# Patient Record
Sex: FEMALE | Race: WHITE | NOT HISPANIC OR LATINO | URBAN - METROPOLITAN AREA
[De-identification: names, ages, dates, MRNs, and addresses within clinical notes are randomized per-mention and may not be internally consistent; named-entity substitution may affect disease eponyms.]

---

## 2017-05-11 ENCOUNTER — APPOINTMENT (RX ONLY)
Dept: URBAN - METROPOLITAN AREA CLINIC 349 | Facility: CLINIC | Age: 72
Setting detail: DERMATOLOGY
End: 2017-05-11

## 2017-05-11 DIAGNOSIS — L81.4 OTHER MELANIN HYPERPIGMENTATION: ICD-10-CM | Status: IMPROVED

## 2017-05-11 DIAGNOSIS — D22 MELANOCYTIC NEVI: ICD-10-CM | Status: STABLE

## 2017-05-11 DIAGNOSIS — F42.4 EXCORIATION (SKIN-PICKING) DISORDER: ICD-10-CM

## 2017-05-11 DIAGNOSIS — L28.1 PRURIGO NODULARIS: ICD-10-CM

## 2017-05-11 DIAGNOSIS — D485 NEOPLASM OF UNCERTAIN BEHAVIOR OF SKIN: ICD-10-CM

## 2017-05-11 DIAGNOSIS — B07.8 OTHER VIRAL WARTS: ICD-10-CM

## 2017-05-11 DIAGNOSIS — L21.8 OTHER SEBORRHEIC DERMATITIS: ICD-10-CM | Status: STABLE

## 2017-05-11 PROBLEM — D22.5 MELANOCYTIC NEVI OF TRUNK: Status: ACTIVE | Noted: 2017-05-11

## 2017-05-11 PROBLEM — D22.62 MELANOCYTIC NEVI OF LEFT UPPER LIMB, INCLUDING SHOULDER: Status: ACTIVE | Noted: 2017-05-11

## 2017-05-11 PROBLEM — D22.61 MELANOCYTIC NEVI OF RIGHT UPPER LIMB, INCLUDING SHOULDER: Status: ACTIVE | Noted: 2017-05-11

## 2017-05-11 PROBLEM — E03.9 HYPOTHYROIDISM, UNSPECIFIED: Status: ACTIVE | Noted: 2017-05-11

## 2017-05-11 PROBLEM — D22.39 MELANOCYTIC NEVI OF OTHER PARTS OF FACE: Status: ACTIVE | Noted: 2017-05-11

## 2017-05-11 PROBLEM — L29.8 OTHER PRURITUS: Status: ACTIVE | Noted: 2017-05-11

## 2017-05-11 PROBLEM — J30.1 ALLERGIC RHINITIS DUE TO POLLEN: Status: ACTIVE | Noted: 2017-05-11

## 2017-05-11 PROBLEM — K21.9 GASTRO-ESOPHAGEAL REFLUX DISEASE WITHOUT ESOPHAGITIS: Status: ACTIVE | Noted: 2017-05-11

## 2017-05-11 PROBLEM — F32.9 MAJOR DEPRESSIVE DISORDER, SINGLE EPISODE, UNSPECIFIED: Status: ACTIVE | Noted: 2017-05-11

## 2017-05-11 PROBLEM — D22.72 MELANOCYTIC NEVI OF LEFT LOWER LIMB, INCLUDING HIP: Status: ACTIVE | Noted: 2017-05-11

## 2017-05-11 PROBLEM — L85.3 XEROSIS CUTIS: Status: ACTIVE | Noted: 2017-05-11

## 2017-05-11 PROBLEM — I10 ESSENTIAL (PRIMARY) HYPERTENSION: Status: ACTIVE | Noted: 2017-05-11

## 2017-05-11 PROBLEM — S70.922A UNSPECIFIED SUPERFICIAL INJURY OF LEFT THIGH, INITIAL ENCOUNTER: Status: ACTIVE | Noted: 2017-05-11

## 2017-05-11 PROBLEM — D48.5 NEOPLASM OF UNCERTAIN BEHAVIOR OF SKIN: Status: ACTIVE | Noted: 2017-05-11

## 2017-05-11 PROBLEM — S70.921A UNSPECIFIED SUPERFICIAL INJURY OF RIGHT THIGH, INITIAL ENCOUNTER: Status: ACTIVE | Noted: 2017-05-11

## 2017-05-11 PROBLEM — D22.71 MELANOCYTIC NEVI OF RIGHT LOWER LIMB, INCLUDING HIP: Status: ACTIVE | Noted: 2017-05-11

## 2017-05-11 PROCEDURE — ? COUNSELING

## 2017-05-11 PROCEDURE — ? OTHER

## 2017-05-11 PROCEDURE — ? LIQUID NITROGEN

## 2017-05-11 PROCEDURE — ? PRESCRIPTION

## 2017-05-11 PROCEDURE — 99214 OFFICE O/P EST MOD 30 MIN: CPT | Mod: 25

## 2017-05-11 PROCEDURE — 17110 DESTRUCTION B9 LES UP TO 14: CPT

## 2017-05-11 PROCEDURE — ? MEDICAL PHOTOGRAPHY REVIEW

## 2017-05-11 PROCEDURE — ? OBSERVATION

## 2017-05-11 RX ORDER — CLOBETASOL PROPIONATE 0.05 %
SHAMPOO TOPICAL
Qty: 1 | Refills: 2 | Status: ERX

## 2017-05-11 ASSESSMENT — LOCATION DETAILED DESCRIPTION DERM
LOCATION DETAILED: LEFT PROXIMAL POSTERIOR THIGH
LOCATION DETAILED: LEFT DISTAL POSTERIOR THIGH
LOCATION DETAILED: LEFT DISTAL DORSAL FOREARM
LOCATION DETAILED: NASAL SUPRATIP
LOCATION DETAILED: LEFT SUPERIOR PARIETAL SCALP
LOCATION DETAILED: RIGHT PROXIMAL POSTERIOR THIGH
LOCATION DETAILED: RIGHT DISTAL RADIAL DORSAL FOREARM
LOCATION DETAILED: RIGHT DISTAL PALMAR INDEX FINGER
LOCATION DETAILED: RIGHT PROXIMAL LATERAL POSTERIOR THIGH
LOCATION DETAILED: LEFT INFERIOR MEDIAL UPPER BACK
LOCATION DETAILED: NASAL TIP
LOCATION DETAILED: LEFT SUPERIOR MEDIAL FOREHEAD

## 2017-05-11 ASSESSMENT — LOCATION ZONE DERM
LOCATION ZONE: SCALP
LOCATION ZONE: FACE
LOCATION ZONE: LEG
LOCATION ZONE: FINGER
LOCATION ZONE: NOSE
LOCATION ZONE: ARM
LOCATION ZONE: TRUNK

## 2017-05-11 ASSESSMENT — LOCATION SIMPLE DESCRIPTION DERM
LOCATION SIMPLE: NOSE
LOCATION SIMPLE: LEFT FOREARM
LOCATION SIMPLE: RIGHT POSTERIOR THIGH
LOCATION SIMPLE: RIGHT FOREARM
LOCATION SIMPLE: LEFT UPPER BACK
LOCATION SIMPLE: SCALP
LOCATION SIMPLE: LEFT FOREHEAD
LOCATION SIMPLE: RIGHT INDEX FINGER
LOCATION SIMPLE: LEFT POSTERIOR THIGH

## 2017-05-11 NOTE — PROCEDURE: LIQUID NITROGEN
Consent: The patient's consent was obtained including but not limited to risks of crusting, scabbing, blistering, scarring, darker or lighter pigmentary change, recurrence, incomplete removal and infection.
Include Z78.9 (Other Specified Conditions Influencing Health Status) As An Associated Diagnosis?: Yes
Add 52 Modifier (Optional): no
Post-Care Instructions: I reviewed with the patient in detail post-care instructions. Patient is to wear sunprotection, and avoid picking at any of the treated lesions. Pt may apply Vaseline to crusted or scabbing areas.
Medical Necessity Information: It is in your best interest to select a reason for this procedure from the list below. All of these items fulfill various CMS LCD requirements except the new and changing color options.
Medical Necessity Clause: This procedure was medically necessary because the lesions that were treated were:
Detail Level: Detailed

## 2017-05-11 NOTE — PROCEDURE: OTHER
Other (Free Text): Patient was advised to treat this site with aldara due to bleeding, patient did not treat this lesion due to going out of town, after that lesion has remained stable, and has not bled. Advised patient if this happens again to begin aldara treatment
Note Text (......Xxx Chief Complaint.): This diagnosis correlates with the
Detail Level: Detailed

## 2017-08-13 ENCOUNTER — APPOINTMENT (OUTPATIENT)
Dept: GENERAL RADIOLOGY | Age: 72
End: 2017-08-13
Attending: EMERGENCY MEDICINE
Payer: MEDICARE

## 2017-08-13 ENCOUNTER — HOSPITAL ENCOUNTER (OUTPATIENT)
Age: 72
Setting detail: OBSERVATION
Discharge: HOME | End: 2017-08-14
Attending: EMERGENCY MEDICINE | Admitting: INTERNAL MEDICINE
Payer: MEDICARE

## 2017-08-13 DIAGNOSIS — I48.91 ATRIAL FIBRILLATION, UNSPECIFIED TYPE (HCC): Primary | ICD-10-CM

## 2017-08-13 PROBLEM — I48.0 PAROXYSMAL ATRIAL FIBRILLATION (HCC): Status: ACTIVE | Noted: 2017-08-13

## 2017-08-13 LAB
ALBUMIN SERPL BCP-MCNC: 3.4 G/DL (ref 3.2–4.6)
ALBUMIN/GLOB SERPL: 1 {RATIO} (ref 1.2–3.5)
ALP SERPL-CCNC: 96 U/L (ref 50–136)
ALT SERPL-CCNC: 15 U/L (ref 12–65)
ANION GAP BLD CALC-SCNC: 9 MMOL/L (ref 7–16)
AST SERPL W P-5'-P-CCNC: 16 U/L (ref 15–37)
BASOPHILS # BLD AUTO: 0 K/UL (ref 0–0.2)
BASOPHILS # BLD: 0 % (ref 0–2)
BILIRUB SERPL-MCNC: 0.7 MG/DL (ref 0.2–1.1)
BUN SERPL-MCNC: 13 MG/DL (ref 8–23)
CALCIUM SERPL-MCNC: 8.7 MG/DL (ref 8.3–10.4)
CHLORIDE SERPL-SCNC: 108 MMOL/L (ref 98–107)
CO2 SERPL-SCNC: 27 MMOL/L (ref 21–32)
CREAT SERPL-MCNC: 0.64 MG/DL (ref 0.6–1)
DIFFERENTIAL METHOD BLD: ABNORMAL
EOSINOPHIL # BLD: 0 K/UL (ref 0–0.8)
EOSINOPHIL NFR BLD: 0 % (ref 0.5–7.8)
ERYTHROCYTE [DISTWIDTH] IN BLOOD BY AUTOMATED COUNT: 13.3 % (ref 11.9–14.6)
GLOBULIN SER CALC-MCNC: 3.4 G/DL (ref 2.3–3.5)
GLUCOSE SERPL-MCNC: 106 MG/DL (ref 65–100)
HCT VFR BLD AUTO: 43.3 % (ref 35.8–46.3)
HGB BLD-MCNC: 15.2 G/DL (ref 11.7–15.4)
IMM GRANULOCYTES # BLD: 0 K/UL (ref 0–0.5)
IMM GRANULOCYTES NFR BLD AUTO: 0.1 % (ref 0–5)
LYMPHOCYTES # BLD AUTO: 20 % (ref 13–44)
LYMPHOCYTES # BLD: 1.5 K/UL (ref 0.5–4.6)
MAGNESIUM SERPL-MCNC: 2.2 MG/DL (ref 1.8–2.4)
MCH RBC QN AUTO: 30.5 PG (ref 26.1–32.9)
MCHC RBC AUTO-ENTMCNC: 35.1 G/DL (ref 31.4–35)
MCV RBC AUTO: 86.9 FL (ref 79.6–97.8)
MONOCYTES # BLD: 0.6 K/UL (ref 0.1–1.3)
MONOCYTES NFR BLD AUTO: 7 % (ref 4–12)
NEUTS SEG # BLD: 5.6 K/UL (ref 1.7–8.2)
NEUTS SEG NFR BLD AUTO: 73 % (ref 43–78)
PHOSPHATE SERPL-MCNC: 2.8 MG/DL (ref 2.3–3.7)
PLATELET # BLD AUTO: 324 K/UL (ref 150–450)
PMV BLD AUTO: 12.4 FL (ref 10.8–14.1)
POTASSIUM SERPL-SCNC: 3.8 MMOL/L (ref 3.5–5.1)
PROT SERPL-MCNC: 6.8 G/DL (ref 6.3–8.2)
RBC # BLD AUTO: 4.98 M/UL (ref 4.05–5.25)
SODIUM SERPL-SCNC: 144 MMOL/L (ref 136–145)
TROPONIN I BLD-MCNC: 0.02 NG/ML (ref 0–0.08)
TROPONIN I BLD-MCNC: 0.03 NG/ML (ref 0–0.08)
TROPONIN I BLD-MCNC: 0.05 NG/ML (ref 0–0.08)
WBC # BLD AUTO: 7.8 K/UL (ref 4.3–11.1)

## 2017-08-13 PROCEDURE — 84100 ASSAY OF PHOSPHORUS: CPT | Performed by: EMERGENCY MEDICINE

## 2017-08-13 PROCEDURE — 87641 MR-STAPH DNA AMP PROBE: CPT | Performed by: INTERNAL MEDICINE

## 2017-08-13 PROCEDURE — 83735 ASSAY OF MAGNESIUM: CPT | Performed by: EMERGENCY MEDICINE

## 2017-08-13 PROCEDURE — 85025 COMPLETE CBC W/AUTO DIFF WBC: CPT | Performed by: EMERGENCY MEDICINE

## 2017-08-13 PROCEDURE — 99285 EMERGENCY DEPT VISIT HI MDM: CPT | Performed by: EMERGENCY MEDICINE

## 2017-08-13 PROCEDURE — 74011250637 HC RX REV CODE- 250/637: Performed by: INTERNAL MEDICINE

## 2017-08-13 PROCEDURE — 80053 COMPREHEN METABOLIC PANEL: CPT | Performed by: EMERGENCY MEDICINE

## 2017-08-13 PROCEDURE — 99218 HC RM OBSERVATION: CPT

## 2017-08-13 PROCEDURE — 71010 XR CHEST PORT: CPT

## 2017-08-13 PROCEDURE — 84484 ASSAY OF TROPONIN QUANT: CPT

## 2017-08-13 PROCEDURE — 93005 ELECTROCARDIOGRAM TRACING: CPT | Performed by: EMERGENCY MEDICINE

## 2017-08-13 RX ORDER — SODIUM CHLORIDE 0.9 % (FLUSH) 0.9 %
5-10 SYRINGE (ML) INJECTION EVERY 8 HOURS
Status: DISCONTINUED | OUTPATIENT
Start: 2017-08-13 | End: 2017-08-14 | Stop reason: HOSPADM

## 2017-08-13 RX ORDER — ESTRADIOL 1 MG/1
1 TABLET ORAL DAILY
Status: DISCONTINUED | OUTPATIENT
Start: 2017-08-14 | End: 2017-08-14 | Stop reason: HOSPADM

## 2017-08-13 RX ORDER — SODIUM CHLORIDE 0.9 % (FLUSH) 0.9 %
5-10 SYRINGE (ML) INJECTION AS NEEDED
Status: DISCONTINUED | OUTPATIENT
Start: 2017-08-13 | End: 2017-08-13

## 2017-08-13 RX ORDER — SODIUM CHLORIDE 0.9 % (FLUSH) 0.9 %
5-10 SYRINGE (ML) INJECTION AS NEEDED
Status: DISCONTINUED | OUTPATIENT
Start: 2017-08-13 | End: 2017-08-14 | Stop reason: HOSPADM

## 2017-08-13 RX ORDER — ACETAMINOPHEN, DIPHENHYDRAMINE HCL, PHENYLEPHRINE HCL 325; 25; 5 MG/1; MG/1; MG/1
10 TABLET ORAL
Status: DISCONTINUED | OUTPATIENT
Start: 2017-08-13 | End: 2017-08-14 | Stop reason: HOSPADM

## 2017-08-13 RX ORDER — PANTOPRAZOLE SODIUM 40 MG/1
40 TABLET, DELAYED RELEASE ORAL
Status: DISCONTINUED | OUTPATIENT
Start: 2017-08-14 | End: 2017-08-14 | Stop reason: HOSPADM

## 2017-08-13 RX ORDER — LEVOTHYROXINE SODIUM 100 UG/1
100 TABLET ORAL
Status: DISCONTINUED | OUTPATIENT
Start: 2017-08-14 | End: 2017-08-14 | Stop reason: HOSPADM

## 2017-08-13 RX ORDER — SODIUM CHLORIDE 0.9 % (FLUSH) 0.9 %
5-10 SYRINGE (ML) INJECTION EVERY 8 HOURS
Status: DISCONTINUED | OUTPATIENT
Start: 2017-08-13 | End: 2017-08-13

## 2017-08-13 RX ORDER — METOPROLOL TARTRATE 25 MG/1
12.5 TABLET, FILM COATED ORAL EVERY 12 HOURS
Status: DISCONTINUED | OUTPATIENT
Start: 2017-08-13 | End: 2017-08-14 | Stop reason: HOSPADM

## 2017-08-13 RX ORDER — FLECAINIDE ACETATE 100 MG/1
50 TABLET ORAL EVERY 12 HOURS
Status: DISCONTINUED | OUTPATIENT
Start: 2017-08-13 | End: 2017-08-14 | Stop reason: HOSPADM

## 2017-08-13 RX ORDER — DULOXETIN HYDROCHLORIDE 30 MG/1
30 CAPSULE, DELAYED RELEASE ORAL DAILY
Status: DISCONTINUED | OUTPATIENT
Start: 2017-08-14 | End: 2017-08-14 | Stop reason: HOSPADM

## 2017-08-13 RX ADMIN — FLECAINIDE ACETATE 50 MG: 100 TABLET ORAL at 22:09

## 2017-08-13 RX ADMIN — RIVAROXABAN 20 MG: 20 TABLET, FILM COATED ORAL at 22:09

## 2017-08-13 RX ADMIN — Medication 10 ML: at 22:10

## 2017-08-13 RX ADMIN — METOPROLOL TARTRATE 12.5 MG: 25 TABLET ORAL at 22:10

## 2017-08-13 NOTE — ED PROVIDER NOTES
HPI Comments: Patient is a 72 yo female with lightheadedness and SOB. Patient states she has felt some intermittent dizziness and palpitations since early this morning. States she got up to go to the bathroom and passed out. When EMS arrived she was in afib with rate in the 130s however was given dilt 20 then 25mg and converted shortly after arriving back to sinus. She denies any chest pain,  Or SOB at this time, however states she felt extremely \"clammy\" . Denies any fevers or chills, no abdominal pain, no headache or neck pain, no further complaints. Patient is a 71 y.o. female presenting with rapid heart beat and dizziness. The history is provided by the patient. No  was used. Rapid Heart Rate   This is a new problem. Pertinent negatives include no chest pain, no abdominal pain, no headaches and no shortness of breath. Dizziness   Pertinent negatives include no shortness of breath, no chest pain, no vomiting, no headaches and no nausea.         Past Medical History:   Diagnosis Date    Anxiety     Arthritis     Bell's palsy     in Oct. 2009 with some vertigo at times still, no other after effects    Depression     GERD (gastroesophageal reflux disease)     reflux, takes nexium    Hypertension     on medication since 2006    Hypothyroidism     Obese     Psychiatric disorder     depression    S/P Left total knee replacement using cement 5/16/2011    Unspecified hypothyroidism 5/16/2011       Past Surgical History:   Procedure Laterality Date    HX APPENDECTOMY  1961    HX BREAST LUMPECTOMY  1998    benign    HX CATARACT REMOVAL Bilateral 2014    HX CHOLECYSTECTOMY  1976    HX GASTRECTOMY  9/21/15    sleeve    HX HYSTERECTOMY  1987    HX ORTHOPAEDIC Bilateral 1994    heel spurs removed    HX ORTHOPAEDIC  2005    lower back     HX TONSILLECTOMY  1966    HX UROLOGICAL  2008/2009    bladder tack X2    THYROIDECTOMY  1980    partial    TOTAL KNEE ARTHROPLASTY Right 2010    TOTAL KNEE ARTHROPLASTY Left 2011         Family History:   Problem Relation Age of Onset    Malignant Hyperthermia Neg Hx     Pseudocholinesterase Deficiency Neg Hx     Delayed Awakening Neg Hx     Post-op Nausea/Vomiting Neg Hx     Emergence Delirium Neg Hx     Post-op Cognitive Dysfunction Neg Hx     Cancer Mother      breast    Heart Attack Mother     Cancer Father      throat    Other Father      gastric ulcer    Kidney Disease Father        Social History     Social History    Marital status:      Spouse name: N/A    Number of children: N/A    Years of education: N/A     Occupational History    Not on file. Social History Main Topics    Smoking status: Never Smoker    Smokeless tobacco: Never Used    Alcohol use 1.0 oz/week     2 Glasses of wine per week    Drug use: No    Sexual activity: No     Other Topics Concern    Not on file     Social History Narrative         ALLERGIES: Dilaudid [hydromorphone (bulk)] and Sulfa (sulfonamide antibiotics)    Review of Systems   Constitutional: Negative for chills and fever. HENT: Negative for rhinorrhea and sore throat. Eyes: Negative for visual disturbance. Respiratory: Negative for cough and shortness of breath. Cardiovascular: Positive for palpitations. Negative for chest pain and leg swelling. Gastrointestinal: Negative for abdominal pain, diarrhea, nausea and vomiting. Genitourinary: Negative for dysuria. Musculoskeletal: Negative for back pain and neck pain. Skin: Negative for rash. Neurological: Positive for dizziness. Negative for weakness and headaches. Psychiatric/Behavioral: The patient is not nervous/anxious. There were no vitals filed for this visit. Physical Exam   Constitutional: She is oriented to person, place, and time. She appears well-developed and well-nourished. HENT:   Head: Normocephalic.    Right Ear: External ear normal.   Left Ear: External ear normal.   Eyes: Conjunctivae and EOM are normal. Pupils are equal, round, and reactive to light. Neck: Normal range of motion. Neck supple. No tracheal deviation present. Cardiovascular: Normal rate, regular rhythm, normal heart sounds and intact distal pulses. No murmur heard. Pulmonary/Chest: Effort normal and breath sounds normal. No respiratory distress. Abdominal: Soft. There is no tenderness. Musculoskeletal: Normal range of motion. Neurological: She is alert and oriented to person, place, and time. No cranial nerve deficit. Skin: No rash noted. Nursing note and vitals reviewed. MDM  Number of Diagnoses or Management Options     Amount and/or Complexity of Data Reviewed  Clinical lab tests: reviewed and ordered  Tests in the radiology section of CPT®: ordered and reviewed  Tests in the medicine section of CPT®: ordered and reviewed  Review and summarize past medical records: yes    Risk of Complications, Morbidity, and/or Mortality  Presenting problems: high  Diagnostic procedures: high  Management options: high    Patient Progress  Patient progress: stable    ED Course       Procedures    Recent Results (from the past 12 hour(s))   EKG, 12 LEAD, INITIAL    Collection Time: 08/13/17  3:03 PM   Result Value Ref Range    Ventricular Rate 85 BPM    Atrial Rate 85 BPM    P-R Interval 170 ms    QRS Duration 88 ms    Q-T Interval 390 ms    QTC Calculation (Bezet) 464 ms    Calculated P Axis 68 degrees    Calculated R Axis 42 degrees    Calculated T Axis 91 degrees    Diagnosis       !! AGE AND GENDER SPECIFIC ECG ANALYSIS !!   Normal sinus rhythm  Septal infarct , age undetermined  Abnormal ECG  When compared with ECG of 15-MAR-2010 13:30,  Septal infarct is now Present     CBC WITH AUTOMATED DIFF    Collection Time: 08/13/17  3:09 PM   Result Value Ref Range    WBC 7.8 4.3 - 11.1 K/uL    RBC 4.98 4.05 - 5.25 M/uL    HGB 15.2 11.7 - 15.4 g/dL    HCT 43.3 35.8 - 46.3 %    MCV 86.9 79.6 - 97.8 FL    MCH 30.5 26.1 - 32.9 PG    MCHC 35.1 (H) 31.4 - 35.0 g/dL    RDW 13.3 11.9 - 14.6 %    PLATELET 752 511 - 462 K/uL    MPV 12.4 10.8 - 14.1 FL    DF AUTOMATED      NEUTROPHILS 73 43 - 78 %    LYMPHOCYTES 20 13 - 44 %    MONOCYTES 7 4.0 - 12.0 %    EOSINOPHILS 0 (L) 0.5 - 7.8 %    BASOPHILS 0 0.0 - 2.0 %    IMMATURE GRANULOCYTES 0.1 0.0 - 5.0 %    ABS. NEUTROPHILS 5.6 1.7 - 8.2 K/UL    ABS. LYMPHOCYTES 1.5 0.5 - 4.6 K/UL    ABS. MONOCYTES 0.6 0.1 - 1.3 K/UL    ABS. EOSINOPHILS 0.0 0.0 - 0.8 K/UL    ABS. BASOPHILS 0.0 0.0 - 0.2 K/UL    ABS. IMM. GRANS. 0.0 0.0 - 0.5 K/UL   POC TROPONIN-I    Collection Time: 08/13/17  3:18 PM   Result Value Ref Range    Troponin-I (POC) 0.02 0.0 - 0.08 ng/ml   MAGNESIUM    Collection Time: 08/13/17  5:04 PM   Result Value Ref Range    Magnesium 2.2 1.8 - 2.4 mg/dL   METABOLIC PANEL, COMPREHENSIVE    Collection Time: 08/13/17  5:04 PM   Result Value Ref Range    Sodium 144 136 - 145 mmol/L    Potassium 3.8 3.5 - 5.1 mmol/L    Chloride 108 (H) 98 - 107 mmol/L    CO2 27 21 - 32 mmol/L    Anion gap 9 7 - 16 mmol/L    Glucose 106 (H) 65 - 100 mg/dL    BUN 13 8 - 23 MG/DL    Creatinine 0.64 0.6 - 1.0 MG/DL    GFR est AA >60 >60 ml/min/1.73m2    GFR est non-AA >60 >60 ml/min/1.73m2    Calcium 8.7 8.3 - 10.4 MG/DL    Bilirubin, total 0.7 0.2 - 1.1 MG/DL    ALT (SGPT) 15 12 - 65 U/L    AST (SGOT) 16 15 - 37 U/L    Alk.  phosphatase 96 50 - 136 U/L    Protein, total 6.8 6.3 - 8.2 g/dL    Albumin 3.4 3.2 - 4.6 g/dL    Globulin 3.4 2.3 - 3.5 g/dL    A-G Ratio 1.0 (L) 1.2 - 3.5     PHOSPHORUS    Collection Time: 08/13/17  5:04 PM   Result Value Ref Range    Phosphorus 2.8 2.3 - 3.7 MG/DL   POC TROPONIN-I    Collection Time: 08/13/17  5:55 PM   Result Value Ref Range    Troponin-I (POC) 0.03 0.0 - 0.08 ng/ml   POC TROPONIN-I    Collection Time: 08/13/17  6:35 PM   Result Value Ref Range    Troponin-I (POC) 0.05 0.0 - 0.08 ng/ml     Xr Chest Port    Result Date: 8/13/2017  HISTORY: Dizziness, new onset atrial fibrillation. EXAM: AP chest radiograph PRIOR STUDY: None FINDINGS: The cardiomediastinal silhouette is normal. The lungs are clear. There is no pleural effusion or pneumothorax. IMPRESSION: No acute cardiopulmonary process. 72 yo female with new onset Afib:     Troponin trending up although still within normal limits at this time. Discussed with cardiology for evaluation and possible Echo for further management.

## 2017-08-13 NOTE — IP AVS SNAPSHOT
[address] 
[phone] Patient: Yash Block MRN: [id number]:[id number] You are allergic to the following Allergen Reactions Dilaudid (Hydromorphone (Bulk)) Swelling Sulfa (Sulfonamide Antibiotics) Rash Recent Documentation Height Weight BMI OB Status Smoking Status 1.676 m 84.1 kg 29.92 kg/m2 Hysterectomy Never Smoker Unresulted Labs Order Current Status MRSA SCREEN - PCR (NASAL) In process Emergency Contacts Name Discharge Info Relation Home Work Mobile Neno Espitia  Spouse [3] [phone] About your hospitalization You were admitted on:  August 13, 2017 You last received care in the:  [facility] 3 TELEMETRY You were discharged on:  August 14, 2017 Unit phone number:  [phone] Why you were hospitalized Your primary diagnosis was:  Not on File Your diagnoses also included:  Paroxysmal Atrial Fibrillation (Hcc), Htn (Hypertension), A-Fib (Hcc) Providers Seen During Your Hospitalizations Provider Role Specialty Primary office phone David Charles MD Attending Provider Emergency Medicine [phone] Wayne Guajardo MD Attending Provider Emergency Medicine [phone] Jj Amado MD Attending Provider Cardiology [phone] Your Primary Care Physician (PCP) Primary Care Physician Office Phone Office Fax [address] [phone] Follow-up Information Follow up With Details Comments Contact Info Stephen Ruby MD On 8/28/2017 At 1:30pm [address] 
[phone] Jj Amado MD On 8/30/2017 at 9:30 AM [address] [address] 
[phone] Your Appointments Monday August 28, 2017  1:30 PM EDT Office Visit with Stephen Ruby MD  
 [address] [facility] ([facility] [address] [facility]) 101 [facility] ([facility]) [address]  
[phone] Wednesday August 30, 2017  9:30 AM EDT HOSPITAL FOLLOW-UP with Jaylen Latif MD  
[place] OFFICE ([facility]) [address]  
[phone] Thursday September 21, 2017  1:40 PM EDT Follow Up Bariatric with Vee Beck MD  
[facility] ([address] [facility]) [address] [place] [id number] [phone] Current Discharge Medication List  
  
START taking these medications Dose & Instructions Dispensing Information Comments Morning Noon Evening Bedtime  
 flecainide 50 mg tablet Commonly known as:  TAMBOCOR Your next dose is: Tonight before bedtime Dose:  50 mg Take 1 Tab by mouth every twelve (12) hours. Quantity:  60 Tab Refills:  3  
     
   
   
   
  
  
 metoprolol tartrate 25 mg tablet Commonly known as:  LOPRESSOR Your next dose is: Tonight before bedtime Dose:  12.5 mg Take 0.5 Tabs by mouth every twelve (12) hours. Quantity:  30 Tab Refills:  3  
     
   
   
   
  
  
 rivaroxaban 20 mg Tab tablet Commonly known as:  Derral Ness Your next dose is: Today at dinner time with food Dose:  20 mg Take 1 Tab by mouth daily (with dinner). Quantity:  30 Tab Refills:  3 CONTINUE these medications which have NOT CHANGED Dose & Instructions Dispensing Information Comments Morning Noon Evening Bedtime Biotin 2,500 mcg Cap Your next dose is:  Tuesday morning Take  by mouth. Refills:  0 DULoxetine 30 mg capsule Commonly known as:  CYMBALTA Your next dose is:  Tuesday morning Dose:  60 mg Take 60 mg by mouth daily. Refills:  0 estradiol 1 mg tablet Commonly known as:  ESTRACE Your next dose is:  Tuesday morning Dose:  1 mg Take 1 mg by mouth daily. Refills:  0  
     
   
   
   
  
 levothyroxine 100 mcg tablet Commonly known as:  SYNTHROID Your next dose is:  Tuesday morning Dose:  100 mcg Take 1 Tab by mouth daily. Quantity:  90 Tab Refills:  3  
     
   
   
   
  
 melatonin 10 mg Cap Your next dose is: At bedtime Take  by mouth. Refills:  0  
     
   
   
   
  
  
 multivitamin tablet Commonly known as:  ONE A DAY Your next dose is:  Tuesday morning Dose:  1 Tab Take 1 Tab by mouth daily. Refills:  0  
     
   
   
   
  
 omeprazole 40 mg capsule Commonly known as:  PriLOSEC Your next dose is:  Tuesday morning Dose:  40 mg Take 1 Cap by mouth daily. Indications: GASTROESOPHAGEAL REFLUX Quantity:  90 Cap Refills:  3 STOP taking these medications ALLER-NILSON PO Notes to Patient:  STOP TAKING THIS MEDICATION ! ! !  
   
  
  
  
Where to Get Your Medications Information on where to get these meds will be given to you by the nurse or doctor. ! Ask your nurse or doctor about these medications  
  flecainide 50 mg tablet  
 metoprolol tartrate 25 mg tablet  
 rivaroxaban 20 mg Tab tablet Discharge Instructions Atrial Fibrillation: Care Instructions Your Care Instructions Atrial fibrillation is an irregular and often fast heartbeat. Treating this condition is important for several reasons. It can cause blood clots, which can travel from your heart to your brain and cause a stroke. If you have a fast heartbeat, you may feel lightheaded, dizzy, and weak. An irregular heartbeat can also increase your risk for heart failure. Atrial fibrillation is often the result of another heart condition, such as high blood pressure or coronary artery disease.  Making changes to improve your heart condition will help you stay healthy and active. Follow-up care is a key part of your treatment and safety. Be sure to make and go to all appointments, and call your doctor if you are having problems. It's also a good idea to know your test results and keep a list of the medicines you take. How can you care for yourself at home? Medicines · Take your medicines exactly as prescribed. Call your doctor if you think you are having a problem with your medicine. You will get more details on the specific medicines your doctor prescribes. · If your doctor has given you a blood thinner to prevent a stroke, be sure you get instructions about how to take your medicine safely. Blood thinners can cause serious bleeding problems. · Do not take any vitamins, over-the-counter drugs, or herbal products without talking to your doctor first. 
Lifestyle changes · Do not smoke. Smoking can increase your chance of a stroke and heart attack. If you need help quitting, talk to your doctor about stop-smoking programs and medicines. These can increase your chances of quitting for good. · Eat a heart-healthy diet. · Stay at a healthy weight. Lose weight if you need to. · Limit alcohol to 2 drinks a day for men and 1 drink a day for women. Too much alcohol can cause health problems. · Avoid colds and flu. Get a pneumococcal vaccine shot. If you have had one before, ask your doctor whether you need another dose. Get a flu shot every year. If you must be around people with colds or flu, wash your hands often. Activity · If your doctor recommends it, get more exercise. Walking is a good choice. Bit by bit, increase the amount you walk every day. Try for at least 30 minutes on most days of the week. You also may want to swim, bike, or do other activities. Your doctor may suggest that you join a cardiac rehabilitation program so that you can have help increasing your physical activity safely. · Start light exercise if your doctor says it is okay. Even a small amount will help you get stronger, have more energy, and manage stress. Walking is an easy way to get exercise. Start out by walking a little more than you did in the hospital. Gradually increase the amount you walk. · When you exercise, watch for signs that your heart is working too hard. You are pushing too hard if you cannot talk while you are exercising. If you become short of breath or dizzy or have chest pain, sit down and rest immediately. · Check your pulse regularly. Place two fingers on the artery at the palm side of your wrist, in line with your thumb. If your heartbeat seems uneven or fast, talk to your doctor. When should you call for help? Call 911 anytime you think you may need emergency care. For example, call if: 
· You have symptoms of a heart attack. These may include: ¨ Chest pain or pressure, or a strange feeling in the chest. 
¨ Sweating. ¨ Shortness of breath. ¨ Nausea or vomiting. ¨ Pain, pressure, or a strange feeling in the back, neck, jaw, or upper belly or in one or both shoulders or arms. ¨ Lightheadedness or sudden weakness. ¨ A fast or irregular heartbeat. After you call 911, the  may tell you to chew 1 adult-strength or 2 to 4 low-dose aspirin. Wait for an ambulance. Do not try to drive yourself. · You have symptoms of a stroke. These may include: 
¨ Sudden numbness, tingling, weakness, or loss of movement in your face, arm, or leg, especially on only one side of your body. ¨ Sudden vision changes. ¨ Sudden trouble speaking. ¨ Sudden confusion or trouble understanding simple statements. ¨ Sudden problems with walking or balance. ¨ A sudden, severe headache that is different from past headaches. · You passed out (lost consciousness). Call your doctor now or seek immediate medical care if: 
· You have new or increased shortness of breath. · You feel dizzy or lightheaded, or you feel like you may faint. · Your heart rate becomes irregular. · You can feel your heart flutter in your chest or skip heartbeats. Tell your doctor if these symptoms are new or worse. Watch closely for changes in your health, and be sure to contact your doctor if you have any problems. Where can you learn more? Go to http://tamie-nolan.info/. Enter U020 in the search box to learn more about \"Atrial Fibrillation: Care Instructions. \" Current as of: September 21, 2016 Content Version: 11.3 © 7162-0107 [employer]. Care instructions adapted under license by [employer] (which disclaims liability or warranty for this information). If you have questions about a medical condition or this instruction, always ask your healthcare professional. [address] any warranty or liability for your use of this information. Discharge Orders None ACO Transitions of Care Introducing [facility] [address] Cecy Sloan offers a voluntary care coordination program to provide high quality service and care to [facility] fee-for-service beneficiaries. Becky Kaden was designed to help you enhance your health and well-being through the following services: ? Transitions of Care  support for individuals who are transitioning from one care setting to another (example: Hospital to home). ? Chronic and Complex Care Coordination  support for individuals and caregivers of those with serious or chronic illnesses or with more than one chronic (ongoing) condition and those who take a number of different medications. If you meet specific medical criteria, a [facility] [address] may call you directly to coordinate your care with your primary care physician and your other care providers.  
 
For questions about the [facility] programs, please, contact your physicians office. For general questions or additional information about Accountable Care Organizations: 
Please visit www.medicare.gov/acos. html or call 1-800-MEDICARE (5-241.577.5532) TTY users should call 7-320.951.7509. [email or web address] Announcement We are excited to announce that we are making your provider's discharge notes available to you in [email or web address]. You will see these notes when they are completed and signed by the physician that discharged you from your recent hospital stay. If you have any questions or concerns about any information you see in Vitals ([email or web address])t, please call the Health Information Department where you were seen or reach out to your Primary Care Provider for more information about your plan of care. Introducing [facility]! Dear Salvador Johnson: 
Thank you for requesting a [employer] account. Our records indicate that you have previously registered for a [email or web address] account but its currently inactive. Please call our [employer] support line at [phone]. Additional Information If you have questions, please visit the Frequently Asked Questions section of the [email or web address] website at [email or web address] Remember, [email or web address] is NOT to be used for urgent needs. For medical emergencies, dial 911. Now available from your iPhone and Android! General Information Please provide this summary of care documentation to your next provider. Patient Signature:  ____________________________________________________________ Date:  ____________________________________________________________  
  
[facility] Provider Signature:  ____________________________________________________________ Date:  ____________________________________________________________

## 2017-08-13 NOTE — ED TRIAGE NOTES
PAtient arrives via EMS with dizziness and fall this afternoon. EMS reports upon arrival on scene, patient pale and diaphoretic with afib rate of 160s. Given an initial bolus of 20 mg of cardizem IVP which did not improve the heart rate at all. Approximately 25 min after first dose, second dose of 25 mg IVP cardizem given. Rate slowed, but patient was still in afib. Upon arrival to ED, patient now in NSR.  with EMS. Denies chest pain and shortness of breath.

## 2017-08-13 NOTE — H&P
[facility] Cardiology/Electrophyiology Consult                Date of  Admission: 8/13/2017  2:55 PM     CC/Reason for consult: atrial fibrillation with RVR    Carlee Galloway is a 71 y.o. female admitted for There are no admission diagnoses documented for this encounter. .      4FG WF with a history of HTN (off meds since gastric sleeve), no prior CAD/CHF or arrhythmia history who presents today via EMS with afib with RVR. Pt reports she had the abrupt onset of feeling very dizzy and lightheaded starting at 0900. She continued to do things around the house but was walking and had to stop because of progressively worsening dizziness/presyncope and reports \" coming to\" on the ground. It is not clear whether or not she lost consciousness. Pt denies associated chest pain, shortness of breath, palpitations, feeling fast or irregular heart rates. No recent or remote exertional symptoms, no bleeding history. EMS arrived and HRs were in the 160s with afib with RVR, received diltiazem bolus x 2 and converted back to NSR. Pt afib is a new diagnosis, no aggravating or alleviating factors, with symptoms as noted above.      Cardiac PMH: (Old records have been reviewed and summarized below)  EMS strip afib with RVR    Patient Active Problem List   Diagnosis Code    Osteoarthritis of right knee M17.11    Status post total knee replacement Z96.659    Osteoarthritis of left knee M17.12    S/P Left total knee replacement using cement Z96.659    HTN (hypertension) I10    Unspecified hypothyroidism E03.9    Depression F32.9    Esophageal reflux K21.9    Morbid obesity with BMI of 45.0-49.9, adult ([facility]) E66.01, Z68.42    Paroxysmal atrial fibrillation ([facility] [address] I48.0       Past Medical History:   Diagnosis Date    Anxiety     Arthritis     Bell's palsy     in Oct. 2009 with some vertigo at times still, no other after effects    Depression     GERD (gastroesophageal reflux disease)     reflux, takes nexium    Hypertension on medication since 2006    Hypothyroidism     Obese     Paroxysmal atrial fibrillation ([facility] [address] 8/13/2017    Psychiatric disorder     depression    S/P Left total knee replacement using cement 5/16/2011    Unspecified hypothyroidism 5/16/2011      Past Surgical History:   Procedure Laterality Date    HX APPENDECTOMY  1961    HX BREAST LUMPECTOMY  1998    benign    HX CATARACT REMOVAL Bilateral 2014    HX CHOLECYSTECTOMY  1976    HX GASTRECTOMY  9/21/15    sleeve    HX HYSTERECTOMY  1987    HX ORTHOPAEDIC Bilateral 1994    heel spurs removed    HX ORTHOPAEDIC  2005    lower back     HX TONSILLECTOMY  1966    HX UROLOGICAL  2008/2009    bladder tack X2    THYROIDECTOMY  1980    partial    TOTAL KNEE ARTHROPLASTY Right 2010    TOTAL KNEE ARTHROPLASTY Left 2011     Allergies   Allergen Reactions    Dilaudid [Hydromorphone (Bulk)] Swelling    Sulfa (Sulfonamide Antibiotics) Rash      Family History   Problem Relation Age of Onset    Malignant Hyperthermia Neg Hx     Pseudocholinesterase Deficiency Neg Hx     Delayed Awakening Neg Hx     Post-op Nausea/Vomiting Neg Hx     Emergence Delirium Neg Hx     Post-op Cognitive Dysfunction Neg Hx     Cancer Mother      breast    Heart Attack Mother     Cancer Father      throat    Other Father      gastric ulcer    Kidney Disease Father         Current Facility-Administered Medications   Medication Dose Route Frequency    sodium chloride (NS) flush 5-10 mL  5-10 mL IntraVENous Q8H    sodium chloride (NS) flush 5-10 mL  5-10 mL IntraVENous PRN     Current Outpatient Prescriptions   Medication Sig    estradiol (ESTRACE) 1 mg tablet Take 1 mg by mouth daily.  omeprazole (PRILOSEC) 40 mg capsule Take 1 Cap by mouth daily. Indications: GASTROESOPHAGEAL REFLUX    levothyroxine (SYNTHROID) 100 mcg tablet Take 1 Tab by mouth daily.  melatonin 10 mg cap Take  by mouth.  Biotin 2,500 mcg cap Take  by mouth.     multivitamin (ONE A DAY) tablet Take 1 Tab by mouth daily.  DULoxetine (CYMBALTA) 30 mg capsule Take 30 mg by mouth daily.  CETIRIZINE HCL (ALLER-NILSON PO) Take 10 mg by mouth daily. Patient instructed to take morning of surgery per anesthesia guidelines       Review of Symptoms:  A comprehensive ROS was performed with the pertinent positives and negatives mentioned in the HPI, all other systems reviewed and are negative       Physical Exam  Vitals:    08/13/17 1655 08/13/17 1730   BP: 137/65 123/64   Pulse: 76 68   Resp: 15 16   SpO2: 100% 100%       Physical Exam:  General appearance - Alert, well appearing, and in no distress   Mental status - Affect appropriate to mood. Eyes - Sclerae anicteric,  ENMT - Hearing grossly normal bilaterally, Dental hygiene good. Neck - Carotids upstroke normal bilaterally, no bruits, no JVD. Resp - Clear to auscultation, no wheezes, rales or rhonchi, symmetric air entry. Heart - Normal rate, regular rhythm, distant S1, S2, no murmurs, rubs, clicks or gallops. GI - Soft, nontender, nondistended, no masses or organomegaly. Neurological - Grossly intact - normal speech, no focal findings  Musculoskeletal - No joint tenderness, deformity or swelling, no muscular tenderness noted. Extremities - Peripheral pulses normal, no pedal edema, no clubbing or cyanosis. Skin - Normal coloration and turgor. Psych -  oriented to person, place, and time. Cardiographics    Telemetry:   ECG (Indpendently visualized and interpreted): NSR, PRWP  Echocardiogram:     Labs:   Recent Labs      08/13/17   1704  08/13/17   1509   NA  144   --    K  3.8   --    MG  2.2   --    BUN  13   --    CREA  0.64   --    GLU  106*   --    WBC   --   7.8   HGB   --   15.2   HCT   --   43.3   PLT   --   324        Assessment:      Active Problems:    HTN (hypertension) (5/16/2011)      Paroxysmal atrial fibrillation (HCC) (8/13/2017)       Plan:   1.  Paroxysmal atrial fibrillation, new diagnosis: Pt presents with symptomatic afib with RVR, now converted to NSR and feels back to baseline. I had a discussion with the Pt today regarding rate and rhythm control strategies, rhythm control strategy treatment options including antiarrhythmic therapy. As patient was highly symptomatic with afib, will plan for a rhythm control strategy. Will initiate metoprolol 12.5mg Q12H and flecainide 50mg Q12H. Check ECG in the AM. TTE in the AM.    2. CVA protection: I had a discussion with the patient today regarding the increased risk of stroke in the setting of atrial fibrillation. We discussed the patient's individual risk factors for stroke (guided by the CHADSVasc risk score including CHF, HTN, age (>71 and >76), stroke and vascular disease (CAD, peripheral arterial disease)) and the risks, benefits, and alternatives to anticoagulation. We discussed the available therapies including warfarin/coumadin versus the novel oral anticoagulants (including eliquis, xarelto, and pradaxa). The main risk with all available therapies is the increased risk of bleeding. This risk is weighed against the increased risk for stroke, and the evidence these medications reduce the overall risk of stroke. After discussion, the decision was made to initiate anticoagulation to reduce the risks of cardioembolic events in the setting of atrial fibrillation. Will start xarelto 20mg with dinner. 3. Syncope: unclear whether patient lost consciousness, etiology likely secondary to afib with RVR, telemetry overnight. 4. HTN, controlled: blood pressures OK in ER, starting metoprolol as noted above    5. Morbid obesity: improved post gastric sleeve, consider IGNACIA evaluation as outpatient. 6. PPX: on xarelto    7. Full code    Abiola Kyle MD, MS  Cardiology/Electrophysiology

## 2017-08-13 NOTE — IP AVS SNAPSHOT
Armin Villarreal 
 
 
 [address] [facility] [address] [facility] 
[phone] Patient: Rere Nance MRN: [id number] Current Discharge Medication List  
  
START taking these medications Dose & Instructions Dispensing Information Comments Morning Noon Evening Bedtime  
 flecainide 50 mg tablet Commonly known as:  TAMBOCOR Your next dose is: Tonight before bedtime Dose:  50 mg Take 1 Tab by mouth every twelve (12) hours. Quantity:  60 Tab Refills:  3  
     
   
   
   
  
  
 metoprolol tartrate 25 mg tablet Commonly known as:  LOPRESSOR Your next dose is: Tonight before bedtime Dose:  12.5 mg Take 0.5 Tabs by mouth every twelve (12) hours. Quantity:  30 Tab Refills:  3  
     
   
   
   
  
  
 rivaroxaban 20 mg Tab tablet Commonly known as:  Felicie Galeas Your next dose is: Today at dinner time with food Dose:  20 mg Take 1 Tab by mouth daily (with dinner). Quantity:  30 Tab Refills:  3 CONTINUE these medications which have NOT CHANGED Dose & Instructions Dispensing Information Comments Morning Noon Evening Bedtime Biotin 2,500 mcg Cap Your next dose is:  Tuesday morning Take  by mouth. Refills:  0 DULoxetine 30 mg capsule Commonly known as:  CYMBALTA Your next dose is:  Tuesday morning Dose:  60 mg Take 60 mg by mouth daily. Refills:  0  
     
   
   
   
  
 estradiol 1 mg tablet Commonly known as:  ESTRACE Your next dose is:  Tuesday morning Dose:  1 mg Take 1 mg by mouth daily. Refills:  0  
     
   
   
   
  
 levothyroxine 100 mcg tablet Commonly known as:  SYNTHROID Your next dose is:  Tuesday morning Dose:  100 mcg Take 1 Tab by mouth daily. Quantity:  90 Tab Refills:  3  
     
   
   
   
  
 melatonin 10 mg Cap Your next dose is: At bedtime Take  by mouth. Refills:  0  
     
   
   
   
  
  
 multivitamin tablet Commonly known as:  ONE A DAY Your next dose is:  Tuesday morning Dose:  1 Tab Take 1 Tab by mouth daily. Refills:  0  
     
   
   
   
  
 omeprazole 40 mg capsule Commonly known as:  PriLOSEC Your next dose is:  Tuesday morning Dose:  40 mg Take 1 Cap by mouth daily. Indications: GASTROESOPHAGEAL REFLUX Quantity:  90 Cap Refills:  3 STOP taking these medications ALLER-NILSON PO Notes to Patient:  STOP TAKING THIS MEDICATION ! ! !  
   
  
  
  
Where to Get Your Medications Information on where to get these meds will be given to you by the nurse or doctor. ! Ask your nurse or doctor about these medications  
  flecainide 50 mg tablet  
 metoprolol tartrate 25 mg tablet  
 rivaroxaban 20 mg Tab tablet

## 2017-08-14 VITALS
HEART RATE: 67 BPM | WEIGHT: 185.4 LBS | SYSTOLIC BLOOD PRESSURE: 183 MMHG | OXYGEN SATURATION: 97 % | TEMPERATURE: 98.4 F | RESPIRATION RATE: 18 BRPM | BODY MASS INDEX: 29.8 KG/M2 | DIASTOLIC BLOOD PRESSURE: 76 MMHG | HEIGHT: 66 IN

## 2017-08-14 LAB
ATRIAL RATE: 56 BPM
ATRIAL RATE: 85 BPM
BACTERIA SPEC CULT: NORMAL
CALCULATED P AXIS, ECG09: 19 DEGREES
CALCULATED P AXIS, ECG09: 68 DEGREES
CALCULATED R AXIS, ECG10: 22 DEGREES
CALCULATED R AXIS, ECG10: 42 DEGREES
CALCULATED T AXIS, ECG11: 102 DEGREES
CALCULATED T AXIS, ECG11: 91 DEGREES
DIAGNOSIS, 93000: NORMAL
DIAGNOSIS, 93000: NORMAL
MAGNESIUM SERPL-MCNC: 2.3 MG/DL (ref 1.8–2.4)
P-R INTERVAL, ECG05: 168 MS
P-R INTERVAL, ECG05: 170 MS
Q-T INTERVAL, ECG07: 390 MS
Q-T INTERVAL, ECG07: 444 MS
QRS DURATION, ECG06: 88 MS
QRS DURATION, ECG06: 92 MS
QTC CALCULATION (BEZET), ECG08: 428 MS
QTC CALCULATION (BEZET), ECG08: 464 MS
SERVICE CMNT-IMP: NORMAL
TSH SERPL DL<=0.005 MIU/L-ACNC: 1.5 UIU/ML (ref 0.36–3.74)
VENTRICULAR RATE, ECG03: 56 BPM
VENTRICULAR RATE, ECG03: 85 BPM

## 2017-08-14 PROCEDURE — 83735 ASSAY OF MAGNESIUM: CPT | Performed by: INTERNAL MEDICINE

## 2017-08-14 PROCEDURE — 74011250637 HC RX REV CODE- 250/637: Performed by: INTERNAL MEDICINE

## 2017-08-14 PROCEDURE — 74011250636 HC RX REV CODE- 250/636: Performed by: INTERNAL MEDICINE

## 2017-08-14 PROCEDURE — 74011000250 HC RX REV CODE- 250: Performed by: INTERNAL MEDICINE

## 2017-08-14 PROCEDURE — 93005 ELECTROCARDIOGRAM TRACING: CPT | Performed by: INTERNAL MEDICINE

## 2017-08-14 PROCEDURE — 99218 HC RM OBSERVATION: CPT

## 2017-08-14 PROCEDURE — 36415 COLL VENOUS BLD VENIPUNCTURE: CPT | Performed by: INTERNAL MEDICINE

## 2017-08-14 PROCEDURE — 84443 ASSAY THYROID STIM HORMONE: CPT | Performed by: INTERNAL MEDICINE

## 2017-08-14 PROCEDURE — C8929 TTE W OR WO FOL WCON,DOPPLER: HCPCS

## 2017-08-14 RX ORDER — METOPROLOL TARTRATE 25 MG/1
12.5 TABLET, FILM COATED ORAL EVERY 12 HOURS
Qty: 30 TAB | Refills: 3 | Status: SHIPPED | OUTPATIENT
Start: 2017-08-14 | End: 2017-10-31 | Stop reason: SDUPTHER

## 2017-08-14 RX ORDER — FLECAINIDE ACETATE 50 MG/1
50 TABLET ORAL EVERY 12 HOURS
Qty: 60 TAB | Refills: 3 | Status: SHIPPED | OUTPATIENT
Start: 2017-08-14 | End: 2017-10-31 | Stop reason: SDUPTHER

## 2017-08-14 RX ADMIN — ESTRADIOL 1 MG: 1 TABLET ORAL at 11:20

## 2017-08-14 RX ADMIN — PANTOPRAZOLE SODIUM 40 MG: 40 TABLET, DELAYED RELEASE ORAL at 05:36

## 2017-08-14 RX ADMIN — LEVOTHYROXINE SODIUM 100 MCG: 100 TABLET ORAL at 05:36

## 2017-08-14 RX ADMIN — Medication 10 ML: at 11:25

## 2017-08-14 RX ADMIN — Medication 5 ML: at 05:36

## 2017-08-14 RX ADMIN — DULOXETINE HYDROCHLORIDE 30 MG: 30 CAPSULE, DELAYED RELEASE ORAL at 11:19

## 2017-08-14 RX ADMIN — FLECAINIDE ACETATE 50 MG: 100 TABLET ORAL at 11:20

## 2017-08-14 RX ADMIN — PERFLUTREN 1 ML: 6.52 INJECTION, SUSPENSION INTRAVENOUS at 10:00

## 2017-08-14 RX ADMIN — METOPROLOL TARTRATE 12.5 MG: 25 TABLET ORAL at 11:20

## 2017-08-14 NOTE — PROGRESS NOTES
[facility] CARDIOLOGY PROGRESS NOTE           8/14/2017 8:33 AM    Admit Date: 8/13/2017      Subjective:   Pt denies CP, SOB or palpitations. In NSR awaiting echo this AM    ROS:  Cardiovascular:  As noted above    Objective:      Vitals:    08/13/17 2046 08/14/17 0127 08/14/17 0426 08/14/17 0800   BP: 156/74 135/77 134/67    Pulse: 72 (!) 57 (!) 57 64   Resp: 18 17 16    Temp: 97.7 °F (36.5 °C) 97.6 °F (36.4 °C) 97.7 °F (36.5 °C)    SpO2: 100% 100% 99%    Weight: 83.7 kg (184 lb 8 oz)  84.1 kg (185 lb 6.4 oz)    Height: 5' 6\" (1.676 m)          Physical Exam:  General-No Acute Distress  Neck- supple, no JVD  CV- regular rate and rhythm no MRG  Lung- clear bilaterally  Abd- soft, nontender, nondistended  Ext- no edema bilaterally.   Skin- warm and dry    Data Review:   Recent Labs      08/14/17   0609  08/13/17   1704  08/13/17   1509   NA   --   144   --    K   --   3.8   --    MG  2.3  2.2   --    BUN   --   13   --    CREA   --   0.64   --    GLU   --   106*   --    WBC   --    --   7.8   HGB   --    --   15.2   HCT   --    --   43.3   PLT   --    --   324       Assessment/Plan:     Active Problems:    HTN (hypertension) (5/16/2011)- controlled, continue meds      Paroxysmal atrial fibrillation (HCC) (8/13/2017)-  in NSR after IV Cardizem, started on Lopressor 12.5 mg q 12h, Flecainide 50 mg q 12 h and Xarelto   20 mg every day with dinner, awaiting echo     Hypothyroidism- continue home synthroid dose    Jeannette Lambert PA-C  8/14/2017 8:33 AM

## 2017-08-14 NOTE — DISCHARGE INSTRUCTIONS
Atrial Fibrillation: Care Instructions  Your Care Instructions    Atrial fibrillation is an irregular and often fast heartbeat. Treating this condition is important for several reasons. It can cause blood clots, which can travel from your heart to your brain and cause a stroke. If you have a fast heartbeat, you may feel lightheaded, dizzy, and weak. An irregular heartbeat can also increase your risk for heart failure. Atrial fibrillation is often the result of another heart condition, such as high blood pressure or coronary artery disease. Making changes to improve your heart condition will help you stay healthy and active. Follow-up care is a key part of your treatment and safety. Be sure to make and go to all appointments, and call your doctor if you are having problems. It's also a good idea to know your test results and keep a list of the medicines you take. How can you care for yourself at home? Medicines  · Take your medicines exactly as prescribed. Call your doctor if you think you are having a problem with your medicine. You will get more details on the specific medicines your doctor prescribes. · If your doctor has given you a blood thinner to prevent a stroke, be sure you get instructions about how to take your medicine safely. Blood thinners can cause serious bleeding problems. · Do not take any vitamins, over-the-counter drugs, or herbal products without talking to your doctor first.  Lifestyle changes  · Do not smoke. Smoking can increase your chance of a stroke and heart attack. If you need help quitting, talk to your doctor about stop-smoking programs and medicines. These can increase your chances of quitting for good. · Eat a heart-healthy diet. · Stay at a healthy weight. Lose weight if you need to. · Limit alcohol to 2 drinks a day for men and 1 drink a day for women. Too much alcohol can cause health problems. · Avoid colds and flu. Get a pneumococcal vaccine shot.  If you have had one before, ask your doctor whether you need another dose. Get a flu shot every year. If you must be around people with colds or flu, wash your hands often. Activity  · If your doctor recommends it, get more exercise. Walking is a good choice. Bit by bit, increase the amount you walk every day. Try for at least 30 minutes on most days of the week. You also may want to swim, bike, or do other activities. Your doctor may suggest that you join a cardiac rehabilitation program so that you can have help increasing your physical activity safely. · Start light exercise if your doctor says it is okay. Even a small amount will help you get stronger, have more energy, and manage stress. Walking is an easy way to get exercise. Start out by walking a little more than you did in the hospital. Gradually increase the amount you walk. · When you exercise, watch for signs that your heart is working too hard. You are pushing too hard if you cannot talk while you are exercising. If you become short of breath or dizzy or have chest pain, sit down and rest immediately. · Check your pulse regularly. Place two fingers on the artery at the palm side of your wrist, in line with your thumb. If your heartbeat seems uneven or fast, talk to your doctor. When should you call for help? Call 911 anytime you think you may need emergency care. For example, call if:  · You have symptoms of a heart attack. These may include:  ¨ Chest pain or pressure, or a strange feeling in the chest.  ¨ Sweating. ¨ Shortness of breath. ¨ Nausea or vomiting. ¨ Pain, pressure, or a strange feeling in the back, neck, jaw, or upper belly or in one or both shoulders or arms. ¨ Lightheadedness or sudden weakness. ¨ A fast or irregular heartbeat. After you call 911, the  may tell you to chew 1 adult-strength or 2 to 4 low-dose aspirin. Wait for an ambulance. Do not try to drive yourself. · You have symptoms of a stroke.  These may include:  ¨ Sudden numbness, tingling, weakness, or loss of movement in your face, arm, or leg, especially on only one side of your body. ¨ Sudden vision changes. ¨ Sudden trouble speaking. ¨ Sudden confusion or trouble understanding simple statements. ¨ Sudden problems with walking or balance. ¨ A sudden, severe headache that is different from past headaches. · You passed out (lost consciousness). Call your doctor now or seek immediate medical care if:  · You have new or increased shortness of breath. · You feel dizzy or lightheaded, or you feel like you may faint. · Your heart rate becomes irregular. · You can feel your heart flutter in your chest or skip heartbeats. Tell your doctor if these symptoms are new or worse. Watch closely for changes in your health, and be sure to contact your doctor if you have any problems. Where can you learn more? Go to http://tamie-nolan.info/. Enter U020 in the search box to learn more about \"Atrial Fibrillation: Care Instructions. \"  Current as of: September 21, 2016  Content Version: 11.3  © 4687-0341 [employer]. Care instructions adapted under license by [employer] (which disclaims liability or warranty for this information). If you have questions about a medical condition or this instruction, always ask your healthcare professional. [address] any warranty or liability for your use of this information.

## 2017-08-14 NOTE — PROGRESS NOTES
Spiritual Care visit. Initial Visit attempted. Patient had been discharged.     Visit by Elodia Valdez M.Ed., .B. ,Staff

## 2017-08-14 NOTE — PROGRESS NOTES
Problem: Falls - Risk of  Goal: *Absence of Falls  Document Amber Fall Risk and appropriate interventions in the flowsheet.    Outcome: Progressing Towards Goal  Fall Risk Interventions:              Medication Interventions: Patient to call before getting OOB, Teach patient to arise slowly     Elimination Interventions: Call light in reach, Patient to call for help with toileting needs     History of Falls Interventions: Evaluate medications/consider consulting pharmacy, Door open when patient unattended, Room close to nurse's station

## 2017-08-14 NOTE — DISCHARGE SUMMARY
[facility] Cardiology Discharge Summary     Patient ID:  Ema Bowman  [id number]  89 y.o.  1945    Admit date: 8/13/2017    Discharge date and time:  8-    Admitting Physician: Jenny Castañeda MD     Discharge Physician: Tobin Merrill PA-C/Dr. John Robin    Admission Diagnoses: A-fib [facility])    Discharge Diagnoses:   Patient Active Problem List    Diagnosis Date Noted    Paroxysmal atrial fibrillation ([facility] 08/13/2017    A-fib ([facility] 08/13/2017    Morbid obesity with BMI of 45.0-49.9, adult ([facility] 09/21/2015    Osteoarthritis of left knee 05/16/2011    S/P Left total knee replacement using cement 05/16/2011    HTN (hypertension) 05/16/2011    Unspecified hypothyroidism 05/16/2011    Depression 05/16/2011    Esophageal reflux 05/16/2011    Osteoarthritis of right knee 04/12/2010    Status post total knee replacement 04/12/2010       Cardiology Procedures this admission:  EchoCardiogram  Consults: None    Hospital Course: Pt is a 78yo WF with a history of HTN (off meds since gastric sleeve), no prior CAD/CHF or arrhythmia history who presented to the [facility] ER via EMS with afib with RVR. Pt reports she had the abrupt onset of feeling very dizzy and lightheaded starting at 0900 on 8/13. She continued to do things around the house but was walking and had to stop because of progressively worsening dizziness/presyncope and reports \" coming to\" on the ground. It is not clear whether or not she lost consciousness. Pt denies associated chest pain, shortness of breath, palpitations, feeling fast or irregular heart rates. No recent or remote exertional symptoms, no bleeding history. EMS arrived and HRs were in the 160s with afib with RVR, received diltiazem bolus x 2 and converted back to NSR. She was admitted and started on Flecainide, Lopressor and Xarelto. The following morning ECG showed NSR and echo showed normal LVSF. Pt was up feeling well without any complaints of CP, SOB or palpitations.  Pt's labs were WNL. Pt was seen and examined by Dr. Mitchel Gibbs and determined stable and ready for discharge. DISPOSITION: The patient is being discharged home in stable condition on a low saturated fat, low cholesterol and low salt diet. Pt is instructed to advance activities as tolerated limited to fatigue or shortness of breath. Pt is instructed to call office or return to ER for immediate evaluation of any shortness of breath or chest pain. [facility] Dr. Kta Staples on 8/30 at 9:30 AM  Follow up with PCP Dr. Shayy Ramachandran in 2 weeks    Discharge Exam:   Visit Vitals    /76    Pulse 67    Temp 98.4 °F (36.9 °C)    Resp 18    Ht 5' 6\" (1.676 m)    Wt 84.1 kg (185 lb 6.4 oz)    SpO2 97%    BMI 29.92 kg/m2   Pt has been seen by Dr. Mitchel Gibbs: see his progress note for exam details. Recent Results (from the past 24 hour(s))   EKG, 12 LEAD, INITIAL    Collection Time: 08/13/17  3:03 PM   Result Value Ref Range    Ventricular Rate 85 BPM    Atrial Rate 85 BPM    P-R Interval 170 ms    QRS Duration 88 ms    Q-T Interval 390 ms    QTC Calculation (Bezet) 464 ms    Calculated P Axis 68 degrees    Calculated R Axis 42 degrees    Calculated T Axis 91 degrees    Diagnosis       !! AGE AND GENDER SPECIFIC ECG ANALYSIS !!   Normal sinus rhythm  Septal infarct , age undetermined  Abnormal ECG  When compared with ECG of 15-MAR-2010 13:30,  Septal infarct is now Present  Confirmed by MARTINE KIRBY (), Rosetta Nicolas ([id number]) on 8/14/2017 9:16:04 AM     CBC WITH AUTOMATED DIFF    Collection Time: 08/13/17  3:09 PM   Result Value Ref Range    WBC 7.8 4.3 - 11.1 K/uL    RBC 4.98 4.05 - 5.25 M/uL    HGB 15.2 11.7 - 15.4 g/dL    HCT 43.3 35.8 - 46.3 %    MCV 86.9 79.6 - 97.8 FL    MCH 30.5 26.1 - 32.9 PG    MCHC 35.1 (H) 31.4 - 35.0 g/dL    RDW 13.3 11.9 - 14.6 %    PLATELET 153 204 - 243 K/uL    MPV 12.4 10.8 - 14.1 FL    DF AUTOMATED      NEUTROPHILS 73 43 - 78 %    LYMPHOCYTES 20 13 - 44 %    MONOCYTES 7 4.0 - 12.0 %    EOSINOPHILS 0 (L) 0.5 - 7.8 %    BASOPHILS 0 0.0 - 2.0 %    IMMATURE GRANULOCYTES 0.1 0.0 - 5.0 %    ABS. NEUTROPHILS 5.6 1.7 - 8.2 K/UL    ABS. LYMPHOCYTES 1.5 0.5 - 4.6 K/UL    ABS. MONOCYTES 0.6 0.1 - 1.3 K/UL    ABS. EOSINOPHILS 0.0 0.0 - 0.8 K/UL    ABS. BASOPHILS 0.0 0.0 - 0.2 K/UL    ABS. IMM. GRANS. 0.0 0.0 - 0.5 K/UL   POC TROPONIN-I    Collection Time: 08/13/17  3:18 PM   Result Value Ref Range    Troponin-I (POC) 0.02 0.0 - 0.08 ng/ml   MAGNESIUM    Collection Time: 08/13/17  5:04 PM   Result Value Ref Range    Magnesium 2.2 1.8 - 2.4 mg/dL   METABOLIC PANEL, COMPREHENSIVE    Collection Time: 08/13/17  5:04 PM   Result Value Ref Range    Sodium 144 136 - 145 mmol/L    Potassium 3.8 3.5 - 5.1 mmol/L    Chloride 108 (H) 98 - 107 mmol/L    CO2 27 21 - 32 mmol/L    Anion gap 9 7 - 16 mmol/L    Glucose 106 (H) 65 - 100 mg/dL    BUN 13 8 - 23 MG/DL    Creatinine 0.64 0.6 - 1.0 MG/DL    GFR est AA >60 >60 ml/min/1.73m2    GFR est non-AA >60 >60 ml/min/1.73m2    Calcium 8.7 8.3 - 10.4 MG/DL    Bilirubin, total 0.7 0.2 - 1.1 MG/DL    ALT (SGPT) 15 12 - 65 U/L    AST (SGOT) 16 15 - 37 U/L    Alk.  phosphatase 96 50 - 136 U/L    Protein, total 6.8 6.3 - 8.2 g/dL    Albumin 3.4 3.2 - 4.6 g/dL    Globulin 3.4 2.3 - 3.5 g/dL    A-G Ratio 1.0 (L) 1.2 - 3.5     PHOSPHORUS    Collection Time: 08/13/17  5:04 PM   Result Value Ref Range    Phosphorus 2.8 2.3 - 3.7 MG/DL   POC TROPONIN-I    Collection Time: 08/13/17  5:55 PM   Result Value Ref Range    Troponin-I (POC) 0.03 0.0 - 0.08 ng/ml   POC TROPONIN-I    Collection Time: 08/13/17  6:35 PM   Result Value Ref Range    Troponin-I (POC) 0.05 0.0 - 0.08 ng/ml   TSH 3RD GENERATION    Collection Time: 08/14/17  6:09 AM   Result Value Ref Range    TSH 1.500 0.358 - 3.740 uIU/mL   MAGNESIUM    Collection Time: 08/14/17  6:09 AM   Result Value Ref Range    Magnesium 2.3 1.8 - 2.4 mg/dL   EKG, 12 LEAD, INITIAL    Collection Time: 08/14/17  6:21 AM   Result Value Ref Range    Ventricular Rate 56 BPM    Atrial Rate 56 BPM    P-R Interval 168 ms    QRS Duration 92 ms    Q-T Interval 444 ms    QTC Calculation (Bezet) 428 ms    Calculated P Axis 19 degrees    Calculated R Axis 22 degrees    Calculated T Axis 102 degrees    Diagnosis       Sinus bradycardia  Septal infarct (cited on or before 13-AUG-2017)  Abnormal ECG  When compared with ECG of 13-AUG-2017 15:03,  Vent. rate has decreased BY  29 BPM  Confirmed by Reuel Bloch MD (), JORI LEONARD ([id number]) on 8/14/2017 1:00:03 PM           Patient Instructions:   Current Discharge Medication List      START taking these medications    Details   metoprolol tartrate (LOPRESSOR) 25 mg tablet Take 0.5 Tabs by mouth every twelve (12) hours. Qty: 30 Tab, Refills: 3      rivaroxaban (XARELTO) 20 mg tab tablet Take 1 Tab by mouth daily (with dinner). Qty: 30 Tab, Refills: 3      flecainide (TAMBOCOR) 50 mg tablet Take 1 Tab by mouth every twelve (12) hours. Qty: 60 Tab, Refills: 3         CONTINUE these medications which have NOT CHANGED    Details   estradiol (ESTRACE) 1 mg tablet Take 1 mg by mouth daily. omeprazole (PRILOSEC) 40 mg capsule Take 1 Cap by mouth daily. Indications: GASTROESOPHAGEAL REFLUX  Qty: 90 Cap, Refills: 3      levothyroxine (SYNTHROID) 100 mcg tablet Take 1 Tab by mouth daily. Qty: 90 Tab, Refills: 3      melatonin 10 mg cap Take  by mouth.      multivitamin (ONE A DAY) tablet Take 1 Tab by mouth daily. DULoxetine (CYMBALTA) 30 mg capsule Take 60 mg by mouth daily. Biotin 2,500 mcg cap Take  by mouth.          STOP taking these medications       CETIRIZINE HCL (ALLER-NILSON PO) Comments:   Reason for Stopping:                 Blanca Torres PA-C  8/14/2017  1:42 PM  Maribel Cho MD

## 2017-08-14 NOTE — PROGRESS NOTES
Verbal bedside report given to melissa Larson RN. Patient's situation, background, assessment and recommendations provided. Opportunity for questions provided. Oncoming RN assumed care of patient.

## 2017-08-14 NOTE — PROGRESS NOTES
TRANSFER - IN REPORT:    Verbal report received from JAMAR Carrillo on Madai Richardson being received from ED for routine progression of care      Report consisted of patients Situation, Background, Assessment and Recommendations(SBAR). Information from the following report(s) SBAR, Kardex, ED Summary, Intake/Output, MAR, Recent Results and Cardiac Rhythm NSR was reviewed with the receiving nurse. Opportunity for questions and clarification was provided. Assessment completed upon patients arrival to unit and care assumed. Dual skin assessment complete. Small bruises on anterior lower legs. No other skin abnormalities noted. Applied cardiac monitor. Call light within reach. Instructed patient to call for any assistance to the bathroom due to syncopal history at home.

## 2017-08-30 ENCOUNTER — HOSPITAL ENCOUNTER (OUTPATIENT)
Dept: LAB | Age: 72
Discharge: HOME | End: 2017-08-30
Attending: INTERNAL MEDICINE
Payer: MEDICARE

## 2017-08-30 LAB
APPEARANCE UR: ABNORMAL
BACTERIA URNS QL MICRO: ABNORMAL /HPF
BILIRUB UR QL: NEGATIVE
CASTS URNS QL MICRO: 0 /LPF
COLOR UR: ABNORMAL
CRYSTALS URNS QL MICRO: 0 /LPF
EPI CELLS #/AREA URNS HPF: ABNORMAL /HPF
GLUCOSE UR STRIP.AUTO-MCNC: NEGATIVE MG/DL
HGB UR QL STRIP: ABNORMAL
KETONES UR QL STRIP.AUTO: NEGATIVE MG/DL
LEUKOCYTE ESTERASE UR QL STRIP.AUTO: NEGATIVE
MUCOUS THREADS URNS QL MICRO: 0 /LPF
NITRITE UR QL STRIP.AUTO: NEGATIVE
PH UR STRIP: 6 [PH] (ref 5–9)
PROT UR STRIP-MCNC: 30 MG/DL
RBC #/AREA URNS HPF: >100 /HPF
SP GR UR REFRACTOMETRY: 1.02 (ref 1–1.02)
UROBILINOGEN UR QL STRIP.AUTO: 0.2 EU/DL (ref 0.2–1)
WBC URNS QL MICRO: ABNORMAL /HPF

## 2017-08-30 PROCEDURE — 81015 MICROSCOPIC EXAM OF URINE: CPT | Performed by: INTERNAL MEDICINE

## 2017-08-30 PROCEDURE — 87086 URINE CULTURE/COLONY COUNT: CPT | Performed by: INTERNAL MEDICINE

## 2017-08-30 PROCEDURE — 81003 URINALYSIS AUTO W/O SCOPE: CPT | Performed by: INTERNAL MEDICINE

## 2017-09-01 LAB
BACTERIA SPEC CULT: NORMAL
SERVICE CMNT-IMP: NORMAL

## 2017-12-02 ENCOUNTER — APPOINTMENT (OUTPATIENT)
Dept: GENERAL RADIOLOGY | Age: 72
DRG: 605 | End: 2017-12-02
Attending: EMERGENCY MEDICINE
Payer: MEDICARE

## 2017-12-02 ENCOUNTER — APPOINTMENT (OUTPATIENT)
Dept: CT IMAGING | Age: 72
DRG: 605 | End: 2017-12-02
Attending: EMERGENCY MEDICINE
Payer: MEDICARE

## 2017-12-02 ENCOUNTER — HOSPITAL ENCOUNTER (INPATIENT)
Age: 72
LOS: 2 days | Discharge: HOME | DRG: 605 | End: 2017-12-06
Attending: EMERGENCY MEDICINE | Admitting: HOSPITALIST
Payer: MEDICARE

## 2017-12-02 DIAGNOSIS — I95.1 ORTHOSTATIC HYPOTENSION: Primary | ICD-10-CM

## 2017-12-02 DIAGNOSIS — T14.8XXA HEMATOMA: ICD-10-CM

## 2017-12-02 DIAGNOSIS — R55 NEAR SYNCOPE: ICD-10-CM

## 2017-12-02 LAB
ALBUMIN SERPL-MCNC: 3.2 G/DL (ref 3.2–4.6)
ALBUMIN/GLOB SERPL: 1.2 {RATIO} (ref 1.2–3.5)
ALP SERPL-CCNC: 75 U/L (ref 50–136)
ALT SERPL-CCNC: 18 U/L (ref 12–65)
ANION GAP SERPL CALC-SCNC: 11 MMOL/L (ref 7–16)
APPEARANCE UR: ABNORMAL
AST SERPL-CCNC: 19 U/L (ref 15–37)
BASOPHILS # BLD: 0 K/UL (ref 0–0.2)
BASOPHILS NFR BLD: 0 % (ref 0–2)
BILIRUB SERPL-MCNC: 0.6 MG/DL (ref 0.2–1.1)
BILIRUB UR QL: ABNORMAL
BUN SERPL-MCNC: 20 MG/DL (ref 8–23)
CALCIUM SERPL-MCNC: 8.6 MG/DL (ref 8.3–10.4)
CHLORIDE SERPL-SCNC: 104 MMOL/L (ref 98–107)
CO2 SERPL-SCNC: 27 MMOL/L (ref 21–32)
COLOR UR: ABNORMAL
CORTIS BS SERPL-MCNC: 34.3 UG/DL
CREAT SERPL-MCNC: 0.7 MG/DL (ref 0.6–1)
DIFFERENTIAL METHOD BLD: ABNORMAL
EOSINOPHIL # BLD: 0.1 K/UL (ref 0–0.8)
EOSINOPHIL NFR BLD: 1 % (ref 0.5–7.8)
ERYTHROCYTE [DISTWIDTH] IN BLOOD BY AUTOMATED COUNT: 13.1 % (ref 11.9–14.6)
GLOBULIN SER CALC-MCNC: 2.7 G/DL (ref 2.3–3.5)
GLUCOSE SERPL-MCNC: 111 MG/DL (ref 65–100)
GLUCOSE UR STRIP.AUTO-MCNC: NEGATIVE MG/DL
HCT VFR BLD AUTO: 30.5 % (ref 35.8–46.3)
HGB BLD-MCNC: 10 G/DL (ref 11.7–15.4)
HGB UR QL STRIP: NEGATIVE
IMM GRANULOCYTES # BLD: 0 K/UL (ref 0–0.5)
IMM GRANULOCYTES NFR BLD AUTO: 0 % (ref 0–5)
INR PPP: 1.2 (ref 0.9–1.2)
KETONES UR QL STRIP.AUTO: 15 MG/DL
LEUKOCYTE ESTERASE UR QL STRIP.AUTO: NEGATIVE
LYMPHOCYTES # BLD: 1.6 K/UL (ref 0.5–4.6)
LYMPHOCYTES NFR BLD: 21 % (ref 13–44)
MCH RBC QN AUTO: 29 PG (ref 26.1–32.9)
MCHC RBC AUTO-ENTMCNC: 32.8 G/DL (ref 31.4–35)
MCV RBC AUTO: 88.4 FL (ref 79.6–97.8)
MONOCYTES # BLD: 0.5 K/UL (ref 0.1–1.3)
MONOCYTES NFR BLD: 7 % (ref 4–12)
NEUTS SEG # BLD: 5.3 K/UL (ref 1.7–8.2)
NEUTS SEG NFR BLD: 71 % (ref 43–78)
NITRITE UR QL STRIP.AUTO: NEGATIVE
PH UR STRIP: 6.5 [PH] (ref 5–9)
PLATELET # BLD AUTO: 260 K/UL (ref 150–450)
PMV BLD AUTO: 10.9 FL (ref 10.8–14.1)
POTASSIUM SERPL-SCNC: 4.1 MMOL/L (ref 3.5–5.1)
PROT SERPL-MCNC: 5.9 G/DL (ref 6.3–8.2)
PROT UR STRIP-MCNC: NEGATIVE MG/DL
PROTHROMBIN TIME: 12.7 SEC (ref 9.6–12)
RBC # BLD AUTO: 3.45 M/UL (ref 4.05–5.25)
SODIUM SERPL-SCNC: 142 MMOL/L (ref 136–145)
SP GR UR REFRACTOMETRY: 1.02 (ref 1–1.02)
UROBILINOGEN UR QL STRIP.AUTO: 1 EU/DL (ref 0.2–1)
WBC # BLD AUTO: 7.5 K/UL (ref 4.3–11.1)

## 2017-12-02 PROCEDURE — 87086 URINE CULTURE/COLONY COUNT: CPT | Performed by: HOSPITALIST

## 2017-12-02 PROCEDURE — 74011250636 HC RX REV CODE- 250/636: Performed by: EMERGENCY MEDICINE

## 2017-12-02 PROCEDURE — 70450 CT HEAD/BRAIN W/O DYE: CPT

## 2017-12-02 PROCEDURE — 72125 CT NECK SPINE W/O DYE: CPT

## 2017-12-02 PROCEDURE — 96361 HYDRATE IV INFUSION ADD-ON: CPT | Performed by: EMERGENCY MEDICINE

## 2017-12-02 PROCEDURE — 96360 HYDRATION IV INFUSION INIT: CPT | Performed by: EMERGENCY MEDICINE

## 2017-12-02 PROCEDURE — 80053 COMPREHEN METABOLIC PANEL: CPT | Performed by: EMERGENCY MEDICINE

## 2017-12-02 PROCEDURE — 73502 X-RAY EXAM HIP UNI 2-3 VIEWS: CPT

## 2017-12-02 PROCEDURE — 85025 COMPLETE CBC W/AUTO DIFF WBC: CPT | Performed by: EMERGENCY MEDICINE

## 2017-12-02 PROCEDURE — 99285 EMERGENCY DEPT VISIT HI MDM: CPT | Performed by: EMERGENCY MEDICINE

## 2017-12-02 PROCEDURE — 99218 HC RM OBSERVATION: CPT

## 2017-12-02 PROCEDURE — 93005 ELECTROCARDIOGRAM TRACING: CPT | Performed by: EMERGENCY MEDICINE

## 2017-12-02 PROCEDURE — 81003 URINALYSIS AUTO W/O SCOPE: CPT | Performed by: EMERGENCY MEDICINE

## 2017-12-02 PROCEDURE — 74011250636 HC RX REV CODE- 250/636: Performed by: HOSPITALIST

## 2017-12-02 PROCEDURE — 74011250637 HC RX REV CODE- 250/637: Performed by: HOSPITALIST

## 2017-12-02 PROCEDURE — 82533 TOTAL CORTISOL: CPT | Performed by: HOSPITALIST

## 2017-12-02 PROCEDURE — 85610 PROTHROMBIN TIME: CPT | Performed by: EMERGENCY MEDICINE

## 2017-12-02 RX ORDER — DULOXETIN HYDROCHLORIDE 60 MG/1
60 CAPSULE, DELAYED RELEASE ORAL DAILY
Status: DISCONTINUED | OUTPATIENT
Start: 2017-12-03 | End: 2017-12-06 | Stop reason: HOSPADM

## 2017-12-02 RX ORDER — SODIUM CHLORIDE 9 MG/ML
1000 INJECTION, SOLUTION INTRAVENOUS ONCE
Status: COMPLETED | OUTPATIENT
Start: 2017-12-02 | End: 2017-12-02

## 2017-12-02 RX ORDER — SODIUM CHLORIDE 0.9 % (FLUSH) 0.9 %
5-10 SYRINGE (ML) INJECTION EVERY 8 HOURS
Status: DISCONTINUED | OUTPATIENT
Start: 2017-12-02 | End: 2017-12-06 | Stop reason: HOSPADM

## 2017-12-02 RX ORDER — SODIUM CHLORIDE 9 MG/ML
125 INJECTION, SOLUTION INTRAVENOUS CONTINUOUS
Status: DISCONTINUED | OUTPATIENT
Start: 2017-12-02 | End: 2017-12-03

## 2017-12-02 RX ORDER — IBUPROFEN 200 MG
1 TABLET ORAL
Status: DISCONTINUED | OUTPATIENT
Start: 2017-12-02 | End: 2017-12-06 | Stop reason: HOSPADM

## 2017-12-02 RX ORDER — DIPHENHYDRAMINE HCL 25 MG
25 CAPSULE ORAL
Status: DISCONTINUED | OUTPATIENT
Start: 2017-12-02 | End: 2017-12-06 | Stop reason: HOSPADM

## 2017-12-02 RX ORDER — LEVOTHYROXINE SODIUM 100 UG/1
100 TABLET ORAL DAILY
Status: DISCONTINUED | OUTPATIENT
Start: 2017-12-03 | End: 2017-12-06 | Stop reason: HOSPADM

## 2017-12-02 RX ORDER — SODIUM CHLORIDE 0.9 % (FLUSH) 0.9 %
5-10 SYRINGE (ML) INJECTION AS NEEDED
Status: DISCONTINUED | OUTPATIENT
Start: 2017-12-02 | End: 2017-12-06 | Stop reason: HOSPADM

## 2017-12-02 RX ORDER — ACETAMINOPHEN 325 MG/1
650 TABLET ORAL
Status: DISCONTINUED | OUTPATIENT
Start: 2017-12-02 | End: 2017-12-06 | Stop reason: HOSPADM

## 2017-12-02 RX ADMIN — SODIUM CHLORIDE 1000 ML: 900 INJECTION, SOLUTION INTRAVENOUS at 14:21

## 2017-12-02 RX ADMIN — SODIUM CHLORIDE 1000 ML: 900 INJECTION, SOLUTION INTRAVENOUS at 12:59

## 2017-12-02 RX ADMIN — RIVAROXABAN 20 MG: 20 TABLET, FILM COATED ORAL at 16:34

## 2017-12-02 RX ADMIN — SODIUM CHLORIDE 125 ML/HR: 900 INJECTION, SOLUTION INTRAVENOUS at 16:36

## 2017-12-02 RX ADMIN — SODIUM CHLORIDE 1000 ML: 900 INJECTION, SOLUTION INTRAVENOUS at 10:36

## 2017-12-02 RX ADMIN — Medication 1 AMPULE: at 21:22

## 2017-12-02 NOTE — PROGRESS NOTES
Pt was admitted from the ER. Admission assessment completed. Pt heart and lung sounds clear. Pt has active bowel sounds and instructed on how to call for food. Pt reports no pain at this time. Bed is low and locked. Call light in reach and pt denies any needs at this time.

## 2017-12-02 NOTE — ED TRIAGE NOTES
Pt arrives to ER complaining of dizziness for 3 hours. Pt is positive for orthstatics with EMS went from 368 to 80 systolic. Pt states she fell last night backwards and hit her head through the drywall, takes xarelto, states right hip hurts worse than her head.  Pt denies NVD  VS: HR 64,

## 2017-12-02 NOTE — H&P
INTERNAL MEDICINE H&P/CONSULT    Subjective:     Patient is a 71years old female with history of afib, HTN and on Lopressor presents with dizziness and almost passing out this morning. It happened once last August. No CP or SOB. She has beedn treated for acute sinusitis in last 10 days. No recent changes of her meds. Past Medical History:   Diagnosis Date    Anxiety     Arthritis     Bell's palsy     in Oct. 2009 with some vertigo at times still, no other after effects    Depression     GERD (gastroesophageal reflux disease)     reflux, takes nexium    Hypertension     on medication since 2006    Hypothyroidism     Obese     Paroxysmal atrial fibrillation ([facility] 8/13/2017    Psychiatric disorder     depression    S/P Left total knee replacement using cement 5/16/2011    Unspecified hypothyroidism 5/16/2011      Past Surgical History:   Procedure Laterality Date    HX APPENDECTOMY  1961    HX BREAST LUMPECTOMY  1998    benign    HX CATARACT REMOVAL Bilateral 2014    HX CHOLECYSTECTOMY  1976    HX GASTRECTOMY  9/21/15    sleeve    HX HYSTERECTOMY  1987    HX ORTHOPAEDIC Bilateral 1994    heel spurs removed    HX ORTHOPAEDIC  2005    lower back     HX TONSILLECTOMY  1966    HX UROLOGICAL  2008/2009    bladder tack X2    THYROIDECTOMY  1980    partial    TOTAL KNEE ARTHROPLASTY Right 2010    TOTAL KNEE ARTHROPLASTY Left 2011      Prior to Admission medications    Medication Sig Start Date End Date Taking? Authorizing Provider   omeprazole (PRILOSEC) 40 mg capsule Take 1 Cap by mouth daily. Indications: gastroesophageal reflux disease 12/1/17   Edmond Shane MD   levothyroxine (SYNTHROID) 100 mcg tablet Take 1 Tab by mouth daily. 12/1/17   Edmond Shane MD   methylPREDNISolone (MEDROL, CHENG,) 4 mg tablet Take 1 Tab by mouth Specific Days and Specific Times. 12/1/17   Edmond Shane MD   amoxicillin-clavulanate (AUGMENTIN) 875-125 mg per tablet Take 1 Tab by mouth every twelve (12) hours. 12/1/17   Byron Malhotra MD   fluconazole (DIFLUCAN) 150 mg tablet Take 1 tablet on day 3 of Medrol dose delilah and on final day of delilah 12/1/17   Byron Malhotra MD   metoprolol tartrate (LOPRESSOR) 25 mg tablet Take 0.5 Tabs by mouth every twelve (12) hours. 10/31/17   Jenni Valdovinos MD   rivaroxaban (XARELTO) 20 mg tab tablet Take 1 Tab by mouth daily (with dinner). 10/31/17   Jenni Valdovinos MD   flecainide (TAMBOCOR) 50 mg tablet Take 1 Tab by mouth every twelve (12) hours. 10/31/17   Jenni Valdovinos MD   estradiol (ESTRACE) 1 mg tablet Take 1 mg by mouth daily. Historical Provider   melatonin 10 mg cap Take  by mouth. Historical Provider   Biotin 2,500 mcg cap Take  by mouth. Historical Provider   multivitamin (ONE A DAY) tablet Take 1 Tab by mouth daily. Historical Provider   DULoxetine (CYMBALTA) 30 mg capsule Take 60 mg by mouth daily. Historical Provider     Allergies   Allergen Reactions    Dilaudid [Hydromorphone (Bulk)] Swelling    Sulfa (Sulfonamide Antibiotics) Rash      Social History   Substance Use Topics    Smoking status: Never Smoker    Smokeless tobacco: Never Used    Alcohol use 1.0 oz/week     2 Glasses of wine per week        Family History:  HTN    Review of Systems   A comprehensive review of systems was negative except for that written in the HPI. Objective: Intake / Output:  12/02 0701 - 12/02 1900  In: 2000 [I.V.:2000]  Out: -        Physical Exam:  Visit Vitals    /70 (BP 1 Location: Right arm, BP Patient Position: Supine)    Pulse 71    Temp 98 °F (36.7 °C)    Resp 18    Ht 5' 6\" (1.676 m)    Wt 84.8 kg (187 lb)    SpO2 99%    BMI 30.18 kg/m2     General appearance: awake, alert, cooperative, moderate distress, appears stated age -  Head: Normocephalic, without obvious abnormality, atraumatic  Back: symmetric, no curvature. ROM normal. No CVA tenderness.   Lungs: clear to auscultation bilaterally   Heart: regular rate and rhythm, S1, S2 normal, no murmur, click, rub or gallop. Abdomen: soft, no tenderness, no distension, normal bowel sound, no masses, no organomegaly  Extremities: atraumatic, no cyanosis - Bilateral lower limbs edema none. Skin: No rashes or ulceration. Neurologic: Grossly intact - Cranial nerves 2-12 intact. Motor 5/5 in 4 limbs. No sensory deficits. No visual field defects. No dysmetria or nystagmus. DTR +2. Babiniski's reflex negative. Romberg's and gait tests are deferred at this time. ECG: sinus rhythm     Data Review (Labs):   Recent Results (from the past 24 hour(s))   CBC WITH AUTOMATED DIFF    Collection Time: 12/02/17 10:11 AM   Result Value Ref Range    WBC 7.5 4.3 - 11.1 K/uL    RBC 3.45 (L) 4.05 - 5.25 M/uL    HGB 10.0 (L) 11.7 - 15.4 g/dL    HCT 30.5 (L) 35.8 - 46.3 %    MCV 88.4 79.6 - 97.8 FL    MCH 29.0 26.1 - 32.9 PG    MCHC 32.8 31.4 - 35.0 g/dL    RDW 13.1 11.9 - 14.6 %    PLATELET 796 824 - 542 K/uL    MPV 10.9 10.8 - 14.1 FL    DF AUTOMATED      NEUTROPHILS 71 43 - 78 %    LYMPHOCYTES 21 13 - 44 %    MONOCYTES 7 4.0 - 12.0 %    EOSINOPHILS 1 0.5 - 7.8 %    BASOPHILS 0 0.0 - 2.0 %    IMMATURE GRANULOCYTES 0 0.0 - 5.0 %    ABS. NEUTROPHILS 5.3 1.7 - 8.2 K/UL    ABS. LYMPHOCYTES 1.6 0.5 - 4.6 K/UL    ABS. MONOCYTES 0.5 0.1 - 1.3 K/UL    ABS. EOSINOPHILS 0.1 0.0 - 0.8 K/UL    ABS. BASOPHILS 0.0 0.0 - 0.2 K/UL    ABS. IMM. GRANS. 0.0 0.0 - 0.5 K/UL   METABOLIC PANEL, COMPREHENSIVE    Collection Time: 12/02/17 10:11 AM   Result Value Ref Range    Sodium 142 136 - 145 mmol/L    Potassium 4.1 3.5 - 5.1 mmol/L    Chloride 104 98 - 107 mmol/L    CO2 27 21 - 32 mmol/L    Anion gap 11 7 - 16 mmol/L    Glucose 111 (H) 65 - 100 mg/dL    BUN 20 8 - 23 MG/DL    Creatinine 0.70 0.6 - 1.0 MG/DL    GFR est AA >60 >60 ml/min/1.73m2    GFR est non-AA >60 >60 ml/min/1.73m2    Calcium 8.6 8.3 - 10.4 MG/DL    Bilirubin, total 0.6 0.2 - 1.1 MG/DL    ALT (SGPT) 18 12 - 65 U/L    AST (SGOT) 19 15 - 37 U/L    Alk.  phosphatase 75 50 - 136 U/L    Protein, total 5.9 (L) 6.3 - 8.2 g/dL    Albumin 3.2 3.2 - 4.6 g/dL    Globulin 2.7 2.3 - 3.5 g/dL    A-G Ratio 1.2 1.2 - 3.5     PROTHROMBIN TIME + INR    Collection Time: 12/02/17 10:11 AM   Result Value Ref Range    Prothrombin time 12.7 (H) 9.6 - 12.0 sec    INR 1.2 0.9 - 1.2     URINALYSIS W/ RFLX MICROSCOPIC    Collection Time: 12/02/17 11:58 AM   Result Value Ref Range    Color CADEN      Appearance CLOUDY      Specific gravity 1.024 (H) 1.001 - 1.023      pH (UA) 6.5 5.0 - 9.0      Protein NEGATIVE  NEG mg/dL    Glucose NEGATIVE  mg/dL    Ketone 15 (A) NEG mg/dL    Bilirubin SMALL (A) NEG      Blood NEGATIVE  NEG      Urobilinogen 1.0 0.2 - 1.0 EU/dL    Nitrites NEGATIVE  NEG      Leukocyte Esterase NEGATIVE  NEG         Assessment:     Principal Problem:    Orthostatic hypotension (12/2/2017)    Active Problems:    Near syncope (12/2/2017)        Plan:     Observation   IVF hydration  Hold BBL, watch BP  Follow cortisol and urine CS (UA cloudy only)  Had normal recent echo  May dc home on CCB if improve  Continue essential home medications. Patient is full code. Further management depends on patient progress. Thank you for the oppourtinity to contribute in the care of your patient. Will follow the patient with you as needed. Time 25 minutes.     Signed By: Deshaun Mcadams MD     December 2, 2017

## 2017-12-02 NOTE — ED NOTES
TRANSFER - OUT REPORT:    Verbal report given to RN Meredith Rivas on Sushila Young  being transferred to [phone] for routine progression of care       Report consisted of patients Situation, Background, Assessment and   Recommendations(SBAR). Information from the following report(s) SBAR, ED Summary, Procedure Summary, Intake/Output, MAR and Cardiac Rhythm NSR was reviewed with the receiving nurse. Lines:   Peripheral IV 12/02/17 Left Antecubital (Active)   Site Assessment Clean, dry, & intact 12/2/2017 10:36 AM   Phlebitis Assessment 0 12/2/2017 10:36 AM   Infiltration Assessment 0 12/2/2017 10:36 AM        Opportunity for questions and clarification was provided. VTE prophylaxis orders have not been written for Sushila Young. Patient and family given floor number and nurses name. Family updated re: pt status after security code verified.

## 2017-12-02 NOTE — IP AVS SNAPSHOT
[facility] 
 
 
 [address] 
[phone] Patient: Linda Tate MRN: [id number] UIB:[id number] About your hospitalization You were admitted on:  December 2, 2017 You last received care in the:  [facility] 3M You were discharged on:  December 6, 2017 Why you were hospitalized Your primary diagnosis was: Anemia Requiring Transfusions Your diagnoses also included:  Orthostatic Hypotension, Near Syncope, Htn (Hypertension), Morbid Obesity With Bmi Of 45.0-49.9, Adult (Hcc), Traumatic Hematoma Of Right Hip, Acute Blood Loss Anemia, Paroxysmal Atrial Fibrillation (Hcc), Anxiety Things You Need To Do (next 8 weeks) Call Puja Engel MD today As needed Phone:  [phone] Where:  [facility] ([facility]), [address]  Nahid Foy Follow up with [address] in 1 week(s)  
follow up afib, HTN, APPT. DEC. 12th @ 1:00 Phone:  [phone] Where:  [facility], [address] [place] [address] Way [id number] Tuesday Dec 12, 2017 HOSPITAL FOLLOW-UP with Wm Jo MD at  1:00 PM  
Where:  [facility] OFFICE ([address] [place] [address]) Thursday Dec 14, 2017 Follow up with Florentina Mcardle, DO  
2:45pm  
  
Phone:  [phone] Where:  [facility], [address] [place] [address] Discharge Orders None A check sreekanth indicates which time of day the medication should be taken. My Medications STOP taking these medications   
 amoxicillin-clavulanate 875-125 mg per tablet Commonly known as:  AUGMENTIN  
   
  
 fluconazole 150 mg tablet Commonly known as:  DIFLUCAN  
   
  
 methylPREDNISolone 4 mg tablet Commonly known as:  MEDROL (CHENG) TAKE these medications as instructed Instructions Each Dose to Equal  
 Morning Noon Evening Bedtime Biotin 2,500 mcg Cap Your last dose was: Your next dose is: Take  by mouth. DULoxetine 30 mg capsule Commonly known as:  CYMBALTA Your last dose was: Your next dose is: Take 60 mg by mouth daily. 60 mg  
    
   
   
   
  
 estradiol 1 mg tablet Commonly known as:  ESTRACE Your last dose was: Your next dose is: Take 1 mg by mouth daily. 1 mg  
    
   
   
   
  
 flecainide 50 mg tablet Commonly known as:  TAMBOCOR Your last dose was: Your next dose is: Take 1 Tab by mouth every twelve (12) hours. 50 mg  
    
   
   
   
  
 levothyroxine 100 mcg tablet Commonly known as:  SYNTHROID Your last dose was: Your next dose is: Take 1 Tab by mouth daily. 100 mcg  
    
   
   
   
  
 melatonin 10 mg Cap Your last dose was: Your next dose is: Take  by mouth.  
     
   
   
   
  
 metoprolol tartrate 25 mg tablet Commonly known as:  LOPRESSOR Your last dose was: Your next dose is: Take 0.5 Tabs by mouth every twelve (12) hours. 12.5 mg  
    
   
   
   
  
 midodrine 5 mg tablet Commonly known as:  Bao Avila Your last dose was: Your next dose is: Take 1 Tab by mouth three (3) times daily (with meals) for 30 days. Indications: Symptomatic Orthostatic Hypotension 5 mg  
    
   
   
   
  
 multivitamin tablet Commonly known as:  ONE A DAY Your last dose was: Your next dose is: Take 1 Tab by mouth daily. 1 Tab  
    
   
   
   
  
 omeprazole 40 mg capsule Commonly known as:  PriLOSEC Your last dose was: Your next dose is: Take 1 Cap by mouth daily. Indications: gastroesophageal reflux disease 40 mg  
    
   
   
   
  
 oxyCODONE-acetaminophen 5-325 mg per tablet Commonly known as:  PERCOCET Your last dose was: Your next dose is: Take 2 Tabs by mouth every four (4) hours as needed for Pain. Max Daily Amount: 12 Tabs. 2 Tab  
    
   
   
   
  
 rivaroxaban 20 mg Tab tablet Commonly known as:  Renetta Gaming Your last dose was: Your next dose is: Take 1 Tab by mouth daily (with dinner). HOLD FOR 1 WEEK; RESUME 12/13 OR AS DIRECTED BY CARDIOLOGY  
 20 mg Where to Get Your Medications These medications were sent to [facility]pharmacy [id number], [address] [place] [address]  [address] [place] [address], [address] [place] Phone:  [phone]  
  midodrine 5 mg tablet Information on where to get these meds will be given to you by the nurse or doctor. ! Ask your nurse or doctor about these medications  
  oxyCODONE-acetaminophen 5-325 mg per tablet  
 rivaroxaban 20 mg Tab tablet Discharge Instructions Orthostatic Hypotension: Care Instructions Your Care Instructions Orthostatic hypotension is a quick drop in blood pressure. It happens when you get up from sitting or lying down. You may feel faint, lightheaded, or dizzy. When a person sits up or stands up, the body changes the way it pumps blood. This can slow the flow of blood to the brain for a very short time. And that can make you feel lightheaded. Many medicines can cause this problem, especially in older people. Lack of fluids (dehydration) or illnesses such as diabetes or heart disease also can cause it. Follow-up care is a key part of your treatment and safety. Be sure to make and go to all appointments, and call your doctor if you are having problems. It's also a good idea to know your test results and keep a list of the medicines you take. How can you care for yourself at home? · Tell your doctor about any problems you have with your medicines.  
· If your doctor prescribes medicine to help prevent a low blood pressure problem, take it exactly as prescribed. Call your doctor if you think you are having a problem with your medicine. · Drink plenty of fluids, enough so that your urine is light yellow or clear like water. Choose water and other caffeine-free clear liquids. If you have kidney, heart, or liver disease and have to limit fluids, talk with your doctor before you increase the amount of fluids you drink. · Limit or avoid alcohol and caffeine. · Get up slowly from bed or after sitting for a long time. If you are in bed, roll to your side and swing your legs over the edge of the bed and onto the floor. Push your body up to a sitting position. Wait for a while before you slowly stand up. If you are dizzy or lightheaded, sit or lie down. When should you call for help? Call 911 anytime you think you may need emergency care. For example, call if: 
? · You passed out (lost consciousness). ? Watch closely for changes in your health, and be sure to contact your doctor if: 
? · You do not get better as expected. Where can you learn more? Go to http://tamie-nolan.info/. Enter D403 in the search box to learn more about \"Orthostatic Hypotension: Care Instructions. \" Current as of: September 21, 2016 Content Version: 11.4 © 3255-0835 [employer]. Care instructions adapted under license by [employer] (which disclaims liability or warranty for this information). If you have questions about a medical condition or this instruction, always ask your healthcare professional. [place] [address] any warranty or liability for your use of this information. DISCHARGE SUMMARY from Nurse The following personal items are in your possession at time of discharge: 
 
Dental Appliances: None Visual Aid: Glasses Home Medications: Sent home Jewelry: Ring Clothing: At bedside Other Valuables: Cell Phone PATIENT INSTRUCTIONS: 
 
 After general anesthesia or intravenous sedation, for 24 hours or while taking prescription Narcotics: · Limit your activities · Do not drive and operate hazardous machinery · Do not make important personal or business decisions · Do  not drink alcoholic beverages · If you have not urinated within 8 hours after discharge, please contact your surgeon on call. Report the following to your surgeon: 
· Excessive pain, swelling, redness or odor of or around the surgical area · Temperature over 100.5 · Nausea and vomiting lasting longer than 4 hours or if unable to take medications · Any signs of decreased circulation or nerve impairment to extremity: change in color, persistent  numbness, tingling, coldness or increase pain · Any questions What to do at Home: 
Recommended activity: Activity as tolerated, per MD 
 
If you experience any of the following symptoms fever>101, pain unrelieved with medication, nausea/vomiting, shortness of breath, dizziness/fainting, chest pain. , please follow up with your doctor. *  Please give a list of your current medications to your Primary Care Provider. *  Please update this list whenever your medications are discontinued, doses are 
    changed, or new medications (including over-the-counter products) are added. *  Please carry medication information at all times in case of emergency situations. These are general instructions for a healthy lifestyle: No smoking/ No tobacco products/ Avoid exposure to second hand smoke Surgeon General's Warning:  Quitting smoking now greatly reduces serious risk to your health. Obesity, smoking, and sedentary lifestyle greatly increases your risk for illness A healthy diet, regular physical exercise & weight monitoring are important for maintaining a healthy lifestyle You may be retaining fluid if you have a history of heart failure or if you experience any of the following symptoms:  Weight gain of 3 pounds or more overnight or 5 pounds in a week, increased swelling in our hands or feet or shortness of breath while lying flat in bed. Please call your doctor as soon as you notice any of these symptoms; do not wait until your next office visit. Recognize signs and symptoms of STROKE: 
 
F-face looks uneven A-arms unable to move or move unevenly S-speech slurred or non-existent T-time-call 911 as soon as signs and symptoms begin-DO NOT go Back to bed or wait to see if you get better-TIME IS BRAIN. Warning Signs of HEART ATTACK Call 911 if you have these symptoms: 
? Chest discomfort. Most heart attacks involve discomfort in the center of the chest that lasts more than a few minutes, or that goes away and comes back. It can feel like uncomfortable pressure, squeezing, fullness, or pain. ? Discomfort in other areas of the upper body. Symptoms can include pain or discomfort in one or both arms, the back, neck, jaw, or stomach. ? Shortness of breath with or without chest discomfort. ? Other signs may include breaking out in a cold sweat, nausea, or lightheadedness. Don't wait more than five minutes to call [address]! Fast action can save your life. Calling 911 is almost always the fastest way to get lifesaving treatment. Emergency Medical Services staff can begin treatment when they arrive  up to an hour sooner than if someone gets to the hospital by car. The discharge information has been reviewed with the patient. The patient verbalized understanding. Discharge medications reviewed with the patient and appropriate educational materials and side effects teaching were provided. ACO Transitions of Care Introducing [facility] [address] [place] [address] offers a voluntary care coordination program to provide high quality service and care to [place] fee-for-service beneficiaries. Alonso Cornejo was designed to help you enhance your health and well-being through the following services: ? Transitions of Care  support for individuals who are transitioning from one care setting to another (example: Hospital to home). ? Chronic and Complex Care Coordination  support for individuals and caregivers of those with serious or chronic illnesses or with more than one chronic (ongoing) condition and those who take a number of different medications. If you meet specific medical criteria, a [facility] [address] may call you directly to coordinate your care with your primary care physician and your other care providers. For questions about the [facility] programs, please, contact your physicians office. For general questions or additional information about Accountable Care Organizations: 
Please visit www.medicare.gov/acos. html or call 1-800-MEDICARE (4-227.360.3521) TTY users should call 7-466.864.6033. [employer] Announcement We are excited to announce that we are making your provider's discharge notes available to you in [employer]. You will see these notes when they are completed and signed by the physician that discharged you from your recent hospital stay. If you have any questions or concerns about any information you see in clipkithart, please call the Health Information Department where you were seen or reach out to your Primary Care Provider for more information about your plan of care. Introducing [facility]! Dear Bryan Han: 
Thank you for requesting a [employer] account. Our records indicate that you already have an active [employer] account. You can access your account anytime at [email or web address] Did you know that you can access your hospital and ER discharge instructions at any time in Squabblerhart? You can also review all of your test results from your hospital stay or ER visit. Additional Information If you have questions, please visit the Frequently Asked Questions section of the [employer] website at [email or web address] Remember, Squabblerhart is NOT to be used for urgent needs. For medical emergencies, dial 911. Now available from your iPhone and Android! Providers Seen During Your Hospitalization Provider Specialty Primary office phone Dasia Webb MD Emergency Medicine [phone] Octavio Lino MD Internal Medicine [phone] Your Primary Care Physician (PCP) Primary Care Physician Office Phone Office Fax Via [address] [phone] You are allergic to the following Allergen Reactions Dilaudid (Hydromorphone (Bulk)) Swelling Sulfa (Sulfonamide Antibiotics) Rash Recent Documentation Height Weight Breastfeeding? BMI OB Status Smoking Status 1.676 m 84.8 kg No 30.18 kg/m2 Hysterectomy Never Smoker Emergency Contacts Name Discharge Info Relation Home Work Mobile Neno Espitia  Spouse [3] [phone] Patient Belongings The following personal items are in your possession at time of discharge: 
  Dental Appliances: None  Visual Aid: Glasses      Home Medications: Sent home   Jewelry: Ring  Clothing: At bedside    Other Valuables: Cell Phone Please provide this summary of care documentation to your next provider. Signatures-by signing, you are acknowledging that this After Visit Summary has been reviewed with you and you have received a copy. Patient Signature:  ____________________________________________________________ Date:  ____________________________________________________________  
  
Chela Evans  Provider Signature: ____________________________________________________________ Date:  ____________________________________________________________

## 2017-12-02 NOTE — PROGRESS NOTES
TRANSFER - IN REPORT:    Verbal report received from [facility] (name) on Sushila Young  being received from ED (unit) for routine progression of care      Report consisted of patients Situation, Background, Assessment and   Recommendations(SBAR). Information from the following report(s) SBAR, Kardex and ED Summary was reviewed with the receiving nurse. Opportunity for questions and clarification was provided. Assessment completed upon patients arrival to unit and care assumed.

## 2017-12-02 NOTE — ED PROVIDER NOTES
HPI     43-year-old female presenting to the emergency department after a fall. Last night she was backing down the stairs helping inch a box of Cassidy decorations down the stairs. She missed a stair, falling backwards and hitting her head into a wall. She actually put a hole in the drywall. She is on xarelto for a history of atrial fibrillation. She did not lose consciousness at the time. She also states that she hit her right buttock in the fall. However she was able to stand up, walk up the stairs, continue moving boxes. She also got up in the middle of the night and was able to go to the bathroom without any difficulty. However this morning when she woke up, she states that at one point she went to walk to the door to help let the dog out, and began to feel very lightheaded, dizzy, and faint feeling. She reached for the doorknob which gave way when she put weight on it causing her to fall to the ground again. She did not hit her head this time. She denies any other injuries from this fall. Upon EMSs arrival, she was found to be orthostatic, with a heart rate going from 62/100 when standing. The patient was symptomatic at that time as well. She's not had any nausea or vomiting. No diarrhea recently. No chest pain or shortness of breath. She does state in the past when she had a \"bouts\" of atrial fibrillation, that she had felt lightheaded like that as well. She is on xarelto, flecainide, metoprolol for A. Fib. She is noted to be in sinus rhythm by EMS.     Past Medical History:   Diagnosis Date    Anxiety     Arthritis     Bell's palsy     in Oct. 2009 with some vertigo at times still, no other after effects    Depression     GERD (gastroesophageal reflux disease)     reflux, takes nexium    Hypertension     on medication since 2006    Hypothyroidism     Obese     [facility] 8/13/2017    Psychiatric disorder     depression    S/P Left total knee replacement using cement 5/16/2011    Unspecified hypothyroidism 5/16/2011       Past Surgical History:   Procedure Laterality Date    HX APPENDECTOMY  1961    HX BREAST LUMPECTOMY  1998    benign    HX CATARACT REMOVAL Bilateral 2014    HX CHOLECYSTECTOMY  1976    HX GASTRECTOMY  9/21/15    sleeve    HX HYSTERECTOMY  1987    HX ORTHOPAEDIC Bilateral 1994    heel spurs removed    HX ORTHOPAEDIC  2005    lower back     HX TONSILLECTOMY  1966    HX UROLOGICAL  2008/2009    bladder tack X2    THYROIDECTOMY  1980    partial    TOTAL KNEE ARTHROPLASTY Right 2010    TOTAL KNEE ARTHROPLASTY Left 2011         Family History:   Problem Relation Age of Onset    Cancer Mother      breast    Heart Attack Mother     Cancer Father      throat    Other Father      gastric ulcer    Kidney Disease Father     Malignant Hyperthermia Neg Hx     Pseudocholinesterase Deficiency Neg Hx     Delayed Awakening Neg Hx     Post-op Nausea/Vomiting Neg Hx     Emergence Delirium Neg Hx     Post-op Cognitive Dysfunction Neg Hx        Social History     Social History    Marital status:      Spouse name: N/A    Number of children: N/A    Years of education: N/A     Occupational History    Not on file. Social History Main Topics    Smoking status: Never Smoker    Smokeless tobacco: Never Used    Alcohol use 1.0 oz/week     2 Glasses of wine per week    Drug use: No    Sexual activity: No     Other Topics Concern    Not on file     Social History Narrative         ALLERGIES: Dilaudid [hydromorphone (bulk)] and Sulfa (sulfonamide antibiotics)    Review of Systems   Constitutional: Negative for fever. HENT: Negative. Eyes: Negative. Respiratory: Negative for cough, chest tightness, shortness of breath and wheezing. Cardiovascular: Negative for chest pain. Gastrointestinal: Negative for abdominal distention, abdominal pain, constipation, diarrhea and vomiting. Endocrine: Negative. Genitourinary: Negative for dysuria, flank pain, frequency and urgency. Neurological: Positive for dizziness and light-headedness. Negative for tremors, seizures, syncope, facial asymmetry, speech difficulty, weakness, numbness and headaches. Psychiatric/Behavioral: Negative. All other systems reviewed and are negative. Vitals:    12/02/17 0956   BP: 143/71   Pulse: 63   Resp: 18   Temp: 98 °F (36.7 °C)   SpO2: 100%   Weight: 84.8 kg (187 lb)   Height: 5' 6\" (1.676 m)            Physical Exam   Constitutional: She is oriented to person, place, and time. She appears well-developed and well-nourished. No distress. HENT:   Head: Normocephalic and atraumatic. Eyes: EOM are normal. Pupils are equal, round, and reactive to light. Neck: Normal range of motion. Neck supple. Mild midline tenderness over C3-C4, full range of motion   Cardiovascular: Normal rate, regular rhythm and normal heart sounds. Exam reveals no gallop and no friction rub. No murmur heard. Pulmonary/Chest: Effort normal and breath sounds normal. No stridor. No respiratory distress. She has no wheezes. She has no rales. Abdominal: Soft. Bowel sounds are normal. She exhibits no distension and no mass. There is no tenderness. There is no rebound and no guarding. Musculoskeletal: Normal range of motion. She exhibits no edema, tenderness or deformity.    No midline tenderness to palpation of the lumbar thoracic spine, no step-offs or deformities, full range of motion of the hips bilaterally, there is mild tenderness to palpation over the right piriformis muscle, there is no sacral tenderness, full range of motion without discomfort of the knees bilaterally, mild ecchymosis over the right forearm and right elbow, no tenderness over the medial or lateral epicondyles of the right elbow with full range of motion, no tenderness over the left elbow, no tenderness over the clavicles, and acromioclavicular joints, or scapula bilaterally Neurological: She is alert and oriented to person, place, and time. No cranial nerve deficit. Coordination normal.   5+ strength in all distributions, sensation intact, finger-nose-finger intact, normal gait, normal tandem gait, negative Romberg, no facial asymmetry   Skin: Skin is warm and dry. No rash noted. She is not diaphoretic. No erythema. Psychiatric: She has a normal mood and affect. Her behavior is normal.   Vitals reviewed. MDM  Number of Diagnoses or Management Options  Diagnosis management comments: Differential diagnosis: Concussion, the medic and cranial injury, orthostatic hypotension, vasovagal response, arrhythmia, dehydration, electrolyte abnormality    CT of the brain is unremarkable, no evidence of traumatic injury. On initial orthostatic testing, the patient became so near syncopal that she began having myoclonic jerks. Her heart rate went up into the 1 teens from about 60. She received 2 L of fluid, she states she is feeling improved. However repeat orthostatics show her still not near syncopal when standing with a heart rate still going up into the 1 teens. Given the fact that she wouldn't benefit from ongoing hydration overnight and further evaluation into the source of her orthostasis. Will admit to the hospitalist for further tx.        Amount and/or Complexity of Data Reviewed  Clinical lab tests: ordered and reviewed  Tests in the radiology section of CPT®: ordered and reviewed    Risk of Complications, Morbidity, and/or Mortality  Presenting problems: high  Diagnostic procedures: high  Management options: high      ED Course       EKG  Date/Time: 12/2/2017 10:01 AM  Performed by: DAVIN Matias  Authorized by: DAVIN Matias     Interpretation:     Interpretation: normal    Rate:     ECG rate assessment: normal    Rhythm:     Rhythm: sinus rhythm    Ectopy:     Ectopy: none    QRS:     QRS axis:  Normal  ST segments:     ST segments:  Normal  T waves:     T waves: normal

## 2017-12-02 NOTE — ED NOTES
Pt states hx of afib, does not appear to be in afib on monitor. Pt denies any knots on head, states she had been ambulatory since fall.

## 2017-12-03 LAB
ANION GAP SERPL CALC-SCNC: 9 MMOL/L (ref 7–16)
ATRIAL RATE: 65 BPM
BUN SERPL-MCNC: 9 MG/DL (ref 8–23)
CALCIUM SERPL-MCNC: 7.7 MG/DL (ref 8.3–10.4)
CALCULATED P AXIS, ECG09: 67 DEGREES
CALCULATED R AXIS, ECG10: 18 DEGREES
CALCULATED T AXIS, ECG11: 99 DEGREES
CHLORIDE SERPL-SCNC: 111 MMOL/L (ref 98–107)
CO2 SERPL-SCNC: 25 MMOL/L (ref 21–32)
CREAT SERPL-MCNC: 0.53 MG/DL (ref 0.6–1)
DIAGNOSIS, 93000: NORMAL
ERYTHROCYTE [DISTWIDTH] IN BLOOD BY AUTOMATED COUNT: 13.7 % (ref 11.9–14.6)
ERYTHROCYTE [DISTWIDTH] IN BLOOD BY AUTOMATED COUNT: 13.8 % (ref 11.9–14.6)
FERRITIN SERPL-MCNC: 144 NG/ML (ref 8–388)
FOLATE SERPL-MCNC: 18.5 NG/ML (ref 3.1–17.5)
GLUCOSE SERPL-MCNC: 98 MG/DL (ref 65–100)
HCT VFR BLD AUTO: 22 % (ref 35.8–46.3)
HCT VFR BLD AUTO: 23.5 % (ref 35.8–46.3)
HGB BLD-MCNC: 7.3 G/DL (ref 11.7–15.4)
HGB BLD-MCNC: 8 G/DL (ref 11.7–15.4)
IRON SATN MFR SERPL: 17 %
IRON SERPL-MCNC: 31 UG/DL (ref 35–150)
MCH RBC QN AUTO: 29.2 PG (ref 26.1–32.9)
MCH RBC QN AUTO: 30.1 PG (ref 26.1–32.9)
MCHC RBC AUTO-ENTMCNC: 33.2 G/DL (ref 31.4–35)
MCHC RBC AUTO-ENTMCNC: 34 G/DL (ref 31.4–35)
MCV RBC AUTO: 88 FL (ref 79.6–97.8)
MCV RBC AUTO: 88.3 FL (ref 79.6–97.8)
P-R INTERVAL, ECG05: 148 MS
PLATELET # BLD AUTO: 182 K/UL (ref 150–450)
PLATELET # BLD AUTO: 182 K/UL (ref 150–450)
PMV BLD AUTO: 10.4 FL (ref 10.8–14.1)
PMV BLD AUTO: 10.9 FL (ref 10.8–14.1)
POTASSIUM SERPL-SCNC: 3.4 MMOL/L (ref 3.5–5.1)
Q-T INTERVAL, ECG07: 390 MS
QRS DURATION, ECG06: 86 MS
QTC CALCULATION (BEZET), ECG08: 405 MS
RBC # BLD AUTO: 2.5 M/UL (ref 4.05–5.25)
RBC # BLD AUTO: 2.66 M/UL (ref 4.05–5.25)
SODIUM SERPL-SCNC: 145 MMOL/L (ref 136–145)
TIBC SERPL-MCNC: 185 UG/DL (ref 250–450)
TRANSFERRIN SERPL-MCNC: 166 MG/DL (ref 202–364)
VENTRICULAR RATE, ECG03: 65 BPM
VIT B12 SERPL-MCNC: 244 PG/ML (ref 193–986)
WBC # BLD AUTO: 5.6 K/UL (ref 4.3–11.1)
WBC # BLD AUTO: 5.7 K/UL (ref 4.3–11.1)

## 2017-12-03 PROCEDURE — 84466 ASSAY OF TRANSFERRIN: CPT | Performed by: HOSPITALIST

## 2017-12-03 PROCEDURE — 77030013131 HC IV BLD ST ICUM -A

## 2017-12-03 PROCEDURE — 82746 ASSAY OF FOLIC ACID SERUM: CPT | Performed by: HOSPITALIST

## 2017-12-03 PROCEDURE — 74011250636 HC RX REV CODE- 250/636: Performed by: FAMILY MEDICINE

## 2017-12-03 PROCEDURE — 86923 COMPATIBILITY TEST ELECTRIC: CPT | Performed by: FAMILY MEDICINE

## 2017-12-03 PROCEDURE — 82728 ASSAY OF FERRITIN: CPT | Performed by: HOSPITALIST

## 2017-12-03 PROCEDURE — 86900 BLOOD TYPING SEROLOGIC ABO: CPT | Performed by: FAMILY MEDICINE

## 2017-12-03 PROCEDURE — 36415 COLL VENOUS BLD VENIPUNCTURE: CPT | Performed by: HOSPITALIST

## 2017-12-03 PROCEDURE — 99218 HC RM OBSERVATION: CPT

## 2017-12-03 PROCEDURE — 85027 COMPLETE CBC AUTOMATED: CPT | Performed by: HOSPITALIST

## 2017-12-03 PROCEDURE — 80048 BASIC METABOLIC PNL TOTAL CA: CPT | Performed by: HOSPITALIST

## 2017-12-03 PROCEDURE — 36430 TRANSFUSION BLD/BLD COMPNT: CPT

## 2017-12-03 PROCEDURE — 74011250637 HC RX REV CODE- 250/637: Performed by: HOSPITALIST

## 2017-12-03 PROCEDURE — 83540 ASSAY OF IRON: CPT | Performed by: HOSPITALIST

## 2017-12-03 PROCEDURE — P9016 RBC LEUKOCYTES REDUCED: HCPCS | Performed by: FAMILY MEDICINE

## 2017-12-03 PROCEDURE — 82607 VITAMIN B-12: CPT | Performed by: HOSPITALIST

## 2017-12-03 PROCEDURE — 74011250636 HC RX REV CODE- 250/636: Performed by: HOSPITALIST

## 2017-12-03 RX ORDER — SODIUM CHLORIDE 9 MG/ML
50 INJECTION, SOLUTION INTRAVENOUS CONTINUOUS
Status: DISPENSED | OUTPATIENT
Start: 2017-12-03 | End: 2017-12-03

## 2017-12-03 RX ORDER — PANTOPRAZOLE SODIUM 40 MG/1
40 TABLET, DELAYED RELEASE ORAL
Status: DISCONTINUED | OUTPATIENT
Start: 2017-12-03 | End: 2017-12-06 | Stop reason: HOSPADM

## 2017-12-03 RX ORDER — LANOLIN ALCOHOL/MO/W.PET/CERES
1 CREAM (GRAM) TOPICAL
Status: DISCONTINUED | OUTPATIENT
Start: 2017-12-04 | End: 2017-12-06 | Stop reason: HOSPADM

## 2017-12-03 RX ORDER — SODIUM CHLORIDE 9 MG/ML
250 INJECTION, SOLUTION INTRAVENOUS AS NEEDED
Status: DISCONTINUED | OUTPATIENT
Start: 2017-12-03 | End: 2017-12-06 | Stop reason: HOSPADM

## 2017-12-03 RX ORDER — FUROSEMIDE 10 MG/ML
20 INJECTION INTRAMUSCULAR; INTRAVENOUS ONCE
Status: COMPLETED | OUTPATIENT
Start: 2017-12-04 | End: 2017-12-04

## 2017-12-03 RX ADMIN — PANTOPRAZOLE SODIUM 40 MG: 40 TABLET, DELAYED RELEASE ORAL at 16:09

## 2017-12-03 RX ADMIN — SODIUM CHLORIDE 1000 ML: 900 INJECTION, SOLUTION INTRAVENOUS at 19:29

## 2017-12-03 RX ADMIN — LEVOTHYROXINE SODIUM 100 MCG: 100 TABLET ORAL at 08:15

## 2017-12-03 RX ADMIN — DULOXETINE HYDROCHLORIDE 60 MG: 60 CAPSULE, DELAYED RELEASE ORAL at 08:14

## 2017-12-03 RX ADMIN — Medication 1 AMPULE: at 22:27

## 2017-12-03 RX ADMIN — SODIUM CHLORIDE 50 ML/HR: 900 INJECTION, SOLUTION INTRAVENOUS at 13:24

## 2017-12-03 RX ADMIN — Medication 1 AMPULE: at 08:17

## 2017-12-03 RX ADMIN — RIVAROXABAN 20 MG: 20 TABLET, FILM COATED ORAL at 16:09

## 2017-12-03 NOTE — PROGRESS NOTES
AM assessment completed. Pt alert and oriented x4. Resting in bed eating breakfast. Respirations even and unlabored. Orthostatic VS completed this AM, see doc flow sheet. Pt denies any complaints. Asking to shower. Encouraged to call for help OOB and to call if needs arise.

## 2017-12-03 NOTE — PROGRESS NOTES
INTERNAL MEDICINE     Subjective:     Patient is a 71years old female with history of afib, HTN and on Lopressor presents with dizziness and almost passing out this morning. It happened once last August. No CP or SOB. She has beedn treated for acute sinusitis in last 10 days. No recent changes of her meds. Today, feels much better but orthostatics still positive, she has occasionally loose non-bloody non-black stools    Past Medical History:   Diagnosis Date    Anxiety     Arthritis     Bell's palsy     in Oct. 2009 with some vertigo at times still, no other after effects    Depression     GERD (gastroesophageal reflux disease)     reflux, takes nexium    Hypertension     on medication since 2006    Hypothyroidism     Obese     Paroxysmal atrial fibrillation ([facility] [address] 8/13/2017    Psychiatric disorder     depression    S/P Left total knee replacement using cement 5/16/2011    Unspecified hypothyroidism 5/16/2011      Past Surgical History:   Procedure Laterality Date    HX APPENDECTOMY  1961    HX BREAST LUMPECTOMY  1998    benign    HX CATARACT REMOVAL Bilateral 2014    HX CHOLECYSTECTOMY  1976    HX GASTRECTOMY  9/21/15    sleeve    HX HYSTERECTOMY  1987    HX ORTHOPAEDIC Bilateral 1994    heel spurs removed    HX ORTHOPAEDIC  2005    lower back     HX TONSILLECTOMY  1966    HX UROLOGICAL  2008/2009    bladder tack X2    THYROIDECTOMY  1980    partial    TOTAL KNEE ARTHROPLASTY Right 2010    TOTAL KNEE ARTHROPLASTY Left 2011      Prior to Admission medications    Medication Sig Start Date End Date Taking? Authorizing Provider   omeprazole (PRILOSEC) 40 mg capsule Take 1 Cap by mouth daily. Indications: gastroesophageal reflux disease 12/1/17   Kay Powell MD   levothyroxine (SYNTHROID) 100 mcg tablet Take 1 Tab by mouth daily. 12/1/17   Kay Powell MD   methylPREDNISolone (MEDROL, CHENG,) 4 mg tablet Take 1 Tab by mouth Specific Days and Specific Times.  12/1/17   Kay Powell MD amoxicillin-clavulanate (AUGMENTIN) 875-125 mg per tablet Take 1 Tab by mouth every twelve (12) hours. 12/1/17   Erasmo Gaviria MD   fluconazole (DIFLUCAN) 150 mg tablet Take 1 tablet on day 3 of Medrol dose delilah and on final day of delilah 12/1/17   Erasmo Gaviria MD   metoprolol tartrate (LOPRESSOR) 25 mg tablet Take 0.5 Tabs by mouth every twelve (12) hours. 10/31/17   Sam Manzano MD   rivaroxaban (XARELTO) 20 mg tab tablet Take 1 Tab by mouth daily (with dinner). 10/31/17   Sam Manzano MD   flecainide (TAMBOCOR) 50 mg tablet Take 1 Tab by mouth every twelve (12) hours. 10/31/17   Sam Manzano MD   estradiol (ESTRACE) 1 mg tablet Take 1 mg by mouth daily. Historical Provider   melatonin 10 mg cap Take  by mouth. Historical Provider   Biotin 2,500 mcg cap Take  by mouth. Historical Provider   multivitamin (ONE A DAY) tablet Take 1 Tab by mouth daily. Historical Provider   DULoxetine (CYMBALTA) 30 mg capsule Take 60 mg by mouth daily. Historical Provider     Allergies   Allergen Reactions    Dilaudid [Hydromorphone (Bulk)] Swelling    Sulfa (Sulfonamide Antibiotics) Rash      Social History   Substance Use Topics    Smoking status: Never Smoker    Smokeless tobacco: Never Used    Alcohol use 1.0 oz/week     2 Glasses of wine per week        Family History:  HTN    Review of Systems   A comprehensive review of systems was negative except for that written in the HPI. Objective:      Intake / Output:  12/03 0701 - 12/03 1900  In: -   Out: 400 [Urine:400]  12/01 1901 - 12/03 0700  In: 4310 [I.V.:4310]  Out: 8621 [Urine:1650]    Physical Exam:  Visit Vitals    /76 (BP 1 Location: Right arm, BP Patient Position: At rest;Head of bed elevated (Comment degrees))    Pulse 84    Temp 98.2 °F (36.8 °C)    Resp 18    Ht 5' 6\" (1.676 m)    Wt 84.8 kg (187 lb)    SpO2 99%    Breastfeeding No    BMI 30.18 kg/m2     General appearance: awake, alert, cooperative, obese, pale, mild distress, appears stated age -  Head: Normocephalic, without obvious abnormality, atraumatic  Back: symmetric, no curvature. ROM normal. No CVA tenderness. Lungs: clear to auscultation bilaterally   Heart: regular rate and rhythm, S1, S2 normal, no murmur, click, rub or gallop. Abdomen: soft, no tenderness, no distension, normal bowel sound, no masses, no organomegaly  Extremities: atraumatic, no cyanosis - Bilateral lower limbs edema none. Skin: No rashes or ulceration.   Neurologic: Grossly intact    ECG: sinus rhythm     Data Review (Labs):   Recent Results (from the past 24 hour(s))   CBC W/O DIFF    Collection Time: 12/03/17  6:14 AM   Result Value Ref Range    WBC 5.6 4.3 - 11.1 K/uL    RBC 2.50 (L) 4.05 - 5.25 M/uL    HGB 7.3 (L) 11.7 - 15.4 g/dL    HCT 22.0 (L) 35.8 - 46.3 %    MCV 88.0 79.6 - 97.8 FL    MCH 29.2 26.1 - 32.9 PG    MCHC 33.2 31.4 - 35.0 g/dL    RDW 13.7 11.9 - 14.6 %    PLATELET 508 976 - 717 K/uL    MPV 10.9 10.8 - 91.0 FL   METABOLIC PANEL, BASIC    Collection Time: 12/03/17  6:14 AM   Result Value Ref Range    Sodium 145 136 - 145 mmol/L    Potassium 3.4 (L) 3.5 - 5.1 mmol/L    Chloride 111 (H) 98 - 107 mmol/L    CO2 25 21 - 32 mmol/L    Anion gap 9 7 - 16 mmol/L    Glucose 98 65 - 100 mg/dL    BUN 9 8 - 23 MG/DL    Creatinine 0.53 (L) 0.6 - 1.0 MG/DL    GFR est AA >60 >60 ml/min/1.73m2    GFR est non-AA >60 >60 ml/min/1.73m2    Calcium 7.7 (L) 8.3 - 10.4 MG/DL   CBC W/O DIFF    Collection Time: 12/03/17  8:21 AM   Result Value Ref Range    WBC 5.7 4.3 - 11.1 K/uL    RBC 2.66 (L) 4.05 - 5.25 M/uL    HGB 8.0 (L) 11.7 - 15.4 g/dL    HCT 23.5 (L) 35.8 - 46.3 %    MCV 88.3 79.6 - 97.8 FL    MCH 30.1 26.1 - 32.9 PG    MCHC 34.0 31.4 - 35.0 g/dL    RDW 13.8 11.9 - 14.6 %    PLATELET 426 999 - 408 K/uL    MPV 10.4 (L) 10.8 - 14.1 FL   TRANSFERRIN SATURATION    Collection Time: 12/03/17 11:28 AM   Result Value Ref Range    Iron 31 (L) 35 - 150 ug/dL    TIBC 185 (L) 250 - 450 ug/dL Transferrin Saturation 17 (L) >20 %   FERRITIN    Collection Time: 12/03/17 11:28 AM   Result Value Ref Range    Ferritin 144 8 - 388 NG/ML       Assessment:     Principal Problem:    Orthostatic hypotension (12/2/2017) suspect slow Gi bleeding, on xarelto for afib    Active Problems:    Near syncope (12/2/2017)        Plan:     Observation   IVF hydration  PPI, monitor Hb, transfuse if lower  Hold BBL, watch BP  Follow cortisol and urine CS (UA cloudy only)  Had normal recent echo  May dc home on CCB if improve      Signed By: Argentina Muro MD     December 3, 2017

## 2017-12-03 NOTE — PROGRESS NOTES
Assessment completed. Lung sounds clear. Bowel sounds active. No complaints of pain. Currently resting in bed. Will continue to monitor with hourly rounding.

## 2017-12-04 ENCOUNTER — APPOINTMENT (OUTPATIENT)
Dept: ULTRASOUND IMAGING | Age: 72
DRG: 605 | End: 2017-12-04
Attending: HOSPITALIST
Payer: MEDICARE

## 2017-12-04 LAB
HGB BLD-MCNC: 7.7 G/DL (ref 11.7–15.4)
HGB BLD-MCNC: 9.3 G/DL (ref 11.7–15.4)

## 2017-12-04 PROCEDURE — 77030032490 HC SLV COMPR SCD KNE COVD -B

## 2017-12-04 PROCEDURE — 74011250637 HC RX REV CODE- 250/637: Performed by: HOSPITALIST

## 2017-12-04 PROCEDURE — 74011250636 HC RX REV CODE- 250/636: Performed by: FAMILY MEDICINE

## 2017-12-04 PROCEDURE — 36415 COLL VENOUS BLD VENIPUNCTURE: CPT | Performed by: HOSPITALIST

## 2017-12-04 PROCEDURE — 36430 TRANSFUSION BLD/BLD COMPNT: CPT

## 2017-12-04 PROCEDURE — 99218 HC RM OBSERVATION: CPT

## 2017-12-04 PROCEDURE — 77030013131 HC IV BLD ST ICUM -A

## 2017-12-04 PROCEDURE — 74011250636 HC RX REV CODE- 250/636: Performed by: HOSPITALIST

## 2017-12-04 PROCEDURE — 85018 HEMOGLOBIN: CPT | Performed by: HOSPITALIST

## 2017-12-04 PROCEDURE — 76881 US COMPL JOINT R-T W/IMG: CPT

## 2017-12-04 PROCEDURE — P9016 RBC LEUKOCYTES REDUCED: HCPCS | Performed by: FAMILY MEDICINE

## 2017-12-04 PROCEDURE — 65270000029 HC RM PRIVATE

## 2017-12-04 RX ORDER — SODIUM CHLORIDE 9 MG/ML
75 INJECTION, SOLUTION INTRAVENOUS CONTINUOUS
Status: DISCONTINUED | OUTPATIENT
Start: 2017-12-04 | End: 2017-12-05

## 2017-12-04 RX ADMIN — FERROUS SULFATE TAB 325 MG (65 MG ELEMENTAL FE) 325 MG: 325 (65 FE) TAB at 08:08

## 2017-12-04 RX ADMIN — PANTOPRAZOLE SODIUM 40 MG: 40 TABLET, DELAYED RELEASE ORAL at 08:08

## 2017-12-04 RX ADMIN — Medication 1 AMPULE: at 21:20

## 2017-12-04 RX ADMIN — FUROSEMIDE 20 MG: 10 INJECTION, SOLUTION INTRAMUSCULAR; INTRAVENOUS at 04:08

## 2017-12-04 RX ADMIN — LEVOTHYROXINE SODIUM 100 MCG: 100 TABLET ORAL at 08:08

## 2017-12-04 RX ADMIN — PANTOPRAZOLE SODIUM 40 MG: 40 TABLET, DELAYED RELEASE ORAL at 16:52

## 2017-12-04 RX ADMIN — DULOXETINE HYDROCHLORIDE 60 MG: 60 CAPSULE, DELAYED RELEASE ORAL at 08:08

## 2017-12-04 RX ADMIN — SODIUM CHLORIDE 75 ML/HR: 900 INJECTION, SOLUTION INTRAVENOUS at 19:15

## 2017-12-04 NOTE — PROGRESS NOTES
AM assessment completed. Pt alert and oriented x4. Just walked back from bathroom. Pt denies dizziness or any complaints. Has not had any BMs. Pt has large bruise to right hip. All safety measures in place. Encouraged pt to call if needs arise.

## 2017-12-04 NOTE — PROGRESS NOTES
Initial visit made to patient and a prayer was provided. A  card was left.         [employer]

## 2017-12-04 NOTE — PROGRESS NOTES
Patient experiencing increased dizziness. Paged MD and a bolus as well as 2 units of blood order based of patient's hgb. Will administer and continue to monitor.

## 2017-12-04 NOTE — PHYSICIAN ADVISORY
Letter of Determination: Upgrade from Observation to Inpatient Status    This patient was originally hospitalized as observation status on 12/03/2017 for evaluation of near syncope. This patient now meets for Inpatient Admission in accordance with CMS regulation Section 43 .3. Specifically, patient's stay is now over Two Midnights and was medically necessary. The patient's stay was medically necessary based on laboratory studies significant for a hemoglobin of 7.3 mg/dl, serum iron of 31 ug/dL, TIBC of 185 ug/dL, and Transferrin Saturation of 17%. During the hospitalization the patient received 2 units of packed red blood cells. Consistent with CMS guidelines, patient meets for inpatient status. It is our recommendation that this patient's hospitalization status should be upgraded from OBSERVATION to INPATIENT status.      The final decision regarding the patient's hospitalization status depends on the attending physician's judgement.     Mauro Paz MD, GALINA,   Physician [place].

## 2017-12-04 NOTE — PROGRESS NOTES
Problem: Interdisciplinary Rounds  Goal: Interdisciplinary Rounds  Interdisciplinary team rounds were held 12/4/2017 with the following team members:Care Management, Nursing, Nutrition, Patient Relations, Pharmacy and Physician and the patient, spouse and child(vaishali). Plan of care discussed. See clinical pathway and/or care plan for interventions and desired outcomes.

## 2017-12-04 NOTE — PROGRESS NOTES
INTERNAL MEDICINE     Subjective:     Patient is a 71years old female with history of afib, HTN and on Lopressor presents with dizziness and almost passing out this morning. It happened once last August. No CP or SOB. She has beedn treated for acute sinusitis in last 10 days. No recent changes of her meds. Today, feels much better but orthostatics still positive, last night had another syncopal attack and received 2U of PRBC    Past Medical History:   Diagnosis Date    Anxiety     Arthritis     Bell's palsy     in Oct. 2009 with some vertigo at times still, no other after effects    Depression     GERD (gastroesophageal reflux disease)     reflux, takes nexium    Hypertension     on medication since 2006    Hypothyroidism     Obese     Paroxysmal atrial fibrillation ([facility] [address] 8/13/2017    Psychiatric disorder     depression    S/P Left total knee replacement using cement 5/16/2011    Unspecified hypothyroidism 5/16/2011      Past Surgical History:   Procedure Laterality Date    HX APPENDECTOMY  1961    HX BREAST LUMPECTOMY  1998    benign    HX CATARACT REMOVAL Bilateral 2014    HX CHOLECYSTECTOMY  1976    HX GASTRECTOMY  9/21/15    sleeve    HX HYSTERECTOMY  1987    HX ORTHOPAEDIC Bilateral 1994    heel spurs removed    HX ORTHOPAEDIC  2005    lower back     HX TONSILLECTOMY  1966    HX UROLOGICAL  2008/2009    bladder tack X2    THYROIDECTOMY  1980    partial    TOTAL KNEE ARTHROPLASTY Right 2010    TOTAL KNEE ARTHROPLASTY Left 2011      Prior to Admission medications    Medication Sig Start Date End Date Taking? Authorizing Provider   omeprazole (PRILOSEC) 40 mg capsule Take 1 Cap by mouth daily. Indications: gastroesophageal reflux disease 12/1/17   Lila Zavala MD   levothyroxine (SYNTHROID) 100 mcg tablet Take 1 Tab by mouth daily. 12/1/17   Lila Zavala MD   methylPREDNISolone (MEDROL, CHENG,) 4 mg tablet Take 1 Tab by mouth Specific Days and Specific Times.  12/1/17   Akanksha Lane MD Qian   amoxicillin-clavulanate (AUGMENTIN) 875-125 mg per tablet Take 1 Tab by mouth every twelve (12) hours. 12/1/17   Kassidy Ortiz MD   fluconazole (DIFLUCAN) 150 mg tablet Take 1 tablet on day 3 of Medrol dose delilah and on final day of delilah 12/1/17   Kassidy Ortiz MD   metoprolol tartrate (LOPRESSOR) 25 mg tablet Take 0.5 Tabs by mouth every twelve (12) hours. 10/31/17   Kori Villafana MD   rivaroxaban (XARELTO) 20 mg tab tablet Take 1 Tab by mouth daily (with dinner). 10/31/17   Kori Villafana MD   flecainide (TAMBOCOR) 50 mg tablet Take 1 Tab by mouth every twelve (12) hours. 10/31/17   Kori Villafana MD   estradiol (ESTRACE) 1 mg tablet Take 1 mg by mouth daily. Historical Provider   melatonin 10 mg cap Take  by mouth. Historical Provider   Biotin 2,500 mcg cap Take  by mouth. Historical Provider   multivitamin (ONE A DAY) tablet Take 1 Tab by mouth daily. Historical Provider   DULoxetine (CYMBALTA) 30 mg capsule Take 60 mg by mouth daily. Historical Provider     Allergies   Allergen Reactions    Dilaudid [Hydromorphone (Bulk)] Swelling    Sulfa (Sulfonamide Antibiotics) Rash      Social History   Substance Use Topics    Smoking status: Never Smoker    Smokeless tobacco: Never Used    Alcohol use 1.0 oz/week     2 Glasses of wine per week        Family History:  HTN    Review of Systems   A comprehensive review of systems was negative except for that written in the HPI. Objective:      Intake / Output:  12/04 0701 - 12/04 1900  In: -   Out: 300 [Urine:300]  12/02 1901 - 12/04 0700  In: 3496.8 [I.V.:2786]  Out: 3800 [Urine:3800]    Physical Exam:  Visit Vitals    /75 (BP 1 Location: Right arm, BP Patient Position: At rest;Supine)    Pulse 92    Temp 98.1 °F (36.7 °C)    Resp 14    Ht 5' 6\" (1.676 m)    Wt 84.8 kg (187 lb)    SpO2 100%    Breastfeeding No    BMI 30.18 kg/m2     General appearance: awake, alert, cooperative, obese, pale, mild distress, appears stated age -  Head: Normocephalic, without obvious abnormality, atraumatic  Back: symmetric, no curvature. ROM normal. No CVA tenderness. Lungs: clear to auscultation bilaterally   Heart: regular rate and rhythm, S1, S2 normal, no murmur, click, rub or gallop. Abdomen: soft, no tenderness, no distension, normal bowel sound, no masses, no organomegaly  Extremities: atraumatic, no cyanosis - Bilateral lower limbs edema none. Skin: No rashes or ulceration. Back of R thigh has large indurated blue area  Neurologic: Grossly intact    ECG: sinus rhythm     Data Review (Labs):   Recent Results (from the past 24 hour(s))   TYPE + CROSSMATCH    Collection Time: 12/03/17  8:51 PM   Result Value Ref Range    Crossmatch Expiration 12/06/2017     ABO/Rh(D) Geovani Ramos POSITIVE     Antibody screen NEG     Unit number [id number]     Blood component type RC LR AS5     Unit division 00     Status of unit TRANSFUSED     Crossmatch result Compatible     Unit number [id number]     Blood component type RC LR AS5     Unit division 00     Status of unit ISSUED     Crossmatch result Compatible    HEMOGLOBIN    Collection Time: 12/04/17  6:08 AM   Result Value Ref Range    HGB 9.3 (L) 11.7 - 15.4 g/dL       Assessment:     Principal Problem:    Orthostatic hypotension (12/2/2017) on xarelto for afib from home, came w/ mild new anemia suspecting slow ch Gi bleeding but after her syncopal fall before admission she developed now a thigh hematoma which likely is the reason for her Hb drop daily.   Active Problems:    Near syncope (12/2/2017) anemia vs BBL use vs both        Plan:     PRBC as needed, got 2U so far  Monitor Hb in am  Xarelto stopped  Surgery consulted for possible evacuation of hematoma  Follow orthostatics daily (gets tachycardic on standing)  May DC home when improve on CCB instead of BBL  IVF hydration  PPI, monitor Hb  Hold BBL, watch BP      Signed By: Ferny Cole MD     December 4, 2017

## 2017-12-04 NOTE — PROGRESS NOTES
Care Management Interventions  PCP Verified by CM: Yes  Mode of Transport at Discharge: Other (see comment)  Transition of Care Consult (CM Consult): Discharge Planning  Current Support Network: Lives with Spouse  Discharge Location  Discharge Placement: Home      SW spoke with pt & spouse during care rounds. Pt is A&O, PTA indep with ADL'S. At this time do not anticipate any d/c needs.

## 2017-12-04 NOTE — CONSULTS
[address]  [address] [facility], [address] [place] [address], [address] [facility]       History and Physical/Surgical Consult   Sophia Arriola    Admit date: 2017    MRN: [id number]     : 1945     Age: 71 y.o.          2017 2:58 PM    Subjective/HPI:   This patient is a 71 y.o. seen and evaluated for right hip hematoma. The patient was admitted for syncope yesterday. She takes Xarelto at home and reports falling from one step height at home. During her hospital stay her hemoglobin has dropped to 7.3 and has required 2 units PRBC. Current hgb is 9.3. She has remained hemodynamically stable. She complains of Right hip pain and hematoma was identified. US shows 11 cm hematoma. Xarelto has been discontinued and last dose was last night. She complains of hip pain tolerable. .     Review of Systems  A comprehensive review of systems was negative except for that written in the HPI.   Past Medical History:   Diagnosis Date    Anxiety     Arthritis     Bell's palsy     in Oct. 2009 with some vertigo at times still, no other after effects    Depression     GERD (gastroesophageal reflux disease)     reflux, takes nexium    Hypertension     on medication since     Hypothyroidism     Obese     Paroxysmal atrial fibrillation ([facility] [address] 2017    Psychiatric disorder     depression    S/P Left total knee replacement using cement 2011    Unspecified hypothyroidism 2011      Past Surgical History:   Procedure Laterality Date    HX APPENDECTOMY      HX BREAST LUMPECTOMY      benign    HX CATARACT REMOVAL Bilateral 2014    HX CHOLECYSTECTOMY  1976    HX GASTRECTOMY  9/21/15    sleeve    HX HYSTERECTOMY  1987    HX ORTHOPAEDIC Bilateral     heel spurs removed    HX ORTHOPAEDIC  2005    lower back     HX TONSILLECTOMY  1966    HX UROLOGICAL      bladder tack X2    THYROIDECTOMY  1980    partial    TOTAL KNEE ARTHROPLASTY Right 2010  TOTAL KNEE ARTHROPLASTY Left 2011      Allergies   Allergen Reactions    Dilaudid [Hydromorphone (Bulk)] Swelling    Sulfa (Sulfonamide Antibiotics) Rash      Social History   Substance Use Topics    Smoking status: Never Smoker    Smokeless tobacco: Never Used    Alcohol use 1.0 oz/week     2 Glasses of wine per week      Social History     Social History Narrative     Family History   Problem Relation Age of Onset    Cancer Mother      breast    Heart Attack Mother     Cancer Father      throat    Other Father      gastric ulcer    Kidney Disease Father     Malignant Hyperthermia Neg Hx     Pseudocholinesterase Deficiency Neg Hx     Delayed Awakening Neg Hx     Post-op Nausea/Vomiting Neg Hx     Emergence Delirium Neg Hx     Post-op Cognitive Dysfunction Neg Hx       Prior to Admission Medications   Prescriptions Last Dose Informant Patient Reported? Taking? Biotin 2,500 mcg cap   Yes No   Sig: Take  by mouth. DULoxetine (CYMBALTA) 30 mg capsule   Yes No   Sig: Take 60 mg by mouth daily. amoxicillin-clavulanate (AUGMENTIN) 875-125 mg per tablet Not Taking at Unknown time  No No   Sig: Take 1 Tab by mouth every twelve (12) hours. estradiol (ESTRACE) 1 mg tablet   Yes No   Sig: Take 1 mg by mouth daily. flecainide (TAMBOCOR) 50 mg tablet   No No   Sig: Take 1 Tab by mouth every twelve (12) hours. fluconazole (DIFLUCAN) 150 mg tablet Not Taking at Unknown time  No No   Sig: Take 1 tablet on day 3 of Medrol dose cheng and on final day of cheng   levothyroxine (SYNTHROID) 100 mcg tablet   No No   Sig: Take 1 Tab by mouth daily. melatonin 10 mg cap   Yes No   Sig: Take  by mouth.   methylPREDNISolone (MEDROL, CHENG,) 4 mg tablet Not Taking at Unknown time  No No   Sig: Take 1 Tab by mouth Specific Days and Specific Times. metoprolol tartrate (LOPRESSOR) 25 mg tablet   No No   Sig: Take 0.5 Tabs by mouth every twelve (12) hours.    multivitamin (ONE A DAY) tablet   Yes No   Sig: Take 1 Tab by mouth daily. omeprazole (PRILOSEC) 40 mg capsule   No No   Sig: Take 1 Cap by mouth daily. Indications: gastroesophageal reflux disease   rivaroxaban (XARELTO) 20 mg tab tablet   No No   Sig: Take 1 Tab by mouth daily (with dinner). Facility-Administered Medications: None     Current Facility-Administered Medications   Medication Dose Route Frequency    pantoprazole (PROTONIX) tablet 40 mg  40 mg Oral ACB&D    ferrous sulfate tablet 325 mg  1 Tab Oral DAILY WITH BREAKFAST    0.9% sodium chloride infusion 250 mL  250 mL IntraVENous PRN    DULoxetine (CYMBALTA) capsule 60 mg  60 mg Oral DAILY    levothyroxine (SYNTHROID) tablet 100 mcg  100 mcg Oral DAILY    sodium chloride (NS) flush 5-10 mL  5-10 mL IntraVENous Q8H    sodium chloride (NS) flush 5-10 mL  5-10 mL IntraVENous PRN    acetaminophen (TYLENOL) tablet 650 mg  650 mg Oral Q4H PRN    diphenhydrAMINE (BENADRYL) capsule 25 mg  25 mg Oral Q4H PRN    nicotine (NICODERM CQ) 21 mg/24 hr patch 1 Patch  1 Patch TransDERmal DAILY PRN    alcohol 62% (NOZIN) nasal  1 Ampule  1 Ampule Topical Q12H     Objective:     Vitals:    12/04/17 0345 12/04/17 0732 12/04/17 0836 12/04/17 1128   BP: 136/61 140/65 126/64 150/75   Pulse: 86 87 69 92   Resp: 20 17 14   Temp: 98.6 °F (37 °C) 97.6 °F (36.4 °C)  98.1 °F (36.7 °C)   SpO2: 97% 99%  100%   Weight:       Height:         12/04 0701 - 12/04 1900  In: -   Out: 300 [Urine:300]  12/02 1901 - 12/04 0700  In: 3496.8 [I.V.:2786]  Out: 3800 [Urine:3800]  Physical Exam:   Gen- the patient is well developed and in no acute distress  HEENT- PERRL, EOMI, no scleral icterus       nose without alar flaring or epistaxis                  oral muscosa moist without cyanosis  Neck- no JVD or retractions  Lungs- resp even/unlab   Heart- RRR   Abd- soft, no TTP  Ext- warm without cyanosis. There is no lower leg edema. Large hematoma of Right hip. Purple ecchymosis. Tense and tender to palpation.   Skin- no jaundice or rashes, large ecchymosis on Right hip. Neuro- alert and oriented x 3. No gross sensorimotor deficits are present. Data Review   Recent Labs      12/04/17   0608  12/03/17   0821  12/03/17   0614  12/02/17   1011   WBC   --   5.7  5.6  7.5   HGB  9.3*  8.0*  7.3*  10.0*   HCT   --   23.5*  22.0*  30.5*   PLT   --   182  182  260     Recent Labs      12/03/17   0614  12/02/17   1011   NA  145  142   K  3.4*  4.1   CL  111*  104   CO2  25  27   GLU  98  111*   BUN  9  20   CREA  0.53*  0.70   INR   --   1.2       Assessment:     Hospital Problems  Date Reviewed: 11/24/2017          Codes Class Noted POA    * (Principal)Orthostatic hypotension ICD-10-CM: I95.1  ICD-9-CM: 458.0  12/2/2017 Yes        Near syncope ICD-10-CM: R55  ICD-9-CM: 780.2  12/2/2017 Yes        Morbid obesity with BMI of 45.0-49.9, adult ([facility] [address] ICD-10-CM: E66.01, Z68.42  ICD-9-CM: 278.01, V85.42  9/21/2015 Yes        HTN (hypertension) (Chronic) ICD-10-CM: I10  ICD-9-CM: 401.9  5/16/2011 Yes            Plan:   DC Xarelto  NPO at [place]  Serial H&H  Continue serial exams. Consider CT pelvis and Right hip to plan drainage procedure. If hgb stable, will consider nonoperative management. Hematoma may reabsorb without surgery.       Will follow and provide more input tomorrow morning.    --    Sandor Pap Brian, DO

## 2017-12-04 NOTE — PROGRESS NOTES
Pt feeling lightheaded, weak and SOB. Notified Dr. Christopher Ritchie. Order received to check stat Hemoglobin. VSS.

## 2017-12-04 NOTE — PROGRESS NOTES
Assessment completed. Lung sounds clear. Bowel sounds active. Right hip bruised and very swollen-MD aware. Alert and oriented. Currently resting in bed. Will continue to monitor with hourly rounding.

## 2017-12-05 ENCOUNTER — ANESTHESIA (OUTPATIENT)
Dept: SURGERY | Age: 72
DRG: 605 | End: 2017-12-05
Payer: MEDICARE

## 2017-12-05 ENCOUNTER — APPOINTMENT (OUTPATIENT)
Dept: CT IMAGING | Age: 72
DRG: 605 | End: 2017-12-05
Attending: SURGERY
Payer: MEDICARE

## 2017-12-05 ENCOUNTER — ANESTHESIA EVENT (OUTPATIENT)
Dept: SURGERY | Age: 72
DRG: 605 | End: 2017-12-05
Payer: MEDICARE

## 2017-12-05 PROBLEM — D62 ACUTE BLOOD LOSS ANEMIA: Status: ACTIVE | Noted: 2017-12-05

## 2017-12-05 PROBLEM — D64.9 ANEMIA REQUIRING TRANSFUSIONS: Status: ACTIVE | Noted: 2017-12-05

## 2017-12-05 PROBLEM — F41.9 ANXIETY: Chronic | Status: ACTIVE | Noted: 2017-12-05

## 2017-12-05 PROBLEM — S70.01XA TRAUMATIC HEMATOMA OF RIGHT HIP: Status: ACTIVE | Noted: 2017-12-05

## 2017-12-05 PROBLEM — I48.91 A-FIB (HCC): Chronic | Status: ACTIVE | Noted: 2017-08-13

## 2017-12-05 PROBLEM — T14.8XXA HEMATOMA: Status: ACTIVE | Noted: 2017-12-05

## 2017-12-05 PROBLEM — I48.0 PAROXYSMAL ATRIAL FIBRILLATION (HCC): Chronic | Status: ACTIVE | Noted: 2017-08-13

## 2017-12-05 LAB
BACTERIA SPEC CULT: NORMAL
CREAT SERPL-MCNC: 0.6 MG/DL (ref 0.6–1)
HGB BLD-MCNC: 7.5 G/DL (ref 11.7–15.4)
SERVICE CMNT-IMP: NORMAL

## 2017-12-05 PROCEDURE — 77030019940 HC BLNKT HYPOTHRM STRY -B: Performed by: ANESTHESIOLOGY

## 2017-12-05 PROCEDURE — 74011000250 HC RX REV CODE- 250

## 2017-12-05 PROCEDURE — 77030002916 HC SUT ETHLN J&J -A: Performed by: SURGERY

## 2017-12-05 PROCEDURE — 0J9L00Z DRAINAGE OF RIGHT UPPER LEG SUBCUTANEOUS TISSUE AND FASCIA WITH DRAINAGE DEVICE, OPEN APPROACH: ICD-10-PCS | Performed by: SURGERY

## 2017-12-05 PROCEDURE — 74011250636 HC RX REV CODE- 250/636

## 2017-12-05 PROCEDURE — 36415 COLL VENOUS BLD VENIPUNCTURE: CPT | Performed by: HOSPITALIST

## 2017-12-05 PROCEDURE — 65270000029 HC RM PRIVATE

## 2017-12-05 PROCEDURE — 30233N1 TRANSFUSION OF NONAUTOLOGOUS RED BLOOD CELLS INTO PERIPHERAL VEIN, PERCUTANEOUS APPROACH: ICD-10-PCS | Performed by: SURGERY

## 2017-12-05 PROCEDURE — 76010000149 HC OR TIME 1 TO 1.5 HR: Performed by: SURGERY

## 2017-12-05 PROCEDURE — 77030012411 HC DRN WND CARD -A: Performed by: SURGERY

## 2017-12-05 PROCEDURE — 74011250637 HC RX REV CODE- 250/637: Performed by: HOSPITALIST

## 2017-12-05 PROCEDURE — 85018 HEMOGLOBIN: CPT | Performed by: HOSPITALIST

## 2017-12-05 PROCEDURE — P9016 RBC LEUKOCYTES REDUCED: HCPCS | Performed by: FAMILY MEDICINE

## 2017-12-05 PROCEDURE — 36430 TRANSFUSION BLD/BLD COMPNT: CPT

## 2017-12-05 PROCEDURE — 77030011640 HC PAD GRND REM COVD -A: Performed by: SURGERY

## 2017-12-05 PROCEDURE — 77030008477 HC STYL SATN SLP COVD -A: Performed by: ANESTHESIOLOGY

## 2017-12-05 PROCEDURE — 76210000017 HC OR PH I REC 1.5 TO 2 HR: Performed by: SURGERY

## 2017-12-05 PROCEDURE — 74011250636 HC RX REV CODE- 250/636: Performed by: ANESTHESIOLOGY

## 2017-12-05 PROCEDURE — 72193 CT PELVIS W/DYE: CPT

## 2017-12-05 PROCEDURE — 74011250637 HC RX REV CODE- 250/637: Performed by: INTERNAL MEDICINE

## 2017-12-05 PROCEDURE — 74011000258 HC RX REV CODE- 258: Performed by: HOSPITALIST

## 2017-12-05 PROCEDURE — 74011636320 HC RX REV CODE- 636/320: Performed by: HOSPITALIST

## 2017-12-05 PROCEDURE — 77030008703 HC TU ET UNCUF COVD -A: Performed by: ANESTHESIOLOGY

## 2017-12-05 PROCEDURE — 76060000033 HC ANESTHESIA 1 TO 1.5 HR: Performed by: SURGERY

## 2017-12-05 PROCEDURE — 82565 ASSAY OF CREATININE: CPT | Performed by: SURGERY

## 2017-12-05 PROCEDURE — 77030013131 HC IV BLD ST ICUM -A

## 2017-12-05 PROCEDURE — 77030018836 HC SOL IRR NACL ICUM -A: Performed by: SURGERY

## 2017-12-05 RX ORDER — LORAZEPAM 0.5 MG/1
0.5 TABLET ORAL
Status: DISCONTINUED | OUTPATIENT
Start: 2017-12-05 | End: 2017-12-06 | Stop reason: HOSPADM

## 2017-12-05 RX ORDER — ROCURONIUM BROMIDE 10 MG/ML
INJECTION, SOLUTION INTRAVENOUS AS NEEDED
Status: DISCONTINUED | OUTPATIENT
Start: 2017-12-05 | End: 2017-12-05 | Stop reason: HOSPADM

## 2017-12-05 RX ORDER — METOPROLOL TARTRATE 25 MG/1
12.5 TABLET, FILM COATED ORAL EVERY 12 HOURS
Status: DISCONTINUED | OUTPATIENT
Start: 2017-12-05 | End: 2017-12-06 | Stop reason: HOSPADM

## 2017-12-05 RX ORDER — OXYCODONE AND ACETAMINOPHEN 5; 325 MG/1; MG/1
2 TABLET ORAL
Status: CANCELLED | OUTPATIENT
Start: 2017-12-05

## 2017-12-05 RX ORDER — FENTANYL CITRATE 50 UG/ML
INJECTION, SOLUTION INTRAMUSCULAR; INTRAVENOUS AS NEEDED
Status: DISCONTINUED | OUTPATIENT
Start: 2017-12-05 | End: 2017-12-05 | Stop reason: HOSPADM

## 2017-12-05 RX ORDER — MORPHINE SULFATE 10 MG/ML
2 INJECTION, SOLUTION INTRAMUSCULAR; INTRAVENOUS ONCE
Status: DISCONTINUED | OUTPATIENT
Start: 2017-12-05 | End: 2017-12-05

## 2017-12-05 RX ORDER — METOPROLOL TARTRATE 5 MG/5ML
INJECTION INTRAVENOUS AS NEEDED
Status: DISCONTINUED | OUTPATIENT
Start: 2017-12-05 | End: 2017-12-05 | Stop reason: HOSPADM

## 2017-12-05 RX ORDER — MORPHINE SULFATE 10 MG/ML
2 INJECTION, SOLUTION INTRAMUSCULAR; INTRAVENOUS
Status: DISCONTINUED | OUTPATIENT
Start: 2017-12-05 | End: 2017-12-05 | Stop reason: HOSPADM

## 2017-12-05 RX ORDER — ONDANSETRON 2 MG/ML
INJECTION INTRAMUSCULAR; INTRAVENOUS AS NEEDED
Status: DISCONTINUED | OUTPATIENT
Start: 2017-12-05 | End: 2017-12-05 | Stop reason: HOSPADM

## 2017-12-05 RX ORDER — SUCCINYLCHOLINE CHLORIDE 20 MG/ML
INJECTION INTRAMUSCULAR; INTRAVENOUS AS NEEDED
Status: DISCONTINUED | OUTPATIENT
Start: 2017-12-05 | End: 2017-12-05 | Stop reason: HOSPADM

## 2017-12-05 RX ORDER — SODIUM CHLORIDE 9 MG/ML
250 INJECTION, SOLUTION INTRAVENOUS AS NEEDED
Status: DISCONTINUED | OUTPATIENT
Start: 2017-12-05 | End: 2017-12-06 | Stop reason: HOSPADM

## 2017-12-05 RX ORDER — SODIUM CHLORIDE, SODIUM LACTATE, POTASSIUM CHLORIDE, CALCIUM CHLORIDE 600; 310; 30; 20 MG/100ML; MG/100ML; MG/100ML; MG/100ML
75 INJECTION, SOLUTION INTRAVENOUS CONTINUOUS
Status: DISCONTINUED | OUTPATIENT
Start: 2017-12-05 | End: 2017-12-05 | Stop reason: HOSPADM

## 2017-12-05 RX ORDER — MORPHINE SULFATE 10 MG/ML
2 INJECTION, SOLUTION INTRAMUSCULAR; INTRAVENOUS
Status: DISCONTINUED | OUTPATIENT
Start: 2017-12-05 | End: 2017-12-05

## 2017-12-05 RX ORDER — SODIUM CHLORIDE 0.9 % (FLUSH) 0.9 %
10 SYRINGE (ML) INJECTION
Status: COMPLETED | OUTPATIENT
Start: 2017-12-05 | End: 2017-12-05

## 2017-12-05 RX ORDER — PROPOFOL 10 MG/ML
INJECTION, EMULSION INTRAVENOUS AS NEEDED
Status: DISCONTINUED | OUTPATIENT
Start: 2017-12-05 | End: 2017-12-05 | Stop reason: HOSPADM

## 2017-12-05 RX ORDER — SODIUM CHLORIDE, SODIUM LACTATE, POTASSIUM CHLORIDE, CALCIUM CHLORIDE 600; 310; 30; 20 MG/100ML; MG/100ML; MG/100ML; MG/100ML
INJECTION, SOLUTION INTRAVENOUS
Status: DISCONTINUED | OUTPATIENT
Start: 2017-12-05 | End: 2017-12-05 | Stop reason: HOSPADM

## 2017-12-05 RX ORDER — CEFAZOLIN SODIUM 1 G/3ML
INJECTION, POWDER, FOR SOLUTION INTRAMUSCULAR; INTRAVENOUS AS NEEDED
Status: DISCONTINUED | OUTPATIENT
Start: 2017-12-05 | End: 2017-12-05 | Stop reason: HOSPADM

## 2017-12-05 RX ORDER — MIDODRINE HYDROCHLORIDE 5 MG/1
5 TABLET ORAL
Status: DISCONTINUED | OUTPATIENT
Start: 2017-12-05 | End: 2017-12-06 | Stop reason: HOSPADM

## 2017-12-05 RX ADMIN — FERROUS SULFATE TAB 325 MG (65 MG ELEMENTAL FE) 325 MG: 325 (65 FE) TAB at 08:34

## 2017-12-05 RX ADMIN — PROPOFOL 30 MG: 10 INJECTION, EMULSION INTRAVENOUS at 14:27

## 2017-12-05 RX ADMIN — SODIUM CHLORIDE 100 ML: 900 INJECTION, SOLUTION INTRAVENOUS at 10:28

## 2017-12-05 RX ADMIN — FENTANYL CITRATE 50 MCG: 50 INJECTION, SOLUTION INTRAMUSCULAR; INTRAVENOUS at 14:27

## 2017-12-05 RX ADMIN — PANTOPRAZOLE SODIUM 40 MG: 40 TABLET, DELAYED RELEASE ORAL at 08:34

## 2017-12-05 RX ADMIN — DULOXETINE HYDROCHLORIDE 60 MG: 60 CAPSULE, DELAYED RELEASE ORAL at 08:34

## 2017-12-05 RX ADMIN — MORPHINE SULFATE 2 MG: 10 INJECTION INTRAMUSCULAR; INTRAVENOUS; SUBCUTANEOUS at 16:10

## 2017-12-05 RX ADMIN — LEVOTHYROXINE SODIUM 100 MCG: 100 TABLET ORAL at 08:34

## 2017-12-05 RX ADMIN — PROPOFOL 120 MG: 10 INJECTION, EMULSION INTRAVENOUS at 14:10

## 2017-12-05 RX ADMIN — Medication 1 AMPULE: at 08:42

## 2017-12-05 RX ADMIN — Medication 1 AMPULE: at 21:58

## 2017-12-05 RX ADMIN — METOPROLOL TARTRATE 12.5 MG: 25 TABLET ORAL at 21:57

## 2017-12-05 RX ADMIN — LORAZEPAM 0.5 MG: 0.5 TABLET ORAL at 08:39

## 2017-12-05 RX ADMIN — Medication 10 ML: at 10:28

## 2017-12-05 RX ADMIN — SODIUM CHLORIDE, SODIUM LACTATE, POTASSIUM CHLORIDE, CALCIUM CHLORIDE: 600; 310; 30; 20 INJECTION, SOLUTION INTRAVENOUS at 14:04

## 2017-12-05 RX ADMIN — SODIUM CHLORIDE, SODIUM LACTATE, POTASSIUM CHLORIDE, AND CALCIUM CHLORIDE 75 ML/HR: 600; 310; 30; 20 INJECTION, SOLUTION INTRAVENOUS at 13:50

## 2017-12-05 RX ADMIN — METOPROLOL TARTRATE 2 MG: 5 INJECTION INTRAVENOUS at 14:09

## 2017-12-05 RX ADMIN — SUCCINYLCHOLINE CHLORIDE 140 MG: 20 INJECTION INTRAMUSCULAR; INTRAVENOUS at 14:10

## 2017-12-05 RX ADMIN — FENTANYL CITRATE 50 MCG: 50 INJECTION, SOLUTION INTRAMUSCULAR; INTRAVENOUS at 14:31

## 2017-12-05 RX ADMIN — SODIUM CHLORIDE, SODIUM LACTATE, POTASSIUM CHLORIDE, CALCIUM CHLORIDE: 600; 310; 30; 20 INJECTION, SOLUTION INTRAVENOUS at 15:00

## 2017-12-05 RX ADMIN — METOPROLOL TARTRATE 12.5 MG: 25 TABLET ORAL at 08:50

## 2017-12-05 RX ADMIN — PROPOFOL 50 MG: 10 INJECTION, EMULSION INTRAVENOUS at 14:45

## 2017-12-05 RX ADMIN — ROCURONIUM BROMIDE 5 MG: 10 INJECTION, SOLUTION INTRAVENOUS at 14:10

## 2017-12-05 RX ADMIN — IOPAMIDOL 100 ML: 755 INJECTION, SOLUTION INTRAVENOUS at 10:28

## 2017-12-05 RX ADMIN — CEFAZOLIN SODIUM 2 G: 1 INJECTION, POWDER, FOR SOLUTION INTRAMUSCULAR; INTRAVENOUS at 14:19

## 2017-12-05 RX ADMIN — Medication 10 ML: at 22:00

## 2017-12-05 RX ADMIN — DIATRIZOATE MEGLUMINE AND DIATRIZOATE SODIUM 15 ML: 660; 100 LIQUID ORAL; RECTAL at 09:23

## 2017-12-05 RX ADMIN — ONDANSETRON 4 MG: 2 INJECTION INTRAMUSCULAR; INTRAVENOUS at 14:20

## 2017-12-05 RX ADMIN — MORPHINE SULFATE 2 MG: 10 INJECTION INTRAMUSCULAR; INTRAVENOUS; SUBCUTANEOUS at 16:18

## 2017-12-05 RX ADMIN — FENTANYL CITRATE 50 MCG: 50 INJECTION, SOLUTION INTRAMUSCULAR; INTRAVENOUS at 14:10

## 2017-12-05 NOTE — PROGRESS NOTES
[address]  [address] [facility], [facility] [address], [address]      PLAN:Transfuse 2 U PRBC  STAT CT pelvis with IV contrast  NPO  Consent for surgical evacuation of hematoma      ASSESSMENT:  Admit Date: 12/2/2017   * No surgery date entered *  Procedure(s):  INCISION AND DRAINAGE LOWER EXTREMITY RIGHT HIP / ROOM 357    Principal Problem:    Orthostatic hypotension (12/2/2017)    Active Problems:    HTN (hypertension) (5/16/2011)      Morbid obesity with BMI of 45.0-49.9, adult ([facility] [address] (9/21/2015)      Paroxysmal atrial fibrillation ([facility] [address] (8/13/2017)      Near syncope (12/2/2017)      Traumatic hematoma of right hip (12/5/2017)      Acute blood loss anemia (12/5/2017)      Anemia requiring transfusions (12/5/2017)      Anxiety (12/5/2017)         [place] feels cold and clammy. Tachycardic. HGB down to 7.5. Denies pain. Will transfuse 2 Units and order STAT CT. Consent for surgery later today. OBJECTIVE:  Constitutional: Alert oriented cooperative patient in no acute distress; appears stated age   Visit Vitals    /59 (BP 1 Location: Right arm, BP Patient Position: At rest)    Pulse (!) 103    Temp 98.2 °F (36.8 °C)    Resp 16    Ht 5' 6\" (1.676 m)    Wt 187 lb (84.8 kg)    SpO2 99%    Breastfeeding No    BMI 30.18 kg/m2     Eyes:Sclera are clear. ENMT: no external lesions gross hearing normal; no obvious neck masses, no ear or lip lesions  CV: RRR. Normal perfusion  Resp: No JVD. Breathing is  non-labored; no audible wheezing. GI: Soft normal BS    Musculoskeletal: unremarkable with normal function. No embolic signs or cyanosis. Large right hip hematoma. Purple skin extends to right buttock.     Neuro:  Oriented; moves all 4; no focal deficits  Psychiatric: normal affect and mood, no memory impairment      Patient Vitals for the past 24 hrs:   BP Temp Pulse Resp SpO2   12/05/17 0740 144/59 98.2 °F (36.8 °C) (!) 103 16 99 %   12/05/17 0343 125/58 97.9 °F (36.6 °C) (!) 107 16 99 %   12/04/17 2344 155/73 98.4 °F (36.9 °C) 97 16 99 %   12/04/17 1807 129/63 96.6 °F (35.9 °C) 95 16 99 %   12/04/17 1615 145/75 97.7 °F (36.5 °C) (!) 108 18 100 %   12/04/17 1128 150/75 98.1 °F (36.7 °C) 92 14 100 %     Labs:  Recent Labs      12/05/17   0829  12/05/17   0608   12/03/17   0821  12/03/17   0614   WBC   --    --    --   5.7  5.6   HGB   --   7.5*   < >  8.0*  7.3*   PLT   --    --    --   182  182   NA   --    --    --    --   145   K   --    --    --    --   3.4*   CL   --    --    --    --   111*   CO2   --    --    --    --   25   BUN   --    --    --    --   9   CREA  0.60   --    --    --   0.53*   GLU   --    --    --    --   98    < > = values in this interval not displayed.          Nalini Torres, DO

## 2017-12-05 NOTE — PROGRESS NOTES
Assessment completed and documented. Lung sounds diminished in left base. Bowel sounds active. No complaints of pain. Currently resting in bed. Will continue to monitor with hourly rounding.

## 2017-12-05 NOTE — H&P (VIEW-ONLY)
[address]  [address] [facility], [address] [place] [address], [address] [facility]       History and Physical/Surgical Consult   Amy Rodríguez    Admit date: 2017    MRN: [id number]     : 1945     Age: 71 y.o.          2017 2:58 PM    Subjective/HPI:   This patient is a 71 y.o. seen and evaluated for right hip hematoma. The patient was admitted for syncope yesterday. She takes Xarelto at home and reports falling from one step height at home. During her hospital stay her hemoglobin has dropped to 7.3 and has required 2 units PRBC. Current hgb is 9.3. She has remained hemodynamically stable. She complains of Right hip pain and hematoma was identified. US shows 11 cm hematoma. Xarelto has been discontinued and last dose was last night. She complains of hip pain tolerable. .     Review of Systems  A comprehensive review of systems was negative except for that written in the HPI.   Past Medical History:   Diagnosis Date    Anxiety     Arthritis     Bell's palsy     in Oct. 2009 with some vertigo at times still, no other after effects    Depression     GERD (gastroesophageal reflux disease)     reflux, takes nexium    Hypertension     on medication since     Hypothyroidism     Obese     Paroxysmal atrial fibrillation ([facility] [address] 2017    Psychiatric disorder     depression    S/P Left total knee replacement using cement 2011    Unspecified hypothyroidism 2011      Past Surgical History:   Procedure Laterality Date    HX APPENDECTOMY      HX BREAST LUMPECTOMY      benign    HX CATARACT REMOVAL Bilateral 2014    HX CHOLECYSTECTOMY  1976    HX GASTRECTOMY  9/21/15    sleeve    HX HYSTERECTOMY  1987    HX ORTHOPAEDIC Bilateral     heel spurs removed    HX ORTHOPAEDIC  2005    lower back     HX TONSILLECTOMY  1966    HX UROLOGICAL      bladder tack X2    THYROIDECTOMY  1980    partial    TOTAL KNEE ARTHROPLASTY Right   TOTAL KNEE ARTHROPLASTY Left 2011      Allergies   Allergen Reactions    Dilaudid [Hydromorphone (Bulk)] Swelling    Sulfa (Sulfonamide Antibiotics) Rash      Social History   Substance Use Topics    Smoking status: Never Smoker    Smokeless tobacco: Never Used    Alcohol use 1.0 oz/week     2 Glasses of wine per week      Social History     Social History Narrative     Family History   Problem Relation Age of Onset    Cancer Mother      breast    Heart Attack Mother     Cancer Father      throat    Other Father      gastric ulcer    Kidney Disease Father     Malignant Hyperthermia Neg Hx     Pseudocholinesterase Deficiency Neg Hx     Delayed Awakening Neg Hx     Post-op Nausea/Vomiting Neg Hx     Emergence Delirium Neg Hx     Post-op Cognitive Dysfunction Neg Hx       Prior to Admission Medications   Prescriptions Last Dose Informant Patient Reported? Taking? Biotin 2,500 mcg cap   Yes No   Sig: Take  by mouth. DULoxetine (CYMBALTA) 30 mg capsule   Yes No   Sig: Take 60 mg by mouth daily. amoxicillin-clavulanate (AUGMENTIN) 875-125 mg per tablet Not Taking at Unknown time  No No   Sig: Take 1 Tab by mouth every twelve (12) hours. estradiol (ESTRACE) 1 mg tablet   Yes No   Sig: Take 1 mg by mouth daily. flecainide (TAMBOCOR) 50 mg tablet   No No   Sig: Take 1 Tab by mouth every twelve (12) hours. fluconazole (DIFLUCAN) 150 mg tablet Not Taking at Unknown time  No No   Sig: Take 1 tablet on day 3 of Medrol dose cheng and on final day of cheng   levothyroxine (SYNTHROID) 100 mcg tablet   No No   Sig: Take 1 Tab by mouth daily. melatonin 10 mg cap   Yes No   Sig: Take  by mouth.   methylPREDNISolone (MEDROL, CHENG,) 4 mg tablet Not Taking at Unknown time  No No   Sig: Take 1 Tab by mouth Specific Days and Specific Times. metoprolol tartrate (LOPRESSOR) 25 mg tablet   No No   Sig: Take 0.5 Tabs by mouth every twelve (12) hours.    multivitamin (ONE A DAY) tablet   Yes No   Sig: Take 1 Tab by mouth daily. omeprazole (PRILOSEC) 40 mg capsule   No No   Sig: Take 1 Cap by mouth daily. Indications: gastroesophageal reflux disease   rivaroxaban (XARELTO) 20 mg tab tablet   No No   Sig: Take 1 Tab by mouth daily (with dinner). Facility-Administered Medications: None     Current Facility-Administered Medications   Medication Dose Route Frequency    pantoprazole (PROTONIX) tablet 40 mg  40 mg Oral ACB&D    ferrous sulfate tablet 325 mg  1 Tab Oral DAILY WITH BREAKFAST    0.9% sodium chloride infusion 250 mL  250 mL IntraVENous PRN    DULoxetine (CYMBALTA) capsule 60 mg  60 mg Oral DAILY    levothyroxine (SYNTHROID) tablet 100 mcg  100 mcg Oral DAILY    sodium chloride (NS) flush 5-10 mL  5-10 mL IntraVENous Q8H    sodium chloride (NS) flush 5-10 mL  5-10 mL IntraVENous PRN    acetaminophen (TYLENOL) tablet 650 mg  650 mg Oral Q4H PRN    diphenhydrAMINE (BENADRYL) capsule 25 mg  25 mg Oral Q4H PRN    nicotine (NICODERM CQ) 21 mg/24 hr patch 1 Patch  1 Patch TransDERmal DAILY PRN    alcohol 62% (NOZIN) nasal  1 Ampule  1 Ampule Topical Q12H     Objective:     Vitals:    12/04/17 0345 12/04/17 0732 12/04/17 0836 12/04/17 1128   BP: 136/61 140/65 126/64 150/75   Pulse: 86 87 69 92   Resp: 20 17 14   Temp: 98.6 °F (37 °C) 97.6 °F (36.4 °C)  98.1 °F (36.7 °C)   SpO2: 97% 99%  100%   Weight:       Height:         12/04 0701 - 12/04 1900  In: -   Out: 300 [Urine:300]  12/02 1901 - 12/04 0700  In: 3496.8 [I.V.:2786]  Out: 3800 [Urine:3800]  Physical Exam:   Gen- the patient is well developed and in no acute distress  HEENT- PERRL, EOMI, no scleral icterus       nose without alar flaring or epistaxis                  oral muscosa moist without cyanosis  Neck- no JVD or retractions  Lungs- resp even/unlab   Heart- RRR   Abd- soft, no TTP  Ext- warm without cyanosis. There is no lower leg edema. Large hematoma of Right hip. Purple ecchymosis. Tense and tender to palpation.   Skin- no jaundice or rashes, large ecchymosis on Right hip. Neuro- alert and oriented x 3. No gross sensorimotor deficits are present. Data Review   Recent Labs      12/04/17   0608  12/03/17   0821  12/03/17   0614  12/02/17   1011   WBC   --   5.7  5.6  7.5   HGB  9.3*  8.0*  7.3*  10.0*   HCT   --   23.5*  22.0*  30.5*   PLT   --   182  182  260     Recent Labs      12/03/17   0614  12/02/17   1011   NA  145  142   K  3.4*  4.1   CL  111*  104   CO2  25  27   GLU  98  111*   BUN  9  20   CREA  0.53*  0.70   INR   --   1.2       Assessment:     Hospital Problems  Date Reviewed: 11/24/2017          Codes Class Noted POA    * (Principal)Orthostatic hypotension ICD-10-CM: I95.1  ICD-9-CM: 458.0  12/2/2017 Yes        Near syncope ICD-10-CM: R55  ICD-9-CM: 780.2  12/2/2017 Yes        Morbid obesity with BMI of 45.0-49.9, adult ([facility] [address] ICD-10-CM: E66.01, Z68.42  ICD-9-CM: 278.01, V85.42  9/21/2015 Yes        HTN (hypertension) (Chronic) ICD-10-CM: I10  ICD-9-CM: 401.9  5/16/2011 Yes            Plan:   DC Xarelto  NPO at MN  Serial H&H  Continue serial exams. Consider CT pelvis and Right hip to plan drainage procedure. If hgb stable, will consider nonoperative management. Hematoma may reabsorb without surgery.       Will follow and provide more input tomorrow morning.    --    Naoma Goody Brian, DO

## 2017-12-05 NOTE — ANESTHESIA POSTPROCEDURE EVALUATION
Post-Anesthesia Evaluation and Assessment    Patient: Princess Masterson MRN: [id number]  SSN: [id number]    YOB: 1945  Age: 71 y.o. Sex: female       Cardiovascular Function/Vital Signs  Visit Vitals    /65    Pulse 86    Temp 37.5 °C (99.5 °F)    Resp 20    Ht 5' 6\" (1.676 m)    Wt 84.8 kg (187 lb)    SpO2 100%    Breastfeeding No    BMI 30.18 kg/m2       Patient is status post general anesthesia for Procedure(s):  INCISION AND DRAINAGE  RIGHT HIP HEMATOMA. Nausea/Vomiting: None    Postoperative hydration reviewed and adequate. Pain:  Pain Scale 1: Numeric (0 - 10) (12/05/17 1623)  Pain Intensity 1: 3 (12/05/17 1623)   Managed    Neurological Status:   Neuro (WDL): Exceptions to WDL (12/05/17 1518)  Neuro  Neurologic State: Alert (12/05/17 1618)  Cognition: Follows commands (12/05/17 1618)  LUE Motor Response: Purposeful (12/05/17 1618)  LLE Motor Response: Purposeful (12/05/17 1618)  RUE Motor Response: Purposeful (12/05/17 1618)  RLE Motor Response: Purposeful (12/05/17 1618)   At baseline    Mental Status and Level of Consciousness: Arousable    Pulmonary Status:   O2 Device: Nasal cannula (12/05/17 1618)   Adequate oxygenation and airway patent    Complications related to anesthesia: None    Post-anesthesia assessment completed.  No concerns    Signed By: Nahun Mendoza MD     December 5, 2017

## 2017-12-05 NOTE — PROGRESS NOTES
Patient received one unit of PRBC which finished in preop. The second unit of blood ordered was not given. Spoke with Dr. Ashley Irvin and received order to give patient the second unit of blood. Read back and verified.

## 2017-12-05 NOTE — PERIOP NOTES
TRANSFER - OUT REPORT:    Verbal report given to Mabel Islas RN(name) on Rober Joe  being transferred to Med/Surg(unit) for routine post - op       Report consisted of patients Situation, Background, Assessment and   Recommendations(SBAR). Information from the following report(s) SBAR, Kardex, OR Summary, Procedure Summary, Intake/Output and MAR was reviewed with the receiving nurse. Opportunity for questions and clarification was provided.       Patient transported with:   O2 @ 3 liters  Tech

## 2017-12-05 NOTE — INTERVAL H&P NOTE
H&P Update:  Markell Owens was seen and examined. History and physical has been reviewed. Significant clinical changes have occurred as noted:  CT shows superficial hematoma.       Signed By: Adalid Torres DO     December 5, 2017 1:31 PM

## 2017-12-05 NOTE — PERIOP NOTES
TRANSFER - IN REPORT:    Verbal report received from Soto Wise RN on Pamla Pod  being received from 2rd Med-Surg for routine progression of care      Report consisted of patients Situation, Background, Assessment and   Recommendations(SBAR). Information from the following report(s) SBAR and MAR was reviewed with the receiving nurse. Opportunity for questions and clarification was provided. Assessment completed upon patients arrival to unit and care assumed.

## 2017-12-05 NOTE — PROGRESS NOTES
Hospitalist Progress Note     Admit Date:  2017 10:01 AM   Name:  Chirag Vidales   Age:  71 y.o.  :  1945   MRN:  [id number]   PCP:  Carolee Gilford, MD  Treatment Team: Attending Provider: Abdirizak Baird MD; Consulting Provider: Sandor Torres, DO    Subjective:   Patient is a 70 y/o F with PAF on xarelto, orthostatic hypotension, who presented after fall at home. She tripped coming down the stairs and landed on her R side. She developed a massive hematoma and had drop in her Hb from 10 to 7.3, was transfused 2u PRBC. Surgery was consulted.  - pt feels OK this morning. Still tired, weak. No CP, SOB. Objective:   Patient Vitals for the past 24 hrs:   Temp Pulse Resp BP SpO2   17 0343 97.9 °F (36.6 °C) (!) 107 16 125/58 99 %   17 2344 98.4 °F (36.9 °C) 97 16 155/73 99 %   17 1807 96.6 °F (35.9 °C) 95 16 129/63 99 %   17 1615 97.7 °F (36.5 °C) (!) 108 18 145/75 100 %   17 1128 98.1 °F (36.7 °C) 92 14 150/75 100 %   17 0836 - 69 - 126/64 -     Oxygen Therapy  O2 Sat (%): 99 % (17 0343)  Pulse via Oximetry: 77 beats per minute (17 1545)  O2 Device: Room air (17 1545)    Intake/Output Summary (Last 24 hours) at 17 0833  Last data filed at 17 0457   Gross per 24 hour   Intake              689 ml   Output              425 ml   Net              264 ml         General:    Well nourished. Alert. CV:   RRR. No murmur, rub, or gallop. Lungs:   CTAB. No wheezing, rhonchi, or rales. Abdomen:   Soft, nontender, nondistended. Extremities: Warm and dry. No cyanosis or edema. Skin:     No rashes or jaundice. Data Review:  I have reviewed all labs, meds, telemetry events, and studies from the last 24 hours.     Recent Results (from the past 24 hour(s))   HEMOGLOBIN    Collection Time: 17  7:55 PM   Result Value Ref Range    HGB 7.7 (L) 11.7 - 15.4 g/dL   HEMOGLOBIN    Collection Time: 17  6:08 AM Result Value Ref Range    HGB 7.5 (L) 11.7 - 15.4 g/dL        All Micro Results     Procedure Component Value Units Date/Time    CULTURE, URINE [[id number]] Collected:  12/02/17 1158    Order Status:  Completed Specimen:  Clean catch Updated:  12/05/17 0715     Special Requests: NO SPECIAL REQUESTS        Culture result:         50,000-100,000 COLONIES/mL MIXED SKIN JOSÉ ISOLATED          Current Meds:  Current Facility-Administered Medications   Medication Dose Route Frequency    0.9% sodium chloride infusion 250 mL  250 mL IntraVENous PRN    sodium chloride 0.9 % bolus infusion 100 mL  100 mL IntraVENous RAD ONCE    saline peripheral flush soln 10 mL  10 mL InterCATHeter RAD ONCE    diatrizoate meglumine-d.sodium (MD-GASTROVIEW,GASTROGRAFIN) 66-10 % contrast solution 15 mL  15 mL Oral RAD ONCE    iopamidol (ISOVUE-370) 76 % injection 100 mL  100 mL IntraVENous RAD ONCE    LORazepam (ATIVAN) tablet 0.5 mg  0.5 mg Oral Q6H PRN    pantoprazole (PROTONIX) tablet 40 mg  40 mg Oral ACB&D    ferrous sulfate tablet 325 mg  1 Tab Oral DAILY WITH BREAKFAST    0.9% sodium chloride infusion 250 mL  250 mL IntraVENous PRN    DULoxetine (CYMBALTA) capsule 60 mg  60 mg Oral DAILY    levothyroxine (SYNTHROID) tablet 100 mcg  100 mcg Oral DAILY    sodium chloride (NS) flush 5-10 mL  5-10 mL IntraVENous Q8H    sodium chloride (NS) flush 5-10 mL  5-10 mL IntraVENous PRN    acetaminophen (TYLENOL) tablet 650 mg  650 mg Oral Q4H PRN    diphenhydrAMINE (BENADRYL) capsule 25 mg  25 mg Oral Q4H PRN    nicotine (NICODERM CQ) 21 mg/24 hr patch 1 Patch  1 Patch TransDERmal DAILY PRN    alcohol 62% (NOZIN) nasal  1 Ampule  1 Ampule Topical Q12H       Other Studies (last 24 hours):  Us Ext Nonvas Rt Comp    Result Date: 12/4/2017  Soft tissue ultrasound 12/4/2017 CLINICAL HISTORY: Hematoma to back of right thigh and buttocks.  TECHNIQUE: Grayscale and Doppler imaging of the symptomatic right thigh and buttock soft tissues was performed using a 4 MHz transducer. FINDINGS: Ultrasound imaging over the symptomatic soft tissues is remarkable for a complex abnormality measuring 11 cm x 8.1 cm. This demonstrates increased through transmission and therefore is favored to represent a complex fluid collection consistent with the clinical history of hematoma. No internal vascular flow seen by color Doppler imaging. No significant adjacent hyperemic changes are seen by color Doppler imaging to suggest inflammation. IMPRESSION: 1. 11 cm x 8.1 cm complex fluid collection in the symptomatic soft tissues consistent with the history of hematoma. Assessment and Plan:     Hospital Problems as of 12/5/2017  Date Reviewed: 11/24/2017          Codes Class Noted - Resolved POA    Traumatic hematoma of right hip ICD-10-CM: S70.01XA  ICD-9-CM: 924.01  12/5/2017 - Present Yes        Acute blood loss anemia ICD-10-CM: D62  ICD-9-CM: 285.1  12/5/2017 - Present Yes        Anemia requiring transfusions ICD-10-CM: D64.9  ICD-9-CM: 285.9  12/5/2017 - Present Yes        Anxiety (Chronic) ICD-10-CM: F41.9  ICD-9-CM: 300.00  12/5/2017 - Present Yes        * (Principal)Orthostatic hypotension ICD-10-CM: I95.1  ICD-9-CM: 458.0  12/2/2017 - Present Yes        Near syncope ICD-10-CM: R55  ICD-9-CM: 780.2  12/2/2017 - Present Yes        Paroxysmal atrial fibrillation (HCC) (Chronic) ICD-10-CM: I48.0  ICD-9-CM: 427.31  8/13/2017 - Present Yes        Morbid obesity with BMI of 45.0-49.9, adult (HCC) (Chronic) ICD-10-CM: E66.01, Z68.42  ICD-9-CM: 278.01, V85.42  9/21/2015 - Present Yes        HTN (hypertension) (Chronic) ICD-10-CM: I10  ICD-9-CM: 401.9  5/16/2011 - Present Yes              PLAN:    · Hb may be leveling off. Continue to monitor. Holding xarelto. CT scan today to eval bleeding. Surgery following; has OR slot for 230pm if needed. · Try midodrine for orthostatic hypotension  · Restart home metoprolol for PAF.   NSR today  · Recheck BMP tomorrow post-contrast  · PT/OT evals    DC planning/Dispo:  Pending.   Likely home when ready   DVT ppx:  SCDs    Signed:  Samuel Dee MD

## 2017-12-05 NOTE — PROGRESS NOTES
Received bedside shift report from off going nurse Lysle Show RN which included SBAR, MAR, and Plan of Care. Patient is resting quietly with eyes closed and respirations present. Will continue to follow patient's progression through hourly rounding. Will meet all requests as needed and appropriate.

## 2017-12-05 NOTE — BRIEF OP NOTE
BRIEF OPERATIVE NOTE    Date of Procedure: 12/5/2017   Preoperative Diagnosis: RIGHT HIP HEMATOMA  Postoperative Diagnosis: RIGHT HIP HEMATOMA    Procedure(s):  INCISION AND DRAINAGE  RIGHT HIP HEMATOMA  Surgeon(s) and Role:     * Brittany Mcpherson DO - Primary         Assistant Staff:       Surgical Staff:  Circ-1: Allan Cunningham RN  Scrub Tech-1: Bhumi Mayfield  Scrub Tech-2: David Kern  Event Time In   Incision Start 1427   Incision Close 1509     Anesthesia: General   Estimated Blood Loss: 50 cc  Specimens: * No specimens in log *   Findings: Right hematoma   Complications: none  Implants: * No implants in log Sandhya Ivey, [address]

## 2017-12-05 NOTE — PROGRESS NOTES
Physical and Occupational Therapy note: received orders for therapy assessments and after talking to RN, going to hold our evaluations until tomorrow. She is having surgery today around 1430 and RN felt it would be best (and therapy agrees) to see her tomorrow. Thanks.  Charlee Hardy, PT

## 2017-12-05 NOTE — ANESTHESIA PREPROCEDURE EVALUATION
Anesthetic History               Review of Systems / Medical History  Patient summary reviewed, nursing notes reviewed and pertinent labs reviewed    Pulmonary                   Neuro/Psych              Cardiovascular    Hypertension: well controlled        Dysrhythmias : atrial fibrillation      Exercise tolerance: >4 METS  Comments: Last Xarelto Keyshawn   GI/Hepatic/Renal     GERD: well controlled           Endo/Other      Hypothyroidism: well controlled  Anemia     Other Findings            Physical Exam    Airway  Mallampati: II  TM Distance: > 6 cm  Neck ROM: normal range of motion   Mouth opening: Normal     Cardiovascular    Rhythm: regular  Rate: abnormal         Dental  No notable dental hx       Pulmonary  Breath sounds clear to auscultation               Abdominal         Other Findings            Anesthetic Plan    ASA: 3  Anesthesia type: general          Induction: Intravenous  Anesthetic plan and risks discussed with: Patient

## 2017-12-05 NOTE — PROGRESS NOTES
Patient notes she is becoming more anxious this am. Would like Ativan as she takes at home. Spoke with Dr. Palomo Katz and received order for Ativan 0.5 mg Q 6 hours prn anxiety or restlessness.

## 2017-12-06 ENCOUNTER — HOME HEALTH ADMISSION (OUTPATIENT)
Dept: HOME HEALTH SERVICES | Facility: HOME HEALTH | Age: 72
End: 2017-12-06
Payer: MEDICARE

## 2017-12-06 VITALS
TEMPERATURE: 97 F | OXYGEN SATURATION: 99 % | BODY MASS INDEX: 30.05 KG/M2 | WEIGHT: 187 LBS | HEART RATE: 76 BPM | RESPIRATION RATE: 16 BRPM | SYSTOLIC BLOOD PRESSURE: 141 MMHG | HEIGHT: 66 IN | DIASTOLIC BLOOD PRESSURE: 64 MMHG

## 2017-12-06 PROBLEM — I95.1 ORTHOSTATIC HYPOTENSION: Chronic | Status: ACTIVE | Noted: 2017-12-02

## 2017-12-06 LAB
ABO + RH BLD: NORMAL
ANION GAP SERPL CALC-SCNC: 4 MMOL/L (ref 7–16)
BLD PROD TYP BPU: NORMAL
BLOOD GROUP ANTIBODIES SERPL: NORMAL
BPU ID: NORMAL
BUN SERPL-MCNC: 10 MG/DL (ref 8–23)
CALCIUM SERPL-MCNC: 7.6 MG/DL (ref 8.3–10.4)
CHLORIDE SERPL-SCNC: 105 MMOL/L (ref 98–107)
CO2 SERPL-SCNC: 30 MMOL/L (ref 21–32)
CREAT SERPL-MCNC: 0.63 MG/DL (ref 0.6–1)
CROSSMATCH RESULT,%XM: NORMAL
GLUCOSE SERPL-MCNC: 107 MG/DL (ref 65–100)
HGB BLD-MCNC: 8.9 G/DL (ref 11.7–15.4)
HGB BLD-MCNC: 9.3 G/DL (ref 11.7–15.4)
POTASSIUM SERPL-SCNC: 3.4 MMOL/L (ref 3.5–5.1)
SODIUM SERPL-SCNC: 139 MMOL/L (ref 136–145)
SPECIMEN EXP DATE BLD: NORMAL
STATUS OF UNIT,%ST: NORMAL
UNIT DIVISION, %UDIV: 0

## 2017-12-06 PROCEDURE — 80048 BASIC METABOLIC PNL TOTAL CA: CPT | Performed by: HOSPITALIST

## 2017-12-06 PROCEDURE — 85018 HEMOGLOBIN: CPT | Performed by: HOSPITALIST

## 2017-12-06 PROCEDURE — 97165 OT EVAL LOW COMPLEX 30 MIN: CPT

## 2017-12-06 PROCEDURE — 36415 COLL VENOUS BLD VENIPUNCTURE: CPT | Performed by: HOSPITALIST

## 2017-12-06 PROCEDURE — 74011250637 HC RX REV CODE- 250/637: Performed by: HOSPITALIST

## 2017-12-06 PROCEDURE — 74011250637 HC RX REV CODE- 250/637: Performed by: INTERNAL MEDICINE

## 2017-12-06 RX ORDER — MIDODRINE HYDROCHLORIDE 5 MG/1
5 TABLET ORAL
Qty: 90 TAB | Refills: 0 | Status: SHIPPED | OUTPATIENT
Start: 2017-12-06 | End: 2017-12-15 | Stop reason: ALTCHOICE

## 2017-12-06 RX ORDER — POTASSIUM CHLORIDE 20 MEQ/1
40 TABLET, EXTENDED RELEASE ORAL
Status: COMPLETED | OUTPATIENT
Start: 2017-12-06 | End: 2017-12-06

## 2017-12-06 RX ORDER — OXYCODONE AND ACETAMINOPHEN 5; 325 MG/1; MG/1
2 TABLET ORAL
Qty: 40 TAB | Refills: 0 | Status: SHIPPED | OUTPATIENT
Start: 2017-12-06 | End: 2018-01-31

## 2017-12-06 RX ADMIN — Medication 10 ML: at 05:51

## 2017-12-06 RX ADMIN — DULOXETINE HYDROCHLORIDE 60 MG: 60 CAPSULE, DELAYED RELEASE ORAL at 08:16

## 2017-12-06 RX ADMIN — MIDODRINE HYDROCHLORIDE 5 MG: 5 TABLET ORAL at 11:30

## 2017-12-06 RX ADMIN — PANTOPRAZOLE SODIUM 40 MG: 40 TABLET, DELAYED RELEASE ORAL at 08:16

## 2017-12-06 RX ADMIN — METOPROLOL TARTRATE 12.5 MG: 25 TABLET ORAL at 08:16

## 2017-12-06 RX ADMIN — LEVOTHYROXINE SODIUM 100 MCG: 100 TABLET ORAL at 08:16

## 2017-12-06 RX ADMIN — Medication 1 AMPULE: at 09:00

## 2017-12-06 RX ADMIN — POTASSIUM CHLORIDE 40 MEQ: 20 TABLET, EXTENDED RELEASE ORAL at 09:20

## 2017-12-06 RX ADMIN — MIDODRINE HYDROCHLORIDE 5 MG: 5 TABLET ORAL at 09:20

## 2017-12-06 RX ADMIN — FERROUS SULFATE TAB 325 MG (65 MG ELEMENTAL FE) 325 MG: 325 (65 FE) TAB at 08:16

## 2017-12-06 NOTE — PROGRESS NOTES
Problem: Interdisciplinary Rounds  Goal: Interdisciplinary Rounds  Interdisciplinary team rounds were held 12/6/2017 with the following team members:Care Management, Nursing, Nutrition, Pharmacy and Physician. Plan of care discussed. See clinical pathway and/or care plan for interventions and desired outcomes.

## 2017-12-06 NOTE — PROGRESS NOTES
Problem: Self Care Deficits Care Plan (Adult)  Goal: *Acute Goals and Plan of Care (Insert Text)    OCCUPATIONAL THERAPY: Initial Assessment and Discharge 12/6/2017  INPATIENT: Hospital Day: 5  [place] / [place] / Product Type: Medicare /      NAME/AGE/GENDER: Torrey Stern is a 71 y.o. female   PRIMARY DIAGNOSIS:  Orthostatic hypotension  Orthostatic hypotension  RIGHT HIP HEMATOMA Anemia requiring transfusions Anemia requiring transfusions  Procedure(s) (LRB):  INCISION AND DRAINAGE  RIGHT HIP HEMATOMA (Right)  1 Day Post-Op  ICD-10: Treatment Diagnosis:    · Generalized Muscle Weakness (M62.81)   Precautions/Allergies:     Dilaudid [hydromorphone (bulk)] and Sulfa (sulfonamide antibiotics)      ASSESSMENT:     Ms. Vinayak Mendez admitted with above diagnosis; pt is independent with functional mobility and self care. No skilled OT indicated at this time. Will discharge OT. This section established at most recent assessment   PROBLEM LIST (Impairments causing functional limitations   INTERVENTIONS PLANNED: (Benefits and precautions of occupational therapy have been discussed with the patient.)     TREATMENT PLAN: Frequency/Duration: discharge OT  Rehabilitation Potential For Stated Goals: discharge OT     RECOMMENDED REHABILITATION/EQUIPMENT: (at time of discharge pending progress): Due to the probability of continued deficits (see above) this patient will not likely need continued skilled occupational therapy after discharge. Equipment:    None at this time              OCCUPATIONAL PROFILE AND HISTORY:   History of Present Injury/Illness (Reason for Referral): RIGHT HIP HEMATOMA Anemia requiring transfusions Anemia requiring transfusions    Past Medical History/Comorbidities:   Ms. Vinayak Mendez  has a past medical history of Anxiety; Arthritis; Bell's palsy; Depression; GERD (gastroesophageal reflux disease); Hypertension; Hypothyroidism;  Obese; Paroxysmal atrial fibrillation ([facility] (8/13/2017); Psychiatric disorder; S/P Left total knee replacement using cement (5/16/2011); and Unspecified hypothyroidism (5/16/2011). She also has no past medical history of Adverse effect of anesthesia; Aneurysm ([facility] [address] Asthma; Autoimmune disease ([facility] [address] CAD (coronary artery disease); Cancer ([facility] [address] Chronic kidney disease; Chronic pain; Coagulation defects; COPD; Diabetes ([address] Difficult intubation; Heart failure ([address] Liver disease; Malignant hyperthermia due to anesthesia; Nausea & vomiting; Other ill-defined conditions(799.89); Other unknown and unspecified cause of morbidity or mortality; Pseudocholinesterase deficiency; PUD (peptic ulcer disease); Seizures ([facility] [address] Stroke [facility]); Thromboembolus ([facility] [address] Unspecified adverse effect of anesthesia; or Unspecified sleep apnea. Ms. Benjamin Blas  has a past surgical history that includes thyroidectomy (1980); appendectomy (1961); cholecystectomy (1976); hysterectomy (1987); breast lumpectomy (1998); urological (2008/2009); tonsillectomy (1966); cataract removal (Bilateral, 2014); orthopaedic (Bilateral, 1994); orthopaedic (2005); total knee arthroplasty (Right, 2010); total knee arthroplasty (Left, 2011); and gastrectomy (9/21/15).   Social History/Living Environment:   Home Environment: Private residence  One/Two Story Residence: Two story, live on 1st floor  Interior Rails: Left  Lift Chair Available: No  Living Alone: No  Support Systems: Family member(s), Child(vaishali)  Patient Expects to be Discharged to[de-identified] Private residence  Current DME Used/Available at Home: None  Prior Level of Function/Work/Activity:  independent   Number of Personal Factors/Comorbidities that affect the Plan of Care: Brief history (0):  LOW COMPLEXITY   ASSESSMENT OF OCCUPATIONAL PERFORMANCE[de-identified]   Activities of Daily Living:          Basic ADLs (From Assessment) Complex ADLs (From Assessment)   Basic ADL  Feeding: Independent  Oral Facial Hygiene/Grooming: Independent  Bathing: Independent  Upper Body Dressing: Independent  Lower Body Dressing: Independent  Toileting: Independent     Grooming/Bathing/Dressing Activities of Daily Living     Cognitive Retraining  Safety/Judgement: Awareness of environment                 Functional Transfers  Toilet Transfer : Independent  Shower Transfer: Independent     Bed/Mat Mobility  Supine to Sit: Independent  Sit to Supine: Independent  Sit to Stand: Independent  Bed to Chair: Independent       Most Recent Physical Functioning:   Gross Assessment:  AROM: Within functional limits (BUE)  Strength: Within functional limits (BUE)  Coordination: Within functional limits (BUE)  Tone: Normal  Sensation: Intact               Posture:     Balance:  Sitting: Intact  Standing: Intact Bed Mobility:  Supine to Sit: Independent  Sit to Supine: Independent  Wheelchair Mobility:     Transfers:  Sit to Stand: Independent  Stand to Sit: Independent  Bed to Chair: Independent            Patient Vitals for the past 6 hrs:   BP BP Patient Position SpO2 Pulse   17 0349 121/60 At rest 94 % 93   17 0753 141/68 At rest;Supine 97 % 83       Mental Status  Neurologic State: Alert  Orientation Level: Oriented X4  Cognition: Follows commands  Perception: Appears intact  Perseveration: No perseveration noted  Safety/Judgement: Awareness of environment                          Physical Skills Involved:  1. Balance  2. Strength  3. Activity Tolerance Cognitive Skills Affected (resulting in the inability to perform in a timely and safe manner): 1. none Psychosocial Skills Affected:  1. none   Number of elements that affect the Plan of Care: 1-3:  LOW COMPLEXITY   CLINICAL DECISION MAKIN [facility] [id number] AM-PAC 6 Clicks   Daily Activity Inpatient Short Form  How much help from another person does the patient currently need. .. Total A Lot A Little None   1. Putting on and taking off regular lower body clothing? [] 1   [] 2   [] 3   [x] 4   2.   Bathing (including washing, rinsing, drying)? [] 1   [] 2   [] 3   [x] 4   3. Toileting, which includes using toilet, bedpan or urinal?   [] 1   [] 2   [] 3   [x] 4   4. Putting on and taking off regular upper body clothing? [] 1   [] 2   [] 3   [x] 4   5. Taking care of personal grooming such as brushing teeth? [] 1   [] 2   [] 3   [x] 4   6. Eating meals? [] 1   [] 2   [] 3   [x] 4   © 2007, Trustees of [address] [place] [address] [address] [address], under license to [employer]. All rights reserved      Score:  Initial: 24 Most Recent: X (Date: -- )    Interpretation of Tool:  Represents activities that are increasingly more difficult (i.e. Bed mobility, Transfers, Gait). Score 24 23 22-20 19-15 14-10 9-7 6     Modifier CH CI CJ CK CL CM CN      ?  Self Care:     - CURRENT STATUS: CH - 0% impaired, limited or restricted    - GOAL STATUS: CH - 0% impaired, limited or restricted    - D/C STATUS:  CH - 0% impaired, limited or restricted  [place] / [place] / Product Type: Medicare /      Medical Necessity:       Reason for Services/Other Comments   Use of outcome tool(s) and clinical judgement create a POC that gives a: LOW COMPLEXITY         TREATMENT:   (In addition to Assessment/Re-Assessment sessions the following treatments were rendered)     Pre-treatment Symptoms/Complaints:  No complaints  Pain: Initial:   Pain Intensity 1: 0  Post Session:  0     Assessment/Reassessment only, no treatment provided today    Braces/Orthotics/Lines/Etc:   Treatment/Session Assessment:    · Response to Treatment:  Tolerated well  · Interdisciplinary Collaboration:   o Physical Therapist  o Registered Nurse  · After treatment position/precautions:   o Supine in bed  o Bed/Chair-wheels locked  o Bed in low position  o Call light within reach  o RN notified   · Compliance with Program/Exercises: discharge OT  · Recommendations/Intent for next treatment session: discharge OT  Total Treatment Duration:  OT Patient Time In/Time Out  Time In: 0900  Time Out: [address], OT

## 2017-12-06 NOTE — PROGRESS NOTES
Pt's daughter is here and states she is not comfortable with the pt being discharged today. The patient also feels the same way. Dr. Mike Cardona notified and asked to come and see the pt and her daughter.

## 2017-12-06 NOTE — PROGRESS NOTES
Shift assessment complete. Pt alert and oriented x4, respirations present, even and unlabored, HOB elevated, pt denies any SOB at this time, S1&S2 auscultated, HR regular, abd soft, non- tender, Active BS in all 4 quadrants, No pressure ulcers or edema noted, dressing to right hip intact with drainage, dressing reinforced, pt is up with assistance to the bathroom, voiding, SCDs in place and functioning, IVF infusing without difficulty, pt denies any pain at this time, pt instructed to call for assistance, pt verbalizes understanding, bed low and locked, side rails x3, call light within reach.

## 2017-12-06 NOTE — PROGRESS NOTES
[address]  [address], [address] [facility], [address]      PLAN:Hospitalist discharging home  OK from surgical standpoint  [address] [place] [address]   Daily dressing changes      ASSESSMENT:  Admit Date: 12/2/2017   1 Day Post-Op  Procedure(s):  INCISION AND DRAINAGE  RIGHT HIP HEMATOMA    Principal Problem:    Anemia requiring transfusions (12/5/2017)    Active Problems:    HTN (hypertension) (5/16/2011)      Morbid obesity with BMI of 45.0-49.9, adult ([facility] [address] (9/21/2015)      Paroxysmal atrial fibrillation ([facility] [address] (8/13/2017)      Orthostatic hypotension (12/2/2017)      Near syncope (12/2/2017)      Traumatic hematoma of right hip (12/5/2017)      Acute blood loss anemia (12/5/2017)      Anxiety (12/5/2017)         SUBJECTIVE:Minimal complaint. Dressing removed and replaced. Hgb stable. OBJECTIVE:  Constitutional: Alert oriented cooperative patient in no acute distress; appears stated age   Visit Vitals    /64 (BP 1 Location: Right arm, BP Patient Position: At rest;Supine)    Pulse 76    Temp 97 °F (36.1 °C)    Resp 16    Ht 5' 6\" (1.676 m)    Wt 187 lb (84.8 kg)    SpO2 99%    Breastfeeding No    BMI 30.18 kg/m2     Eyes:Sclera are clear. ENMT: no external lesions gross hearing normal; no obvious neck masses, no ear or lip lesions  CV: RRR. Normal perfusion  Resp: No JVD. Breathing is  non-labored; no audible wheezing. GI: soft no TTP    Musculoskeletal: unremarkable with normal function. No embolic signs or cyanosis. Neuro:  Oriented; moves all 4; no focal deficits  Psychiatric: normal affect and mood, no memory impairment  Wound:dressing changed. Penrose in place.     Patient Vitals for the past 24 hrs:   BP Temp Pulse Resp SpO2   12/06/17 1132 141/64 97 °F (36.1 °C) 76 16 99 %   12/06/17 0753 141/68 97.4 °F (36.3 °C) 83 19 97 %   12/06/17 0349 121/60 98.5 °F (36.9 °C) 93 18 94 %   12/05/17 2310 119/66 100 °F (37.8 °C) 94 18 98 %   12/05/17 2157 131/51 - 96 - -   12/05/17 2054 109/46 98.8 °F (37.1 °C) 99 18 99 %   12/05/17 2015 95/61 98.2 °F (36.8 °C) (!) 106 18 99 %   12/05/17 1915 121/55 98.8 °F (37.1 °C) 89 18 99 %   12/05/17 1820 113/55 98.7 °F (37.1 °C) 89 19 98 %   12/05/17 1814 93/51 98.7 °F (37.1 °C) 89 19 98 %   12/05/17 1800 101/51 98.4 °F (36.9 °C) 91 18 98 %   12/05/17 1712 109/56 98.4 °F (36.9 °C) 85 20 99 %   12/05/17 1647 136/65 - 87 20 100 %   12/05/17 1631 133/60 - 87 20 100 %   12/05/17 1618 142/65 - 86 20 100 %   12/05/17 1603 150/67 - 84 20 100 %   12/05/17 1548 142/56 - 78 20 100 %   12/05/17 1533 136/60 - 87 20 100 %   12/05/17 1528 135/55 - 83 20 100 %   12/05/17 1523 137/63 - 83 20 100 %   12/05/17 1518 137/68 99.5 °F (37.5 °C) 86 16 98 %   12/05/17 1316 164/74 98.1 °F (36.7 °C) 89 16 100 %     Labs:  Recent Labs      12/06/17   0952  12/06/17   0610   HGB  9.3*  8.9*   NA   --   139   K   --   3.4*   CL   --   105   CO2   --   30   BUN   --   10   CREA   --   0.63   GLU   --   107*         Michele Torres, DO

## 2017-12-06 NOTE — PROGRESS NOTES
Reported pt's positive orthostatic blood pressures to Dr. Estefany Hoffman. No new orders were received. He said to proceed with pt's discharge.

## 2017-12-06 NOTE — PROGRESS NOTES
Pt has an order for [facility] wound care. I will do discharge teaching once that is set up.

## 2017-12-06 NOTE — PROGRESS NOTES
PT Note: S: Pt without complaints today. Pt hoping to go home today. O: Pt up ad norma in her room then ambulating with OT in the hallway. A: No apparent PT needs at this time. P: D/C PT.

## 2017-12-06 NOTE — OP NOTES
[address]   OPERATIVE REPORT       Name:  Dee Carty   MR#:  [id number]   :  1945   Account #:  [de-identified]   Date of Adm:  2017       DATE OF PROCEDURE: 2017    PREOPERATIVE DIAGNOSIS: Right hip hematoma. POSTOPERATIVE DIAGNOSIS: Right hip hematoma. PROCEDURE PERFORMED: Evacuation of right hip hematoma with sharp   dissection, #15 scalpel blade. ANESTHESIA: General endotracheal.    SURGEON: Toyin Davila,     ASSISTANT: None. COMPLICATIONS: None. DISPOSITION: Stable. COUNTS: Sponge count, needle count, instrument count, all   correct x3. DESCRIPTION OF PROCEDURE: This is a 52-year-old female on   Xarelto, status post a fall from a near syncopal episode with   development of a right hip hematoma that is 11 cm by CAT scan. She has received 4 units of packed red blood cells. She is   prepared for evacuation of the hematoma. Consent was obtained by   describing the procedure to the patient, including the potential   complications to include infection, bleeding, possible open   packing. Consent was obtained, placed upon the chart. She was   administered Ancef 2 grams IV preoperatively, taken to the   operative suite, placed in a supine position. General anesthesia   was initiated without complications. She was then prepped and   draped in the usual sterile fashion in a left lateral decubitus   position and then prepped and draped in the usual sterile   fashion. A time-out was taken to confirm the patient's name,   proper procedure. Following this, we used an 18 needle and a 60 mL syringe to   locate the hematoma and then a #15 scalpel blade was used to   make a skin incision over the hematoma, then Bovie cauterization   was used to dissect down to the hematoma cavity. We entered a   large cavity just lateral and posteriorly located to the hip   bone and the hematoma was then evacuated using blunt dissection   and irrigation.  Once the hematoma was evacuated coinciding with   an 11 cm hematoma, we irrigated with saline until clear and   packed the wound for 10 minutes. At this point, hemostasis was   confirmed. We obtained a 1-inch Penrose drain and made a counter   incision inferior and posterior to the wound, brought this out   inferiorly and suturing into place with a 3-0 nylon. The drain   was then placed into the main hematoma cavity. At this point, we reapproximated the skin using a 3-0 nylon in a   vertical mattress fashion. Sterile dressings were applied. The   patient will be extubated and transferred to recovery stable. FINDINGS: A 71-year-old female with a right hip hematoma. Sharp   dissection with a #15 scalpel blade was used to identify and   evacuate a right hip hematoma. She tolerated the procedure well   without immediate complications. IVONE LEMA DO DD / Cal.Brandy   D:  12/05/2017   15:14   T:  12/06/2017   10:59   Job #:  [id number]

## 2017-12-06 NOTE — PROGRESS NOTES
[facility]  Face to Face Encounter    Patients Name: Zora Quiroz    YOB: 1945    Ordering Physician: Dr. Jp Robbins    Primary Diagnosis: Orthostatic hypotension  Orthostatic hypotension  RIGHT HIP HEMATOMA    Date of Face to Face:   12/6/2017                                  Face to Face Encounter findings are related to primary reason for home care:   yes. 1. I certify that the patient needs intermittent care as follows: skilled nursing care:  wound care    2. I certify that this patient is homebound, that is: 1) patient requires the use of a none device, special transportation, or assistance of another to leave the home; or 2) patient's condition makes leaving the home medically contraindicated; and 3) patient has a normal inability to leave the home and leaving the home requires considerable and taxing effort. Patient may leave the home for infrequent and short duration for medical reasons, and occasional absences for non-medical reasons. Homebound status is due to the following functional limitations: Patient with visual deficits increasing risk for falls with all ambulation outside of the home. Patient requires the assistance of others when leaving the home. 3. I certify that this patient is under my care and that I, or a nurse practitioner or  [id number], or clinical nurse specialist, or certified nurse midwife, working with me, had a Face-to-Face Encounter that meets the physician Face-to-Face Encounter requirements.   The following are the clinical findings from the [address] [place] [address] encounter that support the need for skilled services and is a summary of the encounter: See medical records    See attached progess note      Lainey Simms LMSW  12/6/2017      THE FOLLOWING TO BE COMPLETED BY THE COMMUNITY PHYSICIAN:    I concur with the findings described above from the [facility] encounter that this patient is homebound and in need of a skilled service.     Certifying Physician: _____________________________________      Printed Certifying Physician Name: _____________________________________    Date: _________________

## 2017-12-06 NOTE — DISCHARGE SUMMARY
Hospitalist Discharge Summary     Admit Date:  2017 10:01 AM   Name:  Markell Owens   Age:  71 y.o.  :  1945   MRN:  [id number]   PCP:  Reno Arevalo MD  Treatment Team: Attending Provider: Yara Wilkins MD; Consulting Provider: Adalid Torres DO; Utilization Review: [facility]    Problem List for this Hospitalization:  Hospital Problems as of 2017  Date Reviewed: 2017          Codes Class Noted - Resolved POA    Traumatic hematoma of right hip ICD-10-CM: S70.01XA  ICD-9-CM: 924.01  2017 - Present Yes        Acute blood loss anemia ICD-10-CM: D62  ICD-9-CM: 285.1  2017 - Present Yes        * (Principal)Anemia requiring transfusions ICD-10-CM: D64.9  ICD-9-CM: 285.9  2017 - Present Yes        Anxiety (Chronic) ICD-10-CM: F41.9  ICD-9-CM: 300.00  2017 - Present Yes        Orthostatic hypotension (Chronic) ICD-10-CM: I95.1  ICD-9-CM: 458.0  2017 - Present Yes        Near syncope ICD-10-CM: R55  ICD-9-CM: 780.2  2017 - Present Yes        Paroxysmal atrial fibrillation (HCC) (Chronic) ICD-10-CM: I48.0  ICD-9-CM: 427.31  2017 - Present Yes        Morbid obesity with BMI of 45.0-49.9, adult ([facility] [address] (Chronic) ICD-10-CM: E66.01, Z68.42  ICD-9-CM: 278.01, V85.42  2015 - Present Yes        HTN (hypertension) (Chronic) ICD-10-CM: I10  ICD-9-CM: 401.9  2011 - Present Yes                Admission HPI from 2017:    \" Patient is a 71years old female with history of afib, HTN and on Lopressor presents with dizziness and almost passing out this morning. It happened once last August. No CP or SOB. She has beedn treated for acute sinusitis in last 10 days. No recent changes of her meds. \"    Hospital Course:  From talking to the patient, she says the orthostatic symptoms she described to admitting MD are chronic. She was started on midodrine.   In addition to above, pt had a fall at home which she said occurred because she tripped at home going down the stairs and sustained a large hematoma; she had a drop in Hb from 10 to 7.3 overnight after admission which is the main reason for her prolonged stay. She received 2u PRBC at that point. Her xarelto was held. She had CT scan done which showed superficial hematoma. Her Hb dropped again so she was taken to OR and 2 more units PRBC were given in addition to hematoma evacuation which only yielded 50cc. Her Hb improved and remained stable after that without anymore over bleeding. Bandage with mostly serous fluid today on my exam.      She is stable for discharge at this point. She will continue midodrine for symptomatic orthostatic hypotension despite euvolemia. She just got her first dose this morning because she apparently refused yesterday. She is still orthostatic but she is not passing out and she is capable of standing. I counseled her on sitting up and standing up slowly, with help from  if needed, and to make sure she can always sit back down if she gets too lightheaded. This is not a barrier to discharge as patients often have orthostatic hypotension treated on an outpatient basis. She did well with PT/OT and was discharged from their service. She plans to follow up with cardiology regarding orthostatic hypotension and the need for her xarelto. Pt reports that she had a reversible episode of afib and has not had since; unclear to me if this is true but will defer to cardiology. Will arrange follow up. Told her to hold xarelto for a week or as directed by cardio. Follow up instructions below. Plan was discussed with pt. All questions answered. Patient was stable at time of discharge and was instructed to call or return if there are any concerns or recurrence of symptoms. Diagnostic Imaging/Tests:   Xr Hip Rt W Or Wo Pelv 2-3 Vws    Result Date: 12/2/2017  RIGHT HIP RADIOGRAPHS, 12/2/2017. CLINICAL HISTORY: Mild right lateral hip pain after fall last night.  COMPARISON STUDY: None. FINDINGS: A frontal view of the pelvis and AP and lateral view of the right hip are submitted for evaluation. The sacroiliac joints and pubic symphysis are intact. The pelvic ring is grossly intact. Moderate degenerative changes are seen in the bilateral hips with decreased joint space, and superolateral acetabular hypertrophy. AP and lateral views of the right hip show normal alignment of the osseous structures of the right hip without evidence or dislocation. No acute osseous abnormalities are seen of or about the right hip. IMPRESSION: 1. No acute osseous abnormality of the right hip evident by plain film imaging. Only chronic degenerative changes are seen in the right hip with similar degenerative changes seen of the left hip. Ct Head Wo Cont    Result Date: 12/2/2017  CT HEAD, AND CERVICAL SPINE WITHOUT CONTRAST, 12/2/2017. History: Fall last night striking back of head. Comparison: CT head without contrast 10/8/2009. Technique:   5 mm axial scans from the skull base to the vertex, and 1.25 mm axial scans from the skull base into the upper chest performed, and sagittal and coronal reconstructed images performed. All CT scans performed at this facility use one or all of the following: Automated exposure control, adjustment of the mA and/or kVp according to patient's size, iterative reconstruction. CT HEAD: Findings:  No evidence of intracranial hemorrhage is seen. No abnormal extra-axial fluid collections are seen. The ventricles are normal in size and configuration. No evidence of midline shift or obvious mass effect is seen. No abnormal edema pattern is seen in a vascular distribution to suggest large artery infarction. Evaluation with bone windows shows no acute osseous abnormality of the bony calvarium. No abnormal fluid collections are seen associated with the aerated sinuses.  CT CERVICAL SPINE: The skull base is unremarkable although not well evaluated due to bone reconstruction algorithm. Vertebral body height is maintained at all levels. No evidence of acute fracture is seen. Reconstructed images show maintained alignment. The dens is intact, and the pre dens space is normal.  Larger bridging anterior osteophytes are seen in the mid and lower cervical spine. Additional calcification is seen involving portions of the posterior longitudinal ligament. Prevertebral soft tissues are normal. Limited evaluation of the lung apices shows no gross abnormalities. IMPRESSION: 1. No acute intracranial process evident by noncontrast CT study of the head. 2. No acute osseous abnormality the bony calvarium, skull base, or cervical spine. Only chronic appearing changes are seen of the cervical spine as described above. Ct Spine Cerv Wo Cont    Result Date: 12/2/2017  CT HEAD, AND CERVICAL SPINE WITHOUT CONTRAST, 12/2/2017. History: Fall last night striking back of head. Comparison: CT head without contrast 10/8/2009. Technique:   5 mm axial scans from the skull base to the vertex, and 1.25 mm axial scans from the skull base into the upper chest performed, and sagittal and coronal reconstructed images performed. All CT scans performed at this facility use one or all of the following: Automated exposure control, adjustment of the mA and/or kVp according to patient's size, iterative reconstruction. CT HEAD: Findings:  No evidence of intracranial hemorrhage is seen. No abnormal extra-axial fluid collections are seen. The ventricles are normal in size and configuration. No evidence of midline shift or obvious mass effect is seen. No abnormal edema pattern is seen in a vascular distribution to suggest large artery infarction. Evaluation with bone windows shows no acute osseous abnormality of the bony calvarium. No abnormal fluid collections are seen associated with the aerated sinuses.  CT CERVICAL SPINE: The skull base is unremarkable although not well evaluated due to bone reconstruction algorithm. Vertebral body height is maintained at all levels. No evidence of acute fracture is seen. Reconstructed images show maintained alignment. The dens is intact, and the pre dens space is normal.  Larger bridging anterior osteophytes are seen in the mid and lower cervical spine. Additional calcification is seen involving portions of the posterior longitudinal ligament. Prevertebral soft tissues are normal. Limited evaluation of the lung apices shows no gross abnormalities. IMPRESSION: 1. No acute intracranial process evident by noncontrast CT study of the head. 2. No acute osseous abnormality the bony calvarium, skull base, or cervical spine. Only chronic appearing changes are seen of the cervical spine as described above. Echocardiogram results:  No results found for this visit on 12/02/17. All Micro Results     Procedure Component Value Units Date/Time    CULTURE, URINE [[id number]] Collected:  12/02/17 1158    Order Status:  Completed Specimen:  Clean catch Updated:  12/05/17 0715     Special Requests: NO SPECIAL REQUESTS        Culture result:         50,000-100,000 COLONIES/mL MIXED SKIN JOSÉ ISOLATED          Labs: Results:       BMP, Mg, Phos Recent Labs      12/06/17   0610  12/05/17   0829   NA  139   --    K  3.4*   --    CL  105   --    CO2  30   --    AGAP  4*   --    BUN  10   --    CREA  0.63  0.60   CA  7.6*   --    GLU  107*   --       CBC Recent Labs      12/06/17   0952  12/06/17   0610  12/05/17   0608   HGB  9.3*  8.9*  7.5*      LFT No results for input(s): SGOT, ALT, TBIL, AP, TP, ALB, GLOB, AGRAT, GPT in the last 72 hours.    Cardiac Testing No results found for: BNPP, BNP, CPK, RCK1, RCK2, RCK3, RCK4, CKMB, CKNDX, CKND1, TROPT, TROIQ   Coagulation Tests Lab Results   Component Value Date/Time    Prothrombin time 12.7 12/02/2017 10:11 AM    Prothrombin time 10.0 04/27/2015 10:47 AM    Prothrombin time 9.9 05/02/2011 10:35 AM    INR 1.2 12/02/2017 10:11 AM    INR 0.9 04/27/2015 10:47 AM    INR 0.9 05/02/2011 10:35 AM    aPTT 23.7 04/27/2015 10:47 AM    aPTT 28.2 05/02/2011 10:35 AM    aPTT 28.5 03/15/2010 12:15 PM      A1c No results found for: HBA1C, HGBE8, XRR4HGEW   Lipid Panel Lab Results   Component Value Date/Time    Cholesterol, total 195 04/27/2015 10:47 AM    HDL Cholesterol 58 04/27/2015 10:47 AM    LDL, calculated 119.4 04/27/2015 10:47 AM    VLDL, calculated 17.6 04/27/2015 10:47 AM    Triglyceride 88 04/27/2015 10:47 AM    CHOL/HDL Ratio 3.4 04/27/2015 10:47 AM      Thyroid Panel Lab Results   Component Value Date/Time    T4, Total 12.1 12/17/2014 02:58 PM    T3 Uptake 27 12/17/2014 02:58 PM    TSH 1.500 08/14/2017 06:09 AM    TSH 0.903 01/18/2017 12:08 PM        Most Recent UA Lab Results   Component Value Date/Time    Color CADEN 12/02/2017 11:58 AM    Appearance CLOUDY 12/02/2017 11:58 AM    Specific gravity 1.024 12/02/2017 11:58 AM    pH (UA) 6.5 12/02/2017 11:58 AM    Protein NEGATIVE  12/02/2017 11:58 AM    Glucose NEGATIVE  12/02/2017 11:58 AM    Ketone 15 12/02/2017 11:58 AM    Bilirubin SMALL 12/02/2017 11:58 AM    Blood NEGATIVE  12/02/2017 11:58 AM    Urobilinogen 1.0 12/02/2017 11:58 AM    Nitrites NEGATIVE  12/02/2017 11:58 AM    Leukocyte Esterase NEGATIVE  12/02/2017 11:58 AM        Allergies   Allergen Reactions    Dilaudid [Hydromorphone (Bulk)] Swelling    Sulfa (Sulfonamide Antibiotics) Rash     Immunization History   Administered Date(s) Administered    Influenza Vaccine (Quad) Mdck Pf 12/01/2017    Influenza Vaccine (Quad) PF 09/23/2015       All Labs from Last 24 Hrs:  Recent Results (from the past 24 hour(s))   HEMOGLOBIN    Collection Time: 12/06/17  6:10 AM   Result Value Ref Range    HGB 8.9 (L) 11.7 - 23.2 g/dL   METABOLIC PANEL, BASIC    Collection Time: 12/06/17  6:10 AM   Result Value Ref Range    Sodium 139 136 - 145 mmol/L    Potassium 3.4 (L) 3.5 - 5.1 mmol/L    Chloride 105 98 - 107 mmol/L    CO2 30 21 - 32 mmol/L    Anion gap 4 (L) 7 - 16 mmol/L    Glucose 107 (H) 65 - 100 mg/dL    BUN 10 8 - 23 MG/DL    Creatinine 0.63 0.6 - 1.0 MG/DL    GFR est AA >60 >60 ml/min/1.73m2    GFR est non-AA >60 >60 ml/min/1.73m2    Calcium 7.6 (L) 8.3 - 10.4 MG/DL   HEMOGLOBIN    Collection Time: 12/06/17  9:52 AM   Result Value Ref Range    HGB 9.3 (L) 11.7 - 15.4 g/dL       Discharge Exam:  Patient Vitals for the past 24 hrs:   Temp Pulse Resp BP SpO2   12/06/17 0753 97.4 °F (36.3 °C) 83 19 141/68 97 %   12/06/17 0349 98.5 °F (36.9 °C) 93 18 121/60 94 %   12/05/17 2310 100 °F (37.8 °C) 94 18 119/66 98 %   12/05/17 2157 - 96 - 131/51 -   12/05/17 2054 98.8 °F (37.1 °C) 99 18 109/46 99 %   12/05/17 2015 98.2 °F (36.8 °C) (!) 106 18 95/61 99 %   12/05/17 1915 98.8 °F (37.1 °C) 89 18 121/55 99 %   12/05/17 1820 98.7 °F (37.1 °C) 89 19 113/55 98 %   12/05/17 1814 98.7 °F (37.1 °C) 89 19 93/51 98 %   12/05/17 1800 98.4 °F (36.9 °C) 91 18 101/51 98 %   12/05/17 1712 98.4 °F (36.9 °C) 85 20 109/56 99 %   12/05/17 1647 - 87 20 136/65 100 %   12/05/17 1631 - 87 20 133/60 100 %   12/05/17 1618 - 86 20 142/65 100 %   12/05/17 1603 - 84 20 150/67 100 %   12/05/17 1548 - 78 20 142/56 100 %   12/05/17 1533 - 87 20 136/60 100 %   12/05/17 1528 - 83 20 135/55 100 %   12/05/17 1523 - 83 20 137/63 100 %   12/05/17 1518 99.5 °F (37.5 °C) 86 16 137/68 98 %   12/05/17 1316 98.1 °F (36.7 °C) 89 16 164/74 100 %   12/05/17 1123 97.7 °F (36.5 °C) 78 19 145/73 -   12/05/17 1118 97.5 °F (36.4 °C) 78 19 142/62 -   12/05/17 1050 98.3 °F (36.8 °C) 97 19 147/86 -     Oxygen Therapy  O2 Sat (%): 97 % (12/06/17 0753)  Pulse via Oximetry: 77 beats per minute (12/02/17 1545)  O2 Device: Nasal cannula (12/06/17 0251)  O2 Flow Rate (L/min): 3 l/min (12/06/17 0251)    Intake/Output Summary (Last 24 hours) at 12/06/17 1046  Last data filed at 12/05/17 2054   Gross per 24 hour   Intake           1885.8 ml   Output              525 ml   Net           1360.8 ml       General:    Well nourished. Alert.  No distress. Eyes:   Normal sclera. Extraocular movements intact. ENT:  Normocephalic, atraumatic. Moist mucous membranes  CV:   Regular rate and rhythm. No murmur, rub, or gallop. Lungs:  Clear to auscultation bilaterally. No wheezing, rhonchi, or rales. Abdomen: Soft, nontender, nondistended. Bowel sounds normal.   Extremities: Warm and dry. No cyanosis or edema. Neurologic: CN II-XII grossly intact. Sensation intact. Skin:     No rashes or jaundice. Psych:  Normal mood and affect. Discharge Info:   Current Discharge Medication List      START taking these medications    Details   midodrine (PROAMITINE) 5 mg tablet Take 1 Tab by mouth three (3) times daily (with meals) for 30 days. Indications: Symptomatic Orthostatic Hypotension  Qty: 90 Tab, Refills: 0         CONTINUE these medications which have CHANGED    Details   rivaroxaban (XARELTO) 20 mg tab tablet Take 1 Tab by mouth daily (with dinner). HOLD FOR 1 WEEK; RESUME 12/13 OR AS DIRECTED BY CARDIOLOGY  Qty: 90 Tab, Refills: 3         CONTINUE these medications which have NOT CHANGED    Details   omeprazole (PRILOSEC) 40 mg capsule Take 1 Cap by mouth daily. Indications: gastroesophageal reflux disease  Qty: 90 Cap, Refills: 3      levothyroxine (SYNTHROID) 100 mcg tablet Take 1 Tab by mouth daily. Qty: 90 Tab, Refills: 1      metoprolol tartrate (LOPRESSOR) 25 mg tablet Take 0.5 Tabs by mouth every twelve (12) hours. Qty: 90 Tab, Refills: 3      flecainide (TAMBOCOR) 50 mg tablet Take 1 Tab by mouth every twelve (12) hours. Qty: 90 Tab, Refills: 3      estradiol (ESTRACE) 1 mg tablet Take 1 mg by mouth daily. melatonin 10 mg cap Take  by mouth. Biotin 2,500 mcg cap Take  by mouth.      multivitamin (ONE A DAY) tablet Take 1 Tab by mouth daily. DULoxetine (CYMBALTA) 30 mg capsule Take 60 mg by mouth daily.          STOP taking these medications       methylPREDNISolone (MEDROL, CHENG,) 4 mg tablet Comments:   Reason for Stopping:         amoxicillin-clavulanate (AUGMENTIN) 875-125 mg per tablet Comments:   Reason for Stopping:         fluconazole (DIFLUCAN) 150 mg tablet Comments:   Reason for Stopping:                 Disposition: home    Activity: Activity as tolerated  Diet: DIET REGULAR    Follow-up Appointments   Procedures    FOLLOW UP VISIT Appointment in: One Week Cardiology for follow up afib, resume xarelto or not? Cardiology for follow up afib, resume xarelto or not? Standing Status:   Standing     Number of Occurrences:   1     Order Specific Question:   Appointment in     Answer: One Week         Follow-up Information     Follow up With Details Comments Contact Info    Rashard John MD Call As needed [address]  [address] [facility] OFFICE In 1 week follow up afib, HTN [address]   [address] [address] [place] [address]  [phone]          Time spent in patient discharge planning and coordination 35 minutes.     Signed:  Mag Frye MD

## 2017-12-06 NOTE — DISCHARGE INSTRUCTIONS
Orthostatic Hypotension: Care Instructions  Your Care Instructions    Orthostatic hypotension is a quick drop in blood pressure. It happens when you get up from sitting or lying down. You may feel faint, lightheaded, or dizzy. When a person sits up or stands up, the body changes the way it pumps blood. This can slow the flow of blood to the brain for a very short time. And that can make you feel lightheaded. Many medicines can cause this problem, especially in older people. Lack of fluids (dehydration) or illnesses such as diabetes or heart disease also can cause it. Follow-up care is a key part of your treatment and safety. Be sure to make and go to all appointments, and call your doctor if you are having problems. It's also a good idea to know your test results and keep a list of the medicines you take. How can you care for yourself at home? · Tell your doctor about any problems you have with your medicines. · If your doctor prescribes medicine to help prevent a low blood pressure problem, take it exactly as prescribed. Call your doctor if you think you are having a problem with your medicine. · Drink plenty of fluids, enough so that your urine is light yellow or clear like water. Choose water and other caffeine-free clear liquids. If you have kidney, heart, or liver disease and have to limit fluids, talk with your doctor before you increase the amount of fluids you drink. · Limit or avoid alcohol and caffeine. · Get up slowly from bed or after sitting for a long time. If you are in bed, roll to your side and swing your legs over the edge of the bed and onto the floor. Push your body up to a sitting position. Wait for a while before you slowly stand up. If you are dizzy or lightheaded, sit or lie down. When should you call for help? Call 911 anytime you think you may need emergency care. For example, call if:  ? · You passed out (lost consciousness). ? Watch closely for changes in your health, and be sure to contact your doctor if:  ? · You do not get better as expected. Where can you learn more? Go to http://tamie-nolan.info/. Enter E991 in the search box to learn more about \"Orthostatic Hypotension: Care Instructions. \"  Current as of: September 21, 2016  Content Version: 11.4  © 2006-2017 [employer]. Care instructions adapted under license by [employer] (which disclaims liability or warranty for this information). If you have questions about a medical condition or this instruction, always ask your healthcare professional. [place] [address] any warranty or liability for your use of this information. DISCHARGE SUMMARY from Nurse    The following personal items are in your possession at time of discharge:    Dental Appliances: None  Visual Aid: Glasses     Home Medications: Sent home  Jewelry: Ring  Clothing: At bedside  Other Valuables: Cell Phone             PATIENT INSTRUCTIONS:    After general anesthesia or intravenous sedation, for 24 hours or while taking prescription Narcotics:  · Limit your activities  · Do not drive and operate hazardous machinery  · Do not make important personal or business decisions  · Do  not drink alcoholic beverages  · If you have not urinated within 8 hours after discharge, please contact your surgeon on call.     Report the following to your surgeon:  · Excessive pain, swelling, redness or odor of or around the surgical area  · Temperature over 100.5  · Nausea and vomiting lasting longer than 4 hours or if unable to take medications  · Any signs of decreased circulation or nerve impairment to extremity: change in color, persistent  numbness, tingling, coldness or increase pain  · Any questions        What to do at Home:  Recommended activity: Activity as tolerated, per MD    If you experience any of the following symptoms fever>101, pain unrelieved with medication, nausea/vomiting, shortness of breath, dizziness/fainting, chest pain. , please follow up with your doctor. *  Please give a list of your current medications to your Primary Care Provider. *  Please update this list whenever your medications are discontinued, doses are      changed, or new medications (including over-the-counter products) are added. *  Please carry medication information at all times in case of emergency situations. These are general instructions for a healthy lifestyle:    No smoking/ No tobacco products/ Avoid exposure to second hand smoke    Surgeon General's Warning:  Quitting smoking now greatly reduces serious risk to your health. Obesity, smoking, and sedentary lifestyle greatly increases your risk for illness    A healthy diet, regular physical exercise & weight monitoring are important for maintaining a healthy lifestyle    You may be retaining fluid if you have a history of heart failure or if you experience any of the following symptoms:  Weight gain of 3 pounds or more overnight or 5 pounds in a week, increased swelling in our hands or feet or shortness of breath while lying flat in bed. Please call your doctor as soon as you notice any of these symptoms; do not wait until your next office visit. Recognize signs and symptoms of STROKE:    F-face looks uneven    A-arms unable to move or move unevenly    S-speech slurred or non-existent    T-time-call 911 as soon as signs and symptoms begin-DO NOT go       Back to bed or wait to see if you get better-TIME IS BRAIN. Warning Signs of HEART ATTACK     Call 911 if you have these symptoms:   Chest discomfort. Most heart attacks involve discomfort in the center of the chest that lasts more than a few minutes, or that goes away and comes back. It can feel like uncomfortable pressure, squeezing, fullness, or pain.  Discomfort in other areas of the upper body.  Symptoms can include pain or discomfort in one or both arms, the back, neck, jaw, or stomach.  Shortness of breath with or without chest discomfort.  Other signs may include breaking out in a cold sweat, nausea, or lightheadedness. Don't wait more than five minutes to call 911 - MINUTES MATTER! Fast action can save your life. Calling 911 is almost always the fastest way to get lifesaving treatment. Emergency Medical Services staff can begin treatment when they arrive -- up to an hour sooner than if someone gets to the hospital by car. The discharge information has been reviewed with the patient. The patient verbalized understanding. Discharge medications reviewed with the patient and appropriate educational materials and side effects teaching were provided.

## 2017-12-06 NOTE — PROGRESS NOTES
12/06/17 1132   Vital Signs   Pulse (Heart Rate) 76   /64   BP Patient Position At rest;Supine   Additional Blood Pressure/Pulse Data   Pulse 2 79   BP 2 116/66   Patient Position 2 At rest;Sitting   Pulse 3 95   BP 3 92/55   Patient Position 3 At rest;Standing

## 2017-12-06 NOTE — PROGRESS NOTES
AM assessment completed. Pt alert/oriented x4. Respirations even and unlabored. Lung sounds clear. Pt denies SOB/chest pain. Dressing to right buttock intact with small amount of shadowing. Pt is walking in room. Reports a \"dizzy spell\" last night but feels OK today. Denies any needs, all safety measures in place. Pt instructed to call for help OOB.

## 2017-12-06 NOTE — PROGRESS NOTES
Pt feeling OK this morning. Hb improved after transfusions. Bandage needs changing; saturated with serosang fluid. Will see if her Hb is stable.   I do not see any barriers to discharge today if OK with surgery

## 2017-12-07 ENCOUNTER — HOME CARE VISIT (OUTPATIENT)
Dept: SCHEDULING | Facility: HOME HEALTH | Age: 72
End: 2017-12-07
Payer: MEDICARE

## 2017-12-07 VITALS
DIASTOLIC BLOOD PRESSURE: 64 MMHG | HEART RATE: 78 BPM | OXYGEN SATURATION: 98 % | SYSTOLIC BLOOD PRESSURE: 102 MMHG | RESPIRATION RATE: 18 BRPM | TEMPERATURE: 97.8 F

## 2017-12-07 PROCEDURE — 3331090002 HH PPS REVENUE DEBIT

## 2017-12-07 PROCEDURE — G0299 HHS/HOSPICE OF RN EA 15 MIN: HCPCS

## 2017-12-07 PROCEDURE — A6252 ABSORPT DRG >16 <=48 W/O BDR: HCPCS

## 2017-12-07 PROCEDURE — 3331090001 HH PPS REVENUE CREDIT

## 2017-12-07 PROCEDURE — A4450 NON-WATERPROOF TAPE: HCPCS

## 2017-12-07 PROCEDURE — 400013 HH SOC

## 2017-12-07 PROCEDURE — A6222 GAUZE <=16 IN NO W/SAL W/O B: HCPCS

## 2017-12-08 ENCOUNTER — PATIENT OUTREACH (OUTPATIENT)
Dept: CASE MANAGEMENT | Age: 72
End: 2017-12-08

## 2017-12-08 PROCEDURE — 3331090001 HH PPS REVENUE CREDIT

## 2017-12-08 PROCEDURE — 3331090002 HH PPS REVENUE DEBIT

## 2017-12-08 NOTE — PROGRESS NOTES
Transition of Care Discharge Follow-up Questionnaire   Date/Time of Call:   12/7/2017 10:00 AM   What was the patient hospitalized for? Patient was hospitalized for Anemia requiring transfusion. Does the patient understand his/her diagnosis and/or treatment and what happened during the hospitalization? Spoke to patient who verbalized understanding of treatment and diagnosis. Did the patient receive discharge instructions? Patient states d/c instructions received. Review any discharge instructions (see notes in [facility]). Ask patient if they understand these. Do they have any questions? Patient discussed discharge plan and instruction as Patient understood it to be with Care Coordinator. Which I have documented in the pertinent areas throughout this progress note. Were home services ordered (nursing, PT, OT, ST, etc.)? [facility] ordered at d/c for wound care. If so, has the first visit occurred? If not, why? (Assist with coordination of services if necessary.) Yes. Was any DME ordered? No DME ordered at d/c. If so, has it been received? If not, why?  (Assist with coordination of arranging DME orders if necessary.) N/A   Complete a review of all medications (new, continued and discontinued meds per the D/C instructions and medication tab in [facility]). All meds reviewed with Care Coordinator and Patient. Midodrine prescribed at d/c. Were all new prescriptions filled? If not, why?  (Assist with obtainment of medications if necessary.) Yes. Does the patient understand the purpose and dosing instructions for all medications? (If patient has questions, provide explanation and education.) Indicated by Patient to Care Coordinator, the purpose and dosing instructions for all medications were understood. Does the patient have any problems in performing ADLs? (If patient is unable to perform ADLs  what is the limiting factor(s)?   Do they have a support system that can assist? If no support system is present, discuss possible assistance that they may be able to obtain.) Patient states she is independent with all ADLs. Does the patient have all follow-up appointments scheduled? 7 day f/up with PCP?    7-14 day f/up with specialist?    If f/up has not been made  what actions has the care coordinator made to accomplish this? Has transportation been arranged? [facility] follow-up should be within 7 days of discharge; all others should have PCP follow-up within 7 days of discharge; follow-ups with other specialists as appropriate or ordered.) PCP f/u 12/12, Surgery f/u 12/14. PCP appt. advised. Patient declined assistance in scheduling states will schedule on own. Patient denies need for transportation. Any other questions or concerns expressed by the patient? Gratitude stated and no further questions asked or needs identified. MOLLY Call Completed By: Aleida Sheikh LPN  [facility] [address] Coordinator          This note will not be viewable in [address]

## 2017-12-09 PROCEDURE — 3331090002 HH PPS REVENUE DEBIT

## 2017-12-09 PROCEDURE — 3331090001 HH PPS REVENUE CREDIT

## 2017-12-10 PROCEDURE — 3331090001 HH PPS REVENUE CREDIT

## 2017-12-10 PROCEDURE — 3331090002 HH PPS REVENUE DEBIT

## 2017-12-11 ENCOUNTER — HOME CARE VISIT (OUTPATIENT)
Dept: SCHEDULING | Facility: HOME HEALTH | Age: 72
End: 2017-12-11
Payer: MEDICARE

## 2017-12-11 VITALS
HEART RATE: 85 BPM | DIASTOLIC BLOOD PRESSURE: 83 MMHG | RESPIRATION RATE: 18 BRPM | OXYGEN SATURATION: 98 % | TEMPERATURE: 97.8 F | SYSTOLIC BLOOD PRESSURE: 185 MMHG

## 2017-12-11 PROCEDURE — A5120 SKIN BARRIER, WIPE OR SWAB: HCPCS

## 2017-12-11 PROCEDURE — 3331090002 HH PPS REVENUE DEBIT

## 2017-12-11 PROCEDURE — 3331090001 HH PPS REVENUE CREDIT

## 2017-12-11 PROCEDURE — A6252 ABSORPT DRG >16 <=48 W/O BDR: HCPCS

## 2017-12-11 PROCEDURE — A4450 NON-WATERPROOF TAPE: HCPCS

## 2017-12-11 PROCEDURE — G0299 HHS/HOSPICE OF RN EA 15 MIN: HCPCS

## 2017-12-11 PROCEDURE — A6222 GAUZE <=16 IN NO W/SAL W/O B: HCPCS

## 2017-12-12 PROCEDURE — 3331090002 HH PPS REVENUE DEBIT

## 2017-12-12 PROCEDURE — 3331090001 HH PPS REVENUE CREDIT

## 2017-12-13 ENCOUNTER — HOME CARE VISIT (OUTPATIENT)
Dept: SCHEDULING | Facility: HOME HEALTH | Age: 72
End: 2017-12-13
Payer: MEDICARE

## 2017-12-13 PROCEDURE — 3331090002 HH PPS REVENUE DEBIT

## 2017-12-13 PROCEDURE — 3331090001 HH PPS REVENUE CREDIT

## 2017-12-13 PROCEDURE — G0299 HHS/HOSPICE OF RN EA 15 MIN: HCPCS

## 2017-12-14 ENCOUNTER — HOSPITAL ENCOUNTER (OUTPATIENT)
Dept: LAB | Age: 72
Discharge: HOME | End: 2017-12-14
Attending: SURGERY
Payer: MEDICARE

## 2017-12-14 LAB
ERYTHROCYTE [DISTWIDTH] IN BLOOD BY AUTOMATED COUNT: 16.5 % (ref 11.9–14.6)
HCT VFR BLD AUTO: 33.7 % (ref 35.8–46.3)
HGB BLD-MCNC: 10.7 G/DL (ref 11.7–15.4)
MCH RBC QN AUTO: 29.9 PG (ref 26.1–32.9)
MCHC RBC AUTO-ENTMCNC: 31.8 G/DL (ref 31.4–35)
MCV RBC AUTO: 94.1 FL (ref 79.6–97.8)
PLATELET # BLD AUTO: 531 K/UL (ref 150–450)
PMV BLD AUTO: 10.1 FL (ref 10.8–14.1)
RBC # BLD AUTO: 3.58 M/UL (ref 4.05–5.25)
WBC # BLD AUTO: 6.2 K/UL (ref 4.3–11.1)

## 2017-12-14 PROCEDURE — 85027 COMPLETE CBC AUTOMATED: CPT | Performed by: SURGERY

## 2017-12-14 PROCEDURE — 3331090002 HH PPS REVENUE DEBIT

## 2017-12-14 PROCEDURE — 3331090001 HH PPS REVENUE CREDIT

## 2017-12-14 PROCEDURE — 36415 COLL VENOUS BLD VENIPUNCTURE: CPT | Performed by: SURGERY

## 2017-12-15 PROCEDURE — 3331090002 HH PPS REVENUE DEBIT

## 2017-12-15 PROCEDURE — 3331090001 HH PPS REVENUE CREDIT

## 2017-12-16 PROCEDURE — 3331090002 HH PPS REVENUE DEBIT

## 2017-12-16 PROCEDURE — 3331090001 HH PPS REVENUE CREDIT

## 2017-12-17 PROCEDURE — 3331090002 HH PPS REVENUE DEBIT

## 2017-12-17 PROCEDURE — 3331090001 HH PPS REVENUE CREDIT

## 2017-12-18 ENCOUNTER — HOME CARE VISIT (OUTPATIENT)
Dept: SCHEDULING | Facility: HOME HEALTH | Age: 72
End: 2017-12-18
Payer: MEDICARE

## 2017-12-18 VITALS
HEART RATE: 75 BPM | SYSTOLIC BLOOD PRESSURE: 110 MMHG | RESPIRATION RATE: 18 BRPM | OXYGEN SATURATION: 98 % | DIASTOLIC BLOOD PRESSURE: 64 MMHG

## 2017-12-18 PROCEDURE — 3331090001 HH PPS REVENUE CREDIT

## 2017-12-18 PROCEDURE — G0299 HHS/HOSPICE OF RN EA 15 MIN: HCPCS

## 2017-12-18 PROCEDURE — 3331090002 HH PPS REVENUE DEBIT

## 2017-12-19 PROCEDURE — 3331090002 HH PPS REVENUE DEBIT

## 2017-12-19 PROCEDURE — 3331090001 HH PPS REVENUE CREDIT

## 2017-12-20 ENCOUNTER — HOME CARE VISIT (OUTPATIENT)
Dept: SCHEDULING | Facility: HOME HEALTH | Age: 72
End: 2017-12-20
Payer: MEDICARE

## 2017-12-20 VITALS
DIASTOLIC BLOOD PRESSURE: 78 MMHG | HEART RATE: 78 BPM | OXYGEN SATURATION: 98 % | RESPIRATION RATE: 18 BRPM | SYSTOLIC BLOOD PRESSURE: 146 MMHG | TEMPERATURE: 97.8 F

## 2017-12-20 PROCEDURE — 3331090002 HH PPS REVENUE DEBIT

## 2017-12-20 PROCEDURE — 3331090001 HH PPS REVENUE CREDIT

## 2017-12-20 PROCEDURE — G0299 HHS/HOSPICE OF RN EA 15 MIN: HCPCS

## 2017-12-21 PROCEDURE — 3331090001 HH PPS REVENUE CREDIT

## 2017-12-21 PROCEDURE — 3331090002 HH PPS REVENUE DEBIT

## 2017-12-22 ENCOUNTER — HOSPITAL ENCOUNTER (EMERGENCY)
Age: 72
Discharge: HOME | End: 2017-12-22
Attending: EMERGENCY MEDICINE
Payer: MEDICARE

## 2017-12-22 VITALS
WEIGHT: 184 LBS | TEMPERATURE: 98.6 F | OXYGEN SATURATION: 100 % | SYSTOLIC BLOOD PRESSURE: 174 MMHG | HEIGHT: 64 IN | HEART RATE: 70 BPM | RESPIRATION RATE: 16 BRPM | DIASTOLIC BLOOD PRESSURE: 77 MMHG | BODY MASS INDEX: 31.41 KG/M2

## 2017-12-22 DIAGNOSIS — T14.8XXA HEMATOMA: Primary | ICD-10-CM

## 2017-12-22 LAB
BASOPHILS # BLD: 0.1 K/UL (ref 0–0.2)
BASOPHILS NFR BLD: 1 % (ref 0–2)
DIFFERENTIAL METHOD BLD: ABNORMAL
EOSINOPHIL # BLD: 0.4 K/UL (ref 0–0.8)
EOSINOPHIL NFR BLD: 5 % (ref 0.5–7.8)
ERYTHROCYTE [DISTWIDTH] IN BLOOD BY AUTOMATED COUNT: 15.1 % (ref 11.9–14.6)
HCT VFR BLD AUTO: 33.3 % (ref 35.8–46.3)
HGB BLD-MCNC: 10.9 G/DL (ref 11.7–15.4)
IMM GRANULOCYTES # BLD: 0 K/UL (ref 0–0.5)
IMM GRANULOCYTES NFR BLD AUTO: 0 % (ref 0–5)
LYMPHOCYTES # BLD: 2.2 K/UL (ref 0.5–4.6)
LYMPHOCYTES NFR BLD: 33 % (ref 13–44)
MCH RBC QN AUTO: 30.3 PG (ref 26.1–32.9)
MCHC RBC AUTO-ENTMCNC: 32.7 G/DL (ref 31.4–35)
MCV RBC AUTO: 92.5 FL (ref 79.6–97.8)
MONOCYTES # BLD: 0.6 K/UL (ref 0.1–1.3)
MONOCYTES NFR BLD: 9 % (ref 4–12)
NEUTS SEG # BLD: 3.4 K/UL (ref 1.7–8.2)
NEUTS SEG NFR BLD: 52 % (ref 43–78)
PLATELET # BLD AUTO: 392 K/UL (ref 150–450)
PMV BLD AUTO: 10 FL (ref 10.8–14.1)
RBC # BLD AUTO: 3.6 M/UL (ref 4.05–5.25)
WBC # BLD AUTO: 6.5 K/UL (ref 4.3–11.1)

## 2017-12-22 PROCEDURE — 99283 EMERGENCY DEPT VISIT LOW MDM: CPT | Performed by: EMERGENCY MEDICINE

## 2017-12-22 PROCEDURE — 3331090002 HH PPS REVENUE DEBIT

## 2017-12-22 PROCEDURE — 3331090001 HH PPS REVENUE CREDIT

## 2017-12-22 PROCEDURE — 85025 COMPLETE CBC W/AUTO DIFF WBC: CPT | Performed by: EMERGENCY MEDICINE

## 2017-12-23 NOTE — ED PROVIDER NOTES
HPI Comments: 58-year-old white female had a fall at home 3 weeks ago. She landed on her right hip which resulted in a large hematoma which required surgical evacuation. She also required transfusion of 4 units of blood because of the hematoma. She had her sutures removed  4 days ago and resumed her xarelto 3 days ago. This evening she noticed bleeding from the surgical wound. Family reports she has soaked several pads. No other complaints at this time. Patient is a 71 y.o. female presenting with wound check. The history is provided by the patient.    Wound Check           Past Medical History:   Diagnosis Date    Anxiety     Arthritis     Bell's palsy     in Oct. 2009 with some vertigo at times still, no other after effects    Depression     GERD (gastroesophageal reflux disease)     reflux, takes nexium    Hematoma of hip, right, initial encounter     Hypertension     on medication since 2006    Hypothyroidism     Obese     Paroxysmal atrial fibrillation ([facility] [address] 8/13/2017    Psychiatric disorder     depression    S/P Left total knee replacement using cement 5/16/2011    Unspecified hypothyroidism 5/16/2011       Past Surgical History:   Procedure Laterality Date    HX APPENDECTOMY  1961    HX BREAST LUMPECTOMY  1998    benign    HX CATARACT REMOVAL Bilateral 2014    HX CHOLECYSTECTOMY  1976    HX GASTRECTOMY  9/21/15    sleeve    HX HYSTERECTOMY  1987    HX ORTHOPAEDIC Bilateral 1994    heel spurs removed    HX ORTHOPAEDIC  2005    lower back     HX TONSILLECTOMY  1966    HX UROLOGICAL  2008/2009    bladder tack X2    THYROIDECTOMY  1980    partial    TOTAL KNEE ARTHROPLASTY Right 2010    TOTAL KNEE ARTHROPLASTY Left 2011         Family History:   Problem Relation Age of Onset    Cancer Mother      breast    Heart Attack Mother     Cancer Father      throat    Other Father      gastric ulcer    Kidney Disease Father     Malignant Hyperthermia Neg Hx     Pseudocholinesterase Deficiency Neg Hx     Delayed Awakening Neg Hx     Post-op Nausea/Vomiting Neg Hx     Emergence Delirium Neg Hx     Post-op Cognitive Dysfunction Neg Hx        Social History     Social History    Marital status:      Spouse name: N/A    Number of children: N/A    Years of education: N/A     Occupational History    Not on file. Social History Main Topics    Smoking status: Never Smoker    Smokeless tobacco: Never Used    Alcohol use 1.0 oz/week     2 Glasses of wine per week    Drug use: No    Sexual activity: No     Other Topics Concern    Not on file     Social History Narrative         ALLERGIES: Dilaudid [hydromorphone (bulk)] and Sulfa (sulfonamide antibiotics)    Review of Systems   Constitutional: Negative for fever. Respiratory: Negative for shortness of breath. Genitourinary: Negative for dysuria. Neurological: Negative for headaches. Vitals:    12/22/17 1943 12/22/17 1944   BP: 174/77    Pulse: 68 70   Resp: 16    Temp: 98.6 °F (37 °C)    SpO2: 100% 100%   Weight: 83.5 kg (184 lb)    Height: 5' 4\" (1.626 m)             Physical Exam   Constitutional: She appears well-developed and well-nourished. No distress. HENT:   Head: Normocephalic and atraumatic. Mouth/Throat: Oropharynx is clear and moist.   Eyes: Pupils are equal, round, and reactive to light. Neck: Normal range of motion. Neck supple. Cardiovascular: Normal rate. Pulmonary/Chest: Effort normal.   Musculoskeletal:   Surgical incision lateral aspect of right hip has a small opening approximately 3 mm in length with slow ooze of thin, dark blood. No bright red bleeding. No pulsatile bleeding. Neurological: She is alert. Skin: Skin is warm and dry. Psychiatric: She has a normal mood and affect. Nursing note and vitals reviewed. MDM  Number of Diagnoses or Management Options  Diagnosis management comments: Hemoglobin is stable at 10.9, hematocrit 33.3.   The blood appears to be old hematoma and does not appear to be acute hemorrhage. Have advised family to continue pressure dressing. She appears hemodynamically safe for discharge home.     ED Course       Procedures

## 2017-12-23 NOTE — ED NOTES
I have reviewed discharge instructions with the patient. The patient verbalized understanding. Patient left ED via Discharge Method: wheelchair to Home with  family/friend,      Opportunity for questions and clarification provided. Patient given 0 scripts. To continue your aftercare when you leave the hospital, you may receive an automated call from our care team to check in on how you are doing. This is a free service and part of our promise to provide the best care and service to meet your aftercare needs.  If you have questions, or wish to unsubscribe from this service please call [phone]. Thank you for Choosing our [facility] Emergency Department.

## 2017-12-23 NOTE — ED TRIAGE NOTES
Per EMS: Pt had Hip surgery 10 days ago, stiches  removed 5 days ago. Pt Started wound started bleeding today. Bleeding controlled when EMS arrived. /90 HR 74  R 16. No bleeding noted at this time.

## 2018-02-21 ENCOUNTER — APPOINTMENT (RX ONLY)
Dept: URBAN - METROPOLITAN AREA CLINIC 24 | Facility: CLINIC | Age: 73
Setting detail: DERMATOLOGY
End: 2018-02-21

## 2018-02-21 DIAGNOSIS — Z41.9 ENCOUNTER FOR PROCEDURE FOR PURPOSES OTHER THAN REMEDYING HEALTH STATE, UNSPECIFIED: ICD-10-CM

## 2018-02-21 DIAGNOSIS — D18.0 HEMANGIOMA: ICD-10-CM

## 2018-02-21 DIAGNOSIS — D49.2 NEOPLASM OF UNSPECIFIED BEHAVIOR OF BONE, SOFT TISSUE, AND SKIN: ICD-10-CM

## 2018-02-21 DIAGNOSIS — D22 MELANOCYTIC NEVI: ICD-10-CM

## 2018-02-21 DIAGNOSIS — L57.0 ACTINIC KERATOSIS: ICD-10-CM

## 2018-02-21 DIAGNOSIS — L82.1 OTHER SEBORRHEIC KERATOSIS: ICD-10-CM

## 2018-02-21 DIAGNOSIS — L81.4 OTHER MELANIN HYPERPIGMENTATION: ICD-10-CM

## 2018-02-21 DIAGNOSIS — Z71.89 OTHER SPECIFIED COUNSELING: ICD-10-CM

## 2018-02-21 PROBLEM — D22.5 MELANOCYTIC NEVI OF TRUNK: Status: ACTIVE | Noted: 2018-02-21

## 2018-02-21 PROBLEM — D18.01 HEMANGIOMA OF SKIN AND SUBCUTANEOUS TISSUE: Status: ACTIVE | Noted: 2018-02-21

## 2018-02-21 PROCEDURE — 11101: CPT

## 2018-02-21 PROCEDURE — 11300 SHAVE SKIN LESION 0.5 CM/<: CPT | Mod: 59

## 2018-02-21 PROCEDURE — 99203 OFFICE O/P NEW LOW 30 MIN: CPT | Mod: 25

## 2018-02-21 PROCEDURE — ? BIOPSY BY SHAVE METHOD

## 2018-02-21 PROCEDURE — ? SHAVE REMOVAL

## 2018-02-21 PROCEDURE — ? OTHER (COSMETIC)

## 2018-02-21 PROCEDURE — ? LIQUID NITROGEN

## 2018-02-21 PROCEDURE — ? COUNSELING

## 2018-02-21 PROCEDURE — 11100: CPT | Mod: 59

## 2018-02-21 PROCEDURE — 17000 DESTRUCT PREMALG LESION: CPT

## 2018-02-21 ASSESSMENT — LOCATION SIMPLE DESCRIPTION DERM
LOCATION SIMPLE: LEFT INFERIOR EYELID
LOCATION SIMPLE: ABDOMEN
LOCATION SIMPLE: LEFT FOREARM
LOCATION SIMPLE: RIGHT THIGH
LOCATION SIMPLE: RIGHT LOWER BACK
LOCATION SIMPLE: RIGHT BUTTOCK
LOCATION SIMPLE: NOSE
LOCATION SIMPLE: LEFT TEMPLE
LOCATION SIMPLE: RIGHT UPPER BACK
LOCATION SIMPLE: RIGHT FOREARM
LOCATION SIMPLE: LEFT CHEEK
LOCATION SIMPLE: LEFT UPPER BACK
LOCATION SIMPLE: RIGHT INFERIOR EYELID

## 2018-02-21 ASSESSMENT — LOCATION DETAILED DESCRIPTION DERM
LOCATION DETAILED: RIGHT ANTERIOR DISTAL THIGH
LOCATION DETAILED: RIGHT ANTERIOR PROXIMAL THIGH
LOCATION DETAILED: LEFT DISTAL DORSAL FOREARM
LOCATION DETAILED: NASAL TIP
LOCATION DETAILED: RIGHT SUPERIOR UPPER BACK
LOCATION DETAILED: LEFT SUPERIOR CENTRAL MALAR CHEEK
LOCATION DETAILED: RIGHT INFERIOR LATERAL MIDBACK
LOCATION DETAILED: LEFT LATERAL INFERIOR EYELID
LOCATION DETAILED: LEFT CENTRAL TEMPLE
LOCATION DETAILED: RIGHT RIB CAGE
LOCATION DETAILED: LEFT INFERIOR UPPER BACK
LOCATION DETAILED: RIGHT BUTTOCK
LOCATION DETAILED: RIGHT LATERAL INFERIOR EYELID
LOCATION DETAILED: RIGHT DISTAL DORSAL FOREARM
LOCATION DETAILED: LEFT RIB CAGE
LOCATION DETAILED: NASAL SUPRATIP

## 2018-02-21 ASSESSMENT — LOCATION ZONE DERM
LOCATION ZONE: LEG
LOCATION ZONE: NOSE
LOCATION ZONE: EYELID
LOCATION ZONE: FACE
LOCATION ZONE: ARM
LOCATION ZONE: TRUNK

## 2018-02-21 NOTE — PROCEDURE: LIQUID NITROGEN
Number Of Freeze-Thaw Cycles: 1 freeze-thaw cycle
Duration Of Freeze Thaw-Cycle (Seconds): 0
Detail Level: Detailed
Render Post-Care Instructions In Note?: yes
Consent: The patient's consent was obtained including but not limited to risks of crusting, scabbing, blistering, scarring, darker or lighter pigmentary change, recurrence, incomplete removal and infection.
Post-Care Instructions: I reviewed with the patient in detail post-care instructions. Patient is to wear sunprotection, and avoid picking at any of the treated lesions. Pt may apply Vaseline to crusted or scabbing areas.

## 2018-02-21 NOTE — PROCEDURE: BIOPSY BY SHAVE METHOD
Electrodesiccation And Curettage Text: The wound bed was treated with electrodesiccation and curettage after the biopsy was performed.
Accession #: PC
Curettage Text: The wound bed was treated with curettage after the biopsy was performed.
Billing Type: Third-Party Bill
Render Post-Care Instructions In Note?: yes
X Size Of Lesion In Cm: 0
Bill For Surgical Tray: no
Biopsy Type: H and E
Dressing: bandage
Hemostasis: Aluminum Chloride
Post-Care Instructions: I reviewed with the patient in detail post-care instructions. Patient is to keep the biopsy site dry overnight, and then apply vaseline twice daily until healed. Patient may apply hydrogen peroxide soaks to remove any crusting. Patient given a wound care sheet.
Type Of Destruction Used: Curettage
Wound Care: Vaseline
Silver Nitrate Text: The wound bed was treated with silver nitrate after the biopsy was performed.
Consent: Written consent was obtained and risks were reviewed including but not limited to scarring, infection, bleeding, scabbing, incomplete removal, nerve damage and allergy to anesthesia.
Anesthesia Type: 1% lidocaine without epinephrine and a 1:12 solution of 8.4% sodium bicarbonate
Anesthesia Volume In Cc: 0.1
Cryotherapy Text: The wound bed was treated with cryotherapy after the biopsy was performed.
Notification Instructions: Patient will be notified of biopsy results. However, patient instructed to call the office if not contacted within 2 weeks.
Biopsy Method: Personna blade
Detail Level: Detailed
Electrodesiccation Text: The wound bed was treated with electrodesiccation after the biopsy was performed.

## 2018-02-21 NOTE — PROCEDURE: SHAVE REMOVAL
Anesthesia Volume In Cc: 0.5
Medical Necessity Clause: This procedure was medically necessary because the lesion that was
Wound Care: Vaseline
Bill For Surgical Tray: no
Path Notes (To The Dermatopathologist): Please check margins.
Detail Level: Detailed
Size Of Margin In Cm (Margins Are Not Added To Billing Dimensions): -
Post-Care Instructions: I reviewed with the patient in detail post-care instructions. Patient is to keep the biopsy site dry overnight, and then apply vaseline twice daily until healed. Patient may apply hydrogen peroxide soaks to remove any crusting. Wound care sheet provided.
Biopsy Method: Personna blade
Hemostasis: Aluminum Chloride
Accession #: PC
Anesthesia Type: 1% lidocaine without epinephrine and a 1:10 solution of 8.4% sodium bicarbonate
Medical Necessity Information: It is in your best interest to select a reason for this procedure from the list below. All of these items fulfill various CMS LCD requirements except the new and changing color options.
Billing Type: Third-Party Bill
Notification Instructions: Patient will be notified of biopsy results. However, patient instructed to call the office if not contacted within 2 weeks.
Consent was obtained from the patient. The risks and benefits to therapy were discussed in detail. Specifically, the risks of infection, scarring, bleeding, prolonged wound healing, incomplete removal, allergy to anesthesia, nerve injury and recurrence were addressed. Prior to the procedure, the treatment site was clearly identified and confirmed by the patient. All components of Universal Protocol/PAUSE Rule completed.
X Size Of Lesion In Cm (Optional): 0
Render Post-Care Instructions In Note?: yes

## 2018-02-21 NOTE — PROCEDURE: OTHER (COSMETIC)
Detail Level: Zone
Other (Free Text): Discussed her setting a consult up with Dr. Arambula or Dr. Sapp if she was interested in surgical treatment options.

## 2018-03-14 ENCOUNTER — APPOINTMENT (RX ONLY)
Dept: URBAN - METROPOLITAN AREA CLINIC 24 | Facility: CLINIC | Age: 73
Setting detail: DERMATOLOGY
End: 2018-03-14

## 2018-03-14 DIAGNOSIS — Z87.2 PERSONAL HISTORY OF DISEASES OF THE SKIN AND SUBCUTANEOUS TISSUE: ICD-10-CM

## 2018-03-14 DIAGNOSIS — D22 MELANOCYTIC NEVI: ICD-10-CM

## 2018-03-14 PROBLEM — D22.5 MELANOCYTIC NEVI OF TRUNK: Status: ACTIVE | Noted: 2018-03-14

## 2018-03-14 PROCEDURE — 11401 EXC TR-EXT B9+MARG 0.6-1 CM: CPT

## 2018-03-14 PROCEDURE — ? COUNSELING

## 2018-03-14 PROCEDURE — ? OTHER

## 2018-03-14 PROCEDURE — ? PUNCH EXCISION

## 2018-03-14 ASSESSMENT — LOCATION ZONE DERM
LOCATION ZONE: NOSE
LOCATION ZONE: TRUNK

## 2018-03-14 ASSESSMENT — LOCATION SIMPLE DESCRIPTION DERM
LOCATION SIMPLE: NOSE
LOCATION SIMPLE: RIGHT BUTTOCK

## 2018-03-14 ASSESSMENT — LOCATION DETAILED DESCRIPTION DERM
LOCATION DETAILED: NASAL SUPRATIP
LOCATION DETAILED: RIGHT BUTTOCK

## 2018-03-14 NOTE — PROCEDURE: OTHER
Other (Free Text): We discussed the controversial nature of dysplastic nevi as well as options for moderately dysplastic nevi. We discussed risks/benefits of monitoring vs punch excision, and the patient agreed to proceed with punch excision.
Detail Level: Zone
Note Text (......Xxx Chief Complaint.): This diagnosis correlates with the

## 2018-03-14 NOTE — PROCEDURE: PUNCH EXCISION
Punch Size In Mm: 6
10 Mm Punch Excision Text: A 10 mm punch biopsy was used to excise the lesion to the level of the subcutaneous fat.  Blunt dissection was used to free the lesion from the surrounding tissues and the lesion was removed.
Post-Care Instructions: I reviewed with the patient in detail post-care instructions. Patient is to keep the biopsy site dry overnight, and then apply bacitracin twice daily until healed. Patient may apply hydrogen peroxide soaks to remove any crusting.
Detail Level: Detailed
Consent was obtained from the patient. The risks and benefits to therapy were discussed in detail. Specifically, the risks of infection, scarring, bleeding, prolonged wound healing, incomplete removal, allergy to anesthesia, nerve injury and recurrence were addressed. Prior to the procedure, the treatment site was clearly identified and confirmed by the patient. All components of Universal Protocol/PAUSE Rule completed.
Wound Dressings: a bandage
2.5 Mm Punch Excision Text: A 2.5 mm punch biopsy was used to excise the lesion to the level of the subcutaneous fat.  Blunt dissection was used to free the lesion from the surrounding tissues and the lesion was removed.
2 Mm Punch Excision Text: A 2 mm punch biopsy was used to excise the lesion to the level of the subcutaneous fat.  Blunt dissection was used to free the lesion from the surrounding tissues and the lesion was removed.
Suture Removal: 14 days
5 Mm Punch Excision Text: A 5 mm punch biopsy was used to excise the lesion to the level of the subcutaneous fat.  Blunt dissection was used to free the lesion from the surrounding tissues and the lesion was removed.
Epidermal Closure: simple interrupted
Repair Type: None (Simple)
8 Mm Punch Excision Text: A 8 mm punch biopsy was used to excise the lesion to the level of the subcutaneous fat.  Blunt dissection was used to free the lesion from the surrounding tissues and the lesion was removed.
Anesthesia Type: 1% lidocaine with 1:100,000 epinephrine and a 1:10 solution of 8.4% sodium bicarbonate
Render Post-Care Instructions In Note?: no
Accession #: PC
3 Mm Punch Excision Text: A 3 mm punch biopsy was used to excise the lesion to the level of the subcutaneous fat.  Blunt dissection was used to free the lesion from the surrounding tissues and the lesion was removed.
Include Undermining Statement In The Repair Note?: Yes
X Size Of Lesion Width In Cm (Optional): 0
Notification Instructions: Patient will be notified of biopsy results. However, patient instructed to call the office if not contacted within 2 weeks.
4.5 Mm Punch Excision Text: A 4.5 mm punch biopsy was used to excise the lesion to the level of the subcutaneous fat.  Blunt dissection was used to free the lesion from the surrounding tissues and the lesion was removed.
12 Mm Punch Excision Text: A 12 mm punch biopsy was used to excise the lesion to the level of the subcutaneous fat.  Blunt dissection was used to free the lesion from the surrounding tissues and the lesion was removed.
Anesthesia Volume In Cc: 2
Size Of Lesion (*Required): 0.5
7 Mm Punch Excision Text: A 7 mm punch biopsy was used to excise the lesion to the level of the subcutaneous fat.  Blunt dissection was used to free the lesion from the surrounding tissues and the lesion was removed.
1.5 Mm Punch Excision Text: A 1.5 mm punch biopsy was used to excise the lesion to the level of the subcutaneous fat.  Blunt dissection was used to free the lesion from the surrounding tissues and the lesion was removed.
Size Of Margin In Cm: 0.05
Wound Care: Vaseline
3.5 Mm Punch Excision Text: A 3.5 mm punch biopsy was used to excise the lesion to the level of the subcutaneous fat.  Blunt dissection was used to free the lesion from the surrounding tissues and the lesion was removed.
Epidermal Sutures: 4-0 Prolene
Billing Type: Third-Party Bill
6 Mm Punch Excision Text: A 6 mm punch biopsy was used to excise the lesion to the level of the subcutaneous fat.  Blunt dissection was used to free the lesion from the surrounding tissues and the lesion was removed.
Medical Necessity Clause: This procedure was medically necessary because the lesion that was treated was:
Hemostasis: Pressure
4 Mm Punch Excision Text: A 4 mm punch biopsy was used to excise the lesion to the level of the subcutaneous fat.  Blunt dissection was used to free the lesion from the surrounding tissues and the lesion was removed.

## 2018-03-28 ENCOUNTER — APPOINTMENT (RX ONLY)
Dept: URBAN - METROPOLITAN AREA CLINIC 24 | Facility: CLINIC | Age: 73
Setting detail: DERMATOLOGY
End: 2018-03-28

## 2018-03-28 DIAGNOSIS — Z48.02 ENCOUNTER FOR REMOVAL OF SUTURES: ICD-10-CM

## 2018-03-28 PROCEDURE — ? SUTURE REMOVAL (GLOBAL PERIOD)

## 2018-03-28 PROCEDURE — 99024 POSTOP FOLLOW-UP VISIT: CPT

## 2018-03-28 ASSESSMENT — LOCATION ZONE DERM: LOCATION ZONE: TRUNK

## 2018-03-28 ASSESSMENT — LOCATION SIMPLE DESCRIPTION DERM: LOCATION SIMPLE: RIGHT BUTTOCK

## 2018-03-28 ASSESSMENT — LOCATION DETAILED DESCRIPTION DERM: LOCATION DETAILED: RIGHT BUTTOCK

## 2018-03-28 NOTE — PROCEDURE: SUTURE REMOVAL (GLOBAL PERIOD)
Add 27526 Cpt? (Important Note: In 2017 The Use Of 90524 Is Being Tracked By Cms To Determine Future Global Period Reimbursement For Global Periods): yes
Detail Level: Detailed

## 2018-03-29 NOTE — PROGRESS NOTES
Patient pre-assessment complete for YAN with DR Kevin Rebolledo scheduled for 3/30/18 at 10am, arrival time 8am. Patient verified using . Patient instructed to bring all home medications in labeled bottles on the day of procedure. NPO status reinforced. Instructed they can take all other medications excluding vitamins & supplements. Patient verbalizes understanding of all instructions & denies any questions at this time.

## 2018-03-30 ENCOUNTER — HOSPITAL ENCOUNTER (OUTPATIENT)
Dept: CARDIAC CATH/INVASIVE PROCEDURES | Age: 73
Discharge: HOME | End: 2018-03-30
Payer: MEDICARE

## 2018-03-30 ENCOUNTER — HOSPITAL ENCOUNTER (OUTPATIENT)
Age: 73
Setting detail: OUTPATIENT SURGERY
Discharge: HOME | End: 2018-03-30
Attending: INTERNAL MEDICINE | Admitting: INTERNAL MEDICINE
Payer: MEDICARE

## 2018-03-30 VITALS
HEIGHT: 64 IN | OXYGEN SATURATION: 96 % | WEIGHT: 180 LBS | HEART RATE: 62 BPM | SYSTOLIC BLOOD PRESSURE: 137 MMHG | BODY MASS INDEX: 30.73 KG/M2 | TEMPERATURE: 98 F | RESPIRATION RATE: 8 BRPM | DIASTOLIC BLOOD PRESSURE: 61 MMHG

## 2018-03-30 VITALS
SYSTOLIC BLOOD PRESSURE: 162 MMHG | OXYGEN SATURATION: 98 % | HEART RATE: 65 BPM | RESPIRATION RATE: 17 BRPM | DIASTOLIC BLOOD PRESSURE: 68 MMHG

## 2018-03-30 LAB
ANION GAP SERPL CALC-SCNC: 4 MMOL/L (ref 7–16)
ATRIAL RATE: 58 BPM
BUN SERPL-MCNC: 15 MG/DL (ref 8–23)
CALCIUM SERPL-MCNC: 9.1 MG/DL (ref 8.3–10.4)
CALCULATED P AXIS, ECG09: 78 DEGREES
CALCULATED R AXIS, ECG10: 21 DEGREES
CALCULATED T AXIS, ECG11: 90 DEGREES
CHLORIDE SERPL-SCNC: 107 MMOL/L (ref 98–107)
CO2 SERPL-SCNC: 32 MMOL/L (ref 21–32)
CREAT SERPL-MCNC: 0.71 MG/DL (ref 0.6–1)
DIAGNOSIS, 93000: NORMAL
ERYTHROCYTE [DISTWIDTH] IN BLOOD BY AUTOMATED COUNT: 14.2 % (ref 11.9–14.6)
GLUCOSE SERPL-MCNC: 86 MG/DL (ref 65–100)
HCT VFR BLD AUTO: 39.8 % (ref 35.8–46.3)
HGB BLD-MCNC: 13.4 G/DL (ref 11.7–15.4)
INR PPP: 1.5
MAGNESIUM SERPL-MCNC: 2.1 MG/DL (ref 1.8–2.4)
MCH RBC QN AUTO: 29.4 PG (ref 26.1–32.9)
MCHC RBC AUTO-ENTMCNC: 33.7 G/DL (ref 31.4–35)
MCV RBC AUTO: 87.3 FL (ref 79.6–97.8)
P-R INTERVAL, ECG05: 190 MS
PLATELET # BLD AUTO: 240 K/UL (ref 150–450)
PMV BLD AUTO: 10.8 FL (ref 10.8–14.1)
POTASSIUM SERPL-SCNC: 4.5 MMOL/L (ref 3.5–5.1)
PROTHROMBIN TIME: 17.3 SEC (ref 11.5–14.5)
Q-T INTERVAL, ECG07: 454 MS
QRS DURATION, ECG06: 100 MS
QTC CALCULATION (BEZET), ECG08: 445 MS
RBC # BLD AUTO: 4.56 M/UL (ref 4.05–5.25)
SODIUM SERPL-SCNC: 143 MMOL/L (ref 136–145)
VENTRICULAR RATE, ECG03: 58 BPM
WBC # BLD AUTO: 5.1 K/UL (ref 4.3–11.1)

## 2018-03-30 PROCEDURE — 83735 ASSAY OF MAGNESIUM: CPT | Performed by: INTERNAL MEDICINE

## 2018-03-30 PROCEDURE — 85610 PROTHROMBIN TIME: CPT | Performed by: INTERNAL MEDICINE

## 2018-03-30 PROCEDURE — 85027 COMPLETE CBC AUTOMATED: CPT | Performed by: INTERNAL MEDICINE

## 2018-03-30 PROCEDURE — 74011250636 HC RX REV CODE- 250/636

## 2018-03-30 PROCEDURE — 93312 ECHO TRANSESOPHAGEAL: CPT

## 2018-03-30 PROCEDURE — 93005 ELECTROCARDIOGRAM TRACING: CPT | Performed by: INTERNAL MEDICINE

## 2018-03-30 PROCEDURE — 74011000250 HC RX REV CODE- 250: Performed by: INTERNAL MEDICINE

## 2018-03-30 PROCEDURE — 99152 MOD SED SAME PHYS/QHP 5/>YRS: CPT

## 2018-03-30 PROCEDURE — 80048 BASIC METABOLIC PNL TOTAL CA: CPT | Performed by: INTERNAL MEDICINE

## 2018-03-30 RX ORDER — MIDAZOLAM HYDROCHLORIDE 1 MG/ML
.5-2 INJECTION, SOLUTION INTRAMUSCULAR; INTRAVENOUS
Status: DISCONTINUED | OUTPATIENT
Start: 2018-03-30 | End: 2018-04-03 | Stop reason: HOSPADM

## 2018-03-30 RX ORDER — FLUMAZENIL 0.1 MG/ML
0.2 INJECTION INTRAVENOUS AS NEEDED
Status: CANCELLED | OUTPATIENT
Start: 2018-03-30

## 2018-03-30 RX ORDER — LIDOCAINE HYDROCHLORIDE 10 MG/ML
0.1 INJECTION INFILTRATION; PERINEURAL AS NEEDED
Status: CANCELLED | OUTPATIENT
Start: 2018-03-30

## 2018-03-30 RX ORDER — SODIUM CHLORIDE 9 MG/ML
75 INJECTION, SOLUTION INTRAVENOUS CONTINUOUS
Status: DISCONTINUED | OUTPATIENT
Start: 2018-03-30 | End: 2018-03-30 | Stop reason: HOSPADM

## 2018-03-30 RX ORDER — DIPHENHYDRAMINE HYDROCHLORIDE 50 MG/ML
12.5 INJECTION, SOLUTION INTRAMUSCULAR; INTRAVENOUS
Status: CANCELLED | OUTPATIENT
Start: 2018-03-30

## 2018-03-30 RX ORDER — FENTANYL CITRATE 50 UG/ML
25-50 INJECTION, SOLUTION INTRAMUSCULAR; INTRAVENOUS
Status: DISCONTINUED | OUTPATIENT
Start: 2018-03-30 | End: 2018-04-03 | Stop reason: HOSPADM

## 2018-03-30 RX ORDER — SODIUM CHLORIDE, SODIUM LACTATE, POTASSIUM CHLORIDE, CALCIUM CHLORIDE 600; 310; 30; 20 MG/100ML; MG/100ML; MG/100ML; MG/100ML
75 INJECTION, SOLUTION INTRAVENOUS CONTINUOUS
Status: CANCELLED | OUTPATIENT
Start: 2018-03-30

## 2018-03-30 RX ORDER — NALOXONE HYDROCHLORIDE 0.4 MG/ML
0.1 INJECTION, SOLUTION INTRAMUSCULAR; INTRAVENOUS; SUBCUTANEOUS
Status: CANCELLED | OUTPATIENT
Start: 2018-03-30

## 2018-03-30 RX ORDER — OXYCODONE HYDROCHLORIDE 5 MG/1
5 TABLET ORAL
Status: CANCELLED | OUTPATIENT
Start: 2018-03-30

## 2018-03-30 RX ORDER — LIDOCAINE HYDROCHLORIDE 20 MG/ML
15 SOLUTION OROPHARYNGEAL AS NEEDED
Status: DISCONTINUED | OUTPATIENT
Start: 2018-03-30 | End: 2018-03-30 | Stop reason: HOSPADM

## 2018-03-30 RX ORDER — SODIUM CHLORIDE, SODIUM LACTATE, POTASSIUM CHLORIDE, CALCIUM CHLORIDE 600; 310; 30; 20 MG/100ML; MG/100ML; MG/100ML; MG/100ML
75 INJECTION, SOLUTION INTRAVENOUS CONTINUOUS
Status: CANCELLED | OUTPATIENT
Start: 2018-03-30 | End: 2018-03-31

## 2018-03-30 RX ADMIN — FENTANYL CITRATE 25 MCG: 50 INJECTION, SOLUTION INTRAMUSCULAR; INTRAVENOUS at 11:25

## 2018-03-30 RX ADMIN — MIDAZOLAM HYDROCHLORIDE 1 MG: 1 INJECTION, SOLUTION INTRAMUSCULAR; INTRAVENOUS at 11:24

## 2018-03-30 RX ADMIN — MIDAZOLAM HYDROCHLORIDE 2 MG: 1 INJECTION, SOLUTION INTRAMUSCULAR; INTRAVENOUS at 11:20

## 2018-03-30 RX ADMIN — FENTANYL CITRATE 25 MCG: 50 INJECTION, SOLUTION INTRAMUSCULAR; INTRAVENOUS at 11:23

## 2018-03-30 RX ADMIN — MIDAZOLAM HYDROCHLORIDE 2 MG: 1 INJECTION, SOLUTION INTRAMUSCULAR; INTRAVENOUS at 11:18

## 2018-03-30 RX ADMIN — LIDOCAINE HYDROCHLORIDE 15 ML: 20 SOLUTION ORAL; TOPICAL at 11:00

## 2018-03-30 RX ADMIN — MIDAZOLAM HYDROCHLORIDE 1 MG: 1 INJECTION, SOLUTION INTRAMUSCULAR; INTRAVENOUS at 11:22

## 2018-03-30 RX ADMIN — FENTANYL CITRATE 25 MCG: 50 INJECTION, SOLUTION INTRAMUSCULAR; INTRAVENOUS at 11:18

## 2018-03-30 RX ADMIN — MIDAZOLAM HYDROCHLORIDE 1 MG: 1 INJECTION, SOLUTION INTRAMUSCULAR; INTRAVENOUS at 11:28

## 2018-03-30 NOTE — PROGRESS NOTES
YAN only  Dr. Jeimy Madrigal    Medications and vital signs as noted. Procedure tolerated well    Report given to Mamie Marquez RN regarding procedure, medications, and vital signs. Pt's situation, background, assessment, and recommendations reviewed with receiving RN. Opportunity provided for clarification of concerns.

## 2018-03-30 NOTE — PROGRESS NOTES
Patient received to [address] [place] [address] room # 10  Ambulatory from [facility]. Patient scheduled for YAN today with Dr Tomasa Barth. Procedure reviewed & questions answered, voiced good understanding consent obtained & placed on chart. All medications and medical history reviewed. Will prep patient per orders. Patient & family updated on plan of care. The patient has a fraility score of 3-MANAGING WELL, based on moderate shortness of breath with ambulation to room.

## 2018-03-30 NOTE — PROGRESS NOTES
TRANSFER - IN REPORT:    Verbal report received from Wilfrido Sheldon RN(name) on [facility]  being received from cath lab(unit) for routine progression of care      Report consisted of patients Situation, Background, Assessment and   Recommendations(SBAR). Information from the following report(s) Procedure Summary was reviewed with the receiving nurse. Opportunity for questions and clarification was provided. Assessment completed upon patients arrival to unit and care assumed.

## 2018-03-30 NOTE — PROCEDURES
Pre-Procedure Diagnosis  1. Atrial fibrillation  2. Intolerance to oral anticoagulation     Procedure Performed  1. Transesophageal Echocardiogram    Anesthesia: conscious sedation    Specimens: * No specimens in log *     Procedural Description: The patient was brought to the procedure room in a fasting, nonsedated state. The risks, benefits and alternatives of the procedure were reviewed with the patient, and final questions answered. Informed consent was confirmed. A procedural timeout was called and completed per institutional policy. Once appropriate monitors were applied, fentanyl and versed were given with continuous oxygen saturation and blood pressure monitoring. Once an appropriate level of sedation was achieved, a transesophageal echocardiogram probe was inserted into the esophagus with ease. A comprehensive YAN study was completed and the full report available in the chart. There was no evidence of spontaneous echo contrast or thrombus in the left atrial appendage. The patient awoke from the procedure without overt complications. Post Procedure Diagnosis: No LUZ appendage thrombus, acceptable anatomy for Watchman    Thomas Kyle MD, MS  Clinical Cardiac Electrophysiology  3/30/2018  11:46 AM

## 2018-03-30 NOTE — DISCHARGE INSTRUCTIONS
AFTER YOU TRANSESOPHAGEAL ECHOCARDIOGRAM    Be sure someone else drives you home. You may feel drowsy for several hours. Do not eat or drink for at least two hours after your procedure. Your throat will be numb and there is a risk you might have difficulty swallowing for a while. Be careful when you do eat or drink for the first time especially with hot fluids since you could easily burn your throat. Call your doctor if:    · You are bleeding from your throat or mouth. · You have trouble breathing all of a sudden. · You have chest pain or any pain that spreads to your neck, jaw, or arms. · You have questions or concerns. · You have a fever greater than 101°F.    Doctor: [facility] [phone]    Special Instructions:    No driving for 24 hours.   Do not eat or drink for 2 hours

## 2018-03-30 NOTE — IP AVS SNAPSHOT
[address] 
[phone] Patient: Marisela Rubin MRN: [id number] CVN:[id number] Discharge Summary 3/30/2018 Marisela Rubin MRN[de-identified]  [id number] Admission Information Provider Pager Service Admission Date Expected D/C Date Neli Guevara MD  CARDIAC CATH LAB 3/30/2018 Actual LOS Patient Class 0 days OUTPATIENT SURGERY Follow-up Information None My Medications ASK your physician about these medications Instructions Each Dose to Equal  
 Morning Noon Evening Bedtime  
 cholecalciferol 1,000 unit tablet Commonly known as:  VITAMIN D3 Your last dose was: Your next dose is: Take 5,000 Units by mouth daily. 2 tabs daily 5000 Units  
    
   
   
   
  
 docusate sodium 100 mg capsule Commonly known as:  Eleonore Ramu Your last dose was: Your next dose is: Take 100 mg by mouth two (2) times daily as needed. 100 mg DULoxetine 30 mg capsule Commonly known as:  CYMBALTA Your last dose was: Your next dose is: Take 60 mg by mouth daily. 60 mg  
    
   
   
   
  
 estradiol 1 mg tablet Commonly known as:  ESTRACE Your last dose was: Your next dose is: Take 1 mg by mouth daily. 1 mg  
    
   
   
   
  
 flecainide 50 mg tablet Commonly known as:  TAMBOCOR Your last dose was: Your next dose is: Take 1 Tab by mouth every twelve (12) hours. 50 mg  
    
   
   
   
  
 levothyroxine 100 mcg tablet Commonly known as:  SYNTHROID Your last dose was: Your next dose is: Take 1 Tab by mouth daily. 100 mcg  
    
   
   
   
  
 melatonin 10 mg Cap Your last dose was: Your next dose is: Take  by mouth nightly as needed. metoprolol tartrate 25 mg tablet Commonly known as:  LOPRESSOR Your last dose was: Your next dose is: Take 0.5 Tabs by mouth every twelve (12) hours. 12.5 mg  
    
   
   
   
  
 multivitamin tablet Commonly known as:  ONE A DAY Your last dose was: Your next dose is: Take 1 Tab by mouth daily. 1 Tab  
    
   
   
   
  
 omeprazole 40 mg capsule Commonly known as:  PriLOSEC Your last dose was: Your next dose is: Take 1 Cap by mouth daily. Indications: gastroesophageal reflux disease 40 mg  
    
   
   
   
  
 ondansetron 8 mg disintegrating tablet Commonly known as:  ZOFRAN ODT Your last dose was: Your next dose is: Take 1 Tab by mouth every eight (8) hours as needed for Nausea. 8 mg  
    
   
   
   
  
 rivaroxaban 20 mg Tab tablet Commonly known as:  Barbara Maxn Your last dose was: Your next dose is: Take 1 Tab by mouth daily (with dinner). HOLD FOR 1 WEEK; RESUME 12/13 OR AS DIRECTED BY CARDIOLOGY  
 20 mg  
    
   
   
   
  
  
  
  
 
  
General Information Please provide this summary of care documentation to your next provider. Allergies Unspecified:  Dilaudid [Hydromorphone (Bulk)]; Sulfa (Sulfonamide Antibiotics) Current Immunizations  Reviewed on 12/6/2017 Name Date Influenza Vaccine (Quad) Mdck Pf 12/1/2017 Influenza Vaccine (Quad) PF 9/23/2015 Discharge Instructions Discharge Instructions AFTER YOU TRANSESOPHAGEAL ECHOCARDIOGRAM 
 
Be sure someone else drives you home. You may feel drowsy for several hours. Do not eat or drink for at least two hours after your procedure. Your throat will be numb and there is a risk you might have difficulty swallowing for a while.  Be careful when you do eat or drink for the first time especially with hot fluids since you could easily burn your throat. Call your doctor if: 
 
· You are bleeding from your throat or mouth. · You have trouble breathing all of a sudden. · You have chest pain or any pain that spreads to your neck, jaw, or arms. · You have questions or concerns. · You have a fever greater than 101°F. 
 
Doctor: Moo AYERS [facility] [id number] [phone] Special Instructions: 
 
No driving for 24 hours. Do not eat or drink for 2 hours Discharge Orders None  
  
` Patient Signature:  ____________________________________________________________ Date:  ____________________________________________________________  
  
 Jerri [facility] Provider Signature:  ____________________________________________________________ Date:  ____________________________________________________________

## 2018-05-08 RX ORDER — CLOBETASOL PROPIONATE 0.05 %
SHAMPOO TOPICAL
Qty: 1 | Refills: 2 | Status: ACTIVE

## 2018-05-10 ENCOUNTER — APPOINTMENT (RX ONLY)
Dept: URBAN - METROPOLITAN AREA CLINIC 349 | Facility: CLINIC | Age: 73
Setting detail: DERMATOLOGY
End: 2018-05-10

## 2018-05-10 DIAGNOSIS — L81.4 OTHER MELANIN HYPERPIGMENTATION: ICD-10-CM

## 2018-05-10 DIAGNOSIS — D485 NEOPLASM OF UNCERTAIN BEHAVIOR OF SKIN: ICD-10-CM

## 2018-05-10 DIAGNOSIS — L21.8 OTHER SEBORRHEIC DERMATITIS: ICD-10-CM

## 2018-05-10 DIAGNOSIS — D22 MELANOCYTIC NEVI: ICD-10-CM | Status: STABLE

## 2018-05-10 PROCEDURE — ? MEDICAL PHOTOGRAPHY REVIEW

## 2018-05-10 PROCEDURE — ? OTHER

## 2018-05-10 PROCEDURE — ? COUNSELING

## 2018-05-10 PROCEDURE — ? PRESCRIPTION

## 2018-05-10 PROCEDURE — ? OBSERVATION

## 2018-05-10 ASSESSMENT — LOCATION DETAILED DESCRIPTION DERM
LOCATION DETAILED: LEFT INFERIOR MEDIAL UPPER BACK
LOCATION DETAILED: LEFT DISTAL POSTERIOR THIGH
LOCATION DETAILED: LEFT DISTAL DORSAL FOREARM
LOCATION DETAILED: RIGHT DISTAL RADIAL DORSAL FOREARM
LOCATION DETAILED: RIGHT PROXIMAL POSTERIOR THIGH
LOCATION DETAILED: NASAL TIP
LOCATION DETAILED: NASAL SUPRATIP
LOCATION DETAILED: LEFT SUPERIOR PARIETAL SCALP
LOCATION DETAILED: LEFT SUPERIOR MEDIAL FOREHEAD

## 2018-05-10 ASSESSMENT — LOCATION SIMPLE DESCRIPTION DERM
LOCATION SIMPLE: SCALP
LOCATION SIMPLE: RIGHT FOREARM
LOCATION SIMPLE: LEFT FOREHEAD
LOCATION SIMPLE: LEFT FOREARM
LOCATION SIMPLE: LEFT UPPER BACK
LOCATION SIMPLE: RIGHT POSTERIOR THIGH
LOCATION SIMPLE: NOSE
LOCATION SIMPLE: LEFT POSTERIOR THIGH

## 2018-05-10 ASSESSMENT — LOCATION ZONE DERM
LOCATION ZONE: TRUNK
LOCATION ZONE: NOSE
LOCATION ZONE: FACE
LOCATION ZONE: ARM
LOCATION ZONE: LEG
LOCATION ZONE: SCALP

## 2018-05-10 NOTE — PROCEDURE: OTHER
Note Text (......Xxx Chief Complaint.): This diagnosis correlates with the
Other (Free Text): Patient was advised to treat this site with aldara due to bleeding, patient did not treat this lesion due to going out of town, after that lesion has remained stable, and has not bled. Advised patient if this happens again to begin aldara treatment
Detail Level: Detailed

## 2018-07-11 NOTE — PROGRESS NOTES
Patient pre-assessment complete for LAAO implant  scheduled for 7/17/18 arrival time 0600. Patient instructed to bring all home meds with them the day of procedure in their labeled containers. NPO status reinforced. Patient's last dose of xarelto will be 7/15/18. She will hold xarelto on 7/16/and 7/17. Patient instructed to take all morning meds except vitamins. Patient verbalizes understanding of all instructions. Patient also given contact info for the [facility] coordinator should they have any further questions. Pt has no other questions at this time.

## 2018-07-12 RX ORDER — ASPIRIN 81 MG/1
TABLET ORAL DAILY
COMMUNITY

## 2018-07-16 ENCOUNTER — ANESTHESIA EVENT (OUTPATIENT)
Dept: SURGERY | Age: 73
DRG: 274 | End: 2018-07-16
Payer: MEDICARE

## 2018-07-16 ENCOUNTER — HOSPITAL ENCOUNTER (OUTPATIENT)
Dept: LAB | Age: 73
Discharge: HOME | DRG: 274 | End: 2018-07-16
Payer: MEDICARE

## 2018-07-16 DIAGNOSIS — I48.0 PAROXYSMAL ATRIAL FIBRILLATION (HCC): ICD-10-CM

## 2018-07-16 LAB
ANION GAP SERPL CALC-SCNC: 5 MMOL/L
BASOPHILS # BLD: 0 K/UL (ref 0–0.2)
BASOPHILS NFR BLD: 0 % (ref 0–2)
BUN SERPL-MCNC: 20 MG/DL (ref 8–23)
CALCIUM SERPL-MCNC: 9.2 MG/DL (ref 8.3–10.4)
CHLORIDE SERPL-SCNC: 104 MMOL/L (ref 98–107)
CO2 SERPL-SCNC: 31 MMOL/L (ref 21–32)
CREAT SERPL-MCNC: 0.8 MG/DL (ref 0.6–1)
DIFFERENTIAL METHOD BLD: NORMAL
EOSINOPHIL # BLD: 0.2 K/UL (ref 0–0.8)
EOSINOPHIL NFR BLD: 3 % (ref 0.5–7.8)
ERYTHROCYTE [DISTWIDTH] IN BLOOD BY AUTOMATED COUNT: 12.3 % (ref 11.9–14.6)
GLUCOSE SERPL-MCNC: 106 MG/DL (ref 65–100)
HCT VFR BLD AUTO: 39.1 % (ref 35.8–46.3)
HGB BLD-MCNC: 13.4 G/DL (ref 11.7–15.4)
INR PPP: 1.4
LYMPHOCYTES # BLD: 2.2 K/UL (ref 0.5–4.6)
LYMPHOCYTES NFR BLD: 44 % (ref 13–44)
MCH RBC QN AUTO: 31.3 PG (ref 26.1–32.9)
MCHC RBC AUTO-ENTMCNC: 34.3 G/DL (ref 31.4–35)
MCV RBC AUTO: 91.4 FL (ref 79.6–97.8)
MONOCYTES # BLD: 0.5 K/UL (ref 0.1–1.3)
MONOCYTES NFR BLD: 9 % (ref 4–12)
NEUTS SEG # BLD: 2.2 K/UL (ref 1.7–8.2)
NEUTS SEG NFR BLD: 44 % (ref 43–78)
PLATELET # BLD AUTO: 217 K/UL (ref 150–450)
PMV BLD AUTO: 11.6 FL (ref 10.8–14.1)
POTASSIUM SERPL-SCNC: 4 MMOL/L (ref 3.5–5.1)
PROTHROMBIN TIME: 17.8 SEC (ref 11.5–14.5)
RBC # BLD AUTO: 4.28 M/UL (ref 4.05–5.25)
SODIUM SERPL-SCNC: 140 MMOL/L (ref 136–145)
WBC # BLD AUTO: 5 K/UL (ref 4.3–11.1)

## 2018-07-16 PROCEDURE — 85025 COMPLETE CBC W/AUTO DIFF WBC: CPT | Performed by: INTERNAL MEDICINE

## 2018-07-16 PROCEDURE — 86923 COMPATIBILITY TEST ELECTRIC: CPT | Performed by: INTERNAL MEDICINE

## 2018-07-16 PROCEDURE — 36415 COLL VENOUS BLD VENIPUNCTURE: CPT | Performed by: INTERNAL MEDICINE

## 2018-07-16 PROCEDURE — 80048 BASIC METABOLIC PNL TOTAL CA: CPT | Performed by: INTERNAL MEDICINE

## 2018-07-16 PROCEDURE — 86900 BLOOD TYPING SEROLOGIC ABO: CPT | Performed by: INTERNAL MEDICINE

## 2018-07-16 PROCEDURE — 85610 PROTHROMBIN TIME: CPT | Performed by: INTERNAL MEDICINE

## 2018-07-17 ENCOUNTER — APPOINTMENT (OUTPATIENT)
Dept: CARDIAC CATH/INVASIVE PROCEDURES | Age: 73
DRG: 274 | End: 2018-07-17
Payer: MEDICARE

## 2018-07-17 ENCOUNTER — ANESTHESIA (OUTPATIENT)
Dept: SURGERY | Age: 73
DRG: 274 | End: 2018-07-17
Payer: MEDICARE

## 2018-07-17 ENCOUNTER — HOSPITAL ENCOUNTER (INPATIENT)
Age: 73
LOS: 1 days | Discharge: HOME | DRG: 274 | End: 2018-07-18
Attending: INTERNAL MEDICINE | Admitting: INTERNAL MEDICINE
Payer: MEDICARE

## 2018-07-17 PROBLEM — I48.91 ATRIAL FIBRILLATION (HCC): Status: ACTIVE | Noted: 2018-07-17

## 2018-07-17 PROBLEM — I48.91 A-FIB (HCC): Status: ACTIVE | Noted: 2018-07-17

## 2018-07-17 LAB
ACT BLD: 230 SECS (ref 70–128)
ATRIAL RATE: 54 BPM
CALCULATED P AXIS, ECG09: 71 DEGREES
CALCULATED R AXIS, ECG10: -69 DEGREES
CALCULATED T AXIS, ECG11: 92 DEGREES
DIAGNOSIS, 93000: NORMAL
P-R INTERVAL, ECG05: 176 MS
Q-T INTERVAL, ECG07: 454 MS
QRS DURATION, ECG06: 96 MS
QTC CALCULATION (BEZET), ECG08: 430 MS
VENTRICULAR RATE, ECG03: 54 BPM

## 2018-07-17 PROCEDURE — 77030013687 HC GD NDL BARD -B

## 2018-07-17 PROCEDURE — 85347 COAGULATION TIME ACTIVATED: CPT

## 2018-07-17 PROCEDURE — 74011250637 HC RX REV CODE- 250/637: Performed by: ANESTHESIOLOGY

## 2018-07-17 PROCEDURE — 77030020407 HC IV BLD WRMR ST 3M -A: Performed by: ANESTHESIOLOGY

## 2018-07-17 PROCEDURE — 76937 US GUIDE VASCULAR ACCESS: CPT

## 2018-07-17 PROCEDURE — 77030038110 HC IMPL LAA WATCHMAN 24MM BSC -L

## 2018-07-17 PROCEDURE — 74011250637 HC RX REV CODE- 250/637: Performed by: INTERNAL MEDICINE

## 2018-07-17 PROCEDURE — 77030013794 HC KT TRNSDUC BLD EDWD -B: Performed by: ANESTHESIOLOGY

## 2018-07-17 PROCEDURE — C1894 INTRO/SHEATH, NON-LASER: HCPCS

## 2018-07-17 PROCEDURE — 02L73DK OCCLUSION OF LEFT ATRIAL APPENDAGE WITH INTRALUMINAL DEVICE, PERCUTANEOUS APPROACH: ICD-10-PCS | Performed by: INTERNAL MEDICINE

## 2018-07-17 PROCEDURE — B24BZZ4 ULTRASONOGRAPHY OF HEART WITH AORTA, TRANSESOPHAGEAL: ICD-10-PCS | Performed by: INTERNAL MEDICINE

## 2018-07-17 PROCEDURE — 77030008703 HC TU ET UNCUF COVD -A: Performed by: ANESTHESIOLOGY

## 2018-07-17 PROCEDURE — 74011000250 HC RX REV CODE- 250

## 2018-07-17 PROCEDURE — 33340 PERQ CLSR TCAT L ATR APNDGE: CPT

## 2018-07-17 PROCEDURE — 93005 ELECTROCARDIOGRAM TRACING: CPT | Performed by: INTERNAL MEDICINE

## 2018-07-17 PROCEDURE — C1893 INTRO/SHEATH, FIXED,NON-PEEL: HCPCS

## 2018-07-17 PROCEDURE — 65660000000 HC RM CCU STEPDOWN

## 2018-07-17 PROCEDURE — 77030013292 HC BOWL MX PRSM J&J -A: Performed by: ANESTHESIOLOGY

## 2018-07-17 PROCEDURE — 74011250636 HC RX REV CODE- 250/636

## 2018-07-17 PROCEDURE — C1769 GUIDE WIRE: HCPCS

## 2018-07-17 PROCEDURE — C1760 CLOSURE DEV, VASC: HCPCS

## 2018-07-17 PROCEDURE — 74011250636 HC RX REV CODE- 250/636: Performed by: INTERNAL MEDICINE

## 2018-07-17 PROCEDURE — 77030020506 HC NDL TRNSPTL NRG BAYL -F

## 2018-07-17 PROCEDURE — 77030004559 HC CATH ANGI DX SUPT CARD -B

## 2018-07-17 PROCEDURE — 77030019908 HC STETH ESOPH SIMS -A: Performed by: ANESTHESIOLOGY

## 2018-07-17 PROCEDURE — 74011636320 HC RX REV CODE- 636/320: Performed by: INTERNAL MEDICINE

## 2018-07-17 PROCEDURE — 93312 ECHO TRANSESOPHAGEAL: CPT

## 2018-07-17 PROCEDURE — 77030020782 HC GWN BAIR PAWS FLX 3M -B: Performed by: ANESTHESIOLOGY

## 2018-07-17 PROCEDURE — 77030002912 HC SUT ETHBND J&J -A

## 2018-07-17 PROCEDURE — 76060000034 HC ANESTHESIA 1.5 TO 2 HR: Performed by: INTERNAL MEDICINE

## 2018-07-17 PROCEDURE — 74011250636 HC RX REV CODE- 250/636: Performed by: ANESTHESIOLOGY

## 2018-07-17 PROCEDURE — 77030008477 HC STYL SATN SLP COVD -A: Performed by: ANESTHESIOLOGY

## 2018-07-17 PROCEDURE — 76210000001 HC OR PH I REC 2.5 TO 3 HR: Performed by: INTERNAL MEDICINE

## 2018-07-17 PROCEDURE — 77030005401 HC CATH RAD ARRO -A: Performed by: ANESTHESIOLOGY

## 2018-07-17 RX ORDER — FENTANYL CITRATE 50 UG/ML
100 INJECTION, SOLUTION INTRAMUSCULAR; INTRAVENOUS ONCE
Status: DISCONTINUED | OUTPATIENT
Start: 2018-07-17 | End: 2018-07-17 | Stop reason: HOSPADM

## 2018-07-17 RX ORDER — HEPARIN SODIUM 1000 [USP'U]/ML
INJECTION, SOLUTION INTRAVENOUS; SUBCUTANEOUS AS NEEDED
Status: DISCONTINUED | OUTPATIENT
Start: 2018-07-17 | End: 2018-07-17 | Stop reason: HOSPADM

## 2018-07-17 RX ORDER — OXYCODONE HYDROCHLORIDE 5 MG/1
5 TABLET ORAL
Status: COMPLETED | OUTPATIENT
Start: 2018-07-17 | End: 2018-07-17

## 2018-07-17 RX ORDER — SODIUM CHLORIDE, SODIUM LACTATE, POTASSIUM CHLORIDE, CALCIUM CHLORIDE 600; 310; 30; 20 MG/100ML; MG/100ML; MG/100ML; MG/100ML
100 INJECTION, SOLUTION INTRAVENOUS CONTINUOUS
Status: DISCONTINUED | OUTPATIENT
Start: 2018-07-17 | End: 2018-07-17 | Stop reason: HOSPADM

## 2018-07-17 RX ORDER — DEXAMETHASONE SODIUM PHOSPHATE 100 MG/10ML
INJECTION INTRAMUSCULAR; INTRAVENOUS AS NEEDED
Status: DISCONTINUED | OUTPATIENT
Start: 2018-07-17 | End: 2018-07-17 | Stop reason: HOSPADM

## 2018-07-17 RX ORDER — PROPOFOL 10 MG/ML
INJECTION, EMULSION INTRAVENOUS AS NEEDED
Status: DISCONTINUED | OUTPATIENT
Start: 2018-07-17 | End: 2018-07-17 | Stop reason: HOSPADM

## 2018-07-17 RX ORDER — CEFAZOLIN SODIUM/WATER 2 G/20 ML
2 SYRINGE (ML) INTRAVENOUS ONCE
Status: COMPLETED | OUTPATIENT
Start: 2018-07-17 | End: 2018-07-17

## 2018-07-17 RX ORDER — MIDAZOLAM HYDROCHLORIDE 1 MG/ML
2 INJECTION, SOLUTION INTRAMUSCULAR; INTRAVENOUS ONCE
Status: DISCONTINUED | OUTPATIENT
Start: 2018-07-17 | End: 2018-07-17 | Stop reason: HOSPADM

## 2018-07-17 RX ORDER — GLYCOPYRROLATE 0.2 MG/ML
INJECTION INTRAMUSCULAR; INTRAVENOUS AS NEEDED
Status: DISCONTINUED | OUTPATIENT
Start: 2018-07-17 | End: 2018-07-17 | Stop reason: HOSPADM

## 2018-07-17 RX ORDER — SODIUM CHLORIDE 9 MG/ML
INJECTION, SOLUTION INTRAVENOUS
Status: DISCONTINUED | OUTPATIENT
Start: 2018-07-17 | End: 2018-07-17 | Stop reason: HOSPADM

## 2018-07-17 RX ORDER — FLECAINIDE ACETATE 100 MG/1
50 TABLET ORAL EVERY 12 HOURS
Status: DISCONTINUED | OUTPATIENT
Start: 2018-07-17 | End: 2018-07-18 | Stop reason: HOSPADM

## 2018-07-17 RX ORDER — PANTOPRAZOLE SODIUM 40 MG/1
40 TABLET, DELAYED RELEASE ORAL
Status: DISCONTINUED | OUTPATIENT
Start: 2018-07-18 | End: 2018-07-18 | Stop reason: HOSPADM

## 2018-07-17 RX ORDER — ACETAMINOPHEN 325 MG/1
650 TABLET ORAL
Status: DISCONTINUED | OUTPATIENT
Start: 2018-07-17 | End: 2018-07-18 | Stop reason: HOSPADM

## 2018-07-17 RX ORDER — MIDAZOLAM HYDROCHLORIDE 1 MG/ML
INJECTION, SOLUTION INTRAMUSCULAR; INTRAVENOUS AS NEEDED
Status: DISCONTINUED | OUTPATIENT
Start: 2018-07-17 | End: 2018-07-17 | Stop reason: HOSPADM

## 2018-07-17 RX ORDER — SODIUM CHLORIDE 0.9 % (FLUSH) 0.9 %
5-10 SYRINGE (ML) INJECTION EVERY 8 HOURS
Status: DISCONTINUED | OUTPATIENT
Start: 2018-07-17 | End: 2018-07-18 | Stop reason: HOSPADM

## 2018-07-17 RX ORDER — FENTANYL CITRATE 50 UG/ML
INJECTION, SOLUTION INTRAMUSCULAR; INTRAVENOUS AS NEEDED
Status: DISCONTINUED | OUTPATIENT
Start: 2018-07-17 | End: 2018-07-17 | Stop reason: HOSPADM

## 2018-07-17 RX ORDER — EPHEDRINE SULFATE 50 MG/ML
INJECTION, SOLUTION INTRAVENOUS AS NEEDED
Status: DISCONTINUED | OUTPATIENT
Start: 2018-07-17 | End: 2018-07-17 | Stop reason: HOSPADM

## 2018-07-17 RX ORDER — ONDANSETRON 2 MG/ML
INJECTION INTRAMUSCULAR; INTRAVENOUS AS NEEDED
Status: DISCONTINUED | OUTPATIENT
Start: 2018-07-17 | End: 2018-07-17 | Stop reason: HOSPADM

## 2018-07-17 RX ORDER — DULOXETIN HYDROCHLORIDE 60 MG/1
60 CAPSULE, DELAYED RELEASE ORAL DAILY
Status: DISCONTINUED | OUTPATIENT
Start: 2018-07-18 | End: 2018-07-18 | Stop reason: HOSPADM

## 2018-07-17 RX ORDER — SODIUM CHLORIDE 0.9 % (FLUSH) 0.9 %
5-10 SYRINGE (ML) INJECTION AS NEEDED
Status: DISCONTINUED | OUTPATIENT
Start: 2018-07-17 | End: 2018-07-18 | Stop reason: HOSPADM

## 2018-07-17 RX ORDER — LIDOCAINE HYDROCHLORIDE 10 MG/ML
0.1 INJECTION INFILTRATION; PERINEURAL AS NEEDED
Status: DISCONTINUED | OUTPATIENT
Start: 2018-07-17 | End: 2018-07-17 | Stop reason: HOSPADM

## 2018-07-17 RX ORDER — MIDAZOLAM HYDROCHLORIDE 1 MG/ML
2 INJECTION, SOLUTION INTRAMUSCULAR; INTRAVENOUS
Status: DISCONTINUED | OUTPATIENT
Start: 2018-07-17 | End: 2018-07-17 | Stop reason: HOSPADM

## 2018-07-17 RX ORDER — DOCUSATE SODIUM 100 MG/1
100 CAPSULE, LIQUID FILLED ORAL
Status: DISCONTINUED | OUTPATIENT
Start: 2018-07-17 | End: 2018-07-18 | Stop reason: HOSPADM

## 2018-07-17 RX ORDER — ESTRADIOL 1 MG/1
1 TABLET ORAL DAILY
Status: DISCONTINUED | OUTPATIENT
Start: 2018-07-18 | End: 2018-07-18 | Stop reason: HOSPADM

## 2018-07-17 RX ORDER — SODIUM CHLORIDE, SODIUM LACTATE, POTASSIUM CHLORIDE, CALCIUM CHLORIDE 600; 310; 30; 20 MG/100ML; MG/100ML; MG/100ML; MG/100ML
INJECTION, SOLUTION INTRAVENOUS
Status: DISCONTINUED | OUTPATIENT
Start: 2018-07-17 | End: 2018-07-17 | Stop reason: HOSPADM

## 2018-07-17 RX ORDER — MELATONIN 10 MG
10 CAPSULE ORAL
Status: DISCONTINUED | OUTPATIENT
Start: 2018-07-17 | End: 2018-07-18 | Stop reason: HOSPADM

## 2018-07-17 RX ORDER — ASPIRIN 81 MG/1
81 TABLET ORAL DAILY
Status: DISCONTINUED | OUTPATIENT
Start: 2018-07-18 | End: 2018-07-18 | Stop reason: HOSPADM

## 2018-07-17 RX ORDER — HYDROCODONE BITARTRATE AND ACETAMINOPHEN 5; 325 MG/1; MG/1
1 TABLET ORAL
Status: DISCONTINUED | OUTPATIENT
Start: 2018-07-17 | End: 2018-07-18 | Stop reason: HOSPADM

## 2018-07-17 RX ORDER — ROCURONIUM BROMIDE 10 MG/ML
INJECTION, SOLUTION INTRAVENOUS AS NEEDED
Status: DISCONTINUED | OUTPATIENT
Start: 2018-07-17 | End: 2018-07-17 | Stop reason: HOSPADM

## 2018-07-17 RX ORDER — LEVOTHYROXINE SODIUM 100 UG/1
100 TABLET ORAL
Status: DISCONTINUED | OUTPATIENT
Start: 2018-07-18 | End: 2018-07-18 | Stop reason: HOSPADM

## 2018-07-17 RX ORDER — MORPHINE SULFATE 8 MG/ML
3 INJECTION, SOLUTION INTRAMUSCULAR; INTRAVENOUS
Status: DISCONTINUED | OUTPATIENT
Start: 2018-07-17 | End: 2018-07-17 | Stop reason: HOSPADM

## 2018-07-17 RX ORDER — HEPARIN SODIUM 200 [USP'U]/100ML
3 INJECTION, SOLUTION INTRAVENOUS CONTINUOUS
Status: DISCONTINUED | OUTPATIENT
Start: 2018-07-17 | End: 2018-07-17 | Stop reason: HOSPADM

## 2018-07-17 RX ORDER — NALOXONE HYDROCHLORIDE 0.4 MG/ML
0.04 INJECTION, SOLUTION INTRAMUSCULAR; INTRAVENOUS; SUBCUTANEOUS
Status: DISCONTINUED | OUTPATIENT
Start: 2018-07-17 | End: 2018-07-17 | Stop reason: HOSPADM

## 2018-07-17 RX ORDER — NEOSTIGMINE METHYLSULFATE 1 MG/ML
INJECTION, SOLUTION INTRAVENOUS AS NEEDED
Status: DISCONTINUED | OUTPATIENT
Start: 2018-07-17 | End: 2018-07-17 | Stop reason: HOSPADM

## 2018-07-17 RX ORDER — LIDOCAINE HYDROCHLORIDE 20 MG/ML
INJECTION, SOLUTION EPIDURAL; INFILTRATION; INTRACAUDAL; PERINEURAL AS NEEDED
Status: DISCONTINUED | OUTPATIENT
Start: 2018-07-17 | End: 2018-07-17 | Stop reason: HOSPADM

## 2018-07-17 RX ORDER — METOPROLOL TARTRATE 25 MG/1
12.5 TABLET, FILM COATED ORAL EVERY 12 HOURS
Status: DISCONTINUED | OUTPATIENT
Start: 2018-07-17 | End: 2018-07-18 | Stop reason: HOSPADM

## 2018-07-17 RX ADMIN — Medication 3.04 MG: at 09:48

## 2018-07-17 RX ADMIN — RIVAROXABAN 20 MG: 20 TABLET, FILM COATED ORAL at 17:33

## 2018-07-17 RX ADMIN — NEOSTIGMINE METHYLSULFATE 4 MG: 1 INJECTION, SOLUTION INTRAVENOUS at 09:06

## 2018-07-17 RX ADMIN — GLYCOPYRROLATE 0.6 MG: 0.2 INJECTION INTRAMUSCULAR; INTRAVENOUS at 09:06

## 2018-07-17 RX ADMIN — Medication 3.04 MG: at 09:37

## 2018-07-17 RX ADMIN — MIDAZOLAM HYDROCHLORIDE 2 MG: 1 INJECTION, SOLUTION INTRAMUSCULAR; INTRAVENOUS at 07:26

## 2018-07-17 RX ADMIN — ROCURONIUM BROMIDE 10 MG: 10 INJECTION, SOLUTION INTRAVENOUS at 08:16

## 2018-07-17 RX ADMIN — ROCURONIUM BROMIDE 5 MG: 10 INJECTION, SOLUTION INTRAVENOUS at 08:49

## 2018-07-17 RX ADMIN — HEPARIN SODIUM 2000 UNITS: 1000 INJECTION, SOLUTION INTRAVENOUS; SUBCUTANEOUS at 08:44

## 2018-07-17 RX ADMIN — EPHEDRINE SULFATE 10 MG: 50 INJECTION, SOLUTION INTRAVENOUS at 08:32

## 2018-07-17 RX ADMIN — IOPAMIDOL 60 ML: 755 INJECTION, SOLUTION INTRAVENOUS at 09:06

## 2018-07-17 RX ADMIN — ROCURONIUM BROMIDE 5 MG: 10 INJECTION, SOLUTION INTRAVENOUS at 08:45

## 2018-07-17 RX ADMIN — Medication 2 G: at 08:00

## 2018-07-17 RX ADMIN — FENTANYL CITRATE 50 MCG: 50 INJECTION, SOLUTION INTRAMUSCULAR; INTRAVENOUS at 07:28

## 2018-07-17 RX ADMIN — FLECAINIDE ACETATE 50 MG: 100 TABLET ORAL at 21:02

## 2018-07-17 RX ADMIN — HEPARIN SODIUM 5000 UNITS: 1000 INJECTION, SOLUTION INTRAVENOUS; SUBCUTANEOUS at 08:33

## 2018-07-17 RX ADMIN — LIDOCAINE HYDROCHLORIDE 60 MG: 20 INJECTION, SOLUTION EPIDURAL; INFILTRATION; INTRACAUDAL; PERINEURAL at 07:39

## 2018-07-17 RX ADMIN — HEPARIN SODIUM 3 UNITS/HR: 200 INJECTION, SOLUTION INTRAVENOUS at 08:11

## 2018-07-17 RX ADMIN — SODIUM CHLORIDE, SODIUM LACTATE, POTASSIUM CHLORIDE, CALCIUM CHLORIDE: 600; 310; 30; 20 INJECTION, SOLUTION INTRAVENOUS at 07:25

## 2018-07-17 RX ADMIN — ROCURONIUM BROMIDE 50 MG: 10 INJECTION, SOLUTION INTRAVENOUS at 07:39

## 2018-07-17 RX ADMIN — HEPARIN SODIUM 3000 UNITS: 1000 INJECTION, SOLUTION INTRAVENOUS; SUBCUTANEOUS at 08:23

## 2018-07-17 RX ADMIN — HEPARIN SODIUM 3 UNITS/HR: 200 INJECTION, SOLUTION INTRAVENOUS at 08:05

## 2018-07-17 RX ADMIN — ONDANSETRON 4 MG: 2 INJECTION INTRAMUSCULAR; INTRAVENOUS at 08:26

## 2018-07-17 RX ADMIN — METOPROLOL TARTRATE 12.5 MG: 25 TABLET, FILM COATED ORAL at 21:02

## 2018-07-17 RX ADMIN — FENTANYL CITRATE 50 MCG: 50 INJECTION, SOLUTION INTRAMUSCULAR; INTRAVENOUS at 07:31

## 2018-07-17 RX ADMIN — EPHEDRINE SULFATE 10 MG: 50 INJECTION, SOLUTION INTRAVENOUS at 08:33

## 2018-07-17 RX ADMIN — DEXAMETHASONE SODIUM PHOSPHATE 10 MG: 100 INJECTION INTRAMUSCULAR; INTRAVENOUS at 08:26

## 2018-07-17 RX ADMIN — Medication 10 ML: at 17:33

## 2018-07-17 RX ADMIN — Medication 10 ML: at 21:04

## 2018-07-17 RX ADMIN — OXYCODONE HYDROCHLORIDE 5 MG: 5 TABLET ORAL at 10:53

## 2018-07-17 RX ADMIN — SODIUM CHLORIDE: 9 INJECTION, SOLUTION INTRAVENOUS at 07:50

## 2018-07-17 RX ADMIN — PROPOFOL 150 MG: 10 INJECTION, EMULSION INTRAVENOUS at 07:39

## 2018-07-17 NOTE — IP AVS SNAPSHOT
Summary of Care Report The Summary of Care report has been created to help improve care coordination. Users with access to [facility] or "[employer] [facility] (Web-based application) may access additional patient information including the Discharge Summary. If you are not currently a "[employer] [facility] user and need more information, please call the number listed below in the [place] [address] section and ask to be connected with Medical Records. Facility Information Name Address Phone [address] [facility] [address] [phone] Patient Information Patient Name Sex  Dori Montemayor ([id number]) Female 1945 Discharge Information Admitting Provider Service Area Unit Darleen Guadalupe MD / Na[place]  3 Telemetry / [phone] Discharge Provider Discharge Date/Time Discharge Disposition Destination (none) 2018 (Pending) AHR (none) Patient Language Language ENGLISH [13] Hospital Problems as of 2018  Reviewed: 3/18/2018  9:00 PM by Jessica Wilkins MD  
  
  
  
 Class Noted - Resolved Last Modified POA Active Problems Paroxysmal atrial fibrillation (HCC) (Chronic)  2017 - Present 2018 by Darleen Guadalupe MD Yes Entered by Darleen Guadalupe MD  
  Traumatic hematoma of right hip  2017 - Present 2018 by Darleen Guadalupe MD Yes Entered by Nidia Rodriguez MD  
  Acute blood loss anemia  2017 - Present 2018 by Darleen Guadalupe MD Yes Entered by Nidia Rodriguez MD  
  Anemia requiring transfusions  2017 - Present 2018 by Darleen Guadalupe MD Yes Entered by Nidia Rodriguez MD  
  Atrial fibrillation [facility])  2018 - Present 2018 by Darleen Guadalupe MD Unknown   Entered by Darleen Guadalupe MD  
 A-fib ([facility]  7/17/2018 - Present 7/17/2018 by Yana Caba MD Unknown Entered by Yana Caba MD  
  
Non-Hospital Problems as of 7/18/2018  Reviewed: 3/18/2018  9:00 PM by Sravan Costa MD  
  
  
  
 Class Noted - Resolved Last Modified Active Problems Osteoarthritis of right knee  4/12/2010 - Present 4/15/2010 Entered by Lesli Rueda., MD  
  Status post total knee replacement  4/12/2010 - Present 4/15/2010 Entered by Lesli Rueda., MD  
  Osteoarthritis of left knee  5/16/2011 - Present 5/19/2011 Entered by Lesli Rueda., MD  
  S/P Left total knee replacement using cement  5/16/2011 - Present 5/19/2011 Entered by Lesli Rueda., MD  
  HTN (hypertension) (Chronic)  5/16/2011 - Present 12/3/2017 by Keysha Galarza MD  
  Entered by Gustavo Anna Unspecified hypothyroidism (Chronic)  5/16/2011 - Present 12/17/2014 by Norma Rowley RN Entered by Gustavo Anna  
  Depression  5/16/2011 - Present 5/19/2011 Entered by Gustavo Anna  
  Esophageal reflux (Chronic)  5/16/2011 - Present 12/17/2014 by Norma Rowley, RN Entered by Gustavo Anna Morbid obesity with BMI of 45.0-49.9, adult ([facility] (Chronic)  9/21/2015 - Present 12/5/2017 by Sheron Da Silva MD  
  Entered by Joni Mosqueda MD  
  Orthostatic hypotension (Chronic)  12/2/2017 - Present 12/6/2017 by Sheron Da Silva MD  
  Entered by Octavio Lino MD  
  Near syncope  12/2/2017 - Present 12/3/2017 by Keysha Galarza MD  
  Entered by Octavio Lino MD  
  Anxiety (Chronic)  12/5/2017 - Present 12/5/2017 by Sheron Da Silva MD  
  Entered by Sheron Da Silva MD  
  
You are allergic to the following Allergen Reactions Dilaudid (Hydromorphone (Bulk)) Swelling Sulfa (Sulfonamide Antibiotics) Rash Current Discharge Medication List  
  
CONTINUE these medications which have NOT CHANGED Dose & Instructions Dispensing Information Comments  
 aspirin delayed-release 81 mg tablet Take  by mouth daily. Refills:  0  
   
 cholecalciferol 1,000 unit tablet Commonly known as:  VITAMIN D3 Dose:  5000 Units Take 5,000 Units by mouth daily. 2 tabs daily Refills:  0  
   
 docusate sodium 100 mg capsule Commonly known as:  Hayde Dilling Dose:  100 mg Take 100 mg by mouth two (2) times daily as needed. Refills:  0 DULoxetine 30 mg capsule Commonly known as:  CYMBALTA Dose:  60 mg Take 60 mg by mouth daily. Refills:  0  
   
 estradiol 1 mg tablet Commonly known as:  ESTRACE Dose:  1 mg Take 1 mg by mouth daily. Refills:  0  
   
 flecainide 50 mg tablet Commonly known as:  TAMBOCOR Dose:  50 mg Take 1 Tab by mouth every twelve (12) hours. Quantity:  180 Tab Refills:  3  
   
 levothyroxine 100 mcg tablet Commonly known as:  SYNTHROID Dose:  100 mcg Take 1 Tab by mouth daily. Quantity:  90 Tab Refills:  1  
   
 melatonin 10 mg Cap Take  by mouth nightly as needed. Refills:  0  
   
 metoprolol tartrate 25 mg tablet Commonly known as:  LOPRESSOR Dose:  12.5 mg Take 0.5 Tabs by mouth every twelve (12) hours. Quantity:  90 Tab Refills:  3  
   
 multivitamin tablet Commonly known as:  ONE A DAY Dose:  1 Tab Take 1 Tab by mouth daily. Refills:  0  
   
 omeprazole 40 mg capsule Commonly known as:  PriLOSEC Dose:  40 mg Take 1 Cap by mouth daily. Indications: gastroesophageal reflux disease Quantity:  90 Cap Refills:  3  
   
 ondansetron 8 mg disintegrating tablet Commonly known as:  ZOFRAN ODT Dose:  8 mg Take 1 Tab by mouth every eight (8) hours as needed for Nausea. Quantity:  20 Tab Refills:  0  
   
 rivaroxaban 20 mg Tab tablet Commonly known as:  Arvlui Morocho Dose:  20 mg Take 1 Tab by mouth daily (with dinner). HOLD FOR 1 WEEK; RESUME 12/13 OR AS DIRECTED BY CARDIOLOGY Quantity:  90 Tab Refills:  3 Current Immunizations Name Date Influenza Vaccine (Quad) Mdck Pf 12/1/2017 Influenza Vaccine (Quad) PF 9/23/2015 Surgery Information ID Date/Time Status Primary Surgeon All Procedures Location [id number] 7/17/2018  8:00 AM Dave Villegas MD LEFT ATRIAL APPENDAGE CLOSURE [facility] MAIN OR Follow-up Information Follow up With Details Comments Contact Info Rick Cheney MD On 8/17/2018 45 day YAN; Dr. Bertha Nazario; arrive at 0630; 39 Day YAN; [address] [facility] [address] 
[phone] Britta Barbosa MD On 7/25/2018 9:00 am with Lin Wells  [phone]  [place] 
[phone] Wayne Flores MD On 8/21/2018 @ 1 in the [place] office [address] Cardiology [facility] [address] 
[phone] Discharge Instructions Watchman Discharge Instructions Activity: 
 
? Follow your doctors recommendations ? Return to normal activities gradually, pacing yourself as you feel better, resting when tired. · Activity should be limited for the next 48 hours. Climb stairs as little as possible and avoid any stooping, bending or strenuous activity for 48 hours. No heavy lifting (anything over 10 pounds) for three days. · Do not drive for 48 hoursHave a responsible person drive you home and stay with you for at least 24 hours after your heart catheterization/angiography. · Check the puncture site frequently for swelling or bleeding. If you see any bleeding, lie down and apply pressure over the area with a clean town or washcloth. Notify your doctor for any redness, swelling, drainage or oozing from the puncture site. Notify your doctor for any fever or chills. · You may resume your usual diet. Drink more fluids than usual. 
 
 
 
Incision / Wound Care ? Cleanse wounds with mild soap and water. Keep wound dry. ? A small amount of bloody or clear drainage is normal. 
? Watch for redness, swelling, incision site hot to touch, foul or colored drainage from the incision site, these are all signs of infection and should be reported immediately to the physicians. ? If there is site concern please notify your implanting physician. ? You may remove the bandage from your Right and Groin in 24 hours. You may shower in 24 hours. No tub baths, hot tubs or swimming for one week. Do not place any lotions, creams, powders, ointments over the puncture site for one week. You may place a clean band-aid over the puncture site each day for 5 days. Change this daily. Continued Medications: 
? DO NOT STOP TAKING YOUR WARFARIN OR ASPIRIN  (and/or PLAVIX) WITHOUT SPEAKING WITH YOUR CARDIOLOGIST FIRST! ? Take your medications as ordered at the time of discharge. ? Do NOT stop taking any medications without first discussing it with your doctor. ? Notify all your doctors of current medication lists. Follow instructions on medication administration especially if blood thinning medications are prescribed. Your doctor will monitor your medications and advise you when or if you can stop taking them. Notify your doctor immediately if any of the following: 
? Sudden weight gain ? Increasing shortness of breath ? Pain, change in color, temperature or swelling in lower legs or feet. ? Fevers greater than 101 degrees, redness, swelling, incision site hot to touch, foul or colored drainage from the incision site, these are all signs of infection and should be reported immediately to the physicians. Post Watchman Discharge Instructions Timeline ? 2 weeks from day of discharge you will need a follow up visit with the implanting physician. Your [facility] Coordinator can facilitate arranging these appointments. ?  After a minimum of 45 days from date of procedure you will need to return to hospital to have an outpatient YAN performed to determine if the implant has closed the opening of the appendage. At this time you will see the [place] [address]. Coordinator for a follow up. If the YAN reveals the appendage is not fully closed  you will require another YAN at a later date to reassess. Once the results from YAN have been reviewed the physicians will determine what medication changes need to be made. Your [facility] Coordinator can facilitate arranging these appointments. ? 6 months post implant you will need to see the [facility] Coordinator and visit the physician for a routine follow up and potentially EKG, and lab work. Your Coordinator can facilitate with setting up these appointments. ? At 1, 2 and 4 years post implant you will be contacted by your [place] [address]. Coordinator for a brief interview. This allows us to complete required patient monitoring. ? Prior to any dental work or surgery notify your dentist or surgeon about your Watchman implant and the medications you are on. 
 
? Maintain regular follow up visits with your cardiologist 
 
? Keep all bloodwork appointments. Thank you for entrusting us with your care! Heart-Healthy Diet: Care Instructions Your Care Instructions A heart-healthy diet has lots of vegetables, fruits, nuts, beans, and whole grains, and is low in salt. It limits foods that are high in saturated fat, such as meats, cheeses, and fried foods. It may be hard to change your diet, but even small changes can lower your risk of heart attack and heart disease. Follow-up care is a key part of your treatment and safety. Be sure to make and go to all appointments, and call your doctor if you are having problems. It's also a good idea to know your test results and keep a list of the medicines you take. How can you care for yourself at home? Watch your portions · Learn what a serving is. A \"serving\" and a \"portion\" are not always the same thing. Make sure that you are not eating larger portions than are recommended. For example, a serving of pasta is ½ cup. A serving size of meat is 2 to 3 ounces. A 3-ounce serving is about the size of a deck of cards. Measure serving sizes until you are good at [place]" them. Keep in mind that restaurants often serve portions that are 2 or 3 times the size of one serving. · To keep your energy level up and keep you from feeling hungry, eat often but in smaller portions. · Eat only the number of calories you need to stay at a healthy weight. If you need to lose weight, eat fewer calories than your body burns (through exercise and other physical activity). Eat more fruits and vegetables · Eat a variety of fruit and vegetables every day. Dark green, deep orange, red, or yellow fruits and vegetables are especially good for you. Examples include spinach, carrots, peaches, and berries. · Keep carrots, celery, and other veggies handy for snacks. Buy fruit that is in season and store it where you can see it so that you will be tempted to eat it. · Cook dishes that have a lot of veggies in them, such as stir-fries and soups. Limit saturated and trans fat · Read food labels, and try to avoid saturated and trans fats. They increase your risk of heart disease. Trans fat is found in many processed foods such as cookies and crackers. · Use olive or canola oil when you cook. Try cholesterol-lowering spreads, such as Benecol or Take Control. · Bake, broil, grill, or steam foods instead of frying them. · Choose lean meats instead of high-fat meats such as hot dogs and sausages. Cut off all visible fat when you prepare meat. · Eat fish, skinless poultry, and meat alternatives such as soy products instead of high-fat meats. Soy products, such as tofu, may be especially good for your heart. · Choose low-fat or fat-free milk and dairy products. Eat fish · Eat at least two servings of fish a week. Certain fish, such as salmon and tuna, contain omega-3 fatty acids, which may help reduce your risk of heart attack. Eat foods high in fiber · Eat a variety of grain products every day. Include whole-grain foods that have lots of fiber and nutrients. Examples of whole-grain foods include oats, whole wheat bread, and brown rice. · Buy whole-grain breads and cereals, instead of white bread or pastries. Limit salt and sodium · Limit how much salt and sodium you eat to help lower your blood pressure. · Taste food before you salt it. Add only a little salt when you think you need it. With time, your taste buds will adjust to less salt. · Eat fewer snack items, fast foods, and other high-salt, processed foods. Check food labels for the amount of sodium in packaged foods. · Choose low-sodium versions of canned goods (such as soups, vegetables, and beans). Limit sugar · Limit drinks and foods with added sugar. These include candy, desserts, and soda pop. Limit alcohol · Limit alcohol to no more than 2 drinks a day for men and 1 drink a day for women. Too much alcohol can cause health problems. When should you call for help? Watch closely for changes in your health, and be sure to contact your doctor if: 
  · You would like help planning heart-healthy meals. Where can you learn more? Go to http://tamie-nolan.info/. Enter V137 in the search box to learn more about \"Heart-Healthy Diet: Care Instructions. \" Current as of: December 6, 2017 Content Version: 11.7 © 1998-7181 [employer]. Care instructions adapted under license by [employer] (which disclaims liability or warranty for this information).  If you have questions about a medical condition or this instruction, always ask your healthcare professional. [employer] disclaims any warranty or liability for your use of this information. DISCHARGE SUMMARY from Nurse PATIENT INSTRUCTIONS: 
 
After general anesthesia or intravenous sedation, for 24 hours or while taking prescription Narcotics: · Limit your activities · Do not drive and operate hazardous machinery · Do not make important personal or business decisions · Do  not drink alcoholic beverages · If you have not urinated within 8 hours after discharge, please contact your surgeon on call. Report the following to your surgeon: 
· Excessive pain, swelling, redness or odor of or around the surgical area · Temperature over 100.5 · Nausea and vomiting lasting longer than 4 hours or if unable to take medications · Any signs of decreased circulation or nerve impairment to extremity: change in color, persistent  numbness, tingling, coldness or increase pain · Any questions What to do at Home: 
Recommended activity: Activity as tolerated and Activity as tolerated and no driving for today The patient is being discharged home in stable condition on a low saturated fat, low cholesterol and low salt diet. The patient is instructed to advance activities as tolerated to the limit of fatigue or shortness of breath. The patient is instructed to avoid lifting anything heavier than 10 lbs for 1 week. The patient is instructed to avoid any straining, stooping or squatting for 2 days. The patient is instructed not to drive for 2 days. The patient is instructed to watch the groin site for bleeding/oozing; if seen, the patient is instructed to apply firm pressure with a clean cloth and call [address] Cardiology at [phone]. The patient is instructed to watch for signs of infection which include: increasing area of redness, fever/hot to touch or purulent drainage at the groin site. The patient is instructed not to soak in a bathtub for 7-10 days, but is cleared to shower. The patient is instructed to return to the ER immediately for any severe pain, color change, or temperature change in leg.  
  
The patient is informed not to stop any medications without discussing with our office and to contact our office if any dental work or possible surgeries are expected. *  Please give a list of your current medications to your Primary Care Provider. *  Please update this list whenever your medications are discontinued, doses are 
    changed, or new medications (including over-the-counter products) are added. *  Please carry medication information at all times in case of emergency situations. These are general instructions for a healthy lifestyle: No smoking/ No tobacco products/ Avoid exposure to second hand smoke Surgeon General's Warning:  Quitting smoking now greatly reduces serious risk to your health. Obesity, smoking, and sedentary lifestyle greatly increases your risk for illness A healthy diet, regular physical exercise & weight monitoring are important for maintaining a healthy lifestyle You may be retaining fluid if you have a history of heart failure or if you experience any of the following symptoms:  Weight gain of 3 pounds or more overnight or 5 pounds in a week, increased swelling in our hands or feet or shortness of breath while lying flat in bed. Please call your doctor as soon as you notice any of these symptoms; do not wait until your next office visit. Recognize signs and symptoms of STROKE: 
 
F-face looks uneven A-arms unable to move or move unevenly S-speech slurred or non-existent T-time-call 911 as soon as signs and symptoms begin-DO NOT go Back to bed or wait to see if you get better-TIME IS BRAIN. Warning Signs of HEART ATTACK Call 911 if you have these symptoms: 
? Chest discomfort.  Most heart attacks involve discomfort in the center of the chest that lasts more than a few minutes, or that goes away and comes back. It can feel like uncomfortable pressure, squeezing, fullness, or pain. ? Discomfort in other areas of the upper body. Symptoms can include pain or discomfort in one or both arms, the back, neck, jaw, or stomach. ? Shortness of breath with or without chest discomfort. ? Other signs may include breaking out in a cold sweat, nausea, or lightheadedness. Don't wait more than five minutes to call [address]! Fast action can save your life. Calling 911 is almost always the fastest way to get lifesaving treatment. Emergency Medical Services staff can begin treatment when they arrive  up to an hour sooner than if someone gets to the hospital by car. The discharge information has been reviewed with the patient. The patient verbalized understanding. Discharge medications reviewed with the patient and appropriate educational materials and side effects teaching were provided. ___________________________________________________________________________________________________________________________________ Chart Review Routing History Recipient Method Report Sent By Madalyn Cowart MD, MD  
Fax: [phone] Phone: [phone] Fax Provider Comm Report Joshua James [[id number]] 4/12/2010  2:46 PM 04/12/2010 Javier Cowart MD  
Fax: [phone] Phone: [phone] Fax Provider Comm Report Joshua James [[id number]] 5/16/2011 11:13 AM 05/16/2011 Abdirizak Baird MD  
Phone: [phone] In [place], [address] [[id number]] 12/7/2017  2:33 PM 12/07/2017 Sandor Torres DO Phone: [phone] In [place], [address] [facility] [[id number]] 12/7/2017  2:33 PM 12/07/2017

## 2018-07-17 NOTE — PROGRESS NOTES
TRANSFER - IN REPORT:    Verbal report received from Ardia Seip, RN on Soraya Ji being received from PACU for routine progression of care    Report consisted of patients Situation, Background, Assessment and Recommendations(SBAR). Information from the following report(s) SBAR, Kardex, [facility] and Recent Results was reviewed with the receiving nurse. Opportunity for questions and clarification was provided. Assessment completed upon patients arrival to unit and care assumed. R groin and R radial sites visualized - small dime size ooze to R groin dressing present on arrival and outlined for assessment of ooze. No hematoma noted. Pt educated on call bell and bedrest until 1330 post watchman procedure. NIH scale completed and documented in flowsheet.

## 2018-07-17 NOTE — ANESTHESIA PREPROCEDURE EVALUATION
Anesthetic History     PONV          Review of Systems / Medical History  Pertinent labs reviewed    Pulmonary  Within defined limits                 Neuro/Psych         Psychiatric history     Cardiovascular    Hypertension        Dysrhythmias : atrial fibrillation      Exercise tolerance: >4 METS     GI/Hepatic/Renal     GERD: well controlled          Comments: Gastric sleeve 2016 Endo/Other      Hypothyroidism  Obesity and arthritis     Other Findings            Physical Exam    Airway  Mallampati: II  TM Distance: 4 - 6 cm  Neck ROM: normal range of motion   Mouth opening: Normal     Cardiovascular  Regular rate and rhythm,  S1 and S2 normal,  no murmur, click, rub, or gallop             Dental  No notable dental hx       Pulmonary  Breath sounds clear to auscultation               Abdominal  GI exam deferred       Other Findings            Anesthetic Plan    ASA: 2  Anesthesia type: general    Monitoring Plan: Arterial line and YAN      Induction: Intravenous  Anesthetic plan and risks discussed with: Patient and Spouse      Pt had gastric sleeve 2016, YAN required for procedure and she has had YAN in past.  I will place probe in esophagus and allow cardiologist to manipulate it.

## 2018-07-17 NOTE — PROGRESS NOTES
Problem: Falls - Risk of  Goal: *Absence of Falls  Document Amber Fall Risk and appropriate interventions in the flowsheet.    Outcome: Progressing Towards Goal  Fall Risk Interventions:            Medication Interventions: Evaluate medications/consider consulting pharmacy, Patient to call before getting OOB         History of Falls Interventions: Evaluate medications/consider consulting pharmacy        Problem: Patient Education: Go to Patient Education Activity  Goal: Patient/Family Education  Outcome: Progressing Towards Goal  Patient educated on bedrest post watchman  Pt verbalized understanding to call for assistance, call bell within reach

## 2018-07-17 NOTE — OP NOTES
Pre-Procedure Diagnosis  1. Atrial fibrillation  2. Intolerant to long term anticoagulation     Procedure Performed  1. Transesophageal echocardiogram  2. Transeptal puncture   3. Watchman implantation    Cardiac Electrophysiologist: Rossy Cope. Jackelyn Knowles MD  Interventional Cardiologist: Floridalma Reeves MD    Anesthesia: General     Estimated Blood Loss: Less than 10 mL     Specimens: * No specimens in log *    Procedure in Detail:  The patient was brought to the [facility] OR in the fasting state. The patient was intubated by anesthesiology, invasive arterial blood pressure monitoring obtained, a monge catheter inserted. A tranesophageal echocardiogram was performed directly prior to the procedure and was negative for a LUZ thrombus (see full report in chart). As the secondary , I obtained venous access under ultrasound guidance x 1 using modified Seldinger technique. Perclose device was deployed after 8 Latvian dilator was used to predilate venous puncture. At this point, uncomplicated placement of a 16Fr short sidearm sheath into the right femoral vein. I placed an SL-O 63cm long braided sheath through the 16 Fr sheath in the right femoral vein and guided over a wire to the RA. Next, I inserted a trans-septal needle into the SLO and it was used to perform a trans-septal puncture with assistance from YAN, as well as fluoroscopy. The SLO sheath was advanced into the LA. Total weight based heparin bolus was administered (1/2 prior to transeptal puncture and 1/2 just after transeptal access) and systemic blood pressure monitored invasively. The ACT target was 250-300. As the primary implanting physician, Dr. Jackelyn Knowles prepped the Watchman delivery sheath and delivered the device as outlined in his procedure note. I was present and assisted him with this portion of the procedure.      At the completion of the watchman delivery, all catheters were removed, Venous sheath was removed and perclose device deployed with good hemostasis. The patient tolerated the procedure well with no acute complications recognized. Just prior to pulling shealths, the YAN was used to obtain ultrasound images and revealed no evidence of pericardial effusion. Complications: None    Summary:   1. Successful Watchman implantation of a 24 mm device.       She Thomas MD

## 2018-07-17 NOTE — IP AVS SNAPSHOT
[facility] 
 
 
 [address] [facility] 
[phone] Patient: Gerardo Anderson MRN: [id number] IFD:8[birth year]30[birth year] About your hospitalization You were admitted on:  July 17, 2018 You last received care in the:  [facility] 3 TELEMETRY You were discharged on:  July 18, 2018 Why you were hospitalized Your primary diagnosis was:  Not on File Your diagnoses also included:  Paroxysmal Atrial Fibrillation (Hcc), Traumatic Hematoma Of Right Hip, Acute Blood Loss Anemia, Anemia Requiring Transfusions, Atrial Fibrillation (Hcc), A-Fib (Hcc) Follow-up Information Follow up With Details Comments Contact Info Shazia Parish MD On 8/17/2018 45 day YAN; Dr. Ankur Almeida; arrive at 0630; 39 Day YAN; [address] [facility] [address] 
[phone] Bhaskar Parsons MD On 7/25/2018 9:00 am with Norbert Jiménez  [address]  Nahid Foy 
[phone] Imelda Burton MD On 8/21/2018 @ 1 in the [place] office [address] [facility] Cardiology [facility] [address] 
[phone] Your Scheduled Appointments Wednesday July 25, 2018  9:00 AM EDT Office Visit with Bill Garcia NP [address] ([facility]) [facility] ([facility]) [facility]  
[phone] Friday August 17, 2018  7:30 AM EDT  
[place] YAN with [facility] CATH/CV FLOAT RN  
[facility] CARDIAC CATH LAB ([address] [place] [address]) [address] [facility]  
[phone] Tuesday August 21, 2018  1:00 PM EDT HOSPITAL FOLLOW-UP with Imelda Burton MD  
[facility] ([address] [place] [address])  [place]  
[address] [place] [address]  
[phone] Discharge Orders None A check sreekanth indicates which time of day the medication should be taken. My Medications CONTINUE taking these medications Instructions Each Dose to Equal  
 Morning Noon Evening Bedtime  
 aspirin delayed-release 81 mg tablet Take  by mouth daily. cholecalciferol 1,000 unit tablet Commonly known as:  VITAMIN D3 Take 5,000 Units by mouth daily. 2 tabs daily 5000 Units  
    
  
   
   
   
  
 docusate sodium 100 mg capsule Commonly known as:  [place] Take 100 mg by mouth two (2) times daily as needed. 100 mg DULoxetine 30 mg capsule Commonly known as:  CYMBALTA Take 60 mg by mouth daily. 60 mg  
    
  
   
   
   
  
 estradiol 1 mg tablet Commonly known as:  ESTRACE Take 1 mg by mouth daily. 1 mg  
    
  
   
   
   
  
 flecainide 50 mg tablet Commonly known as:  TAMBOCOR Take 1 Tab by mouth every twelve (12) hours. 50 mg  
    
  
   
   
  
   
  
 levothyroxine 100 mcg tablet Commonly known as:  SYNTHROID Take 1 Tab by mouth daily. 100 mcg  
    
  
   
   
   
  
 melatonin 10 mg Cap Take  by mouth nightly as needed. metoprolol tartrate 25 mg tablet Commonly known as:  LOPRESSOR Take 0.5 Tabs by mouth every twelve (12) hours. 12.5 mg  
    
  
   
   
  
   
  
 multivitamin tablet Commonly known as:  ONE A DAY Take 1 Tab by mouth daily. 1 Tab  
    
  
   
   
   
  
 omeprazole 40 mg capsule Commonly known as:  PriLOSEC Take 1 Cap by mouth daily. Indications: gastroesophageal reflux disease 40 mg  
    
  
   
   
   
  
 ondansetron 8 mg disintegrating tablet Commonly known as:  ZOFRAN ODT Take 1 Tab by mouth every eight (8) hours as needed for Nausea. 8 mg  
    
   
   
   
  
 rivaroxaban 20 mg Tab tablet Commonly known as:  Bart Baker Take 1 Tab by mouth daily (with dinner).  HOLD FOR 1 WEEK; RESUME 12/13 OR AS DIRECTED BY CARDIOLOGY  
 20 mg  
    
   
   
  
   
 Discharge Instructions Watchman Discharge Instructions Activity: 
 
? Follow your doctors recommendations ? Return to normal activities gradually, pacing yourself as you feel better, resting when tired. · Activity should be limited for the next 48 hours. Climb stairs as little as possible and avoid any stooping, bending or strenuous activity for 48 hours. No heavy lifting (anything over 10 pounds) for three days. · Do not drive for 48 hoursHave a responsible person drive you home and stay with you for at least 24 hours after your heart catheterization/angiography. · Check the puncture site frequently for swelling or bleeding. If you see any bleeding, lie down and apply pressure over the area with a clean town or washcloth. Notify your doctor for any redness, swelling, drainage or oozing from the puncture site. Notify your doctor for any fever or chills. · You may resume your usual diet. Drink more fluids than usual. 
 
 
 
Incision / Wound Care ? Cleanse wounds with mild soap and water. Keep wound dry. ? A small amount of bloody or clear drainage is normal. 
? Watch for redness, swelling, incision site hot to touch, foul or colored drainage from the incision site, these are all signs of infection and should be reported immediately to the physicians. ? If there is site concern please notify your implanting physician. ? You may remove the bandage from your Right and Groin in 24 hours. You may shower in 24 hours. No tub baths, hot tubs or swimming for one week. Do not place any lotions, creams, powders, ointments over the puncture site for one week. You may place a clean band-aid over the puncture site each day for 5 days. Change this daily. Continued Medications: 
? DO NOT STOP TAKING YOUR WARFARIN OR ASPIRIN  (and/or PLAVIX) WITHOUT SPEAKING WITH YOUR CARDIOLOGIST FIRST! ? Take your medications as ordered at the time of discharge. ? Do NOT stop taking any medications without first discussing it with your doctor. ? Notify all your doctors of current medication lists. Follow instructions on medication administration especially if blood thinning medications are prescribed. Your doctor will monitor your medications and advise you when or if you can stop taking them. Notify your doctor immediately if any of the following: 
? Sudden weight gain ? Increasing shortness of breath ? Pain, change in color, temperature or swelling in lower legs or feet. ? Fevers greater than 101 degrees, redness, swelling, incision site hot to touch, foul or colored drainage from the incision site, these are all signs of infection and should be reported immediately to the physicians. Post Watchman Discharge Instructions Timeline ? 2 weeks from day of discharge you will need a follow up visit with the implanting physician. Your Structural Heart Clinic Coordinator can facilitate arranging these appointments. ? After a minimum of 45 days from date of procedure you will need to return to hospital to have an outpatient YAN performed to determine if the implant has closed the opening of the appendage. At this time you will see the [place]  [address]. Coordinator for a follow up. If the YAN reveals the appendage is not fully closed  you will require another YAN at a later date to reassess. Once the results from YAN have been reviewed the physicians will determine what medication changes need to be made. Your Structural Heart Clinic Coordinator can facilitate arranging these appointments. ? 6 months post implant you will need to see the Structural Heart Clinic Coordinator and visit the physician for a routine follow up and potentially EKG, and lab work. Your Coordinator can facilitate with setting up these appointments.  
 
? At 1, 2 and 4 years post implant you will be contacted by your Structural Heart Clinic Coordinator for a brief interview. This allows us to complete required patient monitoring. ? Prior to any dental work or surgery notify your dentist or surgeon about your Watchman implant and the medications you are on. 
 
? Maintain regular follow up visits with your cardiologist 
 
? Keep all bloodwork appointments. Thank you for entrusting us with your care! Heart-Healthy Diet: Care Instructions Your Care Instructions A heart-healthy diet has lots of vegetables, fruits, nuts, beans, and whole grains, and is low in salt. It limits foods that are high in saturated fat, such as meats, cheeses, and fried foods. It may be hard to change your diet, but even small changes can lower your risk of heart attack and heart disease. Follow-up care is a key part of your treatment and safety. Be sure to make and go to all appointments, and call your doctor if you are having problems. It's also a good idea to know your test results and keep a list of the medicines you take. How can you care for yourself at home? Watch your portions · Learn what a serving is. A \"serving\" and a \"portion\" are not always the same thing. Make sure that you are not eating larger portions than are recommended. For example, a serving of pasta is ½ cup. A serving size of meat is 2 to 3 ounces. A 3-ounce serving is about the size of a deck of cards. Measure serving sizes until you are good at [place]" them. Keep in mind that restaurants often serve portions that are 2 or 3 times the size of one serving. · To keep your energy level up and keep you from feeling hungry, eat often but in smaller portions. · Eat only the number of calories you need to stay at a healthy weight. If you need to lose weight, eat fewer calories than your body burns (through exercise and other physical activity). Eat more fruits and vegetables · Eat a variety of fruit and vegetables every day.  Dark green, deep orange, red, or yellow fruits and vegetables are especially good for you. Examples include spinach, carrots, peaches, and berries. · Keep carrots, celery, and other veggies handy for snacks. Buy fruit that is in season and store it where you can see it so that you will be tempted to eat it. · Cook dishes that have a lot of veggies in them, such as stir-fries and soups. Limit saturated and trans fat · Read food labels, and try to avoid saturated and trans fats. They increase your risk of heart disease. Trans fat is found in many processed foods such as cookies and crackers. · Use olive or canola oil when you cook. Try cholesterol-lowering spreads, such as Benecol or Take Control. · Bake, broil, grill, or steam foods instead of frying them. · Choose lean meats instead of high-fat meats such as hot dogs and sausages. Cut off all visible fat when you prepare meat. · Eat fish, skinless poultry, and meat alternatives such as soy products instead of high-fat meats. Soy products, such as tofu, may be especially good for your heart. · Choose low-fat or fat-free milk and dairy products. Eat fish · Eat at least two servings of fish a week. Certain fish, such as salmon and tuna, contain omega-3 fatty acids, which may help reduce your risk of heart attack. Eat foods high in fiber · Eat a variety of grain products every day. Include whole-grain foods that have lots of fiber and nutrients. Examples of whole-grain foods include oats, whole wheat bread, and brown rice. · Buy whole-grain breads and cereals, instead of white bread or pastries. Limit salt and sodium · Limit how much salt and sodium you eat to help lower your blood pressure. · Taste food before you salt it. Add only a little salt when you think you need it. With time, your taste buds will adjust to less salt. · Eat fewer snack items, fast foods, and other high-salt, processed foods. Check food labels for the amount of sodium in packaged foods. · Choose low-sodium versions of canned goods (such as soups, vegetables, and beans). Limit sugar · Limit drinks and foods with added sugar. These include candy, desserts, and soda pop. Limit alcohol · Limit alcohol to no more than 2 drinks a day for men and 1 drink a day for women. Too much alcohol can cause health problems. When should you call for help? Watch closely for changes in your health, and be sure to contact your doctor if: 
  · You would like help planning heart-healthy meals. Where can you learn more? Go to http://tamieSimple Car Washnolan.info/. Enter V137 in the search box to learn more about \"Heart-Healthy Diet: Care Instructions. \" Current as of: December 6, 2017 Content Version: 11.7 © 2143-1694 [employer]. Care instructions adapted under license by [employer] (which disclaims liability or warranty for this information). If you have questions about a medical condition or this instruction, always ask your healthcare professional. [place] [address] any warranty or liability for your use of this information. DISCHARGE SUMMARY from Nurse PATIENT INSTRUCTIONS: 
 
After general anesthesia or intravenous sedation, for 24 hours or while taking prescription Narcotics: · Limit your activities · Do not drive and operate hazardous machinery · Do not make important personal or business decisions · Do  not drink alcoholic beverages · If you have not urinated within 8 hours after discharge, please contact your surgeon on call. Report the following to your surgeon: 
· Excessive pain, swelling, redness or odor of or around the surgical area · Temperature over 100.5 · Nausea and vomiting lasting longer than 4 hours or if unable to take medications · Any signs of decreased circulation or nerve impairment to extremity: change in color, persistent  numbness, tingling, coldness or increase pain · Any questions What to do at Home: Recommended activity: Activity as tolerated and Activity as tolerated and no driving for today The patient is being discharged home in stable condition on a low saturated fat, low cholesterol and low salt diet. The patient is instructed to advance activities as tolerated to the limit of fatigue or shortness of breath. The patient is instructed to avoid lifting anything heavier than 10 lbs for 1 week. The patient is instructed to avoid any straining, stooping or squatting for 2 days. The patient is instructed not to drive for 2 days. The patient is instructed to watch the groin site for bleeding/oozing; if seen, the patient is instructed to apply firm pressure with a clean cloth and call [facility] Cardiology at [phone]. The patient is instructed to watch for signs of infection which include: increasing area of redness, fever/hot to touch or purulent drainage at the groin site. The patient is instructed not to soak in a bathtub for 7-10 days, but is cleared to shower. The patient is instructed to return to the ER immediately for any severe pain, color change, or temperature change in leg.  
  
The patient is informed not to stop any medications without discussing with our office and to contact our office if any dental work or possible surgeries are expected. *  Please give a list of your current medications to your Primary Care Provider. *  Please update this list whenever your medications are discontinued, doses are 
    changed, or new medications (including over-the-counter products) are added. *  Please carry medication information at all times in case of emergency situations. These are general instructions for a healthy lifestyle: No smoking/ No tobacco products/ Avoid exposure to second hand smoke Surgeon General's Warning:  Quitting smoking now greatly reduces serious risk to your health. Obesity, smoking, and sedentary lifestyle greatly increases your risk for illness A healthy diet, regular physical exercise & weight monitoring are important for maintaining a healthy lifestyle You may be retaining fluid if you have a history of heart failure or if you experience any of the following symptoms:  Weight gain of 3 pounds or more overnight or 5 pounds in a week, increased swelling in our hands or feet or shortness of breath while lying flat in bed. Please call your doctor as soon as you notice any of these symptoms; do not wait until your next office visit. Recognize signs and symptoms of STROKE: 
 
F-face looks uneven A-arms unable to move or move unevenly S-speech slurred or non-existent T-time-call 911 as soon as signs and symptoms begin-DO NOT go Back to bed or wait to see if you get better-TIME IS BRAIN. Warning Signs of HEART ATTACK Call 911 if you have these symptoms: 
? Chest discomfort. Most heart attacks involve discomfort in the center of the chest that lasts more than a few minutes, or that goes away and comes back. It can feel like uncomfortable pressure, squeezing, fullness, or pain. ? Discomfort in other areas of the upper body. Symptoms can include pain or discomfort in one or both arms, the back, neck, jaw, or stomach. ? Shortness of breath with or without chest discomfort. ? Other signs may include breaking out in a cold sweat, nausea, or lightheadedness. Don't wait more than five minutes to call [address]! Fast action can save your life. Calling 911 is almost always the fastest way to get lifesaving treatment. Emergency Medical Services staff can begin treatment when they arrive  up to an hour sooner than if someone gets to the hospital by car. The discharge information has been reviewed with the patient. The patient verbalized understanding. Discharge medications reviewed with the patient and appropriate educational materials and side effects teaching were provided. ___________________________________________________________________________________________________________________________________ ACO Transitions of Care Introducing [employer] [address] Cec[address] Lisa offers a voluntary care coordination program to provide high quality service and care to [facility] fee-for-service beneficiaries. Shira Dobson was designed to help you enhance your health and well-being through the following services: ? Transitions of Care  support for individuals who are transitioning from one care setting to another (example: Hospital to home). ? Chronic and Complex Care Coordination  support for individuals and caregivers of those with serious or chronic illnesses or with more than one chronic (ongoing) condition and those who take a number of different medications. If you meet specific medical criteria, a [facility] [address] may call you directly to coordinate your care with your primary care physician and your other care providers. For questions about the [facility] programs, please, contact your physicians office. For general questions or additional information about Accountable Care Organizations: 
Please visit www.medicare.gov/acos. html or call 1-800-MEDICARE (0-989.991.3033) TTY users should call 9-110.883.2220. [employer] Announcement We are excited to announce that we are making your provider's discharge notes available to you in PathSourcehart. You will see these notes when they are completed and signed by the physician that discharged you from your recent hospital stay. If you have any questions or concerns about any information you see in PathSourcehart, please call the Health Information Department where you were seen or reach out to your Primary Care Provider for more information about your plan of care. Introducing [facility]! Dear [employer]: Thank you for requesting a [employer] account. Our records indicate that you already have an active [employer] account. You can access your account anytime at [email or web address] Did you know that you can access your hospital and ER discharge instructions at any time in [employer]? You can also review all of your test results from your hospital stay or ER visit. Additional Information If you have questions, please visit the Frequently Asked Questions section of the [employer] website at [email or web address] Remember, [employer] is NOT to be used for urgent needs. For medical emergencies, dial 911. Now available from your iPhone and Android! Introducing Keenan Rivas As a [employer] patient, I wanted to make you aware of our electronic visit tool called Keenan Rivas. [employer] 24/7 allows you to connect within minutes with a medical provider 24 hours a day, seven days a week via a mobile device or tablet or logging into a secure website from your computer. You can access Keenan Sfoiafin from anywhere in the [place]. A virtual visit might be right for you when you have a simple condition and feel like you just dont want to get out of bed, or cant get away from work for an appointment, when your regular [employer] provider is not available (evenings, weekends or holidays), or when youre out of town and need minor care. Electronic visits cost only $49 and if the [employer] 24/7 provider determines a prescription is needed to treat your condition, one can be electronically transmitted to a nearby pharmacy*. Please take a moment to enroll today if you have not already done so. The enrollment process is free and takes just a few minutes. To enroll, please download the [employer] 24/7 preeti to your tablet or phone, or visit [email or web address] to enroll on your computer. And, as an [address] [place] [address] patient with a [employer] account, the results of your visits will be scanned into your electronic medical record and your primary care provider will be able to view the scanned results. We urge you to continue to see your regular [facility] provider for your ongoing medical care. And while your primary care provider may not be the one available when you seek a Keenan Sofiafin virtual visit, the peace of mind you get from getting a real diagnosis real time can be priceless. For more information on Keenan Sofiafin, view our Frequently Asked Questions (FAQs) at [email or web address]. Sincerely, 
 
Valentino Milks, MD 
Chief Medical Officer Reed Lisa *:  certain medications cannot be prescribed via Keenan Kimberlynfin Providers Seen During Your Hospitalization Provider Specialty Primary office phone Victor M Tineo MD Cardiology [phone] Your Primary Care Physician (PCP) Primary Care Physician Office Phone Office Fax [address] T [phone] You are allergic to the following Allergen Reactions Dilaudid (Hydromorphone (Bulk)) Swelling Sulfa (Sulfonamide Antibiotics) Rash Recent Documentation Height Weight Breastfeeding? BMI OB Status Smoking Status 1.626 m 82.7 kg No 31.29 kg/m2 Hysterectomy Never Smoker Emergency Contacts Name Discharge Info Relation Home Work Mobile Nupur,Mike DISCHARGE CAREGIVER [3] Spouse [3] [phone] Patient Belongings The following personal items are in your possession at time of discharge: 
  Dental Appliances: None  Visual Aid: Glasses, With patient      Home Medications: None   Jewelry: Ring  Clothing: At bedside    Other Valuables: Cell Phone Please provide this summary of care documentation to your next provider. Signatures-by signing, you are acknowledging that this After Visit Summary has been reviewed with you and you have received a copy. Patient Signature:  ____________________________________________________________ Date:  ____________________________________________________________  
  
Burlene Bill Provider Signature:  ____________________________________________________________ Date:  ____________________________________________________________

## 2018-07-17 NOTE — PROGRESS NOTES
Care Management Interventions  PCP Verified by CM: Yes (Feb 2018)  Palliative Care Criteria Met (RRAT>21 & CHF Dx)?: No (RRAT 12 Dx Atrial fib)  Transition of Care Consult (CM Consult): Discharge Planning  Discharge Durable Medical Equipment: No  Physical Therapy Consult: No  Occupational Therapy Consult: No  Speech Therapy Consult: No  Current Support Network: Lives with Spouse  Confirm Follow Up Transport: Self  Plan discussed with Pt/Family/Caregiver: Yes  Freedom of Choice Offered: Yes  Discharge Location  Discharge Placement: Home  Met with patient for d/c planning. Patient alert and oriented x 3, independent of ADL's and lives with her  of 46 years. She requires no DME and is able to drive. O[facility]ains her [facility] with [facility] Pres[facility] at [facility]. PCP Dr. Jose Juan Deluna saw Feb 2018. She hopes to d/c in am. Discharge plan is home with .

## 2018-07-17 NOTE — PROGRESS NOTES
4hr bedrest completed post watchman   Pt assisted to bathroom for first time off bedrest - tolerated well   Site clean/dry/intact, no bleeding, no hematoma  Post procedural VS placed in chart

## 2018-07-17 NOTE — PERIOP NOTES
TRANSFER - OUT REPORT:    Verbal report given to Melvina(name) on Angelic Armenta  being transferred to [facility](unit) for routine post - op       Report consisted of patients Situation, Background, Assessment and   Recommendations(SBAR). Information from the following report(s) SBAR, OR Summary, Procedure Summary, Intake/Output, MAR, Recent Results and Cardiac Rhythm NSR was reviewed with the receiving nurse. Lines:   Peripheral IV 07/17/18 Left Wrist (Active)   Site Assessment Clean, dry, & intact 7/17/2018 10:12 AM   Phlebitis Assessment 0 7/17/2018 10:12 AM   Infiltration Assessment 0 7/17/2018 10:12 AM   Dressing Status Clean, dry, & intact 7/17/2018 10:12 AM   Dressing Type Transparent;Tape 7/17/2018 10:12 AM   Hub Color/Line Status Infusing;Pink 7/17/2018 10:12 AM   Alcohol Cap Used No 7/17/2018 10:12 AM       Peripheral IV 07/17/18 Right Wrist (Active)   Site Assessment Clean, dry, & intact 7/17/2018 10:12 AM   Phlebitis Assessment 0 7/17/2018 10:12 AM   Infiltration Assessment 0 7/17/2018 10:12 AM   Dressing Status Clean, dry, & intact 7/17/2018 10:12 AM   Dressing Type Transparent;Tape 7/17/2018 10:12 AM   Hub Color/Line Status Green;Capped 7/17/2018 10:12 AM   Alcohol Cap Used No 7/17/2018 10:12 AM        Opportunity for questions and clarification was provided. Patient transported with:   Monitor  O2 @ 2 liters  Registered Nurse    VTE prophylaxis orders have not been written for Angelic Armenta. Patient and family given floor number and nurses name. Family updated re: pt status after security code verified.

## 2018-07-17 NOTE — PROGRESS NOTES
Physical Exam  
Skin:  
 
  
  
Admission skin assessment completed with second RN Ingris Guzmán and reveals the following as outlined

## 2018-07-17 NOTE — PROGRESS NOTES
Patient received to [address] [place] [address] room # 10  Ambulatory from [facility]. Patient scheduled for watchmen device today with Dr Inez Gonzales. Procedure reviewed & questions answered, voiced good understanding consent obtained & placed on chart. All medications and medical history reviewed. Will prep patient per orders. Patient & family updated on plan of care. The patient has a fraility score of 3-MANAGING WELL, based on pt being independent with ADL's but activity is limited to routine walking.     Pt states last dose of Xarelto was Keyshawn 7/15/18

## 2018-07-17 NOTE — PROGRESS NOTES
Roya Reagan and David  RFV access    Pt transported to PACU with CRNA. Left femoral vein closed with perclose closure device. Site soft without oozing.   Bedrest 4 hours

## 2018-07-17 NOTE — H&P
[facility] Cardiology/Electrophyiology Consult Date of  Admission: 7/17/2018  6:03 AM  
 
CC/Reason for admission: Watchman  implant Linda Tate is a 68 y.o. female admitted for Atrial fibrillation, unspecified type ([address] [I48.91]. 80yo WF with a history of HTN (off meds since gastric sleeve), no prior CAD/CHF or arrhythmia history who presents today for scheduled Watchman implantation. Pt afib is a recurrent problem, no aggravating or alleviating factors. She presents today and is doing well, no cardiac compliants. Pt denies chest pain, worsening shortness of breath, orthopnea, PND, leg swelling, passing out or near passing out spells, palpitations, fast or irregular heart rates. She was recently in the hospital with a fall and a large hip hematoma. She ultimately required surgery and had a drain placed. She is recovering well. Cardiac PMH: (Old records have been reviewed and summarized below) Patient Active Problem List  
Diagnosis Code  Osteoarthritis of right knee M17.11  
 Status post total knee replacement Z96.659  Osteoarthritis of left knee M17.12  
 S/P Left total knee replacement using cement Z96.659  
 HTN (hypertension) I10  
 Unspecified hypothyroidism E03.9  Depression F32.9  Esophageal reflux K21.9  Morbid obesity with BMI of 45.0-49.9, adult (HCC) E66.01, Z68.42  
 Paroxysmal atrial fibrillation (HCC) I48.0  Orthostatic hypotension I95.1  Near syncope R55  Traumatic hematoma of right hip S70. Reddy Hiss  Acute blood loss anemia D62  Anemia requiring transfusions D64.9  Anxiety F41.9 Past Medical History:  
Diagnosis Date  Anxiety  Arthritis  Bell's palsy   
 in Oct. 2009 with some vertigo at times still, no other after effects  Depression  GERD (gastroesophageal reflux disease)   
 reflux, takes nexium  Hematoma of hip, right, initial encounter  Hypertension   
 on medication since 2006  Hypothyroidism  Nausea & vomiting  Obese  Paroxysmal atrial fibrillation ([facility] [address] 8/13/2017  Psychiatric disorder   
 depression  S/P Left total knee replacement using cement 5/16/2011  Unspecified hypothyroidism 5/16/2011 Past Surgical History:  
Procedure Laterality Date [address]  HX BREAST LUMPECTOMY  1998  
 benign  HX CATARACT REMOVAL Bilateral 2014 [address] [facility][note 3]  HX GASTRECTOMY  9/21/15  
 sleeve [address] [facility]  HX ORTHOPAEDIC Bilateral 1994  
 heel spurs removed  HX ORTHOPAEDIC  2005  
 lower back [place]  HX UROLOGICAL  2008/2009  
 bladder tack X2  
 THYROIDECTOMY  1980  
 partial  
 TOTAL KNEE ARTHROPLASTY Right 2010  TOTAL KNEE ARTHROPLASTY Left 2011 Allergies Allergen Reactions  Dilaudid [Hydromorphone (Bulk)] Swelling  Sulfa (Sulfonamide Antibiotics) Rash Family History Problem Relation Age of Onset  Cancer Mother   
  breast  
 Heart Attack Mother  Cancer Father   
  throat  Other Father   
  gastric ulcer  Kidney Disease Father  Malignant Hyperthermia Neg Hx  Pseudocholinesterase Deficiency Neg Hx  Delayed Awakening Neg Hx  Post-op Nausea/Vomiting Neg Hx  Emergence Delirium Neg Hx  Post-op Cognitive Dysfunction Neg Hx Current Facility-Administered Medications Medication Dose Route Frequency  ceFAZolin (ANCEF) 2 g/20 mL in sterile water IV syringe  2 g IntraVENous ONCE Review of Symptoms: A comprehensive ROS was performed with the pertinent positives and negatives mentioned in the HPI, all other systems reviewed and are negative Physical Exam 
Vitals:  
 07/17/18 5728 BP: 165/73 Pulse: 69 Resp: 16 Temp: 98.5 °F (36.9 °C) SpO2: 99% Weight: 81.6 kg (180 lb) Height: 5' 4\" (1.626 m) Physical Exam: 
General appearance: Alert, well appearing, and in no distress Eyes: Sclerae anicteric, Pupils equal, EOMI 
ENT: Hearing grossly normal bilaterally, no oral lesions, normal dentition CV: RRR, no M/R/G, no JVD, normal distal pulses, no lower extremity edema Pulm: Clear to auscultation, no wheezes, no crackles, no accessory muscle use GI: Soft, nontender, nondistended Cardiographics Telemetry:  
ECG (Indpendently visualized and interpreted): 
Echocardiogram:  
 
Labs:  
Recent Labs  
   07/16/18 
 0924 NA  140  
K  4.0  
BUN  20  
CREA  0.80 GLU  106* WBC  5.0 HGB  13.4 HCT  39.1 PLT  217 INR  1.4 Assessment:  
  
Active Problems: 
  Paroxysmal atrial fibrillation ([address] (8/13/2017) Traumatic hematoma of right hip (12/5/2017) Acute blood loss anemia (12/5/2017) Anemia requiring transfusions (12/5/2017) Plan: 1. CVA protection: Ms. Sivakumar Ivan is a pleasant 69 yo with a history of afib and leg hematoma and anemia is an excellent candidate for an alternative to oral anticoagulation and presents today for scheduled Watchman implantation. Answered all questions and concerns and patient wishes to proceed. --CHADVasc = 3 
     --HASBLED = 3 Beny Kyle MD, MS 
Cardiology/Electrophysiology

## 2018-07-17 NOTE — PROGRESS NOTES
Bedside and Verbal shift change report given to Deborah Carlin RN (oncoming nurse) by self (offgoing nurse). Report included the following information SBAR, Kardex, MAR, Recent Results and Cardiac Rhythm SR. R groin site visualized with oncoming RN - site clean/dry/intact.

## 2018-07-17 NOTE — PROCEDURES
Pre-Electrophysiology Diagnosis  1. Atrial fibrillation  2. Intolerant to long term anticoagulation     Procedure Performed  1. Transesophageal echocardiogram  2. Transeptal puncture   3. Watchman implantation    Cardiac Electrophysiologist: Nadia Roberts. Jessa Alonso MD  Interventional Cardiologist: Candido Oliver MD    Anesthesia: General     Estimated Blood Loss: Less than 10 mL     Specimens: * No specimens in log *    Procedure in Detail:  The patient was brought to the hybrid OR suite in the fasting state. The patient was intubated by anesthesiology, invasive arterial blood pressure monitoring obtained, a monge catheter inserted. A tranesophageal echocardiogram was performed directly prior to the procedure and was negative for a LUZ thrombus (see full report in chart). Dr. Divine Otto obtained venous access, placed an SLO and performed the transseptal as outlined in his procedure note. I was present and assisted with this portion of the procedure. As the primary implanting phyisician, I prepped and draped the Watchman delivery sheath and exchanged for the SLO via an Amplatz super stiff wire located in the left upper pulmonary vein. A pig tail catheter was then inserted into the delivery sheath and used to guide the delivery sheath into the appendage. Depth measurements were performed with fluoroscopy and depth and size measurements determined via YAN. The sheath was then advanced over the pig tail catheter into position within the LUZ. The Watchman device was then prepped per protocol and placed into the delivery sheath via a wet to wet connection and advanced into the sheath. The sheath was then pulled back to allow delivery of the Watchman device within the left atrial appendage. The initial attempt was made with a 24 mm device. There was significant shoulder left protruding into the LUZ and therefore this was recaptured. Successful delivery of a 24 mm device revealed excellent PASS criteria.  A contrast appendogram was performed in 2 views revealing n  Adequate seal. After extensive evaluation including and excellent tug test, the device was deployed. Further measurements were taken post implant. The sheath was removed. At the completion of the Watchman implantation procedure, all catheters were removed, 50mg Protamine was administered and sheaths were pulled after Dr. Stahl Members placed a Perclose device for hemostasis. The patient tolerated the procedure well with no acute complications recognized. Just prior to pulling shealths, the YAN was used to obtain ultrasound images and revealed no evidence of pericardial effusion. Complications: None    Summary:   1. Successful Watchman implantation  2. Family updated. Bao Kyle MD, MS  Clinical Cardiac Electrophysiology

## 2018-07-17 NOTE — IP AVS SNAPSHOT
Chanel Garcia 
 
 
 [address] [facility] [address] [facility] 
[phone] Patient: Amy Rodríguez MRN: [id number] A check sreekanth indicates which time of day the medication should be taken. My Medications CONTINUE taking these medications Instructions Each Dose to Equal  
 Morning Noon Evening Bedtime  
 aspirin delayed-release 81 mg tablet Take  by mouth daily. cholecalciferol 1,000 unit tablet Commonly known as:  VITAMIN D3 Take 5,000 Units by mouth daily. 2 tabs daily 5000 Units  
    
  
   
   
   
  
 docusate sodium 100 mg capsule Commonly known as:  Maretta Michael Take 100 mg by mouth two (2) times daily as needed. 100 mg DULoxetine 30 mg capsule Commonly known as:  CYMBALTA Take 60 mg by mouth daily. 60 mg  
    
  
   
   
   
  
 estradiol 1 mg tablet Commonly known as:  ESTRACE Take 1 mg by mouth daily. 1 mg  
    
  
   
   
   
  
 flecainide 50 mg tablet Commonly known as:  TAMBOCOR Take 1 Tab by mouth every twelve (12) hours. 50 mg  
    
  
   
   
  
   
  
 levothyroxine 100 mcg tablet Commonly known as:  SYNTHROID Take 1 Tab by mouth daily. 100 mcg  
    
  
   
   
   
  
 melatonin 10 mg Cap Take  by mouth nightly as needed. metoprolol tartrate 25 mg tablet Commonly known as:  LOPRESSOR Take 0.5 Tabs by mouth every twelve (12) hours. 12.5 mg  
    
  
   
   
  
   
  
 multivitamin tablet Commonly known as:  ONE A DAY Take 1 Tab by mouth daily. 1 Tab  
    
  
   
   
   
  
 omeprazole 40 mg capsule Commonly known as:  PriLOSEC Take 1 Cap by mouth daily. Indications: gastroesophageal reflux disease 40 mg  
    
  
   
   
   
  
 ondansetron 8 mg disintegrating tablet Commonly known as:  ZOFRAN ODT  
   
 Take 1 Tab by mouth every eight (8) hours as needed for Nausea. 8 mg  
    
   
   
   
  
 rivaroxaban 20 mg Tab tablet Commonly known as:  Talia England Take 1 Tab by mouth daily (with dinner).  HOLD FOR 1 WEEK; RESUME 12/13 OR AS DIRECTED BY CARDIOLOGY  
 20 mg

## 2018-07-17 NOTE — ANESTHESIA PROCEDURE NOTES
Arterial Line Placement    Start time: 7/17/2018 7:52 AM  End time: 7/17/2018 7:56 AM  Performed by: Carla Burdick  Authorized by: Anayeli Arriola     Pre-Procedure  Indications:  Arterial pressure monitoring and blood sampling  Preanesthetic Checklist: patient identified, risks and benefits discussed, anesthesia consent, site marked, patient being monitored, timeout performed and patient being monitored    Timeout Time: 07:52        Procedure:   Prep:  ChloraPrep  Seldinger Technique?: Yes    Orientation:  Right  Location:  Radial artery  Catheter size:  20 G  Number of attempts:  1  Cont Cardiac Output Sensor: No      Assessment:   Post-procedure:  Line secured and sterile dressing applied  Patient Tolerance:  Patient tolerated the procedure well with no immediate complications  Comment:   right arm prepped with ChloraPrep, Seldinger technique, good blood return, but unable thread wire, manual pressure held until hemostasis achieved. Site 4cm proximal chosen, Seldinger technique, good blood return, good waveform.

## 2018-07-17 NOTE — ANESTHESIA POSTPROCEDURE EVALUATION
Post-Anesthesia Evaluation and Assessment    Patient: Melissa Allison MRN: [id number]  SSN: [id number]    YOB: 1945  Age: 68 y.o. Sex: female       Cardiovascular Function/Vital Signs  Visit Vitals    /70    Pulse 63    Temp 36.7 °C (98 °F)    Resp 16    Ht 5' 4\" (1.626 m)    Wt 81.6 kg (180 lb)    SpO2 98%    Breastfeeding No    BMI 30.9 kg/m2       Patient is status post general anesthesia for Procedure(s):  LEFT ATRIAL APPENDAGE CLOSURE. Nausea/Vomiting: None    Postoperative hydration reviewed and adequate. Pain:  Pain Scale 1: Numeric (0 - 10) (07/17/18 1053)  Pain Intensity 1: 4 (07/17/18 1053)   Managed    Neurological Status:   Neuro (WDL): Within Defined Limits (07/17/18 1012)  Neuro  Neurologic State: Alert (07/17/18 1012)  Orientation Level: Oriented X4 (07/17/18 1012)  Cognition: Follows commands (07/17/18 1012)  Speech: Clear (07/17/18 1012)  LUE Motor Response: Purposeful (07/17/18 1012)  LLE Motor Response: Purposeful (07/17/18 1012)  RUE Motor Response: Purposeful (07/17/18 1012)  RLE Motor Response: Purposeful (07/17/18 1012)   At baseline    Mental Status and Level of Consciousness: Awake. Pulmonary Status:   O2 Device: Nasal cannula (07/17/18 1012)   Adequate oxygenation and airway patent    Complications related to anesthesia: None    Post-anesthesia assessment completed.  No concerns    Signed By: Constanza Bell MD     July 17, 2018

## 2018-07-18 VITALS
TEMPERATURE: 98.6 F | OXYGEN SATURATION: 99 % | BODY MASS INDEX: 31.12 KG/M2 | HEART RATE: 61 BPM | HEIGHT: 64 IN | RESPIRATION RATE: 20 BRPM | SYSTOLIC BLOOD PRESSURE: 132 MMHG | WEIGHT: 182.3 LBS | DIASTOLIC BLOOD PRESSURE: 65 MMHG

## 2018-07-18 LAB
ANION GAP SERPL CALC-SCNC: 7 MMOL/L (ref 7–16)
BUN SERPL-MCNC: 13 MG/DL (ref 8–23)
CALCIUM SERPL-MCNC: 8.6 MG/DL (ref 8.3–10.4)
CHLORIDE SERPL-SCNC: 108 MMOL/L (ref 98–107)
CO2 SERPL-SCNC: 28 MMOL/L (ref 21–32)
CREAT SERPL-MCNC: 0.62 MG/DL (ref 0.6–1)
ERYTHROCYTE [DISTWIDTH] IN BLOOD BY AUTOMATED COUNT: 12.6 % (ref 11.9–14.6)
GLUCOSE SERPL-MCNC: 107 MG/DL (ref 65–100)
HCT VFR BLD AUTO: 35.5 % (ref 35.8–46.3)
HGB BLD-MCNC: 11.8 G/DL (ref 11.7–15.4)
MAGNESIUM SERPL-MCNC: 2 MG/DL (ref 1.8–2.4)
MCH RBC QN AUTO: 30 PG (ref 26.1–32.9)
MCHC RBC AUTO-ENTMCNC: 33.2 G/DL (ref 31.4–35)
MCV RBC AUTO: 90.3 FL (ref 79.6–97.8)
PLATELET # BLD AUTO: 215 K/UL (ref 150–450)
PMV BLD AUTO: 10.9 FL (ref 10.8–14.1)
POTASSIUM SERPL-SCNC: 4.1 MMOL/L (ref 3.5–5.1)
RBC # BLD AUTO: 3.93 M/UL (ref 4.05–5.25)
SODIUM SERPL-SCNC: 143 MMOL/L (ref 136–145)
WBC # BLD AUTO: 8.8 K/UL (ref 4.3–11.1)

## 2018-07-18 PROCEDURE — 80048 BASIC METABOLIC PNL TOTAL CA: CPT | Performed by: INTERNAL MEDICINE

## 2018-07-18 PROCEDURE — 85027 COMPLETE CBC AUTOMATED: CPT | Performed by: INTERNAL MEDICINE

## 2018-07-18 PROCEDURE — 83735 ASSAY OF MAGNESIUM: CPT | Performed by: INTERNAL MEDICINE

## 2018-07-18 PROCEDURE — 36415 COLL VENOUS BLD VENIPUNCTURE: CPT | Performed by: INTERNAL MEDICINE

## 2018-07-18 PROCEDURE — 74011250637 HC RX REV CODE- 250/637: Performed by: INTERNAL MEDICINE

## 2018-07-18 RX ADMIN — ESTRADIOL 1 MG: 1 TABLET ORAL at 08:36

## 2018-07-18 RX ADMIN — PANTOPRAZOLE SODIUM 40 MG: 40 TABLET, DELAYED RELEASE ORAL at 06:06

## 2018-07-18 RX ADMIN — METOPROLOL TARTRATE 12.5 MG: 25 TABLET, FILM COATED ORAL at 08:37

## 2018-07-18 RX ADMIN — DULOXETINE HYDROCHLORIDE 60 MG: 60 CAPSULE, DELAYED RELEASE ORAL at 08:36

## 2018-07-18 RX ADMIN — ASPIRIN 81 MG: 81 TABLET, COATED ORAL at 08:37

## 2018-07-18 RX ADMIN — FLECAINIDE ACETATE 50 MG: 100 TABLET ORAL at 08:36

## 2018-07-18 RX ADMIN — Medication 5 ML: at 06:11

## 2018-07-18 RX ADMIN — LEVOTHYROXINE SODIUM 100 MCG: 100 TABLET ORAL at 06:06

## 2018-07-18 NOTE — PROGRESS NOTES
[facility] CARDIOLOGY PROGRESS NOTE 
      
 
7/18/2018 6:41 AM 
 
Admit Date: 7/17/2018 Admit Diagnosis: Atrial fibrillation, unspecified type ([address] [I48.91] Subjective: No complaints this AM, no chest pain or shortness of breath Interval History: (History of pertinent interval events obtained from nursing staff) No events overnight ROS: 
GEN:  No fever or chills Cardiovascular:  As noted above Pulmonary:  As noted above Neuro:  No new focal motor or sensory loss Objective:  
 
Vitals:  
 07/17/18 1825 07/17/18 2045 07/18/18 0025 07/18/18 0448 BP: 144/63 135/71 141/68 137/65 Pulse: 67 64 65 61 Resp: 18 18 18 18 Temp: 99.3 °F (37.4 °C) 97.7 °F (36.5 °C) 97.9 °F (36.6 °C) 98 °F (36.7 °C) SpO2: 96% 98% 97% 97% Weight:    82.7 kg (182 lb 4.8 oz) Height:      
 
 
Physical Exam: 
Julián Height, Well Nourished, No Acute Distress, Alert & Oriented x 3, appropriate mood. Neck- supple, no JVD 
CV- regular rate and rhythm no MRG Lung- clear bilaterally Abd- soft, nontender, nondistended Ext- no edema bilaterally. Skin- warm and dry Current Facility-Administered Medications Medication Dose Route Frequency  aspirin delayed-release tablet 81 mg  81 mg Oral DAILY  docusate sodium (COLACE) capsule 100 mg  100 mg Oral BID PRN  
 DULoxetine (CYMBALTA) capsule 60 mg  60 mg Oral DAILY  estradiol (ESTRACE) tablet 1 mg  1 mg Oral DAILY  flecainide (TAMBOCOR) tablet 50 mg  50 mg Oral Q12H  levothyroxine (SYNTHROID) tablet 100 mcg  100 mcg Oral ACB  melatonin cap 10 mg (Patient Supplied)  10 mg Oral QHS PRN  
 metoprolol tartrate (LOPRESSOR) tablet 12.5 mg  12.5 mg Oral Q12H  pantoprazole (PROTONIX) tablet 40 mg  40 mg Oral ACB  rivaroxaban (XARELTO) tablet 20 mg  20 mg Oral DAILY WITH DINNER  
 sodium chloride (NS) flush 5-10 mL  5-10 mL IntraVENous Q8H  
 sodium chloride (NS) flush 5-10 mL  5-10 mL IntraVENous PRN  
 acetaminophen (TYLENOL) tablet 650 mg  650 mg Oral Q4H PRN  
 HYDROcodone-acetaminophen (NORCO) 5-325 mg per tablet 1 Tab  1 Tab Oral Q4H PRN Data Review:  
Recent Results (from the past 24 hour(s)) EKG, 12 LEAD, INITIAL Collection Time: 07/17/18  7:06 AM  
Result Value Ref Range Ventricular Rate 54 BPM  
 Atrial Rate 54 BPM  
 P-R Interval 176 ms QRS Duration 96 ms  
 Q-T Interval 454 ms QTC Calculation (Bezet) 430 ms Calculated P Axis 71 degrees Calculated R Axis -69 degrees Calculated T Axis 92 degrees Diagnosis Sinus bradycardia Left axis deviation Left ventricular hypertrophy with repolarization abnormality Cannot rule out Septal infarct (cited on or before 13-AUG-2017) Abnormal ECG When compared with ECG of 30-MAR-2018 09:09, 
QRS axis Shifted left T wave inversion more evident in Lateral leads Confirmed by MARTINE KIRBY (), Frederick Caballero ([id number]) on 7/17/2018 7:37:28 AM 
  
POC ACTIVATED CLOTTING TIME Collection Time: 07/17/18  8:40 AM  
Result Value Ref Range Activated Clotting Time (POC) 230 (H) 70 - 268 SECS  
METABOLIC PANEL, BASIC Collection Time: 07/18/18  4:36 AM  
Result Value Ref Range Sodium 143 136 - 145 mmol/L Potassium 4.1 3.5 - 5.1 mmol/L Chloride 108 (H) 98 - 107 mmol/L  
 CO2 28 21 - 32 mmol/L Anion gap 7 7 - 16 mmol/L Glucose 107 (H) 65 - 100 mg/dL BUN 13 8 - 23 MG/DL Creatinine 0.62 0.6 - 1.0 MG/DL  
 GFR est AA >60 >60 ml/min/1.73m2 GFR est non-AA >60 >60 ml/min/1.73m2 Calcium 8.6 8.3 - 10.4 MG/DL MAGNESIUM Collection Time: 07/18/18  4:36 AM  
Result Value Ref Range Magnesium 2.0 1.8 - 2.4 mg/dL CBC W/O DIFF Collection Time: 07/18/18  4:36 AM  
Result Value Ref Range WBC 8.8 4.3 - 11.1 K/uL  
 RBC 3.93 (L) 4.05 - 5.25 M/uL  
 HGB 11.8 11.7 - 15.4 g/dL HCT 35.5 (L) 35.8 - 46.3 % MCV 90.3 79.6 - 97.8 FL  
 MCH 30.0 26.1 - 32.9 PG  
 MCHC 33.2 31.4 - 35.0 g/dL  
 RDW 12.6 11.9 - 14.6 %  PLATELET 675 283 - 587 K/uL MPV 10.9 10.8 - 14.1 FL  
 
 
EKG:  (EKG has been independently visualized by me with interpretation below) Assessment:  
 
Active Problems: 
  Atrial fibrillation ([address] (7/17/2018) Paroxysmal atrial fibrillation ([address] (8/13/2017) Traumatic hematoma of right hip (12/5/2017) Acute blood loss anemia (12/5/2017) Anemia requiring transfusions (12/5/2017) A-fib ([facility] [address] (7/17/2018) Plan: 1. CVA protection: s/p Watchman device, no complications, cont ASA and xarelto 2. Afib: cont flecainide, now s/p Watchman 3. Dispo: plan for 45 day YAN for follow up, D/C home today Annia Kyle MD 
Cardiology/Electrophysiology

## 2018-07-18 NOTE — DISCHARGE INSTRUCTIONS
Watchman Discharge Instructions      Activity:     Follow your doctors recommendations   Return to normal activities gradually, pacing yourself as you feel better, resting when tired. · Activity should be limited for the next 48 hours. Climb stairs as little as possible and avoid any stooping, bending or strenuous activity for 48 hours. No heavy lifting (anything over 10 pounds) for three days. · Do not drive for 48 hoursHave a responsible person drive you home and stay with you for at least 24 hours after your heart catheterization/angiography. · Check the puncture site frequently for swelling or bleeding. If you see any bleeding, lie down and apply pressure over the area with a clean town or washcloth. Notify your doctor for any redness, swelling, drainage or oozing from the puncture site. Notify your doctor for any fever or chills. · You may resume your usual diet. Drink more fluids than usual.        Incision / Wound Care       Cleanse wounds with mild soap and water. Keep wound dry.  A small amount of bloody or clear drainage is normal.   Watch for redness, swelling, incision site hot to touch, foul or colored drainage from the incision site, these are all signs of infection and should be reported immediately to the physicians.  If there is site concern please notify your implanting physician.  You may remove the bandage from your Right and Groin in 24 hours. You may shower in 24 hours. No tub baths, hot tubs or swimming for one week. Do not place any lotions, creams, powders, ointments over the puncture site for one week. You may place a clean band-aid over the puncture site each day for 5 days. Change this daily. Continued        Medications:   DO NOT STOP TAKING YOUR WARFARIN OR ASPIRIN  (and/or PLAVIX) WITHOUT SPEAKING WITH YOUR CARDIOLOGIST FIRST!  Take your medications as ordered at the time of discharge.    Do NOT stop taking any medications without first discussing it with your doctor.  Notify all your doctors of current medication lists. Follow instructions on medication administration especially if blood thinning medications are prescribed. Your doctor will monitor your medications and advise you when or if you can stop taking them. Notify your doctor immediately if any of the following:   Sudden weight gain   Increasing shortness of breath   Pain, change in color, temperature or swelling in lower legs or feet.  Fevers greater than 101 degrees, redness, swelling, incision site hot to touch, foul or colored drainage from the incision site, these are all signs of infection and should be reported immediately to the physicians. Post Watchman Discharge Instructions Timeline     2 weeks from day of discharge you will need a follow up visit with the implanting physician. Your [facility] Coordinator can facilitate arranging these appointments.  After a minimum of 45 days from date of procedure you will need to return to hospital to have an outpatient YAN performed to determine if the implant has closed the opening of the appendage. At this time you will see the [place] [id number]. Coordinator for a follow up. If the YAN reveals the appendage is not fully closed - you will require another YAN at a later date to reassess. Once the results from YAN have been reviewed the physicians will determine what medication changes need to be made. Your [facility] Coordinator can facilitate arranging these appointments.  6 months post implant you will need to see the [facility] Coordinator and visit the physician for a routine follow up and potentially EKG, and lab work. Your Coordinator can facilitate with setting up these appointments.  At 1, 2 and 4 years post implant you will be contacted by your [place] [id number]. Coordinator for a brief interview. This allows us to complete required patient monitoring.        Prior to any dental work or surgery notify your dentist or surgeon about your Watchman implant and the medications you are on.     Maintain regular follow up visits with your cardiologist     Keep all bloodwork appointments. Thank you for entrusting us with your care! Heart-Healthy Diet: Care Instructions  Your Care Instructions    A heart-healthy diet has lots of vegetables, fruits, nuts, beans, and whole grains, and is low in salt. It limits foods that are high in saturated fat, such as meats, cheeses, and fried foods. It may be hard to change your diet, but even small changes can lower your risk of heart attack and heart disease. Follow-up care is a key part of your treatment and safety. Be sure to make and go to all appointments, and call your doctor if you are having problems. It's also a good idea to know your test results and keep a list of the medicines you take. How can you care for yourself at home? Watch your portions  · Learn what a serving is. A \"serving\" and a \"portion\" are not always the same thing. Make sure that you are not eating larger portions than are recommended. For example, a serving of pasta is ½ cup. A serving size of meat is 2 to 3 ounces. A 3-ounce serving is about the size of a deck of cards. Measure serving sizes until you are good at [place]" them. Keep in mind that restaurants often serve portions that are 2 or 3 times the size of one serving. · To keep your energy level up and keep you from feeling hungry, eat often but in smaller portions. · Eat only the number of calories you need to stay at a healthy weight. If you need to lose weight, eat fewer calories than your body burns (through exercise and other physical activity). Eat more fruits and vegetables  · Eat a variety of fruit and vegetables every day. Dark green, deep orange, red, or yellow fruits and vegetables are especially good for you. Examples include spinach, carrots, peaches, and berries.   · Keep carrots, celery, and other veggies handy for snacks. Buy fruit that is in season and store it where you can see it so that you will be tempted to eat it. · Cook dishes that have a lot of veggies in them, such as stir-fries and soups. Limit saturated and trans fat  · Read food labels, and try to avoid saturated and trans fats. They increase your risk of heart disease. Trans fat is found in many processed foods such as cookies and crackers. · Use olive or canola oil when you cook. Try cholesterol-lowering spreads, such as Benecol or Take Control. · Bake, broil, grill, or steam foods instead of frying them. · Choose lean meats instead of high-fat meats such as hot dogs and sausages. Cut off all visible fat when you prepare meat. · Eat fish, skinless poultry, and meat alternatives such as soy products instead of high-fat meats. Soy products, such as tofu, may be especially good for your heart. · Choose low-fat or fat-free milk and dairy products. Eat fish  · Eat at least two servings of fish a week. Certain fish, such as salmon and tuna, contain omega-3 fatty acids, which may help reduce your risk of heart attack. Eat foods high in fiber  · Eat a variety of grain products every day. Include whole-grain foods that have lots of fiber and nutrients. Examples of whole-grain foods include oats, whole wheat bread, and brown rice. · Buy whole-grain breads and cereals, instead of white bread or pastries. Limit salt and sodium  · Limit how much salt and sodium you eat to help lower your blood pressure. · Taste food before you salt it. Add only a little salt when you think you need it. With time, your taste buds will adjust to less salt. · Eat fewer snack items, fast foods, and other high-salt, processed foods. Check food labels for the amount of sodium in packaged foods. · Choose low-sodium versions of canned goods (such as soups, vegetables, and beans). Limit sugar  · Limit drinks and foods with added sugar.  These include candy, desserts, and soda pop. Limit alcohol  · Limit alcohol to no more than 2 drinks a day for men and 1 drink a day for women. Too much alcohol can cause health problems. When should you call for help? Watch closely for changes in your health, and be sure to contact your doctor if:    · You would like help planning heart-healthy meals. Where can you learn more? Go to http://tamie-nolan.info/. Enter V137 in the search box to learn more about \"Heart-Healthy Diet: Care Instructions. \"  Current as of: December 6, 2017  Content Version: 11.7  © 0854-6708 [employer]. Care instructions adapted under license by [employer] (which disclaims liability or warranty for this information). If you have questions about a medical condition or this instruction, always ask your healthcare professional. [address] any warranty or liability for your use of this information. DISCHARGE SUMMARY from Nurse    PATIENT INSTRUCTIONS:    After general anesthesia or intravenous sedation, for 24 hours or while taking prescription Narcotics:  · Limit your activities  · Do not drive and operate hazardous machinery  · Do not make important personal or business decisions  · Do  not drink alcoholic beverages  · If you have not urinated within 8 hours after discharge, please contact your surgeon on call.     Report the following to your surgeon:  · Excessive pain, swelling, redness or odor of or around the surgical area  · Temperature over 100.5  · Nausea and vomiting lasting longer than 4 hours or if unable to take medications  · Any signs of decreased circulation or nerve impairment to extremity: change in color, persistent  numbness, tingling, coldness or increase pain  · Any questions    What to do at Home:  Recommended activity: Activity as tolerated and Activity as tolerated and no driving for today  The patient is being discharged home in stable condition on a low saturated fat, low cholesterol and low salt diet. The patient is instructed to advance activities as tolerated to the limit of fatigue or shortness of breath. The patient is instructed to avoid lifting anything heavier than 10 lbs for 1 week. The patient is instructed to avoid any straining, stooping or squatting for 2 days. The patient is instructed not to drive for 2 days. The patient is instructed to watch the groin site for bleeding/oozing; if seen, the patient is instructed to apply firm pressure with a clean cloth and call [facility] at [phone]. The patient is instructed to watch for signs of infection which include: increasing area of redness, fever/hot to touch or purulent drainage at the groin site. The patient is instructed not to soak in a bathtub for 7-10 days, but is cleared to shower. The patient is instructed to return to the ER immediately for any severe pain, color change, or temperature change in leg.      The patient is informed not to stop any medications without discussing with our office and to contact our office if any dental work or possible surgeries are expected. *  Please give a list of your current medications to your Primary Care Provider. *  Please update this list whenever your medications are discontinued, doses are      changed, or new medications (including over-the-counter products) are added. *  Please carry medication information at all times in case of emergency situations. These are general instructions for a healthy lifestyle:    No smoking/ No tobacco products/ Avoid exposure to second hand smoke  Surgeon General's Warning:  Quitting smoking now greatly reduces serious risk to your health.     Obesity, smoking, and sedentary lifestyle greatly increases your risk for illness    A healthy diet, regular physical exercise & weight monitoring are important for maintaining a healthy lifestyle    You may be retaining fluid if you have a history of heart failure or if you experience any of the following symptoms:  Weight gain of 3 pounds or more overnight or 5 pounds in a week, increased swelling in our hands or feet or shortness of breath while lying flat in bed. Please call your doctor as soon as you notice any of these symptoms; do not wait until your next office visit. Recognize signs and symptoms of STROKE:    F-face looks uneven    A-arms unable to move or move unevenly    S-speech slurred or non-existent    T-time-call 911 as soon as signs and symptoms begin-DO NOT go       Back to bed or wait to see if you get better-TIME IS BRAIN. Warning Signs of HEART ATTACK     Call 911 if you have these symptoms:   Chest discomfort. Most heart attacks involve discomfort in the center of the chest that lasts more than a few minutes, or that goes away and comes back. It can feel like uncomfortable pressure, squeezing, fullness, or pain.  Discomfort in other areas of the upper body. Symptoms can include pain or discomfort in one or both arms, the back, neck, jaw, or stomach.  Shortness of breath with or without chest discomfort.  Other signs may include breaking out in a cold sweat, nausea, or lightheadedness. Don't wait more than five minutes to call 911 - MINUTES MATTER! Fast action can save your life. Calling 911 is almost always the fastest way to get lifesaving treatment. Emergency Medical Services staff can begin treatment when they arrive -- up to an hour sooner than if someone gets to the hospital by car. The discharge information has been reviewed with the patient. The patient verbalized understanding. Discharge medications reviewed with the patient and appropriate educational materials and side effects teaching were provided.   ___________________________________________________________________________________________________________________________________

## 2018-07-18 NOTE — DISCHARGE SUMMARY
[address] [place] [address] Cardiology Discharge Summary     Patient ID:  Volodymyr Kelly  [id number]  61 y.o.  1945    Admit date: 7/17/2018    Discharge date:  07/18/2018    Admitting Physician: Naldo Murillo MD     Discharge Physician: Sohan Hope NP/Dr. Luisa Weir    Admission Diagnoses: Atrial fibrillation, unspecified type [facility]) [I48.91]    Discharge Diagnoses:   Patient Active Problem List    Diagnosis Date Noted    Atrial fibrillation ([facility] 07/17/2018    [facility] 07/17/2018    Traumatic hematoma of right hip 12/05/2017    Acute blood loss anemia 12/05/2017    Anemia requiring transfusions 12/05/2017    Anxiety 12/05/2017    Orthostatic hypotension 12/02/2017    Near syncope 12/02/2017    Paroxysmal atrial fibrillation ([facility] 08/13/2017    Morbid obesity with BMI of 45.0-49.9, adult ([facility] 09/21/2015    Osteoarthritis of left knee 05/16/2011    S/P Left total knee replacement using cement 05/16/2011    HTN (hypertension) 05/16/2011    Unspecified hypothyroidism 05/16/2011    Depression 05/16/2011    Esophageal reflux 05/16/2011    Osteoarthritis of right knee 04/12/2010    Status post total knee replacement 04/12/2010       Cardiology Procedures this admission:  YAN, Implantation of Watchman device, Echocardiogram  Consults: None    Hospital Course: Patient was seen at the office of [address] [place] [address] Cardiology by Dr. Doris Morton in follow up. The patient was felt to be an appropriate candidate for Watchman device. The patient presented for procedure. YAN was performed directly prior to procedure that was negative for LUZ thrombus. The patient underwent successful implantation of a 24mm Watchman device. The patient tolerated the procedure well and was monitored closely. The morning of 07/18/18, patient was up feeling well without any complaints of chest pain or shortness of breath. Patient's labs were stable.    Patient was seen and examined by Dr. Luisa Weir and determined stable and ready for discharge. Patient was instructed on the importance of medication compliance. She will remain on Xarelto for at least 45 days. The patient will have repeat YAN performed on 8-. Follow up with Dr. Julee John on 8/21/17 @ 1 pm in the [place] office. DISPOSITION: The patient is being discharged home in stable condition on a low saturated fat, low cholesterol and low salt diet. The patient is instructed to advance activities as tolerated to the limit of fatigue or shortness of breath. The patient is instructed to avoid lifting anything heavier than 10 lbs for 1 week. The patient is instructed to avoid any straining, stooping or squatting for 2 days. The patient is instructed not to drive for 2 days. The patient is instructed to watch the groin site for bleeding/oozing; if seen, the patient is instructed to apply firm pressure with a clean cloth and call [facility]iology at [phone]. The patient is instructed to watch for signs of infection which include: increasing area of redness, fever/hot to touch or purulent drainage at the groin site. The patient is instructed not to soak in a bathtub for 7-10 days, but is cleared to shower. The patient is instructed to return to the ER immediately for any severe pain, color change, or temperature change in leg. The patient is informed not to stop any medications without discussing with our office and to contact our office if any dental work or possible surgeries are expected.      Discharge Exam:   Visit Vitals    /65    Pulse 61    Temp 98 °F (36.7 °C)    Resp 18    Ht 5' 4\" (1.626 m)    Wt 82.7 kg (182 lb 4.8 oz)    SpO2 97%    Breastfeeding No    BMI 31.29 kg/m2         Physical Exam:  General: Well Developed, Well Nourished, No Acute Distress, Alert & Oriented  Neck: supple, no JVD  Heart: S1S2 with RRR without murmurs or gallops  Lungs: Clear throughout auscultation bilaterally without adventitious sounds  Abd: soft, nontender, nondistended, with good bowel sounds  Ext: warm, no edema, calves supple/nontender, pulses 2+ bilaterally  Right and left groin sites: clean, dry, and intact without bruits, hematoma, or bleeding  Skin: warm and dry      Recent Results (from the past 24 hour(s))   POC ACTIVATED CLOTTING TIME    Collection Time: 07/17/18  8:40 AM   Result Value Ref Range    Activated Clotting Time (POC) 230 (H) 70 - 063 SECS   METABOLIC PANEL, BASIC    Collection Time: 07/18/18  4:36 AM   Result Value Ref Range    Sodium 143 136 - 145 mmol/L    Potassium 4.1 3.5 - 5.1 mmol/L    Chloride 108 (H) 98 - 107 mmol/L    CO2 28 21 - 32 mmol/L    Anion gap 7 7 - 16 mmol/L    Glucose 107 (H) 65 - 100 mg/dL    BUN 13 8 - 23 MG/DL    Creatinine 0.62 0.6 - 1.0 MG/DL    GFR est AA >60 >60 ml/min/1.73m2    GFR est non-AA >60 >60 ml/min/1.73m2    Calcium 8.6 8.3 - 10.4 MG/DL   MAGNESIUM    Collection Time: 07/18/18  4:36 AM   Result Value Ref Range    Magnesium 2.0 1.8 - 2.4 mg/dL   CBC W/O DIFF    Collection Time: 07/18/18  4:36 AM   Result Value Ref Range    WBC 8.8 4.3 - 11.1 K/uL    RBC 3.93 (L) 4.05 - 5.25 M/uL    HGB 11.8 11.7 - 15.4 g/dL    HCT 35.5 (L) 35.8 - 46.3 %    MCV 90.3 79.6 - 97.8 FL    MCH 30.0 26.1 - 32.9 PG    MCHC 33.2 31.4 - 35.0 g/dL    RDW 12.6 11.9 - 14.6 %    PLATELET 684 086 - 183 K/uL    MPV 10.9 10.8 - 14.1 FL         Patient Instructions:     Current Discharge Medication List      CONTINUE these medications which have NOT CHANGED    Details   aspirin delayed-release 81 mg tablet Take  by mouth daily. flecainide (TAMBOCOR) 50 mg tablet Take 1 Tab by mouth every twelve (12) hours. Qty: 180 Tab, Refills: 3      cholecalciferol (VITAMIN D3) 1,000 unit tablet Take 5,000 Units by mouth daily. 2 tabs daily      docusate sodium (COLACE) 100 mg capsule Take 100 mg by mouth two (2) times daily as needed. rivaroxaban (XARELTO) 20 mg tab tablet Take 1 Tab by mouth daily (with dinner).  HOLD FOR 1 WEEK; RESUME 12/13 OR AS DIRECTED BY CARDIOLOGY  Qty: 90 Tab, Refills: 3      omeprazole (PRILOSEC) 40 mg capsule Take 1 Cap by mouth daily. Indications: gastroesophageal reflux disease  Qty: 90 Cap, Refills: 3      levothyroxine (SYNTHROID) 100 mcg tablet Take 1 Tab by mouth daily. Qty: 90 Tab, Refills: 1      metoprolol tartrate (LOPRESSOR) 25 mg tablet Take 0.5 Tabs by mouth every twelve (12) hours. Qty: 90 Tab, Refills: 3      estradiol (ESTRACE) 1 mg tablet Take 1 mg by mouth daily. melatonin 10 mg cap Take  by mouth nightly as needed. multivitamin (ONE A DAY) tablet Take 1 Tab by mouth daily. DULoxetine (CYMBALTA) 30 mg capsule Take 60 mg by mouth daily. ondansetron (ZOFRAN ODT) 8 mg disintegrating tablet Take 1 Tab by mouth every eight (8) hours as needed for Nausea.   Qty: 20 Tab, Refills: 0    Associated Diagnoses: Viral URI             Signed:  TRANG Godinez  7/18/2018  7:17 AM

## 2018-07-18 NOTE — PROGRESS NOTES
Care Management Interventions  PCP Verified by CM: Yes (Feb 2018)  Palliative Care Criteria Met (RRAT>21 & CHF Dx)?: No (RRAT 12 Dx Atrial fib)  Mode of Transport at Discharge: Other (see comment) ()  Transition of Care Consult (CM Consult): Discharge Planning  Discharge Durable Medical Equipment: No  Physical Therapy Consult: No  Occupational Therapy Consult: No  Speech Therapy Consult: No  Current Support Network: Lives with Spouse  Confirm Follow Up Transport: Self  Plan discussed with Pt/Family/Caregiver: Yes  Freedom of Choice Offered: Yes  Discharge Location  Discharge Placement: Home  Patient ready for d/c. She voices no concerns or needs. Declined any need for home health not home bound. Patient d/c home with her .

## 2018-07-18 NOTE — PROGRESS NOTES
Discharge instructions reviewed with patient. Opportunity for questions provided. Patient voiced understanding of all discharge instructions. Heart monitor and IVs removed by primary RN.

## 2018-07-18 NOTE — PROGRESS NOTES
Bedside and Verbal shift change report given to self (oncoming nurse) by Mabel Fernandes RN (offgoing nurse). Report included the following information SBAR, Kardex, MAR and Recent Results.

## 2018-07-19 ENCOUNTER — PATIENT OUTREACH (OUTPATIENT)
Dept: CASE MANAGEMENT | Age: 73
End: 2018-07-19

## 2018-07-19 NOTE — PROGRESS NOTES
Transition of Care Discharge Follow-up Questionnaire Date/Time of MOLLY Outreach: 7/19/18 5:00 PM  
What was the patient hospitalized for? Patient was hospitalized for AFib Does the patient understand his/her diagnosis and/or treatment and what happened during the hospitalization? CM Assessed Risk for Readmission: 
 
 
 
Patient stated Risk for Readmission: 
 
 Spoke to Patient who consented to PAULSON Dutton outreach, and verbalized understanding of treatment and diagnosis. Risk for Readmission completed assessed as diagnosis. Patient denied possibility of readmission. Did the patient receive discharge instructions? Patient states d/c instructions received. Review any discharge instructions (see notes in [facility]). Ask patient if they understand these. Do they have any questions? Patient discussed discharge plan and instruction as Patient understood it to be with Care Coordinator. Which I have documented in the pertinent areas throughout this progress note. Were home services ordered (nursing, PT, OT, ST, etc.)? No HH ordered at d/c. If so, has the first visit occurred? If not, why? (Assist with coordination of services if necessary.) N/A. Was any DME ordered? No DME ordered at d/c. If so, has it been received? If not, why?  (Assist with coordination of arranging DME orders if necessary.) N/A Complete a review of all medications (new, continued and discontinued meds per the D/C instructions and medication tab in [facility]). Review of all meds completed with Patient and Care Coordinator. No new meds prescribed at d/c. Were all new prescriptions filled? If not, why?  (Assist with obtainment of medications if necessary.) N/A Does the patient understand the purpose and dosing instructions for all medications?   (If patient has questions, provide explanation and education.) Indicated by Patient to Care Coordinator, the purpose and dosing instructions for all medications were understood. Does the patient have any problems in performing ADLs? (If patient is unable to perform ADLs  what is the limiting factor(s)? Do they have a support system that can assist? If no support system is present, discuss possible assistance that they may be able to obtain.) Patient states she is independent with all ADLs. Does the patient have all follow-up appointments scheduled? 7 day f/up with PCP? 
 
7-14 day f/up with specialist? 
 
If f/up has not been made  what actions has the care coordinator made to accomplish this? Has transportation been arranged? [facility] follow-up should be within 7 days of discharge; all others should have PCP follow-up within 7 days of discharge; follow-ups with other specialists as appropriate or ordered.) PCP f/u 7/25. Cardiology f/u 8/17 and 8/21. Patient denies the need for transportation. Any other questions or concerns expressed by the patient? Gratitude stated and no further questions asked or needs identified. MOLLY Call Completed By:  
Suzi Garcias LPN/ Care Coordinator ODGNTF:[phone] Ronna [address] [place] [address] 
[email or web address] This note will not be viewable in [address]

## 2018-07-20 LAB
ABO + RH BLD: NORMAL
BLD PROD TYP BPU: NORMAL
BLOOD GROUP ANTIBODIES SERPL: NORMAL
BPU ID: NORMAL
CROSSMATCH RESULT,%XM: NORMAL
SPECIMEN EXP DATE BLD: NORMAL
STATUS OF UNIT,%ST: NORMAL
UNIT DIVISION, %UDIV: 0

## 2018-08-15 NOTE — PROGRESS NOTES
Patient pre-assessment complete for [facility] 45 day deisy scheduled for 18, arrival time 0630. Patient verified using . Patient instructed to bring all home medications in labeled bottles on the day of procedure. NPO status reinforced. Patient instructed to take prilosec,synthroid, flecanide, and cymbalta the morning of her procedure. She will hold all other medications. Patient verbalizes understanding of all instructions & denies any questions at this time.

## 2018-08-17 ENCOUNTER — HOSPITAL ENCOUNTER (OUTPATIENT)
Dept: CARDIAC CATH/INVASIVE PROCEDURES | Age: 73
Discharge: HOME | End: 2018-08-17
Attending: INTERNAL MEDICINE | Admitting: INTERNAL MEDICINE
Payer: MEDICARE

## 2018-08-17 VITALS
RESPIRATION RATE: 22 BRPM | WEIGHT: 180 LBS | HEIGHT: 64 IN | SYSTOLIC BLOOD PRESSURE: 171 MMHG | TEMPERATURE: 98.5 F | DIASTOLIC BLOOD PRESSURE: 79 MMHG | HEART RATE: 58 BPM | BODY MASS INDEX: 30.73 KG/M2 | OXYGEN SATURATION: 100 %

## 2018-08-17 LAB
ANION GAP SERPL CALC-SCNC: 8 MMOL/L (ref 7–16)
ATRIAL RATE: 57 BPM
BUN SERPL-MCNC: 17 MG/DL (ref 8–23)
CALCIUM SERPL-MCNC: 8.9 MG/DL (ref 8.3–10.4)
CALCULATED P AXIS, ECG09: 58 DEGREES
CALCULATED R AXIS, ECG10: 6 DEGREES
CALCULATED T AXIS, ECG11: 92 DEGREES
CHLORIDE SERPL-SCNC: 105 MMOL/L (ref 98–107)
CO2 SERPL-SCNC: 30 MMOL/L (ref 21–32)
CREAT SERPL-MCNC: 0.7 MG/DL (ref 0.6–1)
DIAGNOSIS, 93000: NORMAL
ERYTHROCYTE [DISTWIDTH] IN BLOOD BY AUTOMATED COUNT: 12.4 %
GLUCOSE SERPL-MCNC: 89 MG/DL (ref 65–100)
HCT VFR BLD AUTO: 36.3 % (ref 35.8–46.3)
HGB BLD-MCNC: 12 G/DL (ref 11.7–15.4)
INR PPP: 1.5
MCH RBC QN AUTO: 30.5 PG (ref 26.1–32.9)
MCHC RBC AUTO-ENTMCNC: 33.1 G/DL (ref 31.4–35)
MCV RBC AUTO: 92.1 FL (ref 79.6–97.8)
NRBC # BLD: 0 K/UL (ref 0–0.2)
P-R INTERVAL, ECG05: 186 MS
PLATELET # BLD AUTO: 248 K/UL (ref 150–450)
PMV BLD AUTO: 10.9 FL (ref 9.4–12.3)
POTASSIUM SERPL-SCNC: 4.2 MMOL/L (ref 3.5–5.1)
PROTHROMBIN TIME: 18 SEC (ref 11.5–14.5)
Q-T INTERVAL, ECG07: 442 MS
QRS DURATION, ECG06: 96 MS
QTC CALCULATION (BEZET), ECG08: 430 MS
RBC # BLD AUTO: 3.94 M/UL (ref 4.05–5.2)
SODIUM SERPL-SCNC: 143 MMOL/L (ref 136–145)
VENTRICULAR RATE, ECG03: 57 BPM
WBC # BLD AUTO: 5 K/UL (ref 4.3–11.1)

## 2018-08-17 PROCEDURE — 93312 ECHO TRANSESOPHAGEAL: CPT

## 2018-08-17 PROCEDURE — 85610 PROTHROMBIN TIME: CPT

## 2018-08-17 PROCEDURE — 99152 MOD SED SAME PHYS/QHP 5/>YRS: CPT

## 2018-08-17 PROCEDURE — 74011250636 HC RX REV CODE- 250/636

## 2018-08-17 PROCEDURE — 93005 ELECTROCARDIOGRAM TRACING: CPT | Performed by: INTERNAL MEDICINE

## 2018-08-17 PROCEDURE — 80048 BASIC METABOLIC PNL TOTAL CA: CPT

## 2018-08-17 PROCEDURE — 74011000250 HC RX REV CODE- 250: Performed by: INTERNAL MEDICINE

## 2018-08-17 PROCEDURE — 85027 COMPLETE CBC AUTOMATED: CPT

## 2018-08-17 PROCEDURE — 74011250636 HC RX REV CODE- 250/636: Performed by: INTERNAL MEDICINE

## 2018-08-17 RX ORDER — LIDOCAINE HYDROCHLORIDE 20 MG/ML
15 SOLUTION OROPHARYNGEAL AS NEEDED
Status: DISCONTINUED | OUTPATIENT
Start: 2018-08-17 | End: 2018-08-17 | Stop reason: HOSPADM

## 2018-08-17 RX ORDER — MIDAZOLAM HYDROCHLORIDE 1 MG/ML
.5-2 INJECTION, SOLUTION INTRAMUSCULAR; INTRAVENOUS
Status: DISCONTINUED | OUTPATIENT
Start: 2018-08-17 | End: 2018-08-17

## 2018-08-17 RX ORDER — CLOPIDOGREL BISULFATE 75 MG/1
75 TABLET ORAL DAILY
Qty: 30 TAB | Refills: 6 | Status: SHIPPED | OUTPATIENT
Start: 2018-08-17 | End: 2019-02-21

## 2018-08-17 RX ORDER — FENTANYL CITRATE 50 UG/ML
25-100 INJECTION, SOLUTION INTRAMUSCULAR; INTRAVENOUS
Status: DISCONTINUED | OUTPATIENT
Start: 2018-08-17 | End: 2018-08-17 | Stop reason: HOSPADM

## 2018-08-17 RX ORDER — MIDAZOLAM HYDROCHLORIDE 1 MG/ML
1-10 INJECTION, SOLUTION INTRAMUSCULAR; INTRAVENOUS
Status: DISCONTINUED | OUTPATIENT
Start: 2018-08-17 | End: 2018-08-17 | Stop reason: HOSPADM

## 2018-08-17 RX ADMIN — LIDOCAINE HYDROCHLORIDE 15 ML: 20 SOLUTION ORAL; TOPICAL at 08:00

## 2018-08-17 RX ADMIN — FENTANYL CITRATE 50 MCG: 50 INJECTION, SOLUTION INTRAMUSCULAR; INTRAVENOUS at 08:03

## 2018-08-17 RX ADMIN — MIDAZOLAM 2 MG: 1 INJECTION INTRAMUSCULAR; INTRAVENOUS at 08:06

## 2018-08-17 RX ADMIN — MIDAZOLAM 2 MG: 1 INJECTION INTRAMUSCULAR; INTRAVENOUS at 08:03

## 2018-08-17 NOTE — PROGRESS NOTES
Patient received to [address] [place] [address] room # 8  Ambulatory from [facility]. Patient scheduled for YAN today with Dr Kristin Rader. Procedure reviewed & questions answered, voiced good understanding consent obtained & placed on chart. All medications and medical history reviewed. Will prep patient per orders. Patient & family updated on plan of care. The patient has a fraility score of 3-MANAGING WELL, based on pt being independent with ADL's but activity is limited other than walking.

## 2018-08-17 NOTE — PROGRESS NOTES
[facility] 45 Day Follow up YAN with Dr. Amisha Castillo    No device leak noted. Patient can stop xarelto today, per Dr. Amisha Castillo. Patient is to start taking plavix 75mg and continue taking asa 81mg, per Dr. Amisha Castillo. She will continue the plavix until 6 months post watchman implant. At 6 months she will continue ASA 81mg indefinitely.

## 2018-08-17 NOTE — DISCHARGE INSTRUCTIONS
After your Transesophageal echocardiogram (YAN)    Do not eat or drink for at least two hours after your procedure. (10:00)Your throat will be numb and there is a risk you might have difficulty swallowing for a while. Be careful when you do eat or drink for the first time especially with hot fluids since you could easily burn your throat. Call your doctor if:    You are bleeding from your throat or mouth  You have trouble breathing all of a sudden  You have chest pain or any pain that spreads to your neck, jaw or arms. You have questions or concerns  You have a fever greater than 101 F    Do not drive for 24 hours. Do not drink hot fluids for the next 3 hours.  (11:00)

## 2018-08-17 NOTE — PROGRESS NOTES
Discharge instructions given per orders, voiced good understanding of YAN care, medications & follow up care. Denies any questions.  Discharged to home via wheel chair

## 2018-08-17 NOTE — PROGRESS NOTES
TRANSFER - OUT REPORT:    Verbal report given to Yoli(name) on Melissa Estefany  being transferred to [address] [place] [address] for routine progression of care       Report consisted of patients Situation, Background, Assessment and   Recommendations(SBAR). Information from the following report(s) SBAR and Procedure Summary was reviewed with the receiving nurse. Lines:   Peripheral IV 08/17/18 Right Antecubital (Active)        Opportunity for questions and clarification was provided. Pt underwent YAN with Dr. Amisha Castillo. Pt received a total of 4/50 and tolerated well. Pt is alert to voice and denies complaints.  VSS

## 2018-08-17 NOTE — PROGRESS NOTES
Report received from [address] for continue of care post YAN. Patient resting quietly responds to voice.  Family at bedside

## 2018-08-17 NOTE — H&P
[address] Cardiology History & Physical      Date of  Admission: 8/17/2018  6:27 AM     CC: Post watchman YAN    HPI:  Zora Quiroz is a 68 y.o. female     68yo WF with a history of HTN (off meds since gastric sleeve), no prior CAD/CHF or arrhythmia history who presents today post Watchman implantation. Pt afib is a recurrent problem, no aggravating or alleviating factors. She presents today and is doing well, no cardiac compliants. Pt denies chest pain, worsening shortness of breath, orthopnea, PND, leg swelling, passing out or near passing out spells, palpitations, fast or irregular heart rates. She was recently in the hospital with a fall and a large hip hematoma. She ultimately required surgery and had a drain placed.  Tolerating anticoagulation      Past Medical History:   Diagnosis Date    Anxiety     Arthritis     Bell's palsy     in Oct. 2009 with some vertigo at times still, no other after effects    Depression     GERD (gastroesophageal reflux disease)     reflux, takes nexium    Hematoma of hip, right, initial encounter     Hypertension     on medication since 2006    Hypothyroidism     Nausea & vomiting     Obese     Paroxysmal atrial fibrillation ([facility] [address] 8/13/2017    Psychiatric disorder     depression    S/P Left total knee replacement using cement 5/16/2011    Unspecified hypothyroidism 5/16/2011      Past Surgical History:   Procedure Laterality Date    CARDIAC SURG PROCEDURE UNLIST      [address] placed 7/17/18    HX APPENDECTOMY  1961    HX BREAST LUMPECTOMY  1998    benign    HX CATARACT REMOVAL Bilateral 2014    HX CHOLECYSTECTOMY  1976    HX GASTRECTOMY  9/21/15    sleeve    HX HYSTERECTOMY  1987    HX ORTHOPAEDIC Bilateral 1994    heel spurs removed    HX ORTHOPAEDIC  2005    lower back     HX TONSILLECTOMY  1966    HX UROLOGICAL  2008/2009    bladder tack X2    THYROIDECTOMY  1980    partial    TOTAL KNEE ARTHROPLASTY Right 2010    TOTAL KNEE ARTHROPLASTY Left 2011       Allergies   Allergen Reactions    Dilaudid [Hydromorphone (Bulk)] Swelling    Sulfa (Sulfonamide Antibiotics) Rash      Social History     Social History    Marital status:      Spouse name: N/A    Number of children: N/A    Years of education: N/A     Occupational History    Not on file.      Social History Main Topics    Smoking status: Never Smoker    Smokeless tobacco: Never Used    Alcohol use 1.0 oz/week     2 Glasses of wine per week    Drug use: No    Sexual activity: No     Other Topics Concern    Not on file     Social History Narrative     Family History   Problem Relation Age of Onset    Cancer Mother      breast    Heart Attack Mother     Cancer Father      throat    Other Father      gastric ulcer    Kidney Disease Father     Malignant Hyperthermia Neg Hx     Pseudocholinesterase Deficiency Neg Hx     Delayed Awakening Neg Hx     Post-op Nausea/Vomiting Neg Hx     Emergence Delirium Neg Hx     Post-op Cognitive Dysfunction Neg Hx         Current Facility-Administered Medications   Medication Dose Route Frequency    fentaNYL citrate (PF) injection  mcg   mcg IntraVENous Multiple    midazolam (VERSED) injection 1-10 mg  1-10 mg IntraVENous Multiple       Review of Systems    ROS     A comprehensive ROS was performed with the pertinent positives and negatives mentioned in the HPI, all other systems reviewed and are negative       Subjective:     Visit Vitals    /85    Pulse 64    Temp 98.5 °F (36.9 °C)    Resp 10    Ht 5' 4\" (1.626 m)    Wt 81.6 kg (180 lb)    SpO2 100%    Breastfeeding No    BMI 30.9 kg/m2               Physical Exam:  General: Well Developed, Well Nourished, No Acute Distress  HEENT: pupils equal and round, no abnormalities noted  Neck: supple, no JVD, no carotid bruits  Heart: S1S2 with RRR without murmurs or gallops  Lungs: Clear throughout auscultation bilaterally without adventitious sounds  Abd: soft, nontender, nondistended, with good bowel sounds  Ext: warm, no edema, calves supple/nontender, pulses 2+ bilaterally  Skin: warm and dry  Psychiatric: Normal mood and affect  Neurologic: Alert and oriented X 3    Labs:   Recent Results (from the past 24 hour(s))   METABOLIC PANEL, BASIC    Collection Time: 08/17/18  7:10 AM   Result Value Ref Range    Sodium 143 136 - 145 mmol/L    Potassium 4.2 3.5 - 5.1 mmol/L    Chloride 105 98 - 107 mmol/L    CO2 30 21 - 32 mmol/L    Anion gap 8 7 - 16 mmol/L    Glucose 89 65 - 100 mg/dL    BUN 17 8 - 23 MG/DL    Creatinine 0.70 0.6 - 1.0 MG/DL    GFR est AA >60 >60 ml/min/1.73m2    GFR est non-AA >60 >60 ml/min/1.73m2    Calcium 8.9 8.3 - 10.4 MG/DL   CBC W/O DIFF    Collection Time: 08/17/18  7:10 AM   Result Value Ref Range    WBC 5.0 4.3 - 11.1 K/uL    RBC 3.94 (L) 4.05 - 5.2 M/uL    HGB 12.0 11.7 - 15.4 g/dL    HCT 36.3 35.8 - 46.3 %    MCV 92.1 79.6 - 97.8 FL    MCH 30.5 26.1 - 32.9 PG    MCHC 33.1 31.4 - 35.0 g/dL    RDW 12.4 %    PLATELET 600 560 - 973 K/uL    MPV 10.9 9.4 - 12.3 FL    ABSOLUTE NRBC 0.00 0.0 - 0.2 K/uL   PROTHROMBIN TIME + INR    Collection Time: 08/17/18  7:10 AM   Result Value Ref Range    Prothrombin time 18.0 (H) 11.5 - 14.5 sec    INR 1.5     EKG, 12 LEAD, INITIAL    Collection Time: 08/17/18  7:15 AM   Result Value Ref Range    Ventricular Rate 57 BPM    Atrial Rate 57 BPM    P-R Interval 186 ms    QRS Duration 96 ms    Q-T Interval 442 ms    QTC Calculation (Bezet) 430 ms    Calculated P Axis 58 degrees    Calculated R Axis 6 degrees    Calculated T Axis 92 degrees    Diagnosis       Sinus bradycardia  Septal infarct (cited on or before 13-AUG-2017)  Abnormal ECG  When compared with ECG of 17-JUL-2018 07:06,  QRS axis Shifted right  T wave inversion less evident in Lateral leads            Assessment/Plan:      1. Paroxysmal atrial fibrillation s/p watchman device (#24mm)  2.  HTN    Plan for YAN today and if no significant sofi-device flow, then transition to ASA/plavix for 6 months and then ASA indefinitely.      Chelly Johnson MD  8/17/2018 7:30 AM

## 2018-08-17 NOTE — IP AVS SNAPSHOT
[address] 
[phone] Patient: Gerardo Anderson MRN: [id number] DNZ:[id number] Discharge Summary 8/17/2018 Gerardo Anderson MRN[de-identified]  [id number] Admission Information Provider Pager Service Admission Date Expected D/C Date Shazia Parish MD  CARDIAC CATH LAB 8/17/2018 Actual LOS Patient Class 0 days OUTPATIENT Follow-up Information Follow up With Details Comments Contact Info Shazia Parish MD  follow up as previously scheduled [address] [facility] [address] 
[phone] My Medications ASK your physician about these medications Instructions Each Dose to Equal  
 Morning Noon Evening Bedtime  
 aspirin delayed-release 81 mg tablet Your last dose was: Your next dose is: Take  by mouth daily. cholecalciferol 1,000 unit tablet Commonly known as:  VITAMIN D3 Your last dose was: Your next dose is: Take 5,000 Units by mouth daily. 2 tabs daily 5000 Units  
    
   
   
   
  
 docusate sodium 100 mg capsule Commonly known as:  Colby Bough Your last dose was: Your next dose is: Take 100 mg by mouth two (2) times daily as needed. 100 mg DULoxetine 30 mg capsule Commonly known as:  CYMBALTA Your last dose was: Your next dose is: Take 60 mg by mouth daily. 60 mg  
    
   
   
   
  
 estradiol 1 mg tablet Commonly known as:  ESTRACE Your last dose was: Your next dose is: Take 1 mg by mouth daily. 1 mg  
    
   
   
   
  
 flecainide 50 mg tablet Commonly known as:  TAMBOCOR Your last dose was: Your next dose is: Take 1 Tab by mouth every twelve (12) hours.   
 50 mg  
    
   
   
   
  
 levothyroxine 100 mcg tablet Commonly known as:  SYNTHROID Your last dose was: Your next dose is: Take 1 Tab by mouth daily. 100 mcg LORazepam 1 mg tablet Commonly known as:  ATIVAN Your last dose was: Your next dose is: Take 0.5-1 Tabs by mouth daily as needed for Anxiety. Indications: anxiety 0.5-1 mg  
    
   
   
   
  
 melatonin 10 mg Cap Your last dose was: Your next dose is: Take  by mouth nightly as needed. metoprolol tartrate 25 mg tablet Commonly known as:  LOPRESSOR Your last dose was: Your next dose is: Take 0.5 Tabs by mouth every twelve (12) hours. 12.5 mg  
    
   
   
   
  
 multivitamin tablet Commonly known as:  ONE A DAY Your last dose was: Your next dose is: Take 1 Tab by mouth daily. 1 Tab  
    
   
   
   
  
 omeprazole 40 mg capsule Commonly known as:  PriLOSEC Your last dose was: Your next dose is: Take 1 Cap by mouth daily. Indications: gastroesophageal reflux disease 40 mg  
    
   
   
   
  
 ondansetron 8 mg disintegrating tablet Commonly known as:  ZOFRAN ODT Your last dose was: Your next dose is: Take 1 Tab by mouth every eight (8) hours as needed for Nausea. 8 mg  
    
   
   
   
  
 rivaroxaban 20 mg Tab tablet Commonly known as:  Jeralyn Kush Your last dose was: Your next dose is: Take 1 Tab by mouth daily (with dinner). HOLD FOR 1 WEEK; RESUME 12/13 OR AS DIRECTED BY CARDIOLOGY  
 20 mg  
    
   
   
   
  
  
  
  
 
  
General Information Please provide this summary of care documentation to your next provider. Allergies Unspecified:  Dilaudid [Hydromorphone (Bulk)]; Sulfa (Sulfonamide Antibiotics) Current Immunizations  Reviewed on 12/6/2017 Name Date Influenza Vaccine (Quad) Mdck Pf 12/1/2017 Influenza Vaccine (Quad) PF 9/23/2015 Discharge Instructions Discharge Instructions After your Transesophageal echocardiogram (YAN) Do not eat or drink for at least two hours after your procedure. (10:00)Your throat will be numb and there is a risk you might have difficulty swallowing for a while. Be careful when you do eat or drink for the first time especially with hot fluids since you could easily burn your throat. Call your doctor if: 
 
You are bleeding from your throat or mouth You have trouble breathing all of a sudden You have chest pain or any pain that spreads to your neck, jaw or arms. You have questions or concerns You have a fever greater than 101 F Do not drive for 24 hours. Do not drink hot fluids for the next 3 hours. (11:00) Discharge Orders None  
  
` Patient Signature:  ____________________________________________________________ Date:  ____________________________________________________________  
  
 Yina Adames Provider Signature:  ____________________________________________________________ Date:  ____________________________________________________________

## 2018-08-22 ENCOUNTER — HOSPITAL ENCOUNTER (EMERGENCY)
Age: 73
Discharge: HOME | End: 2018-08-22
Attending: EMERGENCY MEDICINE
Payer: MEDICARE

## 2018-08-22 VITALS
DIASTOLIC BLOOD PRESSURE: 90 MMHG | TEMPERATURE: 98.5 F | OXYGEN SATURATION: 98 % | BODY MASS INDEX: 30.73 KG/M2 | HEART RATE: 60 BPM | HEIGHT: 64 IN | SYSTOLIC BLOOD PRESSURE: 177 MMHG | WEIGHT: 180 LBS | RESPIRATION RATE: 15 BRPM

## 2018-08-22 DIAGNOSIS — M54.50 CHRONIC LEFT-SIDED LOW BACK PAIN WITHOUT SCIATICA: Primary | ICD-10-CM

## 2018-08-22 DIAGNOSIS — G89.29 CHRONIC LEFT-SIDED LOW BACK PAIN WITHOUT SCIATICA: Primary | ICD-10-CM

## 2018-08-22 PROCEDURE — 96372 THER/PROPH/DIAG INJ SC/IM: CPT | Performed by: EMERGENCY MEDICINE

## 2018-08-22 PROCEDURE — 74011250637 HC RX REV CODE- 250/637: Performed by: EMERGENCY MEDICINE

## 2018-08-22 PROCEDURE — 99283 EMERGENCY DEPT VISIT LOW MDM: CPT | Performed by: EMERGENCY MEDICINE

## 2018-08-22 PROCEDURE — 74011250636 HC RX REV CODE- 250/636: Performed by: EMERGENCY MEDICINE

## 2018-08-22 RX ORDER — CYCLOBENZAPRINE HCL 10 MG
10 TABLET ORAL
Status: COMPLETED | OUTPATIENT
Start: 2018-08-22 | End: 2018-08-22

## 2018-08-22 RX ORDER — KETOROLAC TROMETHAMINE 30 MG/ML
30 INJECTION, SOLUTION INTRAMUSCULAR; INTRAVENOUS
Status: COMPLETED | OUTPATIENT
Start: 2018-08-22 | End: 2018-08-22

## 2018-08-22 RX ORDER — CYCLOBENZAPRINE HCL 10 MG
10 TABLET ORAL
Qty: 15 TAB | Refills: 0 | Status: SHIPPED | OUTPATIENT
Start: 2018-08-22 | End: 2018-09-12

## 2018-08-22 RX ADMIN — KETOROLAC TROMETHAMINE 30 MG: 30 INJECTION, SOLUTION INTRAMUSCULAR at 15:41

## 2018-08-22 RX ADMIN — CYCLOBENZAPRINE HYDROCHLORIDE 10 MG: 10 TABLET, FILM COATED ORAL at 15:40

## 2018-08-22 NOTE — ED TRIAGE NOTES
Pt reports history of chronic back pain. Pt states she usually get cortisone shots in her back but does not have appt until 9/14 and is having pain.     Shwetha Garcia RN

## 2018-08-22 NOTE — DISCHARGE INSTRUCTIONS
Back Pain: Care Instructions  Your Care Instructions    Back pain has many possible causes. It is often related to problems with muscles and ligaments of the back. It may also be related to problems with the nerves, discs, or bones of the back. Moving, lifting, standing, sitting, or sleeping in an awkward way can strain the back. Sometimes you don't notice the injury until later. Arthritis is another common cause of back pain. Although it may hurt a lot, back pain usually improves on its own within several weeks. Most people recover in 12 weeks or less. Using good home treatment and being careful not to stress your back can help you feel better sooner. Follow-up care is a key part of your treatment and safety. Be sure to make and go to all appointments, and call your doctor if you are having problems. It's also a good idea to know your test results and keep a list of the medicines you take. How can you care for yourself at home? · Sit or lie in positions that are most comfortable and reduce your pain. Try one of these positions when you lie down:  ¨ Lie on your back with your knees bent and supported by large pillows. ¨ Lie on the floor with your legs on the seat of a sofa or chair. Natasha Caleb on your side with your knees and hips bent and a pillow between your legs. ¨ Lie on your stomach if it does not make pain worse. · Do not sit up in bed, and avoid soft couches and twisted positions. Bed rest can help relieve pain at first, but it delays healing. Avoid bed rest after the first day of back pain. · Change positions every 30 minutes. If you must sit for long periods of time, take breaks from sitting. Get up and walk around, or lie in a comfortable position. · Try using a heating pad on a low or medium setting for 15 to 20 minutes every 2 or 3 hours. Try a warm shower in place of one session with the heating pad. · You can also try an ice pack for 10 to 15 minutes every 2 to 3 hours.  Put a thin cloth between the ice pack and your skin. · Take pain medicines exactly as directed. ¨ If the doctor gave you a prescription medicine for pain, take it as prescribed. ¨ If you are not taking a prescription pain medicine, ask your doctor if you can take an over-the-counter medicine. · Take short walks several times a day. You can start with 5 to 10 minutes, 3 or 4 times a day, and work up to longer walks. Walk on level surfaces and avoid hills and stairs until your back is better. · Return to work and other activities as soon as you can. Continued rest without activity is usually not good for your back. · To prevent future back pain, do exercises to stretch and strengthen your back and stomach. Learn how to use good posture, safe lifting techniques, and proper body mechanics. When should you call for help? Call your doctor now or seek immediate medical care if:    · You have new or worsening numbness in your legs.     · You have new or worsening weakness in your legs. (This could make it hard to stand up.)     · You lose control of your bladder or bowels.    Watch closely for changes in your health, and be sure to contact your doctor if:    · You have a fever, lose weight, or don't feel well.     · You do not get better as expected. Where can you learn more? Go to http://tamie-nolan.info/. Enter D275 in the search box to learn more about \"Back Pain: Care Instructions. \"  Current as of: November 29, 2017  Content Version: 11.7  © 2604-6013 [employer]. Care instructions adapted under license by [employer] (which disclaims liability or warranty for this information). If you have questions about a medical condition or this instruction, always ask your healthcare professional. [place] [address] any warranty or liability for your use of this information.

## 2018-08-22 NOTE — ED NOTES
I have reviewed discharge instructions with the patient. The patient verbalized understanding. Patient left ED via Discharge Method: ambulatory to Home with spouse. Opportunity for questions and clarification provided. Patient given 1 scripts. To continue your aftercare when you leave the hospital, you may receive an automated call from our care team to check in on how you are doing. This is a free service and part of our promise to provide the best care and service to meet your aftercare needs.  If you have questions, or wish to unsubscribe from this service please call [phone]. Thank you for Choosing our [facility] [employer] [facility].

## 2018-09-26 ENCOUNTER — APPOINTMENT (RX ONLY)
Dept: URBAN - METROPOLITAN AREA CLINIC 24 | Facility: CLINIC | Age: 73
Setting detail: DERMATOLOGY
End: 2018-09-26

## 2018-09-26 DIAGNOSIS — L57.0 ACTINIC KERATOSIS: ICD-10-CM

## 2018-09-26 DIAGNOSIS — Z87.2 PERSONAL HISTORY OF DISEASES OF THE SKIN AND SUBCUTANEOUS TISSUE: ICD-10-CM

## 2018-09-26 PROCEDURE — 99213 OFFICE O/P EST LOW 20 MIN: CPT

## 2018-09-26 PROCEDURE — ? DEFER

## 2018-09-26 PROCEDURE — ? COUNSELING

## 2018-09-26 ASSESSMENT — LOCATION SIMPLE DESCRIPTION DERM
LOCATION SIMPLE: LEFT LIP
LOCATION SIMPLE: RIGHT BUTTOCK
LOCATION SIMPLE: UPPER LIP

## 2018-09-26 ASSESSMENT — LOCATION DETAILED DESCRIPTION DERM
LOCATION DETAILED: LEFT UPPER CUTANEOUS LIP
LOCATION DETAILED: PHILTRUM
LOCATION DETAILED: RIGHT BUTTOCK

## 2018-09-26 ASSESSMENT — LOCATION ZONE DERM
LOCATION ZONE: LIP
LOCATION ZONE: TRUNK

## 2018-09-26 NOTE — PROCEDURE: DEFER
Procedure To Be Performed At Next Visit: Cryotherapy
Instructions (Optional): Grandson getting , would like to do at skin check. Advised she call w changes or significant growth prior to skin check
Detail Level: Detailed

## 2019-01-15 ENCOUNTER — TELEPHONE (OUTPATIENT)
Dept: CARDIAC CATH/INVASIVE PROCEDURES | Age: 74
End: 2019-01-15

## 2019-01-15 NOTE — TELEPHONE ENCOUNTER
Patient contacted via phone for Watchman 6 month follow up    Patient states that she is doing well. She is currently taking plavix 75mg and asa 81mg daily. She was informed that she can now stop her plavix, per Dr. Ros Randhawa. She will continue [id number] indefinitely. The patient has not had any procedures or hospitalizations since her 45 day follow up. She has no questions or concerns at this time. She has the watchman coordinator contact info if she were to have any questions. She understands that she will be contacted again at 1 year post implant.

## 2019-02-27 ENCOUNTER — APPOINTMENT (RX ONLY)
Dept: URBAN - METROPOLITAN AREA CLINIC 24 | Facility: CLINIC | Age: 74
Setting detail: DERMATOLOGY
End: 2019-02-27

## 2019-02-27 DIAGNOSIS — L57.0 ACTINIC KERATOSIS: ICD-10-CM

## 2019-02-27 DIAGNOSIS — D22 MELANOCYTIC NEVI: ICD-10-CM

## 2019-02-27 DIAGNOSIS — B07.8 OTHER VIRAL WARTS: ICD-10-CM

## 2019-02-27 DIAGNOSIS — L81.4 OTHER MELANIN HYPERPIGMENTATION: ICD-10-CM

## 2019-02-27 DIAGNOSIS — Z87.2 PERSONAL HISTORY OF DISEASES OF THE SKIN AND SUBCUTANEOUS TISSUE: ICD-10-CM

## 2019-02-27 DIAGNOSIS — Z71.89 OTHER SPECIFIED COUNSELING: ICD-10-CM

## 2019-02-27 DIAGNOSIS — D18.0 HEMANGIOMA: ICD-10-CM

## 2019-02-27 DIAGNOSIS — L73.8 OTHER SPECIFIED FOLLICULAR DISORDERS: ICD-10-CM

## 2019-02-27 PROBLEM — D18.01 HEMANGIOMA OF SKIN AND SUBCUTANEOUS TISSUE: Status: ACTIVE | Noted: 2019-02-27

## 2019-02-27 PROBLEM — D22.5 MELANOCYTIC NEVI OF TRUNK: Status: ACTIVE | Noted: 2019-02-27

## 2019-02-27 PROCEDURE — ? SHAVE REMOVAL

## 2019-02-27 PROCEDURE — ? OTHER

## 2019-02-27 PROCEDURE — 17110 DESTRUCTION B9 LES UP TO 14: CPT

## 2019-02-27 PROCEDURE — 11301 SHAVE SKIN LESION 0.6-1.0 CM: CPT | Mod: 59

## 2019-02-27 PROCEDURE — ? COUNSELING

## 2019-02-27 PROCEDURE — 17000 DESTRUCT PREMALG LESION: CPT | Mod: 59

## 2019-02-27 PROCEDURE — 99214 OFFICE O/P EST MOD 30 MIN: CPT | Mod: 25

## 2019-02-27 PROCEDURE — ? LIQUID NITROGEN

## 2019-02-27 PROCEDURE — ? OTC SALICYLIC ACID COUNSELING

## 2019-02-27 ASSESSMENT — LOCATION SIMPLE DESCRIPTION DERM
LOCATION SIMPLE: RIGHT INDEX FINGER
LOCATION SIMPLE: RIGHT LOWER BACK
LOCATION SIMPLE: RIGHT NOSE
LOCATION SIMPLE: CHEST
LOCATION SIMPLE: LEFT UPPER BACK
LOCATION SIMPLE: RIGHT BUTTOCK
LOCATION SIMPLE: GROIN
LOCATION SIMPLE: LEFT LIP
LOCATION SIMPLE: RIGHT UPPER BACK
LOCATION SIMPLE: ABDOMEN

## 2019-02-27 ASSESSMENT — LOCATION DETAILED DESCRIPTION DERM
LOCATION DETAILED: LEFT SUPERIOR MEDIAL UPPER BACK
LOCATION DETAILED: RIGHT MID RADIAL DORSAL INDEX FINGER
LOCATION DETAILED: RIGHT RIB CAGE
LOCATION DETAILED: LEFT RIB CAGE
LOCATION DETAILED: RIGHT BUTTOCK
LOCATION DETAILED: LEFT UPPER CUTANEOUS LIP
LOCATION DETAILED: UPPER STERNUM
LOCATION DETAILED: RIGHT NASAL ALA
LOCATION DETAILED: LEFT INFERIOR UPPER BACK
LOCATION DETAILED: RIGHT SUPERIOR UPPER BACK
LOCATION DETAILED: RIGHT INFERIOR LATERAL MIDBACK
LOCATION DETAILED: LEFT SUPRAPUBIC SKIN

## 2019-02-27 ASSESSMENT — LOCATION ZONE DERM
LOCATION ZONE: TRUNK
LOCATION ZONE: NOSE
LOCATION ZONE: FINGER
LOCATION ZONE: LIP

## 2019-02-27 NOTE — PROCEDURE: OTC SALICYLIC ACID COUNSELING
Detail Level: Detailed
Counseling Text: Over the counter salicylic acid preparations can be used effectively to treat warts at home. There are two types of application: liquid and plaster. Liquid preparations are applied like nail polish and the plaster applications are applied like a bandage (you may need to apply duct tape over the plaster to keep it in place). Dead and macerated skin should be removed regularly with a nail file or nail clippers for best results.
verbalization

## 2019-02-27 NOTE — PROCEDURE: LIQUID NITROGEN
Post-Care Instructions: I reviewed with the patient in detail post-care instructions. Patient is to wear sunprotection, and avoid picking at any of the treated lesions. Pt may apply Vaseline to crusted or scabbing areas.
Number Of Freeze-Thaw Cycles: 1 freeze-thaw cycle
Include Z78.9 (Other Specified Conditions Influencing Health Status) As An Associated Diagnosis?: No
Detail Level: Detailed
Consent: The patient's consent was obtained including but not limited to risks of crusting, scabbing, blistering, scarring, darker or lighter pigmentary change, recurrence, incomplete removal and infection.
Render Post-Care Instructions In Note?: yes
Duration Of Freeze Thaw-Cycle (Seconds): 0
Medical Necessity Information: It is in your best interest to select a reason for this procedure from the list below. All of these items fulfill various CMS LCD requirements except the new and changing color options.
Medical Necessity Clause: This procedure was medically necessary because the lesions that were treated were: caught on razor
Number Of Freeze-Thaw Cycles: 2 freeze-thaw cycles

## 2019-02-27 NOTE — PROCEDURE: SHAVE REMOVAL
Anesthesia Type: 1% lidocaine with epinephrine and a 1:10 solution of 8.4% sodium bicarbonate
Consent was obtained from the patient. The risks and benefits to therapy were discussed in detail. Specifically, the risks of infection, scarring, bleeding, prolonged wound healing, incomplete removal, allergy to anesthesia, nerve injury and recurrence were addressed. Prior to the procedure, the treatment site was clearly identified and confirmed by the patient. All components of Universal Protocol/PAUSE Rule completed.
Bill 99410 For Specimen Handling/Conveyance To Laboratory?: no
Hemostasis: Aluminum Chloride
Anesthesia Volume In Cc: 0.5
Detail Level: Detailed
Medical Necessity Clause: This procedure was medically necessary because the lesion that was
Medical Necessity Information: It is in your best interest to select a reason for this procedure from the list below. All of these items fulfill various CMS LCD requirements except the new and changing color options.
Accession #: PC
Billing Type: Third-Party Bill
Post-Care Instructions: I reviewed with the patient in detail post-care instructions. Patient is to keep the biopsy site dry overnight, and then apply vaseline twice daily until healed. Patient may apply hydrogen peroxide soaks to remove any crusting. Wound care sheet provided.
Render Post-Care Instructions In Note?: yes
Notification Instructions: Patient will be notified of biopsy results. However, patient instructed to call the office if not contacted within 2 weeks.
Size Of Lesion In Cm (Required): 0.6
Biopsy Method: Personna blade
Path Notes (To The Dermatopathologist): Please check margins.
Wound Care: Vaseline
X Size Of Lesion In Cm (Optional): 0

## 2019-02-27 NOTE — PROCEDURE: OTHER
Detail Level: Zone
Note Text (......Xxx Chief Complaint.): This diagnosis correlates with the
Other (Free Text): Pt reports lesion has been coming and going for years. Suspect verruca but will bx if not improving w standard therapy. She will follow up in 1 month and is in agreement with plan.

## 2019-03-27 ENCOUNTER — APPOINTMENT (RX ONLY)
Dept: URBAN - METROPOLITAN AREA CLINIC 24 | Facility: CLINIC | Age: 74
Setting detail: DERMATOLOGY
End: 2019-03-27

## 2019-03-27 DIAGNOSIS — B07.8 OTHER VIRAL WARTS: ICD-10-CM

## 2019-03-27 PROCEDURE — 17110 DESTRUCTION B9 LES UP TO 14: CPT

## 2019-03-27 PROCEDURE — ? COUNSELING

## 2019-03-27 PROCEDURE — ? LIQUID NITROGEN

## 2019-03-27 PROCEDURE — ? OTC SALICYLIC ACID COUNSELING

## 2019-03-27 ASSESSMENT — LOCATION SIMPLE DESCRIPTION DERM: LOCATION SIMPLE: RIGHT INDEX FINGER

## 2019-03-27 ASSESSMENT — LOCATION ZONE DERM: LOCATION ZONE: FINGER

## 2019-03-27 ASSESSMENT — LOCATION DETAILED DESCRIPTION DERM: LOCATION DETAILED: RIGHT MID RADIAL DORSAL INDEX FINGER

## 2019-03-27 NOTE — PROCEDURE: LIQUID NITROGEN
Include Z78.9 (Other Specified Conditions Influencing Health Status) As An Associated Diagnosis?: No
Render Post-Care Instructions In Note?: yes
Number Of Freeze-Thaw Cycles: 2 freeze-thaw cycles
Post-Care Instructions: I reviewed with the patient in detail post-care instructions. Patient is to wear sunprotection, and avoid picking at any of the treated lesions. Pt may apply Vaseline to crusted or scabbing areas.
Medical Necessity Clause: This procedure was medically necessary because the lesions that were treated were: caught on razor
Consent: The patient's consent was obtained including but not limited to risks of crusting, scabbing, blistering, scarring, darker or lighter pigmentary change, recurrence, incomplete removal and infection.
Medical Necessity Information: It is in your best interest to select a reason for this procedure from the list below. All of these items fulfill various CMS LCD requirements except the new and changing color options.
Detail Level: Detailed

## 2019-05-02 ENCOUNTER — APPOINTMENT (RX ONLY)
Dept: URBAN - METROPOLITAN AREA CLINIC 24 | Facility: CLINIC | Age: 74
Setting detail: DERMATOLOGY
End: 2019-05-02

## 2019-05-02 DIAGNOSIS — Z87.2 PERSONAL HISTORY OF DISEASES OF THE SKIN AND SUBCUTANEOUS TISSUE: ICD-10-CM

## 2019-05-02 DIAGNOSIS — B07.8 OTHER VIRAL WARTS: ICD-10-CM

## 2019-05-02 PROCEDURE — 99212 OFFICE O/P EST SF 10 MIN: CPT | Mod: 25

## 2019-05-02 PROCEDURE — ? LIQUID NITROGEN

## 2019-05-02 PROCEDURE — 17110 DESTRUCTION B9 LES UP TO 14: CPT

## 2019-05-02 PROCEDURE — ? COUNSELING

## 2019-05-02 PROCEDURE — ? OTC SALICYLIC ACID COUNSELING

## 2019-05-02 ASSESSMENT — LOCATION SIMPLE DESCRIPTION DERM
LOCATION SIMPLE: GROIN
LOCATION SIMPLE: RIGHT INDEX FINGER

## 2019-05-02 ASSESSMENT — LOCATION ZONE DERM
LOCATION ZONE: FINGER
LOCATION ZONE: TRUNK

## 2019-05-02 ASSESSMENT — LOCATION DETAILED DESCRIPTION DERM
LOCATION DETAILED: RIGHT MID RADIAL DORSAL INDEX FINGER
LOCATION DETAILED: LEFT SUPRAPUBIC SKIN

## 2019-05-02 NOTE — PROCEDURE: LIQUID NITROGEN
Number Of Freeze-Thaw Cycles: 2 freeze-thaw cycles
Medical Necessity Information: It is in your best interest to select a reason for this procedure from the list below. All of these items fulfill various CMS LCD requirements except the new and changing color options.
Add 52 Modifier (Optional): no
Post-Care Instructions: I reviewed with the patient in detail post-care instructions. Patient is to wear sunprotection, and avoid picking at any of the treated lesions. Pt may apply Vaseline to crusted or scabbing areas.
Consent: The patient's consent was obtained including but not limited to risks of crusting, scabbing, blistering, scarring, darker or lighter pigmentary change, recurrence, incomplete removal and infection.
Render Post-Care Instructions In Note?: yes
Detail Level: Detailed
Medical Necessity Clause: This procedure was medically necessary because the lesions that were treated were: caught on razor

## 2019-06-19 ENCOUNTER — TELEPHONE (OUTPATIENT)
Dept: CARDIAC CATH/INVASIVE PROCEDURES | Age: 74
End: 2019-06-19

## 2019-11-18 ENCOUNTER — APPOINTMENT (OUTPATIENT)
Dept: CT IMAGING | Age: 74
End: 2019-11-18
Attending: EMERGENCY MEDICINE
Payer: MEDICARE

## 2019-11-18 ENCOUNTER — HOSPITAL ENCOUNTER (EMERGENCY)
Age: 74
Discharge: HOME | End: 2019-11-18
Attending: EMERGENCY MEDICINE
Payer: MEDICARE

## 2019-11-18 VITALS
HEART RATE: 80 BPM | HEIGHT: 64 IN | SYSTOLIC BLOOD PRESSURE: 225 MMHG | DIASTOLIC BLOOD PRESSURE: 98 MMHG | RESPIRATION RATE: 16 BRPM | TEMPERATURE: 98.2 F | WEIGHT: 190 LBS | OXYGEN SATURATION: 97 % | BODY MASS INDEX: 32.44 KG/M2
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
## 2019-11-18 DIAGNOSIS — S01.01XA LACERATION OF OCCIPITAL REGION OF SCALP, INITIAL ENCOUNTER: ICD-10-CM

## 2019-11-18 DIAGNOSIS — S09.90XA CLOSED HEAD INJURY, INITIAL ENCOUNTER: ICD-10-CM

## 2019-11-18 PROCEDURE — 77030002986 HC SUT PROL J&J -A: Performed by: EMERGENCY MEDICINE

## 2019-11-18 PROCEDURE — 90471 IMMUNIZATION ADMIN: CPT | Performed by: EMERGENCY MEDICINE

## 2019-11-18 PROCEDURE — 99283 EMERGENCY DEPT VISIT LOW MDM: CPT | Performed by: EMERGENCY MEDICINE

## 2019-11-18 PROCEDURE — 74011250636 HC RX REV CODE- 250/636: Performed by: EMERGENCY MEDICINE

## 2019-11-18 PROCEDURE — 70486 CT MAXILLOFACIAL W/O DYE: CPT

## 2019-11-18 PROCEDURE — 72125 CT NECK SPINE W/O DYE: CPT

## 2019-11-18 PROCEDURE — 90715 TDAP VACCINE 7 YRS/> IM: CPT | Performed by: EMERGENCY MEDICINE

## 2019-11-18 PROCEDURE — 75810000293 HC SIMP/SUPERF WND  RPR: Performed by: EMERGENCY MEDICINE

## 2019-11-18 PROCEDURE — 70450 CT HEAD/BRAIN W/O DYE: CPT

## 2019-11-18 RX ADMIN — TETANUS TOXOID, REDUCED DIPHTHERIA TOXOID AND ACELLULAR PERTUSSIS VACCINE, ADSORBED 0.5 ML: 5; 2.5; 8; 8; 2.5 SUSPENSION INTRAMUSCULAR at 20:40

## 2019-11-18 NOTE — ED TRIAGE NOTES
Pt in states she tripped up steps and hit her head. Laceration to right side of scalp. No loc. Unknown last tetanus shot.

## 2019-11-19 NOTE — DISCHARGE INSTRUCTIONS
Clean gently once a day soap and water. Antibiotic ointment. Gauze wrapping is optional.  Sutures out 8 to 10 days. Recheck signs of infection or persistent bleeding or swelling. Patient Education        Learning About a Closed Head Injury  What is a closed head injury? A closed head injury happens when your head gets hit hard. The strong force of the blow causes your brain to shake in your skull. This movement can cause the brain to bruise, swell, or tear. Sometimes nerves or blood vessels also get damaged. This can cause bleeding in or around the brain. A concussion is a type of closed head injury. What are the symptoms? If you have a mild concussion, you may have a mild headache or feel \"not quite right. \" These symptoms are common. They usually go away over a few days to 4 weeks. But sometimes after a concussion, you feel like you can't function as well as before the injury. And you have new symptoms. This is called postconcussive syndrome. You may:  · Find it harder to solve problems, think, concentrate, or remember. · Have headaches. · Have changes in your sleep patterns, such as not being able to sleep or sleeping all the time. · Have changes in your personality. · Not be interested in your usual activities. · Feel angry or anxious without a clear reason. · Lose your sense of taste or smell. · Be dizzy, lightheaded, or unsteady. It may be hard to stand or walk. How is a closed head injury treated? Any person who may have a concussion needs to see a doctor. Some people have to stay in the hospital to be watched. Others can go home safely. If you go home, follow your doctor's instructions. He or she will tell you if you need someone to watch you closely for the next 24 hours or longer. Rest is the best treatment. Get plenty of sleep at night. And try to rest during the day. · Avoid activities that are physically or mentally demanding. These include housework, exercise, and schoolwork.  And don't play video games, send text messages, or use the computer. You may need to change your school or work schedule to be able to avoid these activities. · Ask your doctor when it's okay to drive, ride a bike, or operate machinery. · Take an over-the-counter pain medicine, such as acetaminophen (Tylenol), ibuprofen (Advil, Motrin), or naproxen (Aleve). Be safe with medicines. Read and follow all instructions on the label. · Check with your doctor before you use any other medicines for pain. · Do not drink alcohol or use illegal drugs. They can slow recovery. They can also increase your risk of getting a second head injury. Follow-up care is a key part of your treatment and safety. Be sure to make and go to all appointments, and call your doctor if you are having problems. It's also a good idea to know your test results and keep a list of the medicines you take. Where can you learn more? Go to http://tamie-nolan.info/. Enter E235 in the search box to learn more about \"Learning About a Closed Head Injury. \"  Current as of: March 28, 2019  Content Version: 12.2  © 9966-8134 [employer]. Care instructions adapted under license by [employer] (which disclaims liability or warranty for this information). If you have questions about a medical condition or this instruction, always ask your healthcare professional. [place] [address] any warranty or liability for your use of this information. Patient Education        Cuts Closed With Stitches: Care Instructions  Your Care Instructions  A cut can happen anywhere on your body. The doctor used stitches to close the cut. Using stitches also helps the cut heal and reduces scarring. Sometimes pieces of tape called Steri-Strips are put over the stitches. If the cut went deep and through the skin, the doctor may have put in two layers of stitches. The deeper layer brings the deep part of the cut together. These stitches will dissolve and don't need to be removed. The stitches in the upper layer are the ones you see on the cut. You will probably have a bandage over the stitches. You will need to have the stitches removed, usually in 7 to 14 days. The doctor has checked you carefully, but problems can develop later. If you notice any problems or new symptoms, get medical treatment right away. Follow-up care is a key part of your treatment and safety. Be sure to make and go to all appointments, and call your doctor if you are having problems. It's also a good idea to know your test results and keep a list of the medicines you take. How can you care for yourself at home? · Keep the cut dry for the first 24 to 48 hours. After this, you can shower if your doctor okays it. Pat the cut dry. · Don't soak the cut, such as in a bathtub. Your doctor will tell you when it's safe to get the cut wet. · If your doctor told you how to care for your cut, follow your doctor's instructions. If you did not get instructions, follow this general advice:  ? After the first 24 to 48 hours, wash around the cut with clean water 2 times a day. Don't use hydrogen peroxide or alcohol, which can slow healing. ? You may cover the cut with a thin layer of petroleum jelly, such as Vaseline, and a nonstick bandage. ? Apply more petroleum jelly and replace the bandage as needed. · Prop up the sore area on a pillow anytime you sit or lie down during the next 3 days. Try to keep it above the level of your heart. This will help reduce swelling. · Avoid any activity that could cause your cut to reopen. · Do not remove the stitches on your own. Your doctor will tell you when to come back to have the stitches removed. · Leave Steri-Strips on until they fall off. · Be safe with medicines. Read and follow all instructions on the label. ? If the doctor gave you a prescription medicine for pain, take it as prescribed.   ? If you are not taking a prescription pain medicine, ask your doctor if you can take an over-the-counter medicine. When should you call for help? Call your doctor now or seek immediate medical care if:    · You have new pain, or your pain gets worse.     · The skin near the cut is cold or pale or changes color.     · You have tingling, weakness, or numbness near the cut.     · The cut starts to bleed, and blood soaks through the bandage. Oozing small amounts of blood is normal.     · You have trouble moving the area near the cut.     · You have symptoms of infection, such as:  ? Increased pain, swelling, warmth, or redness around the cut.  ? Red streaks leading from the cut.  ? Pus draining from the cut.  ? A fever.    Watch closely for changes in your health, and be sure to contact your doctor if:    · The cut reopens.     · You do not get better as expected. Where can you learn more? Go to http://tamie-nolan.info/. Enter R217 in the search box to learn more about \"Cuts Closed With Stitches: Care Instructions. \"  Current as of: June 26, 2019  Content Version: 12.2  © 3895-8059 [employer]. Care instructions adapted under license by [employer] (which disclaims liability or warranty for this information). If you have questions about a medical condition or this instruction, always ask your healthcare professional. [address] any warranty or liability for your use of this information.

## 2019-11-19 NOTE — ED NOTES
I have reviewed discharge instructions with the patient. The patient verbalized understanding. Patient left ED via Discharge Method: ambulatory to Home with daughter. Opportunity for questions and clarification provided. Patient given 0 scripts. To continue your aftercare when you leave the hospital, you may receive an automated call from our care team to check in on how you are doing. This is a free service and part of our promise to provide the best care and service to meet your aftercare needs.  If you have questions, or wish to unsubscribe from this service please call [phone]. Thank you for Choosing our [facility] Emergency Department.

## 2019-11-19 NOTE — ED PROVIDER NOTES
18-year-old female tripped while going up steps. [place]  and struck her head against the door. No loss of consciousness. Mild neck pain. No chest back or abdominal pain. No focal numbness or weakness. Suffered a laceration to the head. Tetanus not up-to-date. Patient takes a baby aspirin but no other blood thinners at this point. The history is provided by the patient. Fall   The accident occurred 1 to 2 hours ago. The fall occurred while standing. She fell from a height of ground level. She landed on hard floor. The volume of blood lost was minimal. The point of impact was the head. The pain is mild. She was ambulatory at the scene. Associated symptoms include laceration. Pertinent negatives include no visual change, no abdominal pain, no vomiting, no extremity weakness and no loss of consciousness. The patient's last tetanus shot was more than 10 years ago.        Past Medical History:   Diagnosis Date    Anxiety     Arthritis     Bell's palsy     in Oct. 2009 with some vertigo at times still, no other after effects    Depression     GERD (gastroesophageal reflux disease)     reflux, takes nexium    Hematoma of hip, right, initial encounter     Hypertension     on medication since 2006    Hypothyroidism     Nausea & vomiting     Obese     Paroxysmal atrial fibrillation ([facility] [address] 8/13/2017    Psychiatric disorder     depression    S/P Left total knee replacement using cement 5/16/2011    Unspecified hypothyroidism 5/16/2011       Past Surgical History:   Procedure Laterality Date    CARDIAC SURG PROCEDURE UNLIST      [address] placed 7/17/18    HX APPENDECTOMY  1961    HX BREAST LUMPECTOMY  1998    benign    HX CATARACT REMOVAL Bilateral 2014    HX CHOLECYSTECTOMY  1976    HX GASTRECTOMY  9/21/15    sleeve    HX HYSTERECTOMY  1987    HX ORTHOPAEDIC Bilateral 1994    heel spurs removed    HX ORTHOPAEDIC  2005    lower back     HX TONSILLECTOMY  1966    HX UROLOGICAL  2008/2009    bladder tack X2    THYROIDECTOMY  1980    partial    TOTAL KNEE ARTHROPLASTY Right 2010    TOTAL KNEE ARTHROPLASTY Left 2011         Family History:   Problem Relation Age of Onset    Cancer Mother         breast    Heart Attack Mother     Cancer Father         throat    Other Father         gastric ulcer    Kidney Disease Father     Malignant Hyperthermia Neg Hx     Pseudocholinesterase Deficiency Neg Hx     Delayed Awakening Neg Hx     Post-op Nausea/Vomiting Neg Hx     Emergence Delirium Neg Hx     Post-op Cognitive Dysfunction Neg Hx        Social History     Socioeconomic History    Marital status:      Spouse name: Not on file    Number of children: Not on file    Years of education: Not on file    Highest education level: Not on file   Occupational History    Not on file   Social Needs    Financial resource strain: Not on file    Food insecurity:     Worry: Not on file     Inability: Not on file    Transportation needs:     Medical: Not on file     Non-medical: Not on file   Tobacco Use    Smoking status: Never Smoker    Smokeless tobacco: Never Used   Substance and Sexual Activity    Alcohol use:  Yes     Alcohol/week: 1.7 standard drinks     Types: 2 Glasses of wine per week    Drug use: Never    Sexual activity: Not on file   Lifestyle    Physical activity:     Days per week: Not on file     Minutes per session: Not on file    Stress: Not on file   Relationships    Social connections:     Talks on phone: Not on file     Gets together: Not on file     Attends Church service: Not on file     Active member of club or organization: Not on file     Attends meetings of clubs or organizations: Not on file     Relationship status: Not on file    Intimate partner violence:     Fear of current or ex partner: Not on file     Emotionally abused: Not on file     Physically abused: Not on file     Forced sexual activity: Not on file   Other Topics Concern    Not on file   Social History Narrative    Not on file         ALLERGIES: Dilaudid [hydromorphone (bulk)] and Sulfa (sulfonamide antibiotics)    Review of Systems   Eyes: Negative for visual disturbance. Respiratory: Negative for shortness of breath. Cardiovascular: Negative for chest pain. Gastrointestinal: Negative for abdominal pain and vomiting. Musculoskeletal: Positive for neck pain. Negative for back pain and extremity weakness. Neurological: Negative for loss of consciousness. Vitals:    11/18/19 1728   BP: (!) 190/95   Pulse: 80   Resp: 16   Temp: 98.2 °F (36.8 °C)   SpO2: 99%   Weight: 86.2 kg (190 lb)   Height: 5' 4\" (1.626 m)            Physical Exam   Constitutional: She is oriented to person, place, and time. She appears well-developed and well-nourished. No distress. HENT:   Head: Normocephalic. Mouth/Throat: Oropharynx is clear and moist.   Tenderness and soft tissue swelling of the nose. Medial periorbital contusion. No step-off. Eyes: Pupils are equal, round, and reactive to light. EOM are normal.   Neck: Normal range of motion. Neck supple. No muscular tenderness present. Neurological: She is alert and oriented to person, place, and time. GCS eye subscore is 4. GCS verbal subscore is 5. GCS motor subscore is 6. Speech normal  Normal gait. Skin: Laceration noted. Nursing note and vitals reviewed. MDM  Number of Diagnoses or Management Options  Diagnosis management comments: Imaging of head and neck needed. Repair laceration.        Amount and/or Complexity of Data Reviewed  Tests in the radiology section of CPT®: ordered and reviewed    Risk of Complications, Morbidity, and/or Mortality  Presenting problems: moderate  Diagnostic procedures: minimal  Management options: low    Patient Progress  Patient progress: improved         Wound Repair  Date/Time: 11/18/2019 8:36 PM  Pre-procedure re-eval: Immediately prior to the procedure, the patient was reevaluated and found suitable for the planned procedure and any planned medications. Location details: scalp  Wound length:2.6 - 7.5 cm  Anesthesia: local infiltration    Anesthesia:  Local Anesthetic: lidocaine 1% without epinephrine  Foreign bodies: no foreign bodies  Irrigation solution: saline  Number of sutures: 7  Technique: simple  Approximation: close  Dressing: antibiotic ointment  Patient tolerance: Patient tolerated the procedure well with no immediate complications  My total time at bedside, performing this procedure was 1-15 minutes. Comments: 7 sutures of 4-0 Prolene used. Ct Head Wo Cont    Result Date: 11/18/2019  NONCONTRAST HEAD CT, CT FACIAL BONES WITH ADDITIONAL REFORMATS:  CLINICAL HISTORY:  HEAD and facial injury from fall. TECHNIQUE:  Standard noncontrast head CT was performed. The facial bones were scanned with spiral technique in the axial plane, and coronal reformats were produced. Radiation dose reduction was achieved using one or all of the following techniques: automated exposure control, weight-based dosing, iterative reconstruction. COMPARISON:  December 2, 2017. HEAD:   No definite intracranial mass effect, hemorrhage, or evidence of acute geographic infarction is seen. White matter hypodensities are most consistent with small vessel ischemic disease. The ventricles are normal in size and configuration, accounting for the patient's age. Orbits and  paranasal sinuses are clear where imaged. Bone windows demonstrate no definite fracture or destruction. FACIAL BONES:  No definite fracture is identified. The orbits, paranasal sinuses, and mastoid air cells appear clear as imaged. IMPRESSION:     1. SMALL VESSEL ISCHEMIC DISEASE WITH NO ACUTE INTRACRANIAL ABNORMALITY IDENTIFIED AT NONCONTRAST CT. 2.  NO DEFINITE CALVARIAL OR FACIAL FRACTURE.       Ct Maxillofacial Wo Cont    Result Date: 11/18/2019  NONCONTRAST HEAD CT, CT FACIAL BONES WITH ADDITIONAL REFORMATS:  CLINICAL HISTORY:  HEAD and facial injury from fall. TECHNIQUE:  Standard noncontrast head CT was performed. The facial bones were scanned with spiral technique in the axial plane, and coronal reformats were produced. Radiation dose reduction was achieved using one or all of the following techniques: automated exposure control, weight-based dosing, iterative reconstruction. COMPARISON:  December 2, 2017. HEAD:   No definite intracranial mass effect, hemorrhage, or evidence of acute geographic infarction is seen. White matter hypodensities are most consistent with small vessel ischemic disease. The ventricles are normal in size and configuration, accounting for the patient's age. Orbits and  paranasal sinuses are clear where imaged. Bone windows demonstrate no definite fracture or destruction. FACIAL BONES:  No definite fracture is identified. The orbits, paranasal sinuses, and mastoid air cells appear clear as imaged. IMPRESSION:     1. SMALL VESSEL ISCHEMIC DISEASE WITH NO ACUTE INTRACRANIAL ABNORMALITY IDENTIFIED AT NONCONTRAST CT. 2.  NO DEFINITE CALVARIAL OR FACIAL FRACTURE. Ct Spine Cerv Wo Cont    Result Date: 11/18/2019  CT CERVICAL SPINE 11/18/2019 COMPARISON: 12/02/2017 INDICATION: Neck pain after fall TECHNIQUE: Contiguous axial images from the skull base through the superior mediastinum were obtained with coronal and sagittal reformations. This examination was interpreted using multiplanar reconstructions. If multiplanar reconstructions had not been performed, the likelihood of detecting an abnormality relevant to the patient's condition would have been substantially decreased. Radiation dose reduction techniques were used for this study:  Our CT scanners use one or all of the following: Automated exposure control, adjustment of the mA and/or kVp according to patient's size, iterative reconstruction. FINDINGS: No fractures identified. No prevertebral soft tissue swelling. C1-2 articulation normal for age.  Straightening cervical lordosis with bridging large anterior flowing osteophytes consistent with dish C4-C7 levels. There is prominent ossification of the posterior longitudinal ligament C3-4 level with resultant left anterior central canal stenosis. Visualized soft tissues are unremarkable. IMPRESSION: 1. Negative for fracture or static subluxation of the cervical spine.

## 2020-01-07 ENCOUNTER — HOSPITAL ENCOUNTER (OUTPATIENT)
Dept: MAMMOGRAPHY | Age: 75
Discharge: HOME | End: 2020-01-07
Attending: FAMILY MEDICINE
Payer: MEDICARE

## 2020-01-07 DIAGNOSIS — Z12.31 VISIT FOR SCREENING MAMMOGRAM: ICD-10-CM

## 2020-01-07 PROCEDURE — 77063 BREAST TOMOSYNTHESIS BI: CPT

## 2020-01-17 ENCOUNTER — HOSPITAL ENCOUNTER (OUTPATIENT)
Dept: MAMMOGRAPHY | Age: 75
Discharge: HOME | End: 2020-01-17
Attending: FAMILY MEDICINE
Payer: MEDICARE

## 2020-01-17 DIAGNOSIS — R92.8 ABNORMAL SCREENING MAMMOGRAM: ICD-10-CM

## 2020-01-17 PROCEDURE — 76642 ULTRASOUND BREAST LIMITED: CPT

## 2020-03-12 ENCOUNTER — APPOINTMENT (RX ONLY)
Dept: URBAN - METROPOLITAN AREA CLINIC 24 | Facility: CLINIC | Age: 75
Setting detail: DERMATOLOGY
End: 2020-03-12

## 2020-03-12 DIAGNOSIS — L57.0 ACTINIC KERATOSIS: ICD-10-CM

## 2020-03-12 DIAGNOSIS — D18.0 HEMANGIOMA: ICD-10-CM

## 2020-03-12 DIAGNOSIS — Z87.2 PERSONAL HISTORY OF DISEASES OF THE SKIN AND SUBCUTANEOUS TISSUE: ICD-10-CM

## 2020-03-12 DIAGNOSIS — B07.8 OTHER VIRAL WARTS: ICD-10-CM | Status: RESOLVED

## 2020-03-12 DIAGNOSIS — L30.4 ERYTHEMA INTERTRIGO: ICD-10-CM

## 2020-03-12 DIAGNOSIS — Z71.89 OTHER SPECIFIED COUNSELING: ICD-10-CM

## 2020-03-12 DIAGNOSIS — L81.4 OTHER MELANIN HYPERPIGMENTATION: ICD-10-CM

## 2020-03-12 PROBLEM — D18.01 HEMANGIOMA OF SKIN AND SUBCUTANEOUS TISSUE: Status: ACTIVE | Noted: 2020-03-12

## 2020-03-12 PROBLEM — J30.1 ALLERGIC RHINITIS DUE TO POLLEN: Status: ACTIVE | Noted: 2020-03-12

## 2020-03-12 PROCEDURE — ? LIQUID NITROGEN

## 2020-03-12 PROCEDURE — 17000 DESTRUCT PREMALG LESION: CPT

## 2020-03-12 PROCEDURE — ? COUNSELING

## 2020-03-12 PROCEDURE — ? OTHER

## 2020-03-12 PROCEDURE — ? PRESCRIPTION

## 2020-03-12 PROCEDURE — 99214 OFFICE O/P EST MOD 30 MIN: CPT | Mod: 25

## 2020-03-12 RX ORDER — KETOCONAZOLE 20 MG/G
CREAM TOPICAL
Qty: 1 | Refills: 11 | Status: ERX | COMMUNITY
Start: 2020-03-12

## 2020-03-12 RX ADMIN — KETOCONAZOLE: 20 CREAM TOPICAL at 00:00

## 2020-03-12 ASSESSMENT — LOCATION ZONE DERM
LOCATION ZONE: NECK
LOCATION ZONE: TRUNK
LOCATION ZONE: NOSE

## 2020-03-12 ASSESSMENT — LOCATION DETAILED DESCRIPTION DERM
LOCATION DETAILED: RIGHT LATERAL UPPER BACK
LOCATION DETAILED: RIGHT LATERAL TRAPEZIAL NECK
LOCATION DETAILED: RIGHT BUTTOCK
LOCATION DETAILED: NASAL TIP
LOCATION DETAILED: LEFT LATERAL ABDOMEN
LOCATION DETAILED: LEFT SUPRAPUBIC SKIN

## 2020-03-12 ASSESSMENT — LOCATION SIMPLE DESCRIPTION DERM
LOCATION SIMPLE: RIGHT UPPER BACK
LOCATION SIMPLE: GROIN
LOCATION SIMPLE: ABDOMEN
LOCATION SIMPLE: NOSE
LOCATION SIMPLE: RIGHT BUTTOCK
LOCATION SIMPLE: POSTERIOR NECK

## 2020-03-12 NOTE — PROCEDURE: LIQUID NITROGEN
Post-Care Instructions: I reviewed with the patient in detail post-care instructions. Patient is to wear sunprotection, and avoid picking at any of the treated lesions. Pt may apply Vaseline to crusted or scabbing areas.
Render Note In Bullet Format When Appropriate: No
Detail Level: Detailed
Consent: The patient's consent was obtained including but not limited to risks of crusting, scabbing, blistering, scarring, darker or lighter pigmentary change, recurrence, incomplete removal and infection.
Duration Of Freeze Thaw-Cycle (Seconds): 0
Number Of Freeze-Thaw Cycles: 1 freeze-thaw cycle
Render Post-Care Instructions In Note?: yes

## 2020-03-12 NOTE — PROCEDURE: OTHER
Other (Free Text): This is adjacent to previous bx site
Detail Level: Zone
Note Text (......Xxx Chief Complaint.): This diagnosis correlates with the

## 2020-05-14 ENCOUNTER — APPOINTMENT (RX ONLY)
Dept: URBAN - METROPOLITAN AREA CLINIC 24 | Facility: CLINIC | Age: 75
Setting detail: DERMATOLOGY
End: 2020-05-14

## 2020-05-14 DIAGNOSIS — D485 NEOPLASM OF UNCERTAIN BEHAVIOR OF SKIN: ICD-10-CM

## 2020-05-14 PROBLEM — D48.5 NEOPLASM OF UNCERTAIN BEHAVIOR OF SKIN: Status: ACTIVE | Noted: 2020-05-14

## 2020-05-14 PROCEDURE — ? BIOPSY BY SHAVE METHOD

## 2020-05-14 PROCEDURE — 11102 TANGNTL BX SKIN SINGLE LES: CPT

## 2020-05-14 ASSESSMENT — LOCATION DETAILED DESCRIPTION DERM: LOCATION DETAILED: LEFT MEDIAL SUPERIOR CHEST

## 2020-05-14 ASSESSMENT — LOCATION ZONE DERM: LOCATION ZONE: TRUNK

## 2020-05-14 ASSESSMENT — LOCATION SIMPLE DESCRIPTION DERM: LOCATION SIMPLE: CHEST

## 2020-05-14 NOTE — PROCEDURE: BIOPSY BY SHAVE METHOD
Type Of Destruction Used: Curettage
Validate Triangulation: No
Wound Care: Vaseline
Render Post-Care Instructions In Note?: yes
Cryotherapy Text: The wound bed was treated with cryotherapy after the biopsy was performed.
Depth Of Biopsy: dermis
Anesthesia Volume In Cc: 0.1
Billing Type: Third-Party Bill
Accession #: [id number]
Additional Anesthesia Volume In Cc (Will Not Render If 0): 0
Hemostasis: Aluminum Chloride and Electrocautery
Silver Nitrate Text: The wound bed was treated with silver nitrate after the biopsy was performed.
Detail Level: Detailed
Anesthesia Type: 1% lidocaine without epinephrine and a 1:10 solution of 8.4% sodium bicarbonate
Electrodesiccation Text: The wound bed was treated with electrodesiccation after the biopsy was performed.
Biopsy Method: Personna blade
Post-Care Instructions: I reviewed with the patient in detail post-care instructions. Patient is to keep the biopsy site dry overnight, and then apply vaseline twice daily until healed. Patient may apply hydrogen peroxide soaks to remove any crusting. Patient given a wound care sheet.
Consent: Written consent was obtained and risks were reviewed including but not limited to scarring, infection, bleeding, scabbing, incomplete removal, nerve damage and allergy to anesthesia.
Dressing: bandage
Curettage Text: The wound bed was treated with curettage after the biopsy was performed.
Information: Selecting Yes will display possible errors in your note based on the variables you have selected. This validation is only offered as a suggestion for you. PLEASE NOTE THAT THE VALIDATION TEXT WILL BE REMOVED WHEN YOU FINALIZE YOUR NOTE. IF YOU WANT TO FAX A PRELIMINARY NOTE YOU WILL NEED TO TOGGLE THIS TO 'NO' IF YOU DO NOT WANT IT IN YOUR FAXED NOTE.
Biopsy Type: H and E
Notification Instructions: Patient will be notified of biopsy results. However, patient instructed to call the office if not contacted within 2 weeks.
Electrodesiccation And Curettage Text: The wound bed was treated with electrodesiccation and curettage after the biopsy was performed.

## 2020-07-01 ENCOUNTER — TELEPHONE (OUTPATIENT)
Dept: CARDIAC CATH/INVASIVE PROCEDURES | Age: 75
End: 2020-07-01

## 2020-07-01 NOTE — TELEPHONE ENCOUNTER
Jacquelineman 2 year follow up Phone Call    Patient contacted for [facility] 2 year follow up. She is currently taking asa 81mg. She says that she is doing great and she has no questions or concerns at this time. She has not had any hospitalizations or procedures since her 1 year follow up.     THE   BARTHEL INDEX   Activity Score  FEEDING  0 = unable  5 = needs help cutting, spreading butter, etc., or requires modified diet  10 = independent   10  BATHING  0 = dependent  5 = independent (or in shower)  5  GROOMING  0 = needs to help with personal care  5 = independent face/hair/teeth/shaving (implements provided)  5  DRESSING  0 = dependent  5 = needs help but can do about half unaided  10 = independent (including buttons, zips, laces, etc.)   10  BOWELS  0 = incontinent (or needs to be given enemas)  5 = occasional accident  10 = continent  10  BLADDER  0 = incontinent, or catheterized and unable to manage alone  5 = occasional accident  10 = continent  10  TOILET USE  0 = dependent  5 = needs some help, but can do something alone  10 = independent (on and off, dressing, wiping)  10  TRANSFERS (BED TO CHAIR AND BACK)  0 = unable, no sitting balance  5 = major help (one or two people, physical), can sit  10 = minor help (verbal or physical)  15 = independent   15  MOBILITY (ON LEVEL SURFACES)  0 = immobile or < 50 yards  5 = wheelchair independent, including corners, > 50 yards  10 = walks with help of one person (verbal or physical) > 50 yards  15 = independent (but may use any aid; for example, stick) > 50 yards   15  STAIRS  0 = unable  5 = needs help (verbal, physical, carrying aid)  10 = independent  10  TOTAL (0-100): 100

## 2021-01-05 ENCOUNTER — TRANSCRIBE ORDER (OUTPATIENT)
Dept: SCHEDULING | Age: 76
End: 2021-01-05

## 2021-01-05 DIAGNOSIS — Z12.31 VISIT FOR SCREENING MAMMOGRAM: Primary | ICD-10-CM

## 2021-01-20 ENCOUNTER — HOSPITAL ENCOUNTER (OUTPATIENT)
Dept: MAMMOGRAPHY | Age: 76
Discharge: HOME | End: 2021-01-20
Attending: OBSTETRICS & GYNECOLOGY
Payer: MEDICARE

## 2021-01-20 DIAGNOSIS — Z12.31 VISIT FOR SCREENING MAMMOGRAM: ICD-10-CM

## 2021-01-20 PROCEDURE — 77063 BREAST TOMOSYNTHESIS BI: CPT

## 2021-01-26 ENCOUNTER — HOSPITAL ENCOUNTER (OUTPATIENT)
Dept: MAMMOGRAPHY | Age: 76
Discharge: HOME | End: 2021-01-26
Attending: OBSTETRICS & GYNECOLOGY
Payer: MEDICARE

## 2021-01-26 DIAGNOSIS — Z80.3 FH: BREAST CANCER IN FIRST DEGREE RELATIVE WHEN <50 YEARS OLD: ICD-10-CM

## 2021-01-26 DIAGNOSIS — R92.8 ABNORMAL SCREENING MAMMOGRAM: ICD-10-CM

## 2021-01-26 PROCEDURE — 77065 DX MAMMO INCL CAD UNI: CPT

## 2021-01-26 PROCEDURE — 76642 ULTRASOUND BREAST LIMITED: CPT

## 2021-03-25 ENCOUNTER — APPOINTMENT (RX ONLY)
Dept: URBAN - METROPOLITAN AREA CLINIC 24 | Facility: CLINIC | Age: 76
Setting detail: DERMATOLOGY
End: 2021-03-25

## 2021-03-25 DIAGNOSIS — L82.1 OTHER SEBORRHEIC KERATOSIS: ICD-10-CM

## 2021-03-25 DIAGNOSIS — Z87.2 PERSONAL HISTORY OF DISEASES OF THE SKIN AND SUBCUTANEOUS TISSUE: ICD-10-CM | Status: STABLE

## 2021-03-25 DIAGNOSIS — Z71.89 OTHER SPECIFIED COUNSELING: ICD-10-CM

## 2021-03-25 DIAGNOSIS — L81.4 OTHER MELANIN HYPERPIGMENTATION: ICD-10-CM

## 2021-03-25 DIAGNOSIS — L30.4 ERYTHEMA INTERTRIGO: ICD-10-CM

## 2021-03-25 DIAGNOSIS — L30.9 DERMATITIS, UNSPECIFIED: ICD-10-CM

## 2021-03-25 DIAGNOSIS — D485 NEOPLASM OF UNCERTAIN BEHAVIOR OF SKIN: ICD-10-CM

## 2021-03-25 DIAGNOSIS — L57.8 OTHER SKIN CHANGES DUE TO CHRONIC EXPOSURE TO NONIONIZING RADIATION: ICD-10-CM

## 2021-03-25 PROBLEM — D48.5 NEOPLASM OF UNCERTAIN BEHAVIOR OF SKIN: Status: ACTIVE | Noted: 2021-03-25

## 2021-03-25 PROCEDURE — 99214 OFFICE O/P EST MOD 30 MIN: CPT | Mod: 25

## 2021-03-25 PROCEDURE — ? BIOPSY BY SHAVE METHOD

## 2021-03-25 PROCEDURE — ? PRESCRIPTION MEDICATION MANAGEMENT

## 2021-03-25 PROCEDURE — 11103 TANGNTL BX SKIN EA SEP/ADDL: CPT

## 2021-03-25 PROCEDURE — 11102 TANGNTL BX SKIN SINGLE LES: CPT

## 2021-03-25 PROCEDURE — ? PRESCRIPTION

## 2021-03-25 PROCEDURE — ? OTHER

## 2021-03-25 PROCEDURE — ? COUNSELING

## 2021-03-25 RX ORDER — TRIAMCINOLONE ACETONIDE 1 MG/G
CREAM TOPICAL BID
Qty: 1 | Refills: 3 | Status: ERX | COMMUNITY
Start: 2021-03-25

## 2021-03-25 RX ORDER — KETOCONAZOLE 20 MG/G
CREAM TOPICAL
Qty: 1 | Refills: 11 | Status: ERX

## 2021-03-25 RX ADMIN — TRIAMCINOLONE ACETONIDE: 1 CREAM TOPICAL at 00:00

## 2021-03-25 ASSESSMENT — LOCATION SIMPLE DESCRIPTION DERM
LOCATION SIMPLE: LEFT ANTERIOR NECK
LOCATION SIMPLE: RIGHT SHOULDER
LOCATION SIMPLE: GROIN
LOCATION SIMPLE: LEFT CHEEK
LOCATION SIMPLE: LEFT SHOULDER
LOCATION SIMPLE: RIGHT FOREHEAD
LOCATION SIMPLE: LEFT UPPER BACK
LOCATION SIMPLE: RIGHT BUTTOCK
LOCATION SIMPLE: CHEST
LOCATION SIMPLE: RIGHT CHEEK
LOCATION SIMPLE: ABDOMEN
LOCATION SIMPLE: RIGHT PRETIBIAL REGION
LOCATION SIMPLE: RIGHT BACK

## 2021-03-25 ASSESSMENT — LOCATION DETAILED DESCRIPTION DERM
LOCATION DETAILED: LEFT INFERIOR MEDIAL MALAR CHEEK
LOCATION DETAILED: RIGHT INFERIOR MEDIAL MALAR CHEEK
LOCATION DETAILED: LEFT POSTERIOR SHOULDER
LOCATION DETAILED: LEFT INFERIOR ANTERIOR NECK
LOCATION DETAILED: RIGHT SUPERIOR MEDIAL FOREHEAD
LOCATION DETAILED: RIGHT LATERAL SUPERIOR CHEST
LOCATION DETAILED: LEFT RIB CAGE
LOCATION DETAILED: LEFT SUPRAPUBIC SKIN
LOCATION DETAILED: RIGHT BUTTOCK
LOCATION DETAILED: RIGHT PROXIMAL PRETIBIAL REGION
LOCATION DETAILED: LEFT MID-UPPER BACK
LOCATION DETAILED: RIGHT SUPERIOR LATERAL UPPER BACK
LOCATION DETAILED: RIGHT POSTERIOR SHOULDER

## 2021-03-25 ASSESSMENT — LOCATION ZONE DERM
LOCATION ZONE: LEG
LOCATION ZONE: ARM
LOCATION ZONE: FACE
LOCATION ZONE: NECK
LOCATION ZONE: TRUNK

## 2021-03-25 NOTE — PROCEDURE: BIOPSY BY SHAVE METHOD
Additional Anesthesia Volume In Cc (Will Not Render If 0): 0
Bill [id number] For Specimen Handling/Conveyance To Laboratory?: no
Billing Type: Third-Party Bill
Information: Selecting Yes will display possible errors in your note based on the variables you have selected. This validation is only offered as a suggestion for you. PLEASE NOTE THAT THE VALIDATION TEXT WILL BE REMOVED WHEN YOU FINALIZE YOUR NOTE. IF YOU WANT TO FAX A PRELIMINARY NOTE YOU WILL NEED TO TOGGLE THIS TO 'NO' IF YOU DO NOT WANT IT IN YOUR FAXED NOTE.
Accession #: [id number]
Hemostasis: Aluminum Chloride
Validate Note Data (See Information Below): Yes
Electrodesiccation And Curettage Text: The wound bed was treated with electrodesiccation and curettage after the biopsy was performed.
Biopsy Method: Personna blade
Type Of Destruction Used: Curettage
Anesthesia Type: 1% lidocaine without epinephrine and a 1:10 solution of 8.4% sodium bicarbonate
Biopsy Type: H and E
Wound Care: Vaseline
Electrodesiccation Text: The wound bed was treated with electrodesiccation after the biopsy was performed.
Cryotherapy Text: The wound bed was treated with cryotherapy after the biopsy was performed.
Notification Instructions: Patient will be notified of biopsy results. However, patient instructed to call the office if not contacted within 2 weeks.
Curettage Text: The wound bed was treated with curettage after the biopsy was performed.
Detail Level: Detailed
Post-Care Instructions: I reviewed with the patient in detail post-care instructions. Patient is to keep the biopsy site dry overnight, and then apply vaseline twice daily until healed. Patient may apply hydrogen peroxide soaks to remove any crusting. Patient given a wound care sheet.
Dressing: bandage
Depth Of Biopsy: dermis
Anesthesia Volume In Cc: 0.1
Silver Nitrate Text: The wound bed was treated with silver nitrate after the biopsy was performed.
Consent: Verbal consent was obtained and risks were reviewed including but not limited to scarring, infection, bleeding, scabbing, incomplete removal, nerve damage and allergy to anesthesia.

## 2021-03-25 NOTE — PROCEDURE: PRESCRIPTION MEDICATION MANAGEMENT
Initiate Treatment: TAC bid x 2 weeks
Detail Level: Zone
Render In Strict Bullet Format?: No
Plan: Atopic precautions

## 2021-03-25 NOTE — HPI: FULL BODY SKIN EXAMINATION
What Type Of Note Output Would You Prefer (Optional)?: Standard Output
What Is The Reason For Today's Visit?: Full Body Skin Examination
What Is The Reason For Today's Visit? (Being Monitored For X): concerning skin lesions on an annual basis
How Severe Are Your Spot(S)?: mild
Additional History: Pt gave verbal consent for Bx  today. Witnessed by Suzanna\\n\\nPt has concerns regarding wrinkles/drooping on sides of lips/nose.

## 2021-03-25 NOTE — PROCEDURE: OTHER
Detail Level: Zone
Note Text (......Xxx Chief Complaint.): This diagnosis correlates with the
Render Risk Assessment In Note?: no
Other (Free Text): Recommended consultation with Dr. Rosenberg or [facility] regarding fillers.

## 2021-05-13 ENCOUNTER — APPOINTMENT (RX ONLY)
Dept: URBAN - METROPOLITAN AREA CLINIC 24 | Facility: CLINIC | Age: 76
Setting detail: DERMATOLOGY
End: 2021-05-13

## 2021-05-13 PROBLEM — D04.5 CARCINOMA IN SITU OF SKIN OF TRUNK: Status: ACTIVE | Noted: 2021-05-13

## 2021-05-13 PROCEDURE — ? CURETTAGE AND DESTRUCTION

## 2021-05-13 PROCEDURE — 17261 DSTRJ MAL LES T/A/L .6-1.0CM: CPT

## 2021-05-13 NOTE — PROCEDURE: CURETTAGE AND DESTRUCTION
Hide Accession Number?: No
Final Size Statement: The size of the lesion after curettage was
Size Of Lesion After Curettage: 0.8
Anesthesia Volume In Cc: 0.5
What Was Performed First?: Curettage
Number Of Curettages: 3
Total Volume (Ccs): 1
Anesthesia Type: 1% lidocaine without epinephrine and a 1:10 solution of 8.4% sodium bicarbonate
Consent was obtained from the patient. The risks, benefits and alternatives to therapy were discussed in detail. Specifically, the risks of infection, scarring, bleeding, prolonged wound healing, nerve injury, incomplete removal, allergy to anesthesia and recurrence were addressed. Alternatives to ED&C, such as: surgical removal and cream were also discussed.  Prior to the procedure, the treatment site was clearly identified and confirmed by the patient. All components of Universal Protocol/PAUSE Rule completed.
Detail Level: Detailed
Bill As A Line Item Or As Units: Line Item
Cautery Type: electrodesiccation
Post-Care Instructions: I reviewed with the patient in detail post-care instructions. Patient is to keep the area dry for 48 hours, and not to engage in any swimming until the area is healed. Should the patient develop any fevers, chills, bleeding, severe pain patient will contact the office immediately.
Additional Information: (Optional): The wound was cleaned, and a pressure dressing was applied.  The patient received detailed post-op instructions.
Size Of Lesion In Cm: 0.6

## 2021-08-03 PROBLEM — I48.91 ATRIAL FIBRILLATION (HCC): Status: RESOLVED | Noted: 2018-07-17 | Resolved: 2021-08-03

## 2021-11-11 ENCOUNTER — APPOINTMENT (RX ONLY)
Dept: URBAN - METROPOLITAN AREA CLINIC 24 | Facility: CLINIC | Age: 76
Setting detail: DERMATOLOGY
End: 2021-11-11

## 2021-11-11 DIAGNOSIS — Z87.2 PERSONAL HISTORY OF DISEASES OF THE SKIN AND SUBCUTANEOUS TISSUE: ICD-10-CM

## 2021-11-11 DIAGNOSIS — L82.1 OTHER SEBORRHEIC KERATOSIS: ICD-10-CM

## 2021-11-11 DIAGNOSIS — Z85.828 PERSONAL HISTORY OF OTHER MALIGNANT NEOPLASM OF SKIN: ICD-10-CM

## 2021-11-11 PROCEDURE — ? COUNSELING

## 2021-11-11 PROCEDURE — 99212 OFFICE O/P EST SF 10 MIN: CPT

## 2021-11-11 ASSESSMENT — LOCATION ZONE DERM
LOCATION ZONE: LIP
LOCATION ZONE: TRUNK

## 2021-11-11 ASSESSMENT — LOCATION SIMPLE DESCRIPTION DERM
LOCATION SIMPLE: GROIN
LOCATION SIMPLE: RIGHT BUTTOCK
LOCATION SIMPLE: CHEST
LOCATION SIMPLE: RIGHT LIP

## 2021-11-11 ASSESSMENT — LOCATION DETAILED DESCRIPTION DERM
LOCATION DETAILED: LEFT SUPRAPUBIC SKIN
LOCATION DETAILED: RIGHT UPPER CUTANEOUS LIP
LOCATION DETAILED: RIGHT BUTTOCK
LOCATION DETAILED: RIGHT LATERAL SUPERIOR CHEST

## 2021-11-11 NOTE — HPI: SKIN LESION
What Type Of Note Output Would You Prefer (Optional)?: Standard Output
How Severe Is Your Skin Lesion?: mild
Has Your Skin Lesion Been Treated?: not been treated
Is This A New Presentation, Or A Follow-Up?: Skin Lesion
Additional History: Pt gives verbal consent for biopsy.Jessica

## 2021-11-11 NOTE — PROCEDURE: REASSURANCE
Additional Notes (Optional): Not examined today
Hide Additional Notes?: No
Detail Level: Detailed
Detail Level: Zone

## 2021-12-21 NOTE — DISCHARGE INSTRUCTIONS
Hematoma: Care Instructions  Your Care Instructions    A hematoma is a bad bruise. It happens when an injury causes blood to collect and pool under the skin. The pooling blood gives the skin a spongy, rubbery, lumpy feel. A hematoma usually is not a cause for concern. It is not the same thing as a blood clot in a vein, and it does not cause blood clots. Follow-up care is a key part of your treatment and safety. Be sure to make and go to all appointments, and call your doctor if you are having problems. It's also a good idea to know your test results and keep a list of the medicines you take. How can you care for yourself at home? · Rest and protect the bruised area. · Put ice or a cold pack on the area for 10 to 20 minutes at a time. · Prop up the bruised area on a pillow when you ice it or anytime you sit or lie down during the next 3 days. Try to keep it above the level of your heart. This will help reduce swelling. · Wrapping the bruised area with an elastic bandage such as an Ace wrap will help decrease swelling. Don't wrap it too tightly, as this can cause more swelling below the affected area. · Take an over-the-counter pain medicine, such as acetaminophen (Tylenol), ibuprofen (Advil, Motrin), or naproxen (Aleve). · Do not take two or more pain medicines at the same time unless the doctor told you to. Many pain medicines have acetaminophen, which is Tylenol. Too much acetaminophen (Tylenol) can be harmful. When should you call for help? Call your doctor now or seek immediate medical care if:  ? · You have signs of skin infection, such as:  ¨ Increased pain, swelling, warmth, or redness. ¨ Red streaks leading from the area. ¨ Pus draining from the area. ¨ A fever. ? Watch closely for changes in your health, and be sure to contact your doctor if:  ? · The bruise lasts longer than 4 weeks. ? · The bruise gets bigger or becomes more painful. ? · You do not get better as expected.    Where can you learn more? Go to http://tamie-nolan.info/. Enter P911 in the search box to learn more about \"Hematoma: Care Instructions. \"  Current as of: March 20, 2017  Content Version: 11.4  © 1813-2668 [employer]. Care instructions adapted under license by [employer] (which disclaims liability or warranty for this information). If you have questions about a medical condition or this instruction, always ask your healthcare professional. [place] [address] any warranty or liability for your use of this information. 0 = swallows foods/liquids without difficulty

## 2022-01-11 ENCOUNTER — TRANSCRIBE ORDER (OUTPATIENT)
Dept: SCHEDULING | Age: 77
End: 2022-01-11

## 2022-01-11 DIAGNOSIS — Z12.31 SCREENING MAMMOGRAM, ENCOUNTER FOR: Primary | ICD-10-CM

## 2022-01-29 ENCOUNTER — HOSPITAL ENCOUNTER (OUTPATIENT)
Dept: MAMMOGRAPHY | Age: 77
Discharge: HOME | End: 2022-01-29
Attending: OBSTETRICS & GYNECOLOGY
Payer: MEDICARE

## 2022-01-29 DIAGNOSIS — Z12.31 SCREENING MAMMOGRAM, ENCOUNTER FOR: ICD-10-CM

## 2022-01-29 PROCEDURE — 77063 BREAST TOMOSYNTHESIS BI: CPT

## 2022-03-18 PROBLEM — R55 NEAR SYNCOPE: Status: ACTIVE | Noted: 2017-12-02

## 2022-03-18 PROBLEM — I48.91 A-FIB (HCC): Status: ACTIVE | Noted: 2018-07-17

## 2022-03-19 PROBLEM — D62 ACUTE BLOOD LOSS ANEMIA: Status: ACTIVE | Noted: 2017-12-05

## 2022-03-19 PROBLEM — I48.0 PAROXYSMAL ATRIAL FIBRILLATION (HCC): Status: ACTIVE | Noted: 2017-08-13

## 2022-03-19 PROBLEM — F41.9 ANXIETY: Status: ACTIVE | Noted: 2017-12-05

## 2022-03-19 PROBLEM — S70.01XA TRAUMATIC HEMATOMA OF RIGHT HIP: Status: ACTIVE | Noted: 2017-12-05

## 2022-03-19 PROBLEM — I95.1 ORTHOSTATIC HYPOTENSION: Status: ACTIVE | Noted: 2017-12-02

## 2022-03-19 PROBLEM — D64.9 ANEMIA REQUIRING TRANSFUSIONS: Status: ACTIVE | Noted: 2017-12-05

## 2022-03-30 ENCOUNTER — APPOINTMENT (RX ONLY)
Dept: URBAN - METROPOLITAN AREA CLINIC 24 | Facility: CLINIC | Age: 77
Setting detail: DERMATOLOGY
End: 2022-03-30

## 2022-03-30 DIAGNOSIS — Z87.2 PERSONAL HISTORY OF DISEASES OF THE SKIN AND SUBCUTANEOUS TISSUE: ICD-10-CM

## 2022-03-30 DIAGNOSIS — L82.0 INFLAMED SEBORRHEIC KERATOSIS: ICD-10-CM

## 2022-03-30 DIAGNOSIS — L57.8 OTHER SKIN CHANGES DUE TO CHRONIC EXPOSURE TO NONIONIZING RADIATION: ICD-10-CM

## 2022-03-30 DIAGNOSIS — L81.4 OTHER MELANIN HYPERPIGMENTATION: ICD-10-CM

## 2022-03-30 DIAGNOSIS — D18.0 HEMANGIOMA: ICD-10-CM

## 2022-03-30 DIAGNOSIS — L28.1 PRURIGO NODULARIS: ICD-10-CM

## 2022-03-30 DIAGNOSIS — L57.0 ACTINIC KERATOSIS: ICD-10-CM

## 2022-03-30 DIAGNOSIS — Z85.828 PERSONAL HISTORY OF OTHER MALIGNANT NEOPLASM OF SKIN: ICD-10-CM

## 2022-03-30 PROBLEM — D18.01 HEMANGIOMA OF SKIN AND SUBCUTANEOUS TISSUE: Status: ACTIVE | Noted: 2022-03-30

## 2022-03-30 PROCEDURE — 99214 OFFICE O/P EST MOD 30 MIN: CPT | Mod: 25

## 2022-03-30 PROCEDURE — ? COUNSELING

## 2022-03-30 PROCEDURE — 17000 DESTRUCT PREMALG LESION: CPT | Mod: 59

## 2022-03-30 PROCEDURE — ? OTHER

## 2022-03-30 PROCEDURE — ? PRESCRIPTION MEDICATION MANAGEMENT

## 2022-03-30 PROCEDURE — ? LIQUID NITROGEN

## 2022-03-30 PROCEDURE — 17110 DESTRUCTION B9 LES UP TO 14: CPT

## 2022-03-30 PROCEDURE — 17003 DESTRUCT PREMALG LES 2-14: CPT | Mod: 59

## 2022-03-30 ASSESSMENT — LOCATION DETAILED DESCRIPTION DERM
LOCATION DETAILED: RIGHT SUPERIOR MEDIAL FOREHEAD
LOCATION DETAILED: LEFT INFERIOR CENTRAL MALAR CHEEK
LOCATION DETAILED: RIGHT LATERAL SUPERIOR CHEST
LOCATION DETAILED: LEFT SUPERIOR VERMILION LIP
LOCATION DETAILED: LEFT UPPER CUTANEOUS LIP
LOCATION DETAILED: LEFT ANTERIOR PROXIMAL THIGH
LOCATION DETAILED: LEFT SUPRAPUBIC SKIN
LOCATION DETAILED: RIGHT BUTTOCK
LOCATION DETAILED: LEFT DISTAL POSTERIOR THIGH
LOCATION DETAILED: MIDDLE STERNUM
LOCATION DETAILED: RIGHT LATERAL TRAPEZIAL NECK
LOCATION DETAILED: LEFT BUTTOCK
LOCATION DETAILED: LEFT SUPERIOR VERMILION BORDER

## 2022-03-30 ASSESSMENT — LOCATION SIMPLE DESCRIPTION DERM
LOCATION SIMPLE: LEFT UPPER LIP
LOCATION SIMPLE: POSTERIOR NECK
LOCATION SIMPLE: LEFT THIGH
LOCATION SIMPLE: LEFT CHEEK
LOCATION SIMPLE: CHEST
LOCATION SIMPLE: GROIN
LOCATION SIMPLE: LEFT LIP
LOCATION SIMPLE: RIGHT BUTTOCK
LOCATION SIMPLE: RIGHT FOREHEAD
LOCATION SIMPLE: LEFT POSTERIOR THIGH
LOCATION SIMPLE: LEFT BUTTOCK

## 2022-03-30 ASSESSMENT — LOCATION ZONE DERM
LOCATION ZONE: FACE
LOCATION ZONE: NECK
LOCATION ZONE: LEG
LOCATION ZONE: TRUNK
LOCATION ZONE: LIP

## 2022-03-30 NOTE — HPI: FULL BODY SKIN EXAMINATION
What Type Of Note Output Would You Prefer (Optional)?: Standard Output
What Is The Reason For Today's Visit?: Full Body Skin Examination
What Is The Reason For Today's Visit? (Being Monitored For X): the development of new lesions
How Severe Are Your Spot(S)?: mild
Additional History: Pt gives verbal consent to biopsy. ANNABELLE Randle

## 2022-03-30 NOTE — PROCEDURE: PRESCRIPTION MEDICATION MANAGEMENT
Plan: Pt to call if not resolving
Detail Level: Zone
Discontinue Regimen: Picking
Initiate Treatment: TAC bid x 2 weeks and occlude with band aid
Render In Strict Bullet Format?: No

## 2022-03-30 NOTE — PROCEDURE: LIQUID NITROGEN
Show Aperture Variable?: Yes
Number Of Freeze-Thaw Cycles: 1 freeze-thaw cycle
Post-Care Instructions: I reviewed with the patient in detail post-care instructions. Patient is to wear sunprotection, and avoid picking at any of the treated lesions. Pt may apply Vaseline to crusted or scabbing areas.
Render Note In Bullet Format When Appropriate: No
Detail Level: Detailed
Consent: The patient's consent was obtained including but not limited to risks of crusting, scabbing, blistering, scarring, darker or lighter pigmentary change, recurrence, incomplete removal and infection.
Medical Necessity Clause: This procedure was medically necessary because the lesions that were treated were: rubbed
Spray Paint Text: The liquid nitrogen was applied to the skin utilizing a spray paint frosting technique.
Medical Necessity Information: It is in your best interest to select a reason for this procedure from the list below. All of these items fulfill various CMS LCD requirements except the new and changing color options.
Duration Of Freeze Thaw-Cycle (Seconds): 0

## 2022-05-17 PROBLEM — I25.119 ATHEROSCLEROSIS OF NATIVE CORONARY ARTERY OF NATIVE HEART WITH ANGINA PECTORIS (HCC): Status: ACTIVE | Noted: 2022-05-17

## 2022-05-17 PROBLEM — I70.0 ATHEROSCLEROSIS OF AORTA (HCC): Status: ACTIVE | Noted: 2022-05-17

## 2022-06-08 ENCOUNTER — APPOINTMENT (RX ONLY)
Dept: URBAN - METROPOLITAN AREA CLINIC 24 | Facility: CLINIC | Age: 77
Setting detail: DERMATOLOGY
End: 2022-06-08

## 2022-06-08 DIAGNOSIS — L57.0 ACTINIC KERATOSIS: ICD-10-CM

## 2022-06-08 PROCEDURE — ? LIQUID NITROGEN

## 2022-06-08 PROCEDURE — 17000 DESTRUCT PREMALG LESION: CPT

## 2022-06-08 PROCEDURE — ? COUNSELING

## 2022-06-08 PROCEDURE — ? OTHER

## 2022-06-08 PROCEDURE — 17003 DESTRUCT PREMALG LES 2-14: CPT

## 2022-06-08 ASSESSMENT — LOCATION ZONE DERM: LOCATION ZONE: NOSE

## 2022-06-08 ASSESSMENT — LOCATION DETAILED DESCRIPTION DERM
LOCATION DETAILED: RIGHT NASAL DORSUM
LOCATION DETAILED: LEFT SOFT TRIANGLE OF THE NOSE

## 2022-06-08 ASSESSMENT — LOCATION SIMPLE DESCRIPTION DERM
LOCATION SIMPLE: LEFT NOSE
LOCATION SIMPLE: NOSE

## 2022-06-08 NOTE — PROCEDURE: OTHER
Note Text (......Xxx Chief Complaint.): This diagnosis correlates with the
Detail Level: Zone
Render Risk Assessment In Note?: no
Other (Free Text): Rec she call if doesn’t resolve

## 2022-06-08 NOTE — PROCEDURE: LIQUID NITROGEN
Detail Level: Detailed
Duration Of Freeze Thaw-Cycle (Seconds): 0
Render Post-Care Instructions In Note?: yes
Render Note In Bullet Format When Appropriate: No
Consent: The patient's consent was obtained including but not limited to risks of crusting, scabbing, blistering, scarring, darker or lighter pigmentary change, recurrence, incomplete removal and infection.
Number Of Freeze-Thaw Cycles: 1 freeze-thaw cycle
Post-Care Instructions: I reviewed with the patient in detail post-care instructions. Patient is to wear sunprotection, and avoid picking at any of the treated lesions. Pt may apply Vaseline to crusted or scabbing areas.

## 2022-06-13 ENCOUNTER — TELEPHONE (OUTPATIENT)
Dept: CARDIOLOGY CLINIC | Age: 77
End: 2022-06-13

## 2022-06-13 NOTE — TELEPHONE ENCOUNTER
Requested Prescriptions     Signed Prescriptions Disp Refills    metoprolol tartrate (LOPRESSOR) 25 MG tablet 180 tablet 0     Sig: Take 0.5 tablets by mouth every 12 hours     Authorizing Provider: Cathie Martinez     Ordering User: Slim Zapata

## 2022-06-13 NOTE — TELEPHONE ENCOUNTER
Patient called requesting a refill of metoprolol. Her preferred pharmacy is [facility] #[id number] [address] from [facility].

## 2022-06-13 NOTE — TELEPHONE ENCOUNTER
Requested Prescriptions     Signed Prescriptions Disp Refills    metoprolol tartrate (LOPRESSOR) 25 MG tablet 180 tablet 0     Sig: Take 0.5 tablets by mouth every 12 hours     Authorizing Provider: Marlena Preston     Ordering User: Kerwin Nunez

## 2022-07-11 NOTE — PROGRESS NOTES
[facility]  Consultation Note    Patient ID:  Name: Michele Murillo  MRN: [id number]  AGE: 69 y.o.  : 1945    Date of Consultation:  2022    CC:   Chief Complaint   Patient presents with    Back Pain         HPI:  Ms. Nba Morrison is a 73-year-old female who presents today for follow-up of low back pain. She has pain in the lower back. She also has discomfort in the bilateral hips, radiating to the bilateral legs. The pain awakens her at night. The pain is associated with lower extremity paresthesias. There is no groin pain. The pain is aggravated in the bed. It is alleviated when she changes positions. She wonders if the pain is coming from her hips. MRI lumbar spine 2018 showed moderate central stenosis L3-4 with moderate left L4-5 and moderate bilateral L3-4 neuroforaminal narrowing.      Past Medical History Includes:   Past Medical History:   Diagnosis Date    Anxiety     Arthritis     Bell's palsy     in Oct. 2009 with some vertigo at times still, no other after effects    Depression     GERD (gastroesophageal reflux disease)     reflux, takes nexium    Hematoma of hip, right, initial encounter     Hypertension     on medication since     Hypothyroidism     Nausea & vomiting     Obese     Paroxysmal atrial fibrillation ([facility] [address] 2017    Psychiatric disorder     depression    S/P total knee replacement using cement 2011    Unspecified hypothyroidism 2011   ,   Past Surgical History:   Procedure Laterality Date    APPENDECTOMY      BREAST BIOPSY Bilateral     BREAST LUMPECTOMY  1998    benign    CATARACT REMOVAL Bilateral 2014    CHOLECYSTECTOMY  1976    COLONOSCOPY  2016    GASTRECTOMY  9/21/15    sleeve    HYSTERECTOMY  1987    ORTHOPEDIC SURGERY  2005    lower back     ORTHOPEDIC SURGERY Bilateral     heel spurs removed    [place] CARDIAC SURG PROCEDURE UNLIST      LAAO placed 18    THYROIDECTOMY 1980    partial   [address] [facility] TOTAL KNEE ARTHROPLASTY Left 2011    TOTAL KNEE ARTHROPLASTY Right 2010    UROLOGICAL SURGERY  2008/2009    bladder tack X2     Family History:   Family History   Problem Relation Age of Onset    Delayed Awakening Neg Hx     Pseudochol. Deficiency Neg Hx     Post-op Nausea/Vomiting Neg Hx     Emergence Delirium Neg Hx     Post-op Cognitive Dysfunction Neg Hx     Cancer Mother         breast    Malig Hypertherm Neg Hx     Breast Cancer Mother 62    Cancer Father         throat    Other Father         gastric ulcer    Kidney Disease Father     Breast Cancer Daughter     Heart Attack Mother       Social History:   Social History     Tobacco Use    Smoking status: Never Smoker    Smokeless tobacco: Never Used   Substance Use Topics    Alcohol use: Yes     Alcohol/week: 1.7 standard drinks       ALLERGIES:   Allergies   Allergen Reactions    Hydromorphone Swelling    Sulfa Antibiotics Rash        Patient Medications    Current Outpatient Medications   Medication Sig    metoprolol tartrate (LOPRESSOR) 25 MG tablet Take 0.5 tablets by mouth every 12 hours    aspirin 81 MG EC tablet Take by mouth daily    busPIRone (BUSPAR) 10 MG tablet Take 1 tablet by mouth 2 times daily    vitamin D (CHOLECALCIFEROL) 25 MCG (1000 UT) TABS tablet Take 5,000 Units by mouth daily    docusate (COLACE, DULCOLAX) 100 MG CAPS Take 100 mg by mouth 2 times daily as needed    DULoxetine (CYMBALTA) 30 MG extended release capsule TAKE 1 CAPSULE BY MOUTH EVERY DAY    DULoxetine (CYMBALTA) 60 MG extended release capsule TAKE 1 CAPSULE BY MOUTH EVERY DAY    flecainide (TAMBOCOR) 50 MG tablet Take 50 mg by mouth every 12 hours    levothyroxine (SYNTHROID) 100 MCG tablet Take 100 mcg by mouth every morning (before breakfast)    LORazepam (ATIVAN) 0.5 MG tablet Take 0.5 mg by mouth every 8 hours as needed.     melatonin 10 MG CAPS capsule Take by mouth    omeprazole (PRILOSEC) 40 MG delayed release capsule Take 40 mg by mouth daily    tiZANidine (ZANAFLEX) 4 MG tablet TAKE 1/2-1 TABLET BY MOUTH 3 TIMES A DAY AS NEEDED     No current facility-administered medications for this visit. ROS/Meds/PSH/PMH/FH/SH: I personally reviewed the patients standard intake form. Physical Exam:      Lumbar: PE: General: Awake, alert, no distress. HEENT: Mucous membranes moist and pink. Margurette King Psych: Appropriate and conversant. Derm: No rash Gait: Unassisted, non-ataxic MS: Straight leg raise negative bilaterally. No pain with hip internal or external rotation. No pain with resisted hip flexion bilaterally. No pain with lumbar flexion. She has pain with lumbar extension. No pain with lumbar facet load. She has tenderness to palpation over the lumbar spine and paraspinals. Strength is 5/5 and symmetric in the bilateral lower extremities. No foot drop. Neurological: Sensation to light touch is intact in the bilateral lower extremities. Reflexes are 1+ and symmetric in the bilateral lower extremities. Lumbar spine, pelvis, bilateral hips XR: AP, Lateral views     Clinical Indication    ICD-10-CM    1. Low back pain, unspecified back pain laterality, unspecified chronicity, unspecified whether sciatica present  M54.50 XR HIP BILATERAL W AP PELVIS (2 VIEWS)     XR LUMBAR SPINE (2-3 VIEWS)     MRI LUMBAR SPINE WO CONTRAST   2. Hip pain  M25.559 XR HIP BILATERAL W AP PELVIS (2 VIEWS)     XR LUMBAR SPINE (2-3 VIEWS)     MRI LUMBAR SPINE WO CONTRAST   3. Spinal stenosis of lumbar region, unspecified whether neurogenic claudication present  M48.061    4. Lumbar radiculopathy  M54.16           Report: AP, lateral x-ray of the lumbar spine, pelvis, bilateral hips demonstrates early bilateral hip DJD. Mild lumbar scoliosis. Lumbar spondylosis, facet arthropathy, DDD. We discussed these findings and reviewed these images together today. Impression: early bilateral hip DJD. Mild lumbar scoliosis. Lumbar spondylosis, facet arthropathy, DDD. Adelaide Sam MD     VITALS: @IPVITALS(8:)@ , [id number]       No flowsheet data found. Results for orders placed or performed in visit on 07/12/22   XR HIP BILATERAL W AP PELVIS (2 VIEWS)    Narrative    AUTOMATIC ADMINISTRATIVE RESULT    The result for this exam can be found in the Progress note in the chart. Impression    See Progress note in the chart. XR LUMBAR SPINE (2-3 VIEWS)    Narrative    AUTOMATIC ADMINISTRATIVE RESULT    The result for this exam can be found in the Progress note in the chart. Impression    See Progress note in the chart. Assessment and Plan:     ICD-10-CM    1. Low back pain, unspecified back pain laterality, unspecified chronicity, unspecified whether sciatica present  M54.50 XR HIP BILATERAL W AP PELVIS (2 VIEWS)     XR LUMBAR SPINE (2-3 VIEWS)     MRI LUMBAR SPINE WO CONTRAST   2. Hip pain  M25.559 XR HIP BILATERAL W AP PELVIS (2 VIEWS)     XR LUMBAR SPINE (2-3 VIEWS)     MRI LUMBAR SPINE WO CONTRAST   3. Spinal stenosis of lumbar region, unspecified whether neurogenic claudication present  M48.061    4. Lumbar radiculopathy  M54.16        Orders Placed This Encounter   Procedures    XR HIP BILATERAL W AP PELVIS (2 VIEWS)     Standing Status:   Future     Number of Occurrences:   1     Standing Expiration Date:   7/12/2023    XR LUMBAR SPINE (2-3 VIEWS)     Standing Status:   Future     Number of Occurrences:   1     Standing Expiration Date:   7/12/2023    MRI LUMBAR SPINE WO CONTRAST     Standing Status:   Future     Standing Expiration Date:   7/12/2023     Order Specific Question:   Reason for exam:     Answer:   lumbar spine pain     Order Specific Question:   What is the sedation requirement?      Answer:   None        4   This is a chronic illness/condition with exacerbation and progression  Treatment at this time:   Studies ordered today: MRI    Today's visit included 31 minutes of face to face time, including history, physical exam, review of imaging, review of previous treatments, and discussion of treatment options.     Wolfgang Mercado MD  7/13/2022,  1:00 PM

## 2022-07-12 ENCOUNTER — OFFICE VISIT (OUTPATIENT)
Dept: ORTHOPEDIC SURGERY | Age: 77
End: 2022-07-12
Payer: MEDICARE

## 2022-07-12 VITALS — BODY MASS INDEX: 33.29 KG/M2 | WEIGHT: 195 LBS | HEIGHT: 64 IN

## 2022-07-12 DIAGNOSIS — M25.559 HIP PAIN: ICD-10-CM

## 2022-07-12 DIAGNOSIS — M54.16 LUMBAR RADICULOPATHY: ICD-10-CM

## 2022-07-12 DIAGNOSIS — M48.061 SPINAL STENOSIS OF LUMBAR REGION, UNSPECIFIED WHETHER NEUROGENIC CLAUDICATION PRESENT: ICD-10-CM

## 2022-07-12 DIAGNOSIS — M54.50 LOW BACK PAIN, UNSPECIFIED BACK PAIN LATERALITY, UNSPECIFIED CHRONICITY, UNSPECIFIED WHETHER SCIATICA PRESENT: Primary | ICD-10-CM

## 2022-07-12 PROCEDURE — 1123F ACP DISCUSS/DSCN MKR DOCD: CPT | Performed by: PHYSICAL MEDICINE & REHABILITATION

## 2022-07-12 PROCEDURE — G8417 CALC BMI ABV UP PARAM F/U: HCPCS | Performed by: PHYSICAL MEDICINE & REHABILITATION

## 2022-07-12 PROCEDURE — 4004F PT TOBACCO SCREEN RCVD TLK: CPT | Performed by: PHYSICAL MEDICINE & REHABILITATION

## 2022-07-12 PROCEDURE — G8427 DOCREV CUR MEDS BY ELIG CLIN: HCPCS | Performed by: PHYSICAL MEDICINE & REHABILITATION

## 2022-07-12 PROCEDURE — G8400 PT W/DXA NO RESULTS DOC: HCPCS | Performed by: PHYSICAL MEDICINE & REHABILITATION

## 2022-07-12 PROCEDURE — 1090F PRES/ABSN URINE INCON ASSESS: CPT | Performed by: PHYSICAL MEDICINE & REHABILITATION

## 2022-07-12 PROCEDURE — 99214 OFFICE O/P EST MOD 30 MIN: CPT | Performed by: PHYSICAL MEDICINE & REHABILITATION

## 2022-07-12 NOTE — LETTER
Vernard Crispin  1945  ______________________________________________________________________    Radiographic Studies:    Cervical MRI/ Contrast        Thoracic MRI/ Contrast        Lumbar MRI/ Contrast    CT Myelogram __________________________________________________    NCS/EMG______________________________________________________    MRI of ________________________________________________________    Other__________________________________________________________      Injections:    _______________________________________________________________    Authorization to stop blood thinners________________________________      Medications:    Oral steroids___________________    Muscle Relaxers___________________    Pain medications_____________________    NSAIDS_____________________    Neuropathic pain medication_________________________________________      Physical Therapy:    Lumbar     Thoracic     Cervical       Other_______________________________      Follow up/ Referral:    Pain referral_______________________________________________________    Referral___________________________________________________________    Follow up_________________________________________________________    Handicapped Parking_______________________________________________    Other_____________________________________________________________

## 2022-08-01 ENCOUNTER — OFFICE VISIT (OUTPATIENT)
Dept: CARDIOLOGY CLINIC | Age: 77
End: 2022-08-01
Payer: MEDICARE

## 2022-08-01 VITALS
WEIGHT: 195.3 LBS | DIASTOLIC BLOOD PRESSURE: 82 MMHG | SYSTOLIC BLOOD PRESSURE: 138 MMHG | HEIGHT: 64 IN | HEART RATE: 63 BPM | BODY MASS INDEX: 33.34 KG/M2

## 2022-08-01 DIAGNOSIS — I35.1 AORTIC VALVE INSUFFICIENCY, ETIOLOGY OF CARDIAC VALVE DISEASE UNSPECIFIED: ICD-10-CM

## 2022-08-01 DIAGNOSIS — Z79.899 LONG TERM CURRENT USE OF ANTIARRHYTHMIC DRUG: ICD-10-CM

## 2022-08-01 DIAGNOSIS — I48.91 ATRIAL FIBRILLATION, UNSPECIFIED TYPE (HCC): Primary | ICD-10-CM

## 2022-08-01 DIAGNOSIS — I10 HYPERTENSION, ESSENTIAL: ICD-10-CM

## 2022-08-01 DIAGNOSIS — R42 DIZZINESS: ICD-10-CM

## 2022-08-01 PROCEDURE — G8428 CUR MEDS NOT DOCUMENT: HCPCS | Performed by: INTERNAL MEDICINE

## 2022-08-01 PROCEDURE — 1090F PRES/ABSN URINE INCON ASSESS: CPT | Performed by: INTERNAL MEDICINE

## 2022-08-01 PROCEDURE — 1036F TOBACCO NON-USER: CPT | Performed by: INTERNAL MEDICINE

## 2022-08-01 PROCEDURE — G8417 CALC BMI ABV UP PARAM F/U: HCPCS | Performed by: INTERNAL MEDICINE

## 2022-08-01 PROCEDURE — 1123F ACP DISCUSS/DSCN MKR DOCD: CPT | Performed by: INTERNAL MEDICINE

## 2022-08-01 PROCEDURE — 93000 ELECTROCARDIOGRAM COMPLETE: CPT | Performed by: INTERNAL MEDICINE

## 2022-08-01 PROCEDURE — G8400 PT W/DXA NO RESULTS DOC: HCPCS | Performed by: INTERNAL MEDICINE

## 2022-08-01 PROCEDURE — 99214 OFFICE O/P EST MOD 30 MIN: CPT | Performed by: INTERNAL MEDICINE

## 2022-08-01 RX ORDER — FLECAINIDE ACETATE 50 MG/1
50 TABLET ORAL EVERY 12 HOURS
Qty: 180 TABLET | Refills: 3 | Status: SHIPPED | OUTPATIENT
Start: 2022-08-01

## 2022-08-01 ASSESSMENT — ENCOUNTER SYMPTOMS
NAIL CHANGES: 0
EYE PAIN: 0
ABDOMINAL PAIN: 0
COUGH: 0
APHONIA: 0
STRIDOR: 0

## 2022-08-01 NOTE — PROGRESS NOTES
[address] [place], [place]  [address], [address] [facility], [address] [place] [address]  PHONE: [phone]    SUBJECTIVE:   Nancy Downs is a 69 y.o. female 1945   seen for a follow up visit regarding the following:     Chief Complaint   Patient presents with    Atrial Fibrillation    Hypertension     yearly           History of present illness: 69 y.o. female presented for follow-up 22  Unfortunately   prior to appt . One fall since last appt otherwise doing well. Occasional dizziness. Interval history:   She was previously seen by Dr. Josafat Ramsey. She had a diagnosis of atrial fibrillation made in 2018. Unfortunately, she had a fall and underwent bleeding complication with large hematoma of her leg. She additionally hit her head at the time of  her fall and fortunately she had negative imaging. She was chronically anticoagulated with Xarelto previously. Due to her elevated bleeding risk, she was referred for [facility] procedure, which was performed 2018. She underwent transesophageal echocardiogram at 45 days, which showed adequate seal  and she was taken off Xarelto therapy. Cardiac History:     Atrial fibrillation in 2017 with spontaneous cardioversion to sinus rhythm. Echocardiogram 2017 normal left ventricular systolic function mild aortic sclerosis    Anticoagulation with Xarelto. Fall in  with large hematoma treated surgically. 2018 WATCHMAN implantation successful.      2018 transesophageal echocardiogram with adequate seal.  Taken off Xarelto. 3/3/2020 Sinus rhythm poor R wave progression  3/2021 NST normal perfusion   2022 EKG sinus rhythm poor R wave progression      Assessment and Plan:     Chest discomfort    Not controlled    Nuclear stress perfusion study planned    Atrial fibrillation. Controlled continue flecainide 50 p.o. twice daily metoprolol 12.5 mg p.o. twice daily    Status post WATCHMAN.       Taken off Xarelto. Continue Plavix for six months per protocol. Long term use of antiarrhythmic drug. Continue flecainide 50 mg p.o. twice daily 12.5 mg p.o. twice daily continue metoprolol    No toxicities on recent ECG. Discussed discontinued zofran for nausea. Hypertension    Continue metoprolol 25 mg p.o. twice daily  Key CAD CHF Meds            metoprolol tartrate (LOPRESSOR) 25 MG tablet (Taking)    Sig - Route: Take 0.5 tablets by mouth in the morning and 0.5 tablets in the evening. - Oral             Aortic sclerosis stable echocardiogram 2017 no pathologic murmur today. Last MARY with trivial AI 2018  Follow-up according to appropriate imaging guidelines. Repeat echocardiogram at appropriate intervals planned 3/2023 prior to appointment      Current Outpatient Medications   Medication Sig    flecainide (TAMBOCOR) 50 MG tablet Take 1 tablet by mouth in the morning and 1 tablet in the evening. metoprolol tartrate (LOPRESSOR) 25 MG tablet Take 0.5 tablets by mouth in the morning and 0.5 tablets in the evening. aspirin 81 MG EC tablet Take by mouth daily    busPIRone (BUSPAR) 10 MG tablet Take 1 tablet by mouth 2 times daily    vitamin D (CHOLECALCIFEROL) 25 MCG (1000 UT) TABS tablet Take 5,000 Units by mouth daily    docusate (COLACE, DULCOLAX) 100 MG CAPS Take 100 mg by mouth 2 times daily as needed    DULoxetine (CYMBALTA) 30 MG extended release capsule TAKE 1 CAPSULE BY MOUTH EVERY DAY    DULoxetine (CYMBALTA) 60 MG extended release capsule TAKE 1 CAPSULE BY MOUTH EVERY DAY    levothyroxine (SYNTHROID) 100 MCG tablet Take 100 mcg by mouth every morning (before breakfast)    LORazepam (ATIVAN) 0.5 MG tablet Take 0.5 mg by mouth every 8 hours as needed.     melatonin 10 MG CAPS capsule Take by mouth    omeprazole (PRILOSEC) 40 MG delayed release capsule Take 40 mg by mouth daily    tiZANidine (ZANAFLEX) 4 MG tablet TAKE 1/2-1 TABLET BY MOUTH 3 TIMES A DAY AS NEEDED     No current facility-administered medications for this visit. Past Medical History, Past Surgical History, Family history, Social History, and Medications were all reviewed with the patient today and updated as necessary. Allergies   Allergen Reactions    Hydromorphone Swelling    Sulfa Antibiotics Rash     Past Medical History:   Diagnosis Date    Anxiety     Arthritis     Bell's palsy     in Oct. 2009 with some vertigo at times still, no other after effects    Depression     GERD (gastroesophageal reflux disease)     reflux, takes nexium    Hematoma of hip, right, initial encounter     Hypertension     on medication since 2006    Hypothyroidism     Nausea & vomiting     Obese     Paroxysmal atrial fibrillation ([facility] [address] 8/13/2017    Psychiatric disorder     depression    S/P total knee replacement using cement 5/16/2011    Unspecified hypothyroidism 5/16/2011     Past Surgical History:   Procedure Laterality Date    APPENDECTOMY  1961    BREAST BIOPSY Bilateral     BREAST LUMPECTOMY  1998    benign    CATARACT REMOVAL Bilateral 2014    CHOLECYSTECTOMY  1976    COLONOSCOPY  04/04/2016    GASTRECTOMY  9/21/15    sleeve    HYSTERECTOMY (CERVIX STATUS UNKNOWN)  [address]  2005    lower back     ORTHOPEDIC SURGERY Bilateral 1994    heel spurs removed    AR CARDIAC SURG PROCEDURE UNLIST      LAAO placed 7/17/18    THYROIDECTOMY  1980    partial    TONSILLECTOMY  1966    TOTAL KNEE ARTHROPLASTY Left 2011    TOTAL KNEE ARTHROPLASTY Right 2010    UROLOGICAL SURGERY  2008/2009    bladder tack X2     Family History   Problem Relation Age of Onset    Delayed Awakening Neg Hx     Pseudochol.  Deficiency Neg Hx     Post-op Nausea/Vomiting Neg Hx     Emergence Delirium Neg Hx     Post-op Cognitive Dysfunction Neg Hx     Cancer Mother         breast    Malig Hypertherm Neg Hx     Breast Cancer Mother 62    Cancer Father         throat    Other Father         gastric ulcer    Kidney Disease Father     Breast Cancer for atrial fibrillation and hypertension. Diagnoses and all orders for this visit:    Atrial fibrillation, unspecified type ([address]  -     EKG 12 Lead    Long term current use of antiarrhythmic drug  -     EKG 12 Lead    Hypertension, essential  -     EKG 12 Lead    Aortic valve insufficiency, etiology of cardiac valve disease unspecified  -     EKG 12 Lead  -     Transthoracic echocardiogram (TTE) complete with contrast, bubble, strain, and 3D PRN; Future    Dizziness  -     Vascular duplex carotid bilateral; Future    Other orders  -     flecainide (TAMBOCOR) 50 MG tablet; Take 1 tablet by mouth in the morning and 1 tablet in the evening.  -     metoprolol tartrate (LOPRESSOR) 25 MG tablet; Take 0.5 tablets by mouth in the morning and 0.5 tablets in the evening. Return in about 1 year (around 8/1/2023).        June García MD  8/1/2022  9:58 AM

## 2022-08-04 NOTE — PROGRESS NOTES
[facility]  Consultation Note  Virtual/Telephone Visit     Patient ID:  Name: Maria Alejandra Headley  MRN: [id number]  AGE: 69 y.o.  : 1945    Date of Consultation:  2022    CC:   Chief Complaint   Patient presents with    Back Problem         HPI:  Today's visit was performed through a virtual visit with live audio and video feed. The patient was at home in [place]. I was in the office. No other persons were present. Verbal informed consent was obtained prior to today's visit, which lasted 10 minutes and included evaluation and management of symptoms, review of imaging, review of previous treatments, and discussion of treatment options. Please see the note below for more detailed information regarding today's visit. Ms. Judy Manzanares is a 81-year-old female who presents today for follow-up of low back pain. She feels that she is awakening less now than she was with her most recent exacerbation of pain. MRI lumbar spine was performed 2022. This was compared to the prior MRI from 2018. She had multilevel degenerative change. On my review of the films, she does have bilateral lateral recess narrowing at L5-S1, which appears more significant than what is mentioned on the MRI report. She has left L4-5 neuroforaminal narrowing. She also has bilateral neuroforaminal narrowing at L3-4 and some central stenosis at L3-4. This is not really mentioned on the MRI report. We discussed these findings at length today.      Past Medical History Includes:   Past Medical History:   Diagnosis Date    Anxiety     Arthritis     Bell's palsy     in Oct. 2009 with some vertigo at times still, no other after effects    Depression     GERD (gastroesophageal reflux disease)     reflux, takes nexium    Hematoma of hip, right, initial encounter     Hypertension     on medication since     Hypothyroidism     Nausea & vomiting     Obese     Paroxysmal atrial fibrillation ([facility] [address] 8/13/2017    Psychiatric disorder     depression    S/P total knee replacement using cement 5/16/2011    Unspecified hypothyroidism 5/16/2011   ,   Past Surgical History:   Procedure Laterality Date    APPENDECTOMY  1961    BREAST BIOPSY Bilateral     BREAST LUMPECTOMY  1998    benign    CATARACT REMOVAL Bilateral 2014    CHOLECYSTECTOMY  1976    COLONOSCOPY  04/04/2016    GASTRECTOMY  9/21/15    sleeve    HYSTERECTOMY (CERVIX STATUS UNKNOWN)  [address]  2005    lower back     ORTHOPEDIC SURGERY Bilateral 1994    heel spurs removed    [facility] CARDI[facility] SURG PROCEDURE UNLIST      LAAO placed 7/17/18    THYROIDECTOMY  1980    partial    TONSILLECTOMY  1966    TOTAL KNEE ARTHROPLASTY Left 2011    TOTAL KNEE ARTHROPLASTY Right 2010    UROLOGICAL SURGERY  2008/2009    bladder tack X2     Family History:   Family History   Problem Relation Age of Onset    Delayed Awakening Neg Hx     Pseudochol. Deficiency Neg Hx     Post-op Nausea/Vomiting Neg Hx     Emergence Delirium Neg Hx     Post-op Cognitive Dysfunction Neg Hx     Cancer Mother         breast    Malig Hypertherm Neg Hx     Breast Cancer Mother 62    Cancer Father         throat    Other Father         gastric ulcer    Kidney Disease Father     Breast Cancer Daughter     Heart Attack Mother       Social History:   Social History     Tobacco Use    Smoking status: Never    Smokeless tobacco: Never   Substance Use Topics    Alcohol use: Yes     Alcohol/week: 1.7 standard drinks       ALLERGIES:   Allergies   Allergen Reactions    Hydromorphone Swelling    Sulfa Antibiotics Rash        Patient Medications    Current Outpatient Medications   Medication Sig    flecainide (TAMBOCOR) 50 MG tablet Take 1 tablet by mouth in the morning and 1 tablet in the evening. metoprolol tartrate (LOPRESSOR) 25 MG tablet Take 0.5 tablets by mouth in the morning and 0.5 tablets in the evening.     aspirin 81 MG EC tablet Take by mouth daily    busPIRone (BUSPAR) 10 MG tablet Take 1 tablet by mouth 2 times daily    vitamin D (CHOLECALCIFEROL) 25 MCG (1000 UT) TABS tablet Take 5,000 Units by mouth daily    docusate (COLACE, DULCOLAX) 100 MG CAPS Take 100 mg by mouth 2 times daily as needed    DULoxetine (CYMBALTA) 30 MG extended release capsule TAKE 1 CAPSULE BY MOUTH EVERY DAY    DULoxetine (CYMBALTA) 60 MG extended release capsule TAKE 1 CAPSULE BY MOUTH EVERY DAY    levothyroxine (SYNTHROID) 100 MCG tablet Take 100 mcg by mouth every morning (before breakfast)    LORazepam (ATIVAN) 0.5 MG tablet Take 0.5 mg by mouth every 8 hours as needed. melatonin 10 MG CAPS capsule Take by mouth    omeprazole (PRILOSEC) 40 MG delayed release capsule Take 40 mg by mouth daily    tiZANidine (ZANAFLEX) 4 MG tablet TAKE 1/2-1 TABLET BY MOUTH 3 TIMES A DAY AS NEEDED     No current facility-administered medications for this visit. ROS/Meds/PSH/PMH/FH/SH: I personally reviewed the patients standard intake form. No flowsheet data found. No results found for any visits on 08/05/22. Assessment and Plan:     ICD-10-CM    1. Lumbar spondylosis  M47.816       2. Lumbar radiculopathy  M54.16       3. Disc degeneration, lumbar  M51.36       4. Spinal stenosis of lumbar region, unspecified whether neurogenic claudication present  M48.061           No orders of the defined types were placed in this encounter. 4   This is a chronic illness/condition with exacerbation and progression  Treatment at this time: She feels she is doing well at this time. We discussed that if her symptoms persist or worsen she might benefit from medication management options, formal physical therapy, or lumbar injections. She would like to wait on that for now.     Studies ordered today: none    On this date 8/5/2022 I have spent 10 minutes reviewing previous notes, test results and face to face with the patient discussing the diagnosis and importance of compliance with the treatment plan as well as documenting on the day of the visit. Toño Roa, was evaluated through a synchronous (real-time) audio-video encounter. The patient (or guardian if applicable) is aware that this is a billable service. Verbal consent to proceed has been obtained within the past 12 months. The visit was conducted pursuant to the emergency declaration under the [address] [place] [address] authority and the [employer] and [employer] General Act. Patient identification was verified, and a caregiver was present when appropriate. The patient was located in a state where the provider was credentialed to provide care.        Kianna Morse MD  8/5/2022,  11:33 AM

## 2022-08-05 ENCOUNTER — TELEMEDICINE (OUTPATIENT)
Dept: ORTHOPEDIC SURGERY | Age: 77
End: 2022-08-05
Payer: MEDICARE

## 2022-08-05 DIAGNOSIS — M47.816 LUMBAR SPONDYLOSIS: Primary | ICD-10-CM

## 2022-08-05 DIAGNOSIS — M54.16 LUMBAR RADICULOPATHY: ICD-10-CM

## 2022-08-05 DIAGNOSIS — M51.36 DISC DEGENERATION, LUMBAR: ICD-10-CM

## 2022-08-05 DIAGNOSIS — M48.061 SPINAL STENOSIS OF LUMBAR REGION, UNSPECIFIED WHETHER NEUROGENIC CLAUDICATION PRESENT: ICD-10-CM

## 2022-08-05 PROCEDURE — G8400 PT W/DXA NO RESULTS DOC: HCPCS | Performed by: PHYSICAL MEDICINE & REHABILITATION

## 2022-08-05 PROCEDURE — 1090F PRES/ABSN URINE INCON ASSESS: CPT | Performed by: PHYSICAL MEDICINE & REHABILITATION

## 2022-08-05 PROCEDURE — 99212 OFFICE O/P EST SF 10 MIN: CPT | Performed by: PHYSICAL MEDICINE & REHABILITATION

## 2022-08-05 PROCEDURE — G8417 CALC BMI ABV UP PARAM F/U: HCPCS | Performed by: PHYSICAL MEDICINE & REHABILITATION

## 2022-08-05 PROCEDURE — 1123F ACP DISCUSS/DSCN MKR DOCD: CPT | Performed by: PHYSICAL MEDICINE & REHABILITATION

## 2022-08-05 PROCEDURE — G8428 CUR MEDS NOT DOCUMENT: HCPCS | Performed by: PHYSICAL MEDICINE & REHABILITATION

## 2022-08-05 PROCEDURE — 1036F TOBACCO NON-USER: CPT | Performed by: PHYSICAL MEDICINE & REHABILITATION

## 2022-08-19 ENCOUNTER — TELEPHONE (OUTPATIENT)
Dept: ORTHOPEDIC SURGERY | Age: 77
End: 2022-08-19

## 2022-08-19 RX ORDER — TIZANIDINE 4 MG/1
TABLET ORAL
Qty: 60 TABLET | Refills: 1 | Status: SHIPPED | OUTPATIENT
Start: 2022-08-19

## 2022-08-19 NOTE — TELEPHONE ENCOUNTER
She needs a refill of her Zanaflex sent to the pharmacy on file.  She is gong out of town on Tuesday

## 2022-09-14 RX ORDER — TIZANIDINE 4 MG/1
TABLET ORAL
Qty: 60 TABLET | Refills: 1 | OUTPATIENT
Start: 2022-09-14

## 2022-11-17 ENCOUNTER — HOSPITAL ENCOUNTER (OUTPATIENT)
Age: 77
Setting detail: OBSERVATION
Discharge: HOME | End: 2022-11-18
Attending: EMERGENCY MEDICINE | Admitting: INTERNAL MEDICINE
Payer: MEDICARE

## 2022-11-17 ENCOUNTER — NURSE TRIAGE (OUTPATIENT)
Dept: OTHER | Facility: CLINIC | Age: 77
End: 2022-11-17

## 2022-11-17 ENCOUNTER — APPOINTMENT (OUTPATIENT)
Dept: GENERAL RADIOLOGY | Age: 77
End: 2022-11-17
Payer: MEDICARE

## 2022-11-17 DIAGNOSIS — I48.91 ATRIAL FIBRILLATION WITH RVR (HCC): Primary | ICD-10-CM

## 2022-11-17 LAB
ALBUMIN SERPL-MCNC: 3.4 G/DL (ref 3.2–4.6)
ALBUMIN/GLOB SERPL: 0.9 {RATIO} (ref 0.4–1.6)
ALP SERPL-CCNC: 109 U/L (ref 50–136)
ALT SERPL-CCNC: 27 U/L (ref 12–65)
ANION GAP SERPL CALC-SCNC: 6 MMOL/L (ref 2–11)
AST SERPL-CCNC: 16 U/L (ref 15–37)
BILIRUB SERPL-MCNC: 0.6 MG/DL (ref 0.2–1.1)
BUN SERPL-MCNC: 24 MG/DL (ref 8–23)
CALCIUM SERPL-MCNC: 9.8 MG/DL (ref 8.3–10.4)
CHLORIDE SERPL-SCNC: 105 MMOL/L (ref 101–110)
CO2 SERPL-SCNC: 28 MMOL/L (ref 21–32)
CREAT SERPL-MCNC: 1 MG/DL (ref 0.6–1)
EKG ATRIAL RATE: 210 BPM
EKG DIAGNOSIS: NORMAL
EKG Q-T INTERVAL: 329 MS
EKG QRS DURATION: 103 MS
EKG QTC CALCULATION (BAZETT): 484 MS
EKG R AXIS: -14 DEGREES
EKG T AXIS: 158 DEGREES
EKG VENTRICULAR RATE: 130 BPM
ERYTHROCYTE [DISTWIDTH] IN BLOOD BY AUTOMATED COUNT: 14.1 % (ref 11.9–14.6)
GLOBULIN SER CALC-MCNC: 3.9 G/DL (ref 2.8–4.5)
GLUCOSE SERPL-MCNC: 130 MG/DL (ref 65–100)
HCT VFR BLD AUTO: 46.6 % (ref 35.8–46.3)
HGB BLD-MCNC: 15.3 G/DL (ref 11.7–15.4)
MAGNESIUM SERPL-MCNC: 1.9 MG/DL (ref 1.8–2.4)
MCH RBC QN AUTO: 29.5 PG (ref 26.1–32.9)
MCHC RBC AUTO-ENTMCNC: 32.8 G/DL (ref 31.4–35)
MCV RBC AUTO: 89.8 FL (ref 82–102)
NRBC # BLD: 0 K/UL (ref 0–0.2)
NT PRO BNP: 6188 PG/ML
PLATELET # BLD AUTO: 385 K/UL (ref 150–450)
PMV BLD AUTO: 10.4 FL (ref 9.4–12.3)
POTASSIUM SERPL-SCNC: 3.7 MMOL/L (ref 3.5–5.1)
PROT SERPL-MCNC: 7.3 G/DL (ref 6.3–8.2)
RBC # BLD AUTO: 5.19 M/UL (ref 4.05–5.2)
SODIUM SERPL-SCNC: 139 MMOL/L (ref 133–143)
T4 FREE SERPL-MCNC: 2.2 NG/DL (ref 0.78–1.46)
TROPONIN I SERPL HS-MCNC: 26.3 PG/ML (ref 0–14)
TROPONIN I SERPL HS-MCNC: 29.5 PG/ML (ref 0–14)
TROPONIN I SERPL HS-MCNC: 30.3 PG/ML (ref 0–14)
TROPONIN I SERPL HS-MCNC: 35.7 PG/ML (ref 0–14)
TSH W FREE THYROID IF ABNORMAL: 3.97 UIU/ML (ref 0.36–3.74)
WBC # BLD AUTO: 12.8 K/UL (ref 4.3–11.1)

## 2022-11-17 PROCEDURE — 6370000000 HC RX 637 (ALT 250 FOR IP): Performed by: PHYSICIAN ASSISTANT

## 2022-11-17 PROCEDURE — 83880 ASSAY OF NATRIURETIC PEPTIDE: CPT

## 2022-11-17 PROCEDURE — G0378 HOSPITAL OBSERVATION PER HR: HCPCS

## 2022-11-17 PROCEDURE — 96366 THER/PROPH/DIAG IV INF ADDON: CPT

## 2022-11-17 PROCEDURE — 83735 ASSAY OF MAGNESIUM: CPT

## 2022-11-17 PROCEDURE — 6370000000 HC RX 637 (ALT 250 FOR IP): Performed by: NURSE PRACTITIONER

## 2022-11-17 PROCEDURE — 2580000003 HC RX 258: Performed by: PHYSICIAN ASSISTANT

## 2022-11-17 PROCEDURE — 96374 THER/PROPH/DIAG INJ IV PUSH: CPT

## 2022-11-17 PROCEDURE — 96365 THER/PROPH/DIAG IV INF INIT: CPT

## 2022-11-17 PROCEDURE — 99285 EMERGENCY DEPT VISIT HI MDM: CPT

## 2022-11-17 PROCEDURE — 85027 COMPLETE CBC AUTOMATED: CPT

## 2022-11-17 PROCEDURE — 71045 X-RAY EXAM CHEST 1 VIEW: CPT

## 2022-11-17 PROCEDURE — 84443 ASSAY THYROID STIM HORMONE: CPT

## 2022-11-17 PROCEDURE — 2500000003 HC RX 250 WO HCPCS: Performed by: PHYSICIAN ASSISTANT

## 2022-11-17 PROCEDURE — 96375 TX/PRO/DX INJ NEW DRUG ADDON: CPT

## 2022-11-17 PROCEDURE — 2500000003 HC RX 250 WO HCPCS: Performed by: EMERGENCY MEDICINE

## 2022-11-17 PROCEDURE — 99215 OFFICE O/P EST HI 40 MIN: CPT | Performed by: INTERNAL MEDICINE

## 2022-11-17 PROCEDURE — 80053 COMPREHEN METABOLIC PANEL: CPT

## 2022-11-17 PROCEDURE — 36415 COLL VENOUS BLD VENIPUNCTURE: CPT

## 2022-11-17 PROCEDURE — 84439 ASSAY OF FREE THYROXINE: CPT

## 2022-11-17 PROCEDURE — 94761 N-INVAS EAR/PLS OXIMETRY MLT: CPT

## 2022-11-17 PROCEDURE — 84484 ASSAY OF TROPONIN QUANT: CPT

## 2022-11-17 PROCEDURE — 96376 TX/PRO/DX INJ SAME DRUG ADON: CPT

## 2022-11-17 PROCEDURE — 93005 ELECTROCARDIOGRAM TRACING: CPT | Performed by: EMERGENCY MEDICINE

## 2022-11-17 RX ORDER — BUSPIRONE HYDROCHLORIDE 10 MG/1
10 TABLET ORAL 2 TIMES DAILY
Status: DISCONTINUED | OUTPATIENT
Start: 2022-11-17 | End: 2022-11-18 | Stop reason: HOSPADM

## 2022-11-17 RX ORDER — LORAZEPAM 1 MG/1
0.5 TABLET ORAL EVERY 8 HOURS PRN
Status: DISCONTINUED | OUTPATIENT
Start: 2022-11-17 | End: 2022-11-18 | Stop reason: HOSPADM

## 2022-11-17 RX ORDER — SODIUM CHLORIDE 0.9 % (FLUSH) 0.9 %
5-40 SYRINGE (ML) INJECTION PRN
Status: DISCONTINUED | OUTPATIENT
Start: 2022-11-17 | End: 2022-11-18 | Stop reason: HOSPADM

## 2022-11-17 RX ORDER — FLECAINIDE ACETATE 50 MG/1
75 TABLET ORAL EVERY 12 HOURS
Status: DISCONTINUED | OUTPATIENT
Start: 2022-11-17 | End: 2022-11-18

## 2022-11-17 RX ORDER — TIZANIDINE 2 MG/1
2 TABLET ORAL EVERY 6 HOURS PRN
Status: DISCONTINUED | OUTPATIENT
Start: 2022-11-17 | End: 2022-11-18 | Stop reason: HOSPADM

## 2022-11-17 RX ORDER — DOCUSATE SODIUM 100 MG/1
100 CAPSULE, LIQUID FILLED ORAL 2 TIMES DAILY PRN
Status: DISCONTINUED | OUTPATIENT
Start: 2022-11-17 | End: 2022-11-18 | Stop reason: HOSPADM

## 2022-11-17 RX ORDER — ACETAMINOPHEN 650 MG/1
650 SUPPOSITORY RECTAL EVERY 6 HOURS PRN
Status: DISCONTINUED | OUTPATIENT
Start: 2022-11-17 | End: 2022-11-18 | Stop reason: HOSPADM

## 2022-11-17 RX ORDER — POLYETHYLENE GLYCOL 3350 17 G/17G
17 POWDER, FOR SOLUTION ORAL DAILY PRN
Status: DISCONTINUED | OUTPATIENT
Start: 2022-11-17 | End: 2022-11-18 | Stop reason: HOSPADM

## 2022-11-17 RX ORDER — DILTIAZEM HYDROCHLORIDE 5 MG/ML
10 INJECTION INTRAVENOUS ONCE
Status: COMPLETED | OUTPATIENT
Start: 2022-11-17 | End: 2022-11-17

## 2022-11-17 RX ORDER — LANOLIN ALCOHOL/MO/W.PET/CERES
6 CREAM (GRAM) TOPICAL NIGHTLY PRN
Status: DISCONTINUED | OUTPATIENT
Start: 2022-11-17 | End: 2022-11-18 | Stop reason: HOSPADM

## 2022-11-17 RX ORDER — PANTOPRAZOLE SODIUM 40 MG/1
40 TABLET, DELAYED RELEASE ORAL
Status: DISCONTINUED | OUTPATIENT
Start: 2022-11-18 | End: 2022-11-18 | Stop reason: HOSPADM

## 2022-11-17 RX ORDER — SODIUM CHLORIDE 9 MG/ML
INJECTION, SOLUTION INTRAVENOUS PRN
Status: DISCONTINUED | OUTPATIENT
Start: 2022-11-17 | End: 2022-11-18 | Stop reason: HOSPADM

## 2022-11-17 RX ORDER — ONDANSETRON 4 MG/1
4 TABLET, ORALLY DISINTEGRATING ORAL EVERY 8 HOURS PRN
Status: DISCONTINUED | OUTPATIENT
Start: 2022-11-17 | End: 2022-11-18 | Stop reason: HOSPADM

## 2022-11-17 RX ORDER — ASPIRIN 81 MG/1
81 TABLET ORAL DAILY
Status: DISCONTINUED | OUTPATIENT
Start: 2022-11-17 | End: 2022-11-18 | Stop reason: HOSPADM

## 2022-11-17 RX ORDER — METOPROLOL TARTRATE 5 MG/5ML
5 INJECTION INTRAVENOUS
Status: COMPLETED | OUTPATIENT
Start: 2022-11-17 | End: 2022-11-17

## 2022-11-17 RX ORDER — ONDANSETRON 2 MG/ML
4 INJECTION INTRAMUSCULAR; INTRAVENOUS EVERY 6 HOURS PRN
Status: DISCONTINUED | OUTPATIENT
Start: 2022-11-17 | End: 2022-11-18 | Stop reason: HOSPADM

## 2022-11-17 RX ORDER — LEVOTHYROXINE SODIUM 0.05 MG/1
100 TABLET ORAL
Status: DISCONTINUED | OUTPATIENT
Start: 2022-11-18 | End: 2022-11-18 | Stop reason: HOSPADM

## 2022-11-17 RX ORDER — DULOXETIN HYDROCHLORIDE 60 MG/1
60 CAPSULE, DELAYED RELEASE ORAL DAILY
Status: DISCONTINUED | OUTPATIENT
Start: 2022-11-18 | End: 2022-11-18 | Stop reason: HOSPADM

## 2022-11-17 RX ORDER — SODIUM CHLORIDE 0.9 % (FLUSH) 0.9 %
5-40 SYRINGE (ML) INJECTION EVERY 12 HOURS SCHEDULED
Status: DISCONTINUED | OUTPATIENT
Start: 2022-11-17 | End: 2022-11-18 | Stop reason: HOSPADM

## 2022-11-17 RX ORDER — ACETAMINOPHEN 325 MG/1
650 TABLET ORAL EVERY 6 HOURS PRN
Status: DISCONTINUED | OUTPATIENT
Start: 2022-11-17 | End: 2022-11-18 | Stop reason: HOSPADM

## 2022-11-17 RX ADMIN — APIXABAN 5 MG: 5 TABLET, FILM COATED ORAL at 15:06

## 2022-11-17 RX ADMIN — SODIUM CHLORIDE, PRESERVATIVE FREE 10 ML: 5 INJECTION INTRAVENOUS at 21:12

## 2022-11-17 RX ADMIN — APIXABAN 5 MG: 5 TABLET, FILM COATED ORAL at 21:08

## 2022-11-17 RX ADMIN — BUSPIRONE HYDROCHLORIDE 10 MG: 10 TABLET ORAL at 21:08

## 2022-11-17 RX ADMIN — SODIUM CHLORIDE 2.5 MG/HR: 900 INJECTION, SOLUTION INTRAVENOUS at 13:33

## 2022-11-17 RX ADMIN — METOPROLOL TARTRATE 12.5 MG: 25 TABLET, FILM COATED ORAL at 17:14

## 2022-11-17 RX ADMIN — FLECAINIDE ACETATE 75 MG: 50 TABLET ORAL at 21:55

## 2022-11-17 RX ADMIN — DILTIAZEM HYDROCHLORIDE 10 MG: 5 INJECTION INTRAVENOUS at 13:27

## 2022-11-17 RX ADMIN — METOPROLOL TARTRATE 5 MG: 5 INJECTION INTRAVENOUS at 13:01

## 2022-11-17 ASSESSMENT — PAIN SCALES - GENERAL
PAINLEVEL_OUTOF10: 0
PAINLEVEL_OUTOF10: 0

## 2022-11-17 ASSESSMENT — ENCOUNTER SYMPTOMS
WHEEZING: 0
VOMITING: 0
BACK PAIN: 0
DIARRHEA: 0
COUGH: 0
NAUSEA: 0
RHINORRHEA: 0
SHORTNESS OF BREATH: 1
ABDOMINAL PAIN: 0

## 2022-11-17 ASSESSMENT — PAIN - FUNCTIONAL ASSESSMENT
PAIN_FUNCTIONAL_ASSESSMENT: 0-10
PAIN_FUNCTIONAL_ASSESSMENT: NONE - DENIES PAIN

## 2022-11-17 NOTE — ED PROVIDER NOTES
Emergency Department Provider Note                   PCP: Sera Nguyen MD               Age: 69 y.o. Sex: female       ICD-10-CM    1. Atrial fibrillation with RVR ([facility])  I48.91 Transthoracic echocardiogram (TTE) complete with contrast, bubble, strain, and 3D PRN     Transthoracic echocardiogram (TTE) complete with contrast, bubble, strain, and 3D PRN          DISPOSITION Admitted 11/17/2022 01:16:25 PM        MDM  Number of Diagnoses or Management Options  Atrial fibrillation with RVR ([facility] [address]): new, needed workup  Diagnosis management comments: 52-year-old female with history of atrial fibrillation presents with complaint of fatigue, shortness of breath. Denies any chest pain. Patient sent from urgent care w/ concern for possible STEMI. EKG with A. fib with RVR. No evidence of STEMI at this time. Lopressor IV given with no significant change in rate. Cardiology present at bedside. Dr. Ezequiel Flores and cardiology service to admit. Amount and/or Complexity of Data Reviewed  Clinical lab tests: ordered and reviewed  Tests in the radiology section of CPT®: ordered and reviewed  Tests in the medicine section of CPT®: ordered and reviewed  Review and summarize past medical records: yes  Discuss the patient with other providers: yes  Independent visualization of images, tracings, or specimens: yes    Risk of Complications, Morbidity, and/or Mortality  Presenting problems: high  Diagnostic procedures: moderate  Management options: high    Patient Progress  Patient progress: stable             Orders Placed This Encounter   Procedures    Critical Care    XR CHEST PORTABLE    CBC    Comprehensive Metabolic Panel    Troponin    Magnesium    Brain Natriuretic Peptide    TSH with Reflex    Basic Metabolic Panel    Magnesium    Troponin    T4, Free    ADULT DIET;  Regular    Cardiac Monitor    Pulse Oximetry    Vital signs per unit routine    Telemetry monitoring - 72 hour duration    Up as tolerated Full code    Initiate Oxygen Therapy Protocol    EKG 12 Lead    Saline lock IV    Place in Observation Service    Discharge patient        Medications   dilTIAZem injection 10 mg (10 mg IntraVENous Given 11/17/22 1327)     Followed by   dilTIAZem 100 mg in sodium chloride 0.9 % 100 mL infusion ([facility]) (0 mg/hr IntraVENous Stopped 11/17/22 2112)   aspirin EC tablet 81 mg (81 mg Oral Given 11/18/22 0929)   busPIRone (BUSPAR) tablet 10 mg (10 mg Oral Given 11/18/22 0930)   docusate sodium (COLACE) capsule 100 mg (has no administration in time range)   DULoxetine (CYMBALTA) extended release capsule 60 mg (60 mg Oral Given 11/18/22 0929)   levothyroxine (SYNTHROID) tablet 100 mcg (100 mcg Oral Given 11/18/22 0614)   LORazepam (ATIVAN) tablet 0.5 mg (has no administration in time range)   melatonin tablet 6 mg (has no administration in time range)   metoprolol tartrate (LOPRESSOR) tablet 12.5 mg (12.5 mg Oral Given 11/18/22 0930)   pantoprazole (PROTONIX) tablet 40 mg (40 mg Oral Given 11/18/22 0615)   tiZANidine (ZANAFLEX) tablet 2 mg (has no administration in time range)   sodium chloride flush 0.9 % injection 5-40 mL (10 mLs IntraVENous Given 11/18/22 0931)   sodium chloride flush 0.9 % injection 5-40 mL (has no administration in time range)   0.9 % sodium chloride infusion (has no administration in time range)   ondansetron (ZOFRAN-ODT) disintegrating tablet 4 mg (has no administration in time range)     Or   ondansetron (ZOFRAN) injection 4 mg (has no administration in time range)   polyethylene glycol (GLYCOLAX) packet 17 g (has no administration in time range)   acetaminophen (TYLENOL) tablet 650 mg (has no administration in time range)     Or   acetaminophen (TYLENOL) suppository 650 mg (has no administration in time range)   flecainide (TAMBOCOR) tablet 100 mg (100 mg Oral Given 11/18/22 0937)   perflutren lipid microspheres syringe (0.45 mLs IntraVENous Given 11/18/22 1128)   metoprolol (LOPRESSOR) injection 5 mg (5 mg IntraVENous Given 11/17/22 1301)       Discharge Medication List as of 11/18/2022 11:42 AM           Shiva Mujica is a 69 y.o. female who presents to the Emergency Department with chief complaint of  fatigue, SOB. Chief Complaint   Patient presents with    Fatigue      80-year-old female with history of A. fib status post watchman, hypertension, osteoarthritis, anemia, GERD presents with complaint of worsening shortness of breath, fatigue, generalized weakness that worsened over the past several days. Patient states that she was diagnosed with bronchitis around 6 days ago. States at that time she was started on antibiotics, prednisone, Tessalon Perles. Patient states her symptoms have progressively worsened over the past several days. Patient was seen at urgent care today and had EKG performed. Cardiology was consulted over concern for possible ST changes. Patient denies any chest pain, hemoptysis, abdominal pain, lower extreme swelling or calf tenderness. States that she took half dose of her metoprolol this morning. Patient states symptoms are exacerbated with exertion, ambulation. The history is provided by the patient and the EMS personnel. No  was used. Review of Systems   Constitutional:  Positive for fatigue. Negative for chills, diaphoresis and fever. HENT:  Positive for congestion. Negative for rhinorrhea. Eyes:  Negative for visual disturbance. Respiratory:  Positive for shortness of breath. Negative for cough and wheezing. Cardiovascular:  Positive for palpitations. Negative for chest pain and leg swelling. Gastrointestinal:  Negative for abdominal pain, diarrhea, nausea and vomiting. Genitourinary:  Negative for dysuria and flank pain. Musculoskeletal:  Negative for arthralgias, back pain and myalgias. Skin:  Negative for rash.    Neurological:  Negative for dizziness, syncope, weakness, light-headedness, numbness and headaches. Hematological:  Does not bruise/bleed easily. Psychiatric/Behavioral:  Negative for confusion. Past Medical History:   Diagnosis Date    Anxiety     Arthritis     Bell's palsy     in Oct. 2009 with some vertigo at times still, no other after effects    Depression     GERD (gastroesophageal reflux disease)     reflux, takes nexium    Hematoma of hip, right, initial encounter     Hypertension     on medication since 2006    Hypothyroidism     Nausea & vomiting     Obese     Paroxysmal atrial fibrillation ([facility] [address] 8/13/2017    Psychiatric disorder     depression    S/P total knee replacement using cement 5/16/2011    Unspecified hypothyroidism 5/16/2011        Past Surgical History:   Procedure Laterality Date    APPENDECTOMY  1961    BREAST BIOPSY Bilateral     BREAST LUMPECTOMY  1998    benign    CATARACT REMOVAL Bilateral 2014    CHOLECYSTECTOMY  1976    COLONOSCOPY  04/04/2016    GASTRECTOMY  9/21/15    sleeve    HYSTERECTOMY (CERVIX STATUS UNKNOWN)  [address]  2005    lower back     ORTHOPEDIC SURGERY Bilateral 1994    heel spurs removed    LA CARDIAC SURG PROCEDURE UNLIST      LAAO placed 7/17/18    THYROIDECTOMY  1980    partial    TONSILLECTOMY  1966    TOTAL KNEE ARTHROPLASTY Left 2011    TOTAL KNEE ARTHROPLASTY Right 2010    UROLOGICAL SURGERY  2008/2009    bladder tack X2        Family History   Problem Relation Age of Onset    Delayed Awakening Neg Hx     Pseudochol. Deficiency Neg Hx     Post-op Nausea/Vomiting Neg Hx     Emergence Delirium Neg Hx     Post-op Cognitive Dysfunction Neg Hx     Cancer Mother         breast    Malig Hypertherm Neg Hx     Breast Cancer Mother 62    Cancer Father         throat    Other Father         gastric ulcer    Kidney Disease Father     Breast Cancer Daughter     Heart Attack Mother         Social History     Socioeconomic History    Marital status:     Tobacco Use    Smoking status: Never    Smokeless tobacco: Never   Substance and Sexual Activity    Alcohol use: Yes     Alcohol/week: 1.7 standard drinks    Drug use: Never         Hydromorphone and Sulfa antibiotics     Discharge Medication List as of 11/18/2022 11:42 AM        CONTINUE these medications which have NOT CHANGED    Details   metoprolol tartrate (LOPRESSOR) 25 MG tablet Take 0.5 tablets by mouth in the morning and 0.5 tablets in the evening., Disp-180 tablet, R-3Normal      aspirin 81 MG EC tablet Take by mouth dailyHistorical Med      busPIRone (BUSPAR) 10 MG tablet Take 1 tablet by mouth 2 times dailyHistorical Med      vitamin D (CHOLECALCIFEROL) 25 MCG (1000 UT) TABS tablet Take 5,000 Units by mouth dailyHistorical Med      docusate (COLACE, DULCOLAX) 100 MG CAPS Take 100 mg by mouth 2 times daily as neededHistorical Med      DULoxetine (CYMBALTA) 60 MG extended release capsule TAKE 1 CAPSULE BY MOUTH EVERY DAYHistorical Med      levothyroxine (SYNTHROID) 100 MCG tablet Take 100 mcg by mouth every morning (before breakfast)Historical Med      LORazepam (ATIVAN) 0.5 MG tablet Take 0.5 mg by mouth every 8 hours as needed. Historical Med      melatonin 10 MG CAPS capsule Take by mouthHistorical Med      omeprazole (PRILOSEC) 40 MG delayed release capsule Take 40 mg by mouth dailyHistorical Med      tiZANidine (ZANAFLEX) 4 MG tablet TAKE 1/2-1 TABLET BY MOUTH 3 TIMES A DAY AS NEEDEDHistorical Med              Vitals signs and nursing note reviewed. Patient Vitals for the past 4 hrs:   Pulse BP   11/18/22 0930 75 126/60          Physical Exam  Vitals and nursing note reviewed. Constitutional:       Appearance: Normal appearance. HENT:      Head: Normocephalic. Nose: Nose normal.      Mouth/Throat:      Mouth: Mucous membranes are moist.   Eyes:      Extraocular Movements: Extraocular movements intact. Pupils: Pupils are equal, round, and reactive to light. Cardiovascular:      Rate and Rhythm: Tachycardia present. Rhythm irregular. Pulses: Normal pulses. Pulmonary:      Effort: Pulmonary effort is normal.      Breath sounds: Normal breath sounds. Abdominal:      Palpations: Abdomen is soft. Tenderness: There is no abdominal tenderness. There is no guarding or rebound. Comments: Soft, nontender, nondistended. No rebound or guarding. Musculoskeletal:         General: No swelling. Normal range of motion. Right lower leg: No edema. Left lower leg: No edema. Comments: No calf tenderness. No palpable cords. Skin:     General: Skin is warm. Findings: No erythema or rash. Neurological:      General: No focal deficit present. Mental Status: She is alert and oriented to person, place, and time. Cranial Nerves: No cranial nerve deficit. Sensory: No sensory deficit. Motor: No weakness. Comments: No focal deficits. Strength 5 out of 5 throughout. EKG 12 Lead    Date/Time: 11/17/2022 12:57 PM  Performed by: Lyly Barraza MD  Authorized by: Lyly Barraza MD     ECG reviewed by ED Physician in the absence of a cardiologist: yes    Rate:     ECG rate:  130    ECG rate assessment: tachycardic    Rhythm:     Rhythm: atrial fibrillation    Ectopy:     Ectopy: none    QRS:     QRS axis:  Normal    QRS intervals:  Normal    QRS conduction: normal    ST segments:     ST segments:  Normal  T waves:     T waves: normal    Critical Care  Performed by: Lyly Barraza MD  Authorized by: Nikita Swartz MD     Critical care provider statement:     Critical care time (minutes):  30    Critical care time was exclusive of:  Separately billable procedures and treating other patients    Critical care was necessary to treat or prevent imminent or life-threatening deterioration of the following conditions: Atrial fibrillation with RVR.     Critical care was time spent personally by me on the following activities:  Blood draw for specimens, development of treatment plan with patient or surrogate, discussions with consultants, examination of patient, evaluation of patient's response to treatment, obtaining history from patient or surrogate, ordering and performing treatments and interventions, ordering and review of laboratory studies, ordering and review of radiographic studies, pulse oximetry, re-evaluation of patient's condition and review of old charts    I assumed direction of critical care for this patient from another provider in my specialty: yes      Care discussed with: admitting provider    Comments:      Patient with Afib with RVR w/ initial rate in 140s-150s requiring initial treatment with IV Lopressor and subsequent treatment with Cardizem. Pt to be admitted by Cardiology service. Results for orders placed or performed during the hospital encounter of 11/17/22   XR CHEST PORTABLE    Narrative    EXAMINATION: XR CHEST PORTABLE 11/17/2022 12:57 PM    ACCESSION NUMBER: [id number]    COMPARISON: Chest radiograph 8/13/2017. INDICATION: Chest Pain    TECHNIQUE: A single view of the chest was obtained. FINDINGS:   No focal consolidation. No pulmonary edema. No pneumothorax or sizable pleural effusion. Stable cardiomediastinal silhouette. Aortic arch atherosclerotic calcification. Left atrial appendage device noted. Impression    No acute airspace disease.        CBC   Result Value Ref Range    WBC 12.8 (H) 4.3 - 11.1 K/uL    RBC 5.19 4.05 - 5.2 M/uL    Hemoglobin 15.3 11.7 - 15.4 g/dL    Hematocrit 46.6 (H) 35.8 - 46.3 %    MCV 89.8 82 - 102 FL    MCH 29.5 26.1 - 32.9 PG    MCHC 32.8 31.4 - 35.0 g/dL    RDW 14.1 11.9 - 14.6 %    Platelets 021 521 - 310 K/uL    MPV 10.4 9.4 - 12.3 FL    nRBC 0.00 0.0 - 0.2 K/uL   Comprehensive Metabolic Panel   Result Value Ref Range    Sodium 139 133 - 143 mmol/L    Potassium 3.7 3.5 - 5.1 mmol/L    Chloride 105 101 - 110 mmol/L    CO2 28 21 - 32 mmol/L    Anion Gap 6 2 - 11 mmol/L    Glucose 130 (H) 65 - 100 mg/dL    BUN 24 (H) 8 - 23 MG/DL    Creatinine 1.00 0.6 - 1.0 MG/DL    Est, Glom Filt Rate 58 (L) >60 ml/min/1.73m2    Calcium 9.8 8.3 - 10.4 MG/DL    Total Bilirubin 0.6 0.2 - 1.1 MG/DL    ALT 27 12 - 65 U/L    AST 16 15 - 37 U/L    Alk Phosphatase 109 50 - 136 U/L    Total Protein 7.3 6.3 - 8.2 g/dL    Albumin 3.4 3.2 - 4.6 g/dL    Globulin 3.9 2.8 - 4.5 g/dL    Albumin/Globulin Ratio 0.9 0.4 - 1.6     Troponin   Result Value Ref Range    Troponin, High Sensitivity 30.3 (H) 0 - 14 pg/mL   Troponin   Result Value Ref Range    Troponin, High Sensitivity 26.3 (H) 0 - 14 pg/mL   Magnesium   Result Value Ref Range    Magnesium 1.9 1.8 - 2.4 mg/dL   Brain Natriuretic Peptide   Result Value Ref Range    NT Pro-BNP 6,188 (H) <450 PG/ML   TSH with Reflex   Result Value Ref Range    TSH w Free Thyroid if Abnormal 3.97 (H) 0.358 - 3.740 UIU/ML   T4, Free   Result Value Ref Range    T4 Free 2.2 (H) 0.78 - 1.46 NG/DL   Basic Metabolic Panel   Result Value Ref Range    Sodium 140 133 - 143 mmol/L    Potassium 3.9 3.5 - 5.1 mmol/L    Chloride 106 101 - 110 mmol/L    CO2 28 21 - 32 mmol/L    Anion Gap 6 2 - 11 mmol/L    Glucose 106 (H) 65 - 100 mg/dL    BUN 25 (H) 8 - 23 MG/DL    Creatinine 0.90 0.6 - 1.0 MG/DL    Est, Glom Filt Rate >60 >60 ml/min/1.73m2    Calcium 9.5 8.3 - 10.4 MG/DL   Magnesium   Result Value Ref Range    Magnesium 2.0 1.8 - 2.4 mg/dL   Troponin   Result Value Ref Range    Troponin, High Sensitivity 29.5 (H) 0 - 14 pg/mL   Troponin   Result Value Ref Range    Troponin, High Sensitivity 35.7 (H) 0 - 14 pg/mL   EKG 12 Lead   Result Value Ref Range    Ventricular Rate 130 BPM    Atrial Rate 210 BPM    QRS Duration 103 ms    Q-T Interval 329 ms    QTc Calculation (Bazett) 484 ms    R Axis -14 degrees    T Axis 158 degrees    Diagnosis Atrial fibrillation     [facility]    Antwan Au Avi Acuna MD     11/17/2022  1:18 PM  EKG 12 Lead    Date/Time: 11/17/2022 12:57 PM  Performed by: Naomi Costa MD  Authorized by: Kaye Bernard Chhaya Carson MD     ECG reviewed by ED Physician in the absence of a cardiologist: yes    Rate:     ECG rate:  130    ECG rate assessment: tachycardic    Rhythm:     Rhythm: atrial fibrillation    Ectopy:     Ectopy: none    QRS:     QRS axis:  Normal    QRS intervals:  Normal    QRS conduction: normal    ST segments:     ST segments:  Normal  T waves:     T waves: normal     Transthoracic echocardiogram (TTE) complete with contrast, bubble, strain, and 3D PRN   Result Value Ref Range    LV EDV A2C 79 mL    LV EDV A4C 114 mL    LV ESV A2C 29 mL    LV ESV A4C 45 mL    IVSd 1.3 (A) 0.6 - 0.9 cm    LVIDd 3.9 3.9 - 5.3 cm    LVIDs 2.7 cm    LVOT Diameter 1.9 cm    LVOT Mean Gradient 3 mmHg    LVOT VTI 23.5 cm    LVOT Peak Velocity 1.1 m/s    LVOT Peak Gradient 4 mmHg    LVPWd 1.2 (A) 0.6 - 0.9 cm    LV E' Lateral Velocity 10 cm/s    LV E' Septal Velocity 7 cm/s    LV Ejection Fraction A2C 63 %    LV Ejection Fraction A4C 61 %    EF BP 63 55 - 100 %    LVOT Area 2.8 cm2    LVOT SV 66.6 ml    LA Minor Axis 6.1 cm    LA Major Axis 6.4 cm    LA Area 2C 23.2 cm2    LA Area 4C 26.8 cm2    LA Volume 2C 73 (A) 22 - 52 mL    LA Volume 4C 91 (A) 22 - 52 mL    LA Volume BP 84 (A) 22 - 52 mL    LA Diameter 5.5 cm    AV Mean Velocity 0.9 m/s    AV Mean Gradient 4 mmHg    AV VTI 26.8 cm    AV Peak Velocity 1.3 m/s    AV Peak Gradient 7 mmHg    AV Area by VTI 2.5 cm2    AV Area by Peak Velocity 2.3 cm2    Aortic Root 2.8 cm    Ascending Aorta 2.7 cm    IVC Proxmal 1.8 cm    MV E Wave Deceleration Time 267.0 ms    MV A Velocity 0.63 m/s    MV E Velocity 0.82 m/s    MV Mean Gradient 1 mmHg    MV VTI 24.1 cm    MV Mean Velocity 0.6 m/s    MV Max Velocity 0.9 m/s    MV Peak Gradient 3 mmHg    MV Area by VTI 2.8 cm2    SC Max Velocity 1.3 m/s    Pulmonary Artery EDP 7 mmHg    PV AT 95.0 ms    PV Max Velocity 0.9 m/s    PV Peak Gradient 3 mmHg    RVIDd 3.2 cm    RV Basal Dimension 3.4 cm    RV Free Wall Peak S' 15 cm/s    TAPSE 2.0 1.7 cm    Body Surface Area 2 m2    Fractional Shortening 2D 31 28 - 44 %    LV ESV Index A4C 23 mL/m2    LV EDV Index A4C 59 mL/m2    LV ESV Index A2C 15 mL/m2    LV EDV Index A2C 41 mL/m2    LVIDd Index 2.01 cm/m2    LVIDs Index 1.39 cm/m2    LV RWT Ratio 0.62     LV Mass 2D 169.4 (A) 67 - 162 g    LV Mass 2D Index 87.3 43 - 95 g/m2    MV E/A 1.30     E/E' Ratio (Averaged) 9.96     E/E' Lateral 8.20     E/E' Septal 11.71     LA Volume Index BP 43 (A) 16 - 34 ml/m2    LVOT Stroke Volume Index 34.3 mL/m2    LA Volume Index 2C 38 (A) 16 - 34 mL/m2    LA Volume Index 4C 47 (A) 16 - 34 mL/m2    LA Size Index 2.84 cm/m2    LA/AO Root Ratio 1.96     Ao Root Index 1.44 cm/m2    Ascending Aorta Index 1.39 cm/m2    AV Velocity Ratio 0.85     LVOT:AV VTI Index 0.88     KEVIN/BSA VTI 1.3 cm2/m2    KEVIN/BSA Peak Velocity 1.2 cm2/m2    MV:LVOT VTI Index 1.03     Narrative      Left Ventricle: Normal left ventricular systolic function with a   visually estimated EF of 60 - 65%. Left ventricle size is normal. Mildly   increased wall thickness. Normal wall motion. Normal diastolic function. Left Atrium: Left atrium is moderately dilated. Mitral Valve: Mild regurgitation. Tricuspid Valve: Mild regurgitation. XR CHEST PORTABLE   Final Result   No acute airspace disease. Voice dictation software was used during the making of this note. This software is not perfect and grammatical and other typographical errors may be present. This note has not been completely proofread for errors.      Kirby Mills MD  11/17/22 1317       Antwan Guillermo MD  11/18/22 1325

## 2022-11-17 NOTE — PROGRESS NOTES
[facility] CARDIOLOGY  [facility], [address]  PHONE: [phone]         CONSULT        22      NAME:  Ekta Flores  : 1945  MRN: [id number]      SUBJECTIVE:   Ekta Flores is a 69 y.o. female seen for a consultation visit regarding the following:     Chief Complaint   Patient presents with    Fatigue            HPI:    43-year-old female with URI last week. She states yesterday she began feeling extremely weak and fatigued. She may have had a brief episode of chest heaviness while walking to the mailbox this was last night and the only occurrence. She has had no shortness of breath. She had no orthopnea PND and felt no palpitations.   She has had no syncope    Hospital Problems             Last Modified POA    * (Principal) Atrial fibrillation with RVR ([facility] 2022 Yes     Allergies   Allergen Reactions    Hydromorphone Swelling    Sulfa Antibiotics Rash     Past Medical History:   Diagnosis Date    Anxiety     Arthritis     Bell's palsy     in Oct. 2009 with some vertigo at times still, no other after effects    Depression     GERD (gastroesophageal reflux disease)     reflux, takes nexium    Hematoma of hip, right, initial encounter     Hypertension     on medication since     Hypothyroidism     Nausea & vomiting     Obese     Paroxysmal atrial fibrillation ([facility] [facility] 2017    Psychiatric disorder     depression    S/P total knee replacement using cement 2011    Unspecified hypothyroidism 2011     Past Surgical History:   Procedure Laterality Date    APPENDECTOMY      BREAST BIOPSY Bilateral     BREAST LUMPECTOMY      benign    CATARACT REMOVAL Bilateral     CHOLECYSTECTOMY  1976    COLONOSCOPY  2016    GASTRECTOMY  9/21/15    sleeve    HYSTERECTOMY (CERVIX STATUS UNKNOWN)  [address]      lower back     ORTHOPEDIC SURGERY Bilateral     heel spurs removed    [place] CARDIAC SURG PROCEDURE UNLIST LAAO placed 7/17/18    THYROIDECTOMY  1980    partial    TONSILLECTOMY  1966    TOTAL KNEE ARTHROPLASTY Left 2011    TOTAL KNEE ARTHROPLASTY Right 2010    UROLOGICAL SURGERY  2008/2009    bladder tack X2     Family History   Problem Relation Age of Onset    Delayed Awakening Neg Hx     Pseudochol. Deficiency Neg Hx     Post-op Nausea/Vomiting Neg Hx     Emergence Delirium Neg Hx     Post-op Cognitive Dysfunction Neg Hx     Cancer Mother         breast    Malig Hypertherm Neg Hx     Breast Cancer Mother 62    Cancer Father         throat    Other Father         gastric ulcer    Kidney Disease Father     Breast Cancer Daughter     Heart Attack Mother      Social History     Tobacco Use    Smoking status: Never    Smokeless tobacco: Never   Substance Use Topics    Alcohol use: Yes     Alcohol/week: 1.7 standard drinks           ROS:    Constitution: Negative for fever. Eyes: Negative for blurred vision. Respiratory: Negative for cough. Endocrine: Negative for cold intolerance and heat intolerance. Skin: Negative for rash. Musculoskeletal: Negative for myalgias. Gastrointestinal: Negative for diarrhea, nausea and vomiting. Genitourinary: Negative for dysuria. Neurological: Negative for headaches and numbness. PHYSICAL EXAM:     /87   Pulse (!) 131   Temp 97.7 °F (36.5 °C) (Oral)   Resp 30   Ht 5' 4\" (1.626 m)   Wt 195 lb 4.8 oz (88.6 kg)   SpO2 (!) 88%   BMI 33.52 kg/m²    Constitutional: Oriented to person, place, and time. Appears well-developed and well-nourished. Head: Normocephalic and atraumatic. Neck: Neck supple. Cardiovascular: Normal rate and regular rhythm with no murmur -No JVP  Pulmonary/Chest: Breath sounds normal.   Abdominal: Soft. Musculoskeletal: No edema. Neurological: Alert and oriented to person, place, and time. Skin: Skin is warm and dry. Psychiatric: Normal mood and affect.    Vitals reviewed        Medical problems and test results were reviewed with the patient today. Wt Readings from Last 3 Encounters:   11/17/22 195 lb 4.8 oz (88.6 kg)   08/18/22 195 lb (88.5 kg)   08/01/22 195 lb 4.8 oz (88.6 kg)          Recent Results (from the past 672 hour(s))   EKG 12 Lead    Collection Time: 11/17/22 12:53 PM   Result Value Ref Range    Ventricular Rate 130 BPM    Atrial Rate 210 BPM    QRS Duration 103 ms    Q-T Interval 329 ms    QTc Calculation (Bazett) 484 ms    R Axis -14 degrees    T Axis 158 degrees    Diagnosis Atrial fibrillation    CBC    Collection Time: 11/17/22 12:59 PM   Result Value Ref Range    WBC 12.8 (H) 4.3 - 11.1 K/uL    RBC 5.19 4.05 - 5.2 M/uL    Hemoglobin 15.3 11.7 - 15.4 g/dL    Hematocrit 46.6 (H) 35.8 - 46.3 %    MCV 89.8 82 - 102 FL    MCH 29.5 26.1 - 32.9 PG    MCHC 32.8 31.4 - 35.0 g/dL    RDW 14.1 11.9 - 14.6 %    Platelets 553 572 - 732 K/uL    MPV 10.4 9.4 - 12.3 FL    nRBC 0.00 0.0 - 0.2 K/uL     Lab Results   Component Value Date/Time    CHOL 189 05/17/2022 11:54 AM    HDL 77 05/17/2022 11:54 AM    VLDL 12 05/17/2022 11:54 AM     EKG with A. fib with RVR nonspecific ST-T wave changes    ASSESSMENT and PLAN      76-year-old female with A. fib with RVR. She has some mildly abnormal ST segments in the precordial leads but this is unchanged from old EKG. She is having no chest pain. This does not represent a STEMI. We will treat with Cardizem drip and if she fails to return to normal sinus rhythm we will plan cardioversion tomorrow. She is status post watchman            Thank you for allowing me to participate in this patient's care. Please call or contact me if there are any questions or concerns regarding the above.       Sebastien Perea MD  11/17/22  1:39 PM

## 2022-11-17 NOTE — TELEPHONE ENCOUNTER
Location of patient: [place]    Received call from Meagan at [employer] with [employer]. Subjective: Caller states \"htn\"     Current Symptoms: htn recent cold/bronchitis seen 11/11 and bp was high then 166/91 last night 153/103 took this am 147/118 feels weak takes bp pill no h/a no blurred vision hx afib yesterday had episode of sob none today no h/a no blurred vision no one sided weakness no cp of sob currently    Onset: 6 days ago;     Associated Symptoms: NA    Pain Severity: 0/10; N/A;     Temperature: none       What has been tried: nothing    LMP: NA Pregnant: NA    Recommended disposition: See in Office Today    Care advice provided, patient verbalizes understanding; denies any other questions or concerns; instructed to call back for any new or worsening symptoms. Patient/Caller agrees with recommended disposition; writer provided warm transfer to [facility]: Elt at [employer] for appointment scheduling    Attention Provider: Thank you for allowing me to participate in the care of your patient. The patient was connected to triage in response to information provided to the ECC/PSC. Please do not respond through this encounter as the response is not directed to a shared pool.          Reason for Disposition   Systolic BP >= 879 OR Diastolic >= 273    Protocols used: Blood Pressure - High-ADULT-OH

## 2022-11-17 NOTE — ED TRIAGE NOTES
Patient arrives via [facility] from urgent care. Reports fatigue x 4 days. Found to be AF with EMS. Hx AF, has watchman.

## 2022-11-17 NOTE — ED NOTES
TRANSFER - OUT REPORT:    Verbal report given to Ash Longoria RN on Luciano Vale  being transferred to [facility] for routine progression of patient care       Report consisted of patient's Situation, Background, Assessment and   Recommendations(SBAR). Information from the following report(s) ED Encounter Summary, ED SBAR, [facility] and Recent Results was reviewed with the receiving nurse. Lines:   Peripheral IV 11/17/22 Right Antecubital (Active)        Opportunity for questions and clarification was provided.       Patient transported with:  Registered Nurse       Ghazal Martin RN  11/17/22 [id number]

## 2022-11-18 ENCOUNTER — APPOINTMENT (OUTPATIENT)
Dept: NON INVASIVE DIAGNOSTICS | Age: 77
End: 2022-11-18
Payer: MEDICARE

## 2022-11-18 VITALS
DIASTOLIC BLOOD PRESSURE: 60 MMHG | OXYGEN SATURATION: 99 % | BODY MASS INDEX: 33.34 KG/M2 | HEART RATE: 75 BPM | TEMPERATURE: 98 F | SYSTOLIC BLOOD PRESSURE: 126 MMHG | WEIGHT: 195.3 LBS | RESPIRATION RATE: 17 BRPM | HEIGHT: 64 IN

## 2022-11-18 LAB
ANION GAP SERPL CALC-SCNC: 6 MMOL/L (ref 2–11)
BUN SERPL-MCNC: 25 MG/DL (ref 8–23)
CALCIUM SERPL-MCNC: 9.5 MG/DL (ref 8.3–10.4)
CHLORIDE SERPL-SCNC: 106 MMOL/L (ref 101–110)
CO2 SERPL-SCNC: 28 MMOL/L (ref 21–32)
CREAT SERPL-MCNC: 0.9 MG/DL (ref 0.6–1)
ECHO AO ASC DIAM: 2.7 CM
ECHO AO ASCENDING AORTA INDEX: 1.39 CM/M2
ECHO AO ROOT DIAM: 2.8 CM
ECHO AO ROOT INDEX: 1.44 CM/M2
ECHO AV AREA PEAK VELOCITY: 2.3 CM2
ECHO AV AREA VTI: 2.5 CM2
ECHO AV AREA/BSA PEAK VELOCITY: 1.2 CM2/M2
ECHO AV AREA/BSA VTI: 1.3 CM2/M2
ECHO AV MEAN GRADIENT: 4 MMHG
ECHO AV MEAN VELOCITY: 0.9 M/S
ECHO AV PEAK GRADIENT: 7 MMHG
ECHO AV PEAK VELOCITY: 1.3 M/S
ECHO AV VELOCITY RATIO: 0.85
ECHO AV VTI: 26.8 CM
ECHO BSA: 2 M2
ECHO IVC PROX: 1.8 CM
ECHO LA AREA 2C: 23.2 CM2
ECHO LA AREA 4C: 26.8 CM2
ECHO LA DIAMETER INDEX: 2.84 CM/M2
ECHO LA DIAMETER: 5.5 CM
ECHO LA MAJOR AXIS: 6.4 CM
ECHO LA MINOR AXIS: 6.1 CM
ECHO LA TO AORTIC ROOT RATIO: 1.96
ECHO LA VOL 2C: 73 ML (ref 22–52)
ECHO LA VOL 4C: 91 ML (ref 22–52)
ECHO LA VOL BP: 84 ML (ref 22–52)
ECHO LA VOL/BSA BIPLANE: 43 ML/M2 (ref 16–34)
ECHO LA VOLUME INDEX A2C: 38 ML/M2 (ref 16–34)
ECHO LA VOLUME INDEX A4C: 47 ML/M2 (ref 16–34)
ECHO LV E' LATERAL VELOCITY: 10 CM/S
ECHO LV E' SEPTAL VELOCITY: 7 CM/S
ECHO LV EDV A2C: 79 ML
ECHO LV EDV A4C: 114 ML
ECHO LV EDV INDEX A4C: 59 ML/M2
ECHO LV EDV NDEX A2C: 41 ML/M2
ECHO LV EJECTION FRACTION A2C: 63 %
ECHO LV EJECTION FRACTION A4C: 61 %
ECHO LV EJECTION FRACTION BIPLANE: 63 % (ref 55–100)
ECHO LV ESV A2C: 29 ML
ECHO LV ESV A4C: 45 ML
ECHO LV ESV INDEX A2C: 15 ML/M2
ECHO LV ESV INDEX A4C: 23 ML/M2
ECHO LV FRACTIONAL SHORTENING: 31 % (ref 28–44)
ECHO LV INTERNAL DIMENSION DIASTOLE INDEX: 2.01 CM/M2
ECHO LV INTERNAL DIMENSION DIASTOLIC: 3.9 CM (ref 3.9–5.3)
ECHO LV INTERNAL DIMENSION SYSTOLIC INDEX: 1.39 CM/M2
ECHO LV INTERNAL DIMENSION SYSTOLIC: 2.7 CM
ECHO LV IVSD: 1.3 CM (ref 0.6–0.9)
ECHO LV MASS 2D: 169.4 G (ref 67–162)
ECHO LV MASS INDEX 2D: 87.3 G/M2 (ref 43–95)
ECHO LV POSTERIOR WALL DIASTOLIC: 1.2 CM (ref 0.6–0.9)
ECHO LV RELATIVE WALL THICKNESS RATIO: 0.62
ECHO LVOT AREA: 2.8 CM2
ECHO LVOT AV VTI INDEX: 0.88
ECHO LVOT DIAM: 1.9 CM
ECHO LVOT MEAN GRADIENT: 3 MMHG
ECHO LVOT PEAK GRADIENT: 4 MMHG
ECHO LVOT PEAK VELOCITY: 1.1 M/S
ECHO LVOT STROKE VOLUME INDEX: 34.3 ML/M2
ECHO LVOT SV: 66.6 ML
ECHO LVOT VTI: 23.5 CM
ECHO MV A VELOCITY: 0.63 M/S
ECHO MV AREA VTI: 2.8 CM2
ECHO MV E DECELERATION TIME (DT): 267 MS
ECHO MV E VELOCITY: 0.82 M/S
ECHO MV E/A RATIO: 1.3
ECHO MV E/E' LATERAL: 8.2
ECHO MV E/E' RATIO (AVERAGED): 9.96
ECHO MV E/E' SEPTAL: 11.71
ECHO MV LVOT VTI INDEX: 1.03
ECHO MV MAX VELOCITY: 0.9 M/S
ECHO MV MEAN GRADIENT: 1 MMHG
ECHO MV MEAN VELOCITY: 0.6 M/S
ECHO MV PEAK GRADIENT: 3 MMHG
ECHO MV VTI: 24.1 CM
ECHO PULMONARY ARTERY END DIASTOLIC PRESSURE: 7 MMHG
ECHO PV ACCELERATION TIME (AT): 95 MS
ECHO PV MAX VELOCITY: 0.9 M/S
ECHO PV PEAK GRADIENT: 3 MMHG
ECHO PV REGURGITANT MAX VELOCITY: 1.3 M/S
ECHO RV BASAL DIMENSION: 3.4 CM
ECHO RV FREE WALL PEAK S': 15 CM/S
ECHO RV INTERNAL DIMENSION: 3.2 CM
ECHO RV TAPSE: 2 CM (ref 1.7–?)
GLUCOSE SERPL-MCNC: 106 MG/DL (ref 65–100)
LV EF: 63 %
LVEF MODALITY: ABNORMAL
MAGNESIUM SERPL-MCNC: 2 MG/DL (ref 1.8–2.4)
POTASSIUM SERPL-SCNC: 3.9 MMOL/L (ref 3.5–5.1)
SODIUM SERPL-SCNC: 140 MMOL/L (ref 133–143)

## 2022-11-18 PROCEDURE — A4216 STERILE WATER/SALINE, 10 ML: HCPCS | Performed by: INTERNAL MEDICINE

## 2022-11-18 PROCEDURE — 2580000003 HC RX 258: Performed by: INTERNAL MEDICINE

## 2022-11-18 PROCEDURE — 6370000000 HC RX 637 (ALT 250 FOR IP): Performed by: PHYSICIAN ASSISTANT

## 2022-11-18 PROCEDURE — 2580000003 HC RX 258: Performed by: PHYSICIAN ASSISTANT

## 2022-11-18 PROCEDURE — 93306 TTE W/DOPPLER COMPLETE: CPT | Performed by: INTERNAL MEDICINE

## 2022-11-18 PROCEDURE — G0378 HOSPITAL OBSERVATION PER HR: HCPCS

## 2022-11-18 PROCEDURE — 6360000004 HC RX CONTRAST MEDICATION: Performed by: INTERNAL MEDICINE

## 2022-11-18 PROCEDURE — C8929 TTE W OR WO FOL WCON,DOPPLER: HCPCS

## 2022-11-18 PROCEDURE — 99225 PR SBSQ OBSERVATION CARE/DAY 25 MINUTES: CPT | Performed by: INTERNAL MEDICINE

## 2022-11-18 PROCEDURE — 36415 COLL VENOUS BLD VENIPUNCTURE: CPT

## 2022-11-18 PROCEDURE — 80048 BASIC METABOLIC PNL TOTAL CA: CPT

## 2022-11-18 PROCEDURE — 83735 ASSAY OF MAGNESIUM: CPT

## 2022-11-18 RX ORDER — FLECAINIDE ACETATE 100 MG/1
100 TABLET ORAL EVERY 12 HOURS
Qty: 60 TABLET | Refills: 3 | Status: SHIPPED | OUTPATIENT
Start: 2022-11-18

## 2022-11-18 RX ORDER — FLECAINIDE ACETATE 50 MG/1
100 TABLET ORAL EVERY 12 HOURS
Status: DISCONTINUED | OUTPATIENT
Start: 2022-11-18 | End: 2022-11-18 | Stop reason: HOSPADM

## 2022-11-18 RX ADMIN — BUSPIRONE HYDROCHLORIDE 10 MG: 10 TABLET ORAL at 09:30

## 2022-11-18 RX ADMIN — SODIUM CHLORIDE, PRESERVATIVE FREE 10 ML: 5 INJECTION INTRAVENOUS at 09:31

## 2022-11-18 RX ADMIN — PERFLUTREN 0.45 ML: 6.52 INJECTION, SUSPENSION INTRAVENOUS at 11:28

## 2022-11-18 RX ADMIN — DULOXETINE HYDROCHLORIDE 60 MG: 60 CAPSULE, DELAYED RELEASE ORAL at 09:29

## 2022-11-18 RX ADMIN — LEVOTHYROXINE SODIUM 100 MCG: 0.05 TABLET ORAL at 06:14

## 2022-11-18 RX ADMIN — ASPIRIN 81 MG: 81 TABLET ORAL at 09:29

## 2022-11-18 RX ADMIN — FLECAINIDE ACETATE 100 MG: 50 TABLET ORAL at 09:37

## 2022-11-18 RX ADMIN — PANTOPRAZOLE SODIUM 40 MG: 40 TABLET, DELAYED RELEASE ORAL at 06:15

## 2022-11-18 RX ADMIN — METOPROLOL TARTRATE 12.5 MG: 25 TABLET, FILM COATED ORAL at 09:30

## 2022-11-18 NOTE — DISCHARGE INSTRUCTIONS
Atrial Fibrillation: Care Instructions  Your Care Instructions     Atrial fibrillation is an irregular and often fast heartbeat. Treating this condition is important for several reasons. It can cause blood clots, which can travel from your heart to your brain and cause a stroke. If you have a fast heartbeat, you may feel lightheaded, dizzy, and weak. An irregular heartbeat can also increase your risk for heart failure. Atrial fibrillation is often the result of another heart condition, such as high blood pressure or coronary artery disease. Making changes to improve your heart condition will help you stay healthy and active. Follow-up care is a key part of your treatment and safety. Be sure to make and go to all appointments, and call your doctor if you are having problems. It's also a good idea to know your test results and keep a list of the medicines you take. How can you care for yourself at home? Medicines    Take your medicines exactly as prescribed. Call your doctor if you think you are having a problem with your medicine. You will get more details on the specific medicines your doctor prescribes. If your doctor has given you a blood thinner to prevent a stroke, be sure you get instructions about how to take your medicine safely. Blood thinners can cause serious bleeding problems. Do not take any vitamins, over-the-counter drugs, or herbal products without talking to your doctor first.   Lifestyle changes    Do not smoke. Smoking can increase your chance of a stroke and heart attack. If you need help quitting, talk to your doctor about stop-smoking programs and medicines. These can increase your chances of quitting for good. Eat a heart-healthy diet. Stay at a healthy weight. Lose weight if you need to. Limit alcohol to 2 drinks a day for men and 1 drink a day for women. Too much alcohol can cause health problems. Avoid colds and flu. Get a pneumococcal vaccine shot.  If you have had one before, ask your doctor whether you need another dose. Get a flu shot every year. If you must be around people with colds or flu, wash your hands often. Activity    If your doctor recommends it, get more exercise. Walking is a good choice. Bit by bit, increase the amount you walk every day. Try for at least 30 minutes on most days of the week. You also may want to swim, bike, or do other activities. Your doctor may suggest that you join a cardiac rehabilitation program so that you can have help increasing your physical activity safely. Start light exercise if your doctor says it is okay. Even a small amount will help you get stronger, have more energy, and manage stress. Walking is an easy way to get exercise. Start out by walking a little more than you did in the hospital. Gradually increase the amount you walk. When you exercise, watch for signs that your heart is working too hard. You are pushing too hard if you cannot talk while you are exercising. If you become short of breath or dizzy or have chest pain, sit down and rest immediately. Check your pulse regularly. Place two fingers on the artery at the palm side of your wrist, in line with your thumb. If your heartbeat seems uneven or fast, talk to your doctor. When should you call for help? Call 911 anytime you think you may need emergency care. For example, call if:    You have symptoms of a heart attack. These may include:  Chest pain or pressure, or a strange feeling in the chest.  Sweating. Shortness of breath. Nausea or vomiting. Pain, pressure, or a strange feeling in the back, neck, jaw, or upper belly or in one or both shoulders or arms. Lightheadedness or sudden weakness. A fast or irregular heartbeat. After you call 911, the  may tell you to chew 1 adult-strength or 2 to 4 low-dose aspirin. Wait for an ambulance. Do not try to drive yourself. You have symptoms of a stroke.  These may include:  Sudden numbness, tingling, weakness, or loss of movement in your face, arm, or leg, especially on only one side of your body. Sudden vision changes. Sudden trouble speaking. Sudden confusion or trouble understanding simple statements. Sudden problems with walking or balance. A sudden, severe headache that is different from past headaches. You passed out (lost consciousness). Call your doctor now or seek immediate medical care if:    You have new or increased shortness of breath. You feel dizzy or lightheaded, or you feel like you may faint. Your heart rate becomes irregular. You can feel your heart flutter in your chest or skip heartbeats. Tell your doctor if these symptoms are new or worse. Watch closely for changes in your health, and be sure to contact your doctor if you have any problems. Where can you learn more? Go to https://[employer] Digital[employer]eb.AppVault. org and sign in to your [employer] account. Enter U020 in the SmartPill box to learn more about \"Atrial Fibrillation: Care Instructions. \"     If you do not have an account, please click on the \"Sign Up Now\" link. Current as of: January 10, 2022               Content Version: 13.4  © 9007-6006 Healthwise, [employer]. Care instructions adapted under license by [facility] ([facility]). If you have questions about a medical condition or this instruction, always ask your healthcare professional. [address] any warranty or liability for your use of this information.

## 2022-11-18 NOTE — PROGRESS NOTES
Discharge instructions, follow up appointments and prescriptions reviewed with patient and family. Both verbalize understanding. All personal belongings taken with patient. Family member will drive patient home. Patient escorted to discharge area via wheelchair. Patient is stable at discharge. PIV and Monitor removed.

## 2022-11-18 NOTE — DISCHARGE SUMMARY
[facility] Cardiology Discharge Summary     Patient ID:  Yves Colmenares  [id number]  57 y.o.  1945    Admit date: 11/17/2022    Discharge date and time:  11-    Admitting Physician: Arnoldo Caicedo MD     Discharge Physician: Mau Ramsey PA-C/Dr. Omid Ledesma    Admission Diagnoses: Atrial fibrillation with RVR [facility]) [I48.91]    Discharge Diagnoses:   Patient Active Problem List    Diagnosis Date Noted    Atrial fibrillation with RVR ([facility] [address] 11/17/2022    Atherosclerosis of aorta ([facility] 05/17/2022    Atherosclerosis of native coronary artery of native heart with angina pectoris ([facility] [address] 05/17/2022    A-fib ([facility] 07/17/2018    Anemia requiring transfusions 12/05/2017    Traumatic hematoma of right hip 12/05/2017    Acute blood loss anemia 12/05/2017    Anxiety 12/05/2017    Near syncope 12/02/2017    Orthostatic hypotension 12/02/2017    Paroxysmal atrial fibrillation ([address] 08/13/2017    Morbid obesity with BMI of 45.0-49.9, adult ([address] 09/21/2015    Osteoarthritis of left knee 05/16/2011    HTN (hypertension) 05/16/2011    S/P total knee replacement using cement 05/16/2011    Esophageal reflux 05/16/2011    Hypothyroidism 05/16/2011    Depression 05/16/2011    Osteoarthritis of right knee 04/12/2010    Status post total knee replacement 04/12/2010     Hospital Course: Pt was admitted with AF and was started on IV Cardizem. Pt has a watchman and does not require [address] [place] [address]. Pt was scheduled for a MARY cardioversion if she did not convert on IV Cardizem. The following morning she had converted to NSR. Pt was up feeling well without any complaints of CP, SOB or palpitations. Pt's labs were WNL. Pt was seen and examined by Dr. Omid Ledesma and determined stable and ready for discharge on increased dose of Flecainide at 100 mg BID. Pt was instructed on the importance of taking Flecainide and Lopressor without missing a dose. Pt will follow up in the office in the next 2 weeks (office will call with appt).     DISPOSITION: The patient is being discharged home in stable condition on a low saturated fat, low cholesterol and low salt diet. Pt is instructed to advance activities as tolerated limited to fatigue or shortness of breath. Pt is instructed to call office or return to ER for immediate evaluation of any shortness of breath, palpitations or chest pain. Discharge Exam: /60   Pulse 65   Temp 98 °F (36.7 °C) (Oral)   Resp 17   Ht 5' 4\" (1.626 m)   Wt 195 lb 4.8 oz (88.6 kg)   SpO2 99%   BMI 33.52 kg/m²    Pt has been seen by Dr. Justa Egan: see his progress note for exam details.     Recent Results (from the past 24 hour(s))   EKG 12 Lead    Collection Time: 11/17/22 12:53 PM   Result Value Ref Range    Ventricular Rate 130 BPM    Atrial Rate 210 BPM    QRS Duration 103 ms    Q-T Interval 329 ms    QTc Calculation (Bazett) 484 ms    R Axis -14 degrees    T Axis 158 degrees    Diagnosis Atrial fibrillation    CBC    Collection Time: 11/17/22 12:59 PM   Result Value Ref Range    WBC 12.8 (H) 4.3 - 11.1 K/uL    RBC 5.19 4.05 - 5.2 M/uL    Hemoglobin 15.3 11.7 - 15.4 g/dL    Hematocrit 46.6 (H) 35.8 - 46.3 %    MCV 89.8 82 - 102 FL    MCH 29.5 26.1 - 32.9 PG    MCHC 32.8 31.4 - 35.0 g/dL    RDW 14.1 11.9 - 14.6 %    Platelets 516 662 - 718 K/uL    MPV 10.4 9.4 - 12.3 FL    nRBC 0.00 0.0 - 0.2 K/uL   Comprehensive Metabolic Panel    Collection Time: 11/17/22 12:59 PM   Result Value Ref Range    Sodium 139 133 - 143 mmol/L    Potassium 3.7 3.5 - 5.1 mmol/L    Chloride 105 101 - 110 mmol/L    CO2 28 21 - 32 mmol/L    Anion Gap 6 2 - 11 mmol/L    Glucose 130 (H) 65 - 100 mg/dL    BUN 24 (H) 8 - 23 MG/DL    Creatinine 1.00 0.6 - 1.0 MG/DL    Est, Glom Filt Rate 58 (L) >60 ml/min/1.73m2    Calcium 9.8 8.3 - 10.4 MG/DL    Total Bilirubin 0.6 0.2 - 1.1 MG/DL    ALT 27 12 - 65 U/L    AST 16 15 - 37 U/L    Alk Phosphatase 109 50 - 136 U/L    Total Protein 7.3 6.3 - 8.2 g/dL    Albumin 3.4 3.2 - 4.6 g/dL    Globulin 3.9 2.8 - 4.5 g/dL Albumin/Globulin Ratio 0.9 0.4 - 1.6     Troponin    Collection Time: 11/17/22 12:59 PM   Result Value Ref Range    Troponin, High Sensitivity 30.3 (H) 0 - 14 pg/mL   Magnesium    Collection Time: 11/17/22 12:59 PM   Result Value Ref Range    Magnesium 1.9 1.8 - 2.4 mg/dL   Brain Natriuretic Peptide    Collection Time: 11/17/22 12:59 PM   Result Value Ref Range    NT Pro-BNP 6,188 (H) <450 PG/ML   TSH with Reflex    Collection Time: 11/17/22 12:59 PM   Result Value Ref Range    TSH w Free Thyroid if Abnormal 3.97 (H) 0.358 - 3.740 UIU/ML   T4, Free    Collection Time: 11/17/22 12:59 PM   Result Value Ref Range    T4 Free 2.2 (H) 0.78 - 1.46 NG/DL   Troponin    Collection Time: 11/17/22  3:15 PM   Result Value Ref Range    Troponin, High Sensitivity 26.3 (H) 0 - 14 pg/mL   Troponin    Collection Time: 11/17/22  4:45 PM   Result Value Ref Range    Troponin, High Sensitivity 29.5 (H) 0 - 14 pg/mL   Troponin    Collection Time: 11/17/22  7:35 PM   Result Value Ref Range    Troponin, High Sensitivity 35.7 (H) 0 - 14 pg/mL   Basic Metabolic Panel    Collection Time: 11/18/22  4:44 AM   Result Value Ref Range    Sodium 140 133 - 143 mmol/L    Potassium 3.9 3.5 - 5.1 mmol/L    Chloride 106 101 - 110 mmol/L    CO2 28 21 - 32 mmol/L    Anion Gap 6 2 - 11 mmol/L    Glucose 106 (H) 65 - 100 mg/dL    BUN 25 (H) 8 - 23 MG/DL    Creatinine 0.90 0.6 - 1.0 MG/DL    Est, Glom Filt Rate >60 >60 ml/min/1.73m2    Calcium 9.5 8.3 - 10.4 MG/DL   Magnesium    Collection Time: 11/18/22  4:44 AM   Result Value Ref Range    Magnesium 2.0 1.8 - 2.4 mg/dL         Patient Instructions:   Current Discharge Medication List        CONTINUE these medications which have CHANGED    Details   flecainide (TAMBOCOR) 100 MG tablet Take 1 tablet by mouth in the morning and 1 tablet in the evening.   Qty: 60 tablet, Refills: 3           CONTINUE these medications which have NOT CHANGED    Details   metoprolol tartrate (LOPRESSOR) 25 MG tablet Take 0.5 tablets by mouth in the morning and 0.5 tablets in the evening. Qty: 180 tablet, Refills: 3      aspirin 81 MG EC tablet Take by mouth daily      busPIRone (BUSPAR) 10 MG tablet Take 1 tablet by mouth 2 times daily      vitamin D (CHOLECALCIFEROL) 25 MCG (1000 UT) TABS tablet Take 5,000 Units by mouth daily      docusate (COLACE, DULCOLAX) 100 MG CAPS Take 100 mg by mouth 2 times daily as needed      DULoxetine (CYMBALTA) 60 MG extended release capsule TAKE 1 CAPSULE BY MOUTH EVERY DAY      levothyroxine (SYNTHROID) 100 MCG tablet Take 100 mcg by mouth every morning (before breakfast)      LORazepam (ATIVAN) 0.5 MG tablet Take 0.5 mg by mouth every 8 hours as needed. melatonin 10 MG CAPS capsule Take by mouth      omeprazole (PRILOSEC) 40 MG delayed release capsule Take 40 mg by mouth daily      tiZANidine (ZANAFLEX) 4 MG tablet TAKE 1/2-1 TABLET BY MOUTH 3 Dylan Perez/  Kaycee Pierce PA-C  11/18/2022  9:29 AM    Patient seen and examined by me. Agree with above note by physician extender.   Key findings are:  No CP or DILL  CV- RRR without murmur  Lungs- Clear bilaterally  Ext- no edema    Plan: af- in nsr- dc on higher dose flecainide- Watchman in place

## 2022-11-18 NOTE — CARE COORDINATION
Pt with discharge orders this day. Chart screened by  for discharge planning. No CM needs identified at time of discharge. Milestones met.         11/17/22 5820   Condition of Participation: Discharge Planning   The Plan for Transition of Care is related to the following treatment goals: Home with family

## 2022-11-21 ENCOUNTER — CARE COORDINATION (OUTPATIENT)
Dept: CARE COORDINATION | Facility: CLINIC | Age: 77
End: 2022-11-21

## 2022-11-21 NOTE — CARE COORDINATION
[facility] Care Transitions Initial Follow Up Call    Call within 2 business days of discharge: Yes    Care Transition Nurse contacted the patient by telephone to perform post hospital discharge assessment. Verified name and  with patient as identifiers. Provided introduction to self, and explanation of the Care Transition Nurse role. Patient: Afshan Sandoval Patient : 1945   MRN: [id number]  Reason for Admission: afib  Discharge Date: 22 RARS: No data recorded    Last Discharge  Street       Date Complaint Diagnosis Description Type Department Provider    22 Fatigue Atrial fibrillation with R[facility]) ED to Hosp-Admission (Discharged) (ADMITTED) [id number] Melquiades Harris MD; Deneen... Was this an external facility discharge? No Discharge Facility: sf    Challenges to be reviewed by the provider   Additional needs identified to be addressed with provider: No  none               Method of communication with provider: none. below    Care Transition Nurse reviewed discharge instructions, medical action plan, and red flags with family who verbalized understanding. The family was given an opportunity to ask questions and does not have any further questions or concerns at this time. Were discharge instructions available to patient? Yes. Reviewed appropriate site of care based on symptoms and resources available to patient including: PCP  Specialist  CTN . The patient agrees to contact the PCP office for questions related to their healthcare. Advance Care Planning:   Does patient have an Advance Directive: reviewed and current. Medication reconciliation was performed with patient, who verbalizes understanding of administration of home medications.  Medications reviewed, 1111F entered: yes    Was patient discharged with a pulse oximeter? no    Non-face-to-face services provided:  Scheduled appointment with PCP-  Scheduled appointment with [address] [place] [address] and reviewed discharge summary and/or continuity of care documents    Offered patient enrollment in the Remote Patient Monitoring (RPM) program for in-home monitoring: NA.    Care Transitions 24 Hour Call    Do you have a copy of your discharge instructions?: Yes  Do you have all of your prescriptions and are they filled?: Yes  Have you been contacted by a [employer] [address]?: No  Have you scheduled your follow up appointment?: Yes (Comment: 11/23 PCP)  How are you going to get to your appointment?: Car - family or friend to transport  Do you feel like you have everything you need to keep you well at home?: Yes  Care Transitions Interventions         Follow Up  Future Appointments   Date Time Provider Dilcia Sharla   11/23/2022  7:40 AM Chema Wooten MD [facility]   12/12/2022  8:00 AM Jose Coates MD [facility]   5/18/2023  8:00 AM Karen Slade APRN - CNP [facility]       Care Transition Nurse provided contact information. No further follow-up call indicated based on severity of symptoms and risk factors. Plan for next call:  patient with no questions/concerns. Patient has follow up with PCP and cards scheduled. Able to verbalize medicine. No resources needed at this time. CTN to follow up PRN for remainder of LAUREN episode.      Anika Mims RN

## 2022-12-01 ENCOUNTER — APPOINTMENT (OUTPATIENT)
Dept: MRI IMAGING | Age: 77
End: 2022-12-01
Payer: MEDICARE

## 2022-12-01 ENCOUNTER — HOSPITAL ENCOUNTER (EMERGENCY)
Dept: CT IMAGING | Age: 77
End: 2022-12-01
Payer: MEDICARE

## 2022-12-01 ENCOUNTER — APPOINTMENT (OUTPATIENT)
Dept: CT IMAGING | Age: 77
End: 2022-12-01
Payer: MEDICARE

## 2022-12-01 ENCOUNTER — HOSPITAL ENCOUNTER (OUTPATIENT)
Age: 77
Setting detail: OBSERVATION
Discharge: HOME | End: 2022-12-02
Attending: EMERGENCY MEDICINE | Admitting: FAMILY MEDICINE
Payer: MEDICARE

## 2022-12-01 DIAGNOSIS — G45.9 TIA (TRANSIENT ISCHEMIC ATTACK): ICD-10-CM

## 2022-12-01 DIAGNOSIS — R29.90 STROKE-LIKE SYMPTOMS: ICD-10-CM

## 2022-12-01 DIAGNOSIS — H53.8 BLURRED VISION, BILATERAL: ICD-10-CM

## 2022-12-01 DIAGNOSIS — R42 DIZZINESS: Primary | ICD-10-CM

## 2022-12-01 PROBLEM — I48.91 A-FIB (HCC): Status: ACTIVE | Noted: 2018-07-17

## 2022-12-01 PROBLEM — I48.0 PAROXYSMAL ATRIAL FIBRILLATION (HCC): Status: ACTIVE | Noted: 2017-08-13

## 2022-12-01 PROBLEM — F39 MOOD DISORDER (HCC): Status: ACTIVE | Noted: 2022-12-01

## 2022-12-01 PROBLEM — I25.119 ATHEROSCLEROSIS OF NATIVE CORONARY ARTERY OF NATIVE HEART WITH ANGINA PECTORIS (HCC): Status: ACTIVE | Noted: 2022-05-17

## 2022-12-01 PROBLEM — S00.03XA: Status: ACTIVE | Noted: 2022-12-01

## 2022-12-01 PROBLEM — I70.0 ATHEROSCLEROSIS OF AORTA (HCC): Status: ACTIVE | Noted: 2022-05-17

## 2022-12-01 PROBLEM — E04.1 RIGHT THYROID NODULE: Status: ACTIVE | Noted: 2022-12-01

## 2022-12-01 LAB
ANION GAP SERPL CALC-SCNC: 5 MMOL/L (ref 2–11)
BASOPHILS # BLD: 0.1 K/UL (ref 0–0.2)
BASOPHILS NFR BLD: 1 % (ref 0–2)
BUN SERPL-MCNC: 17 MG/DL (ref 8–23)
CALCIUM SERPL-MCNC: 9.9 MG/DL (ref 8.3–10.4)
CHLORIDE SERPL-SCNC: 105 MMOL/L (ref 101–110)
CO2 SERPL-SCNC: 27 MMOL/L (ref 21–32)
CREAT SERPL-MCNC: 1 MG/DL (ref 0.6–1)
DIFFERENTIAL METHOD BLD: ABNORMAL
EKG ATRIAL RATE: 77 BPM
EKG DIAGNOSIS: NORMAL
EKG P AXIS: 78 DEGREES
EKG P-R INTERVAL: 267 MS
EKG Q-T INTERVAL: 437 MS
EKG QRS DURATION: 122 MS
EKG QTC CALCULATION (BAZETT): 495 MS
EKG R AXIS: -15 DEGREES
EKG T AXIS: 98 DEGREES
EKG VENTRICULAR RATE: 77 BPM
EOSINOPHIL # BLD: 0 K/UL (ref 0–0.8)
EOSINOPHIL NFR BLD: 0 % (ref 0.5–7.8)
ERYTHROCYTE [DISTWIDTH] IN BLOOD BY AUTOMATED COUNT: 13.7 % (ref 11.9–14.6)
GLUCOSE BLD STRIP.AUTO-MCNC: 130 MG/DL (ref 65–100)
GLUCOSE SERPL-MCNC: 121 MG/DL (ref 65–100)
HCT VFR BLD AUTO: 44.2 % (ref 35.8–46.3)
HGB BLD-MCNC: 14.6 G/DL (ref 11.7–15.4)
IMM GRANULOCYTES # BLD AUTO: 0 K/UL (ref 0–0.5)
IMM GRANULOCYTES NFR BLD AUTO: 0 % (ref 0–5)
INR BLD: 1.2 (ref 0.9–1.2)
LYMPHOCYTES # BLD: 1.1 K/UL (ref 0.5–4.6)
LYMPHOCYTES NFR BLD: 11 % (ref 13–44)
MCH RBC QN AUTO: 29.8 PG (ref 26.1–32.9)
MCHC RBC AUTO-ENTMCNC: 33 G/DL (ref 31.4–35)
MCV RBC AUTO: 90.2 FL (ref 82–102)
MONOCYTES # BLD: 0.4 K/UL (ref 0.1–1.3)
MONOCYTES NFR BLD: 4 % (ref 4–12)
NEUTS SEG # BLD: 8 K/UL (ref 1.7–8.2)
NEUTS SEG NFR BLD: 84 % (ref 43–78)
NRBC # BLD: 0 K/UL (ref 0–0.2)
PLATELET # BLD AUTO: 300 K/UL (ref 150–450)
PMV BLD AUTO: 10.2 FL (ref 9.4–12.3)
POTASSIUM SERPL-SCNC: 3.6 MMOL/L (ref 3.5–5.1)
PT BLD: 13.9 SECS (ref 9.6–11.6)
RBC # BLD AUTO: 4.9 M/UL (ref 4.05–5.2)
SERVICE CMNT-IMP: ABNORMAL
SODIUM SERPL-SCNC: 137 MMOL/L (ref 133–143)
TROPONIN I SERPL HS-MCNC: 10.9 PG/ML (ref 0–14)
WBC # BLD AUTO: 9.5 K/UL (ref 4.3–11.1)

## 2022-12-01 PROCEDURE — 36415 COLL VENOUS BLD VENIPUNCTURE: CPT

## 2022-12-01 PROCEDURE — 93005 ELECTROCARDIOGRAM TRACING: CPT

## 2022-12-01 PROCEDURE — 84443 ASSAY THYROID STIM HORMONE: CPT

## 2022-12-01 PROCEDURE — 94762 N-INVAS EAR/PLS OXIMTRY CONT: CPT

## 2022-12-01 PROCEDURE — 84484 ASSAY OF TROPONIN QUANT: CPT

## 2022-12-01 PROCEDURE — 72141 MRI NECK SPINE W/O DYE: CPT

## 2022-12-01 PROCEDURE — 80048 BASIC METABOLIC PNL TOTAL CA: CPT

## 2022-12-01 PROCEDURE — 70450 CT HEAD/BRAIN W/O DYE: CPT

## 2022-12-01 PROCEDURE — 2580000003 HC RX 258: Performed by: FAMILY MEDICINE

## 2022-12-01 PROCEDURE — 70496 CT ANGIOGRAPHY HEAD: CPT

## 2022-12-01 PROCEDURE — 6360000004 HC RX CONTRAST MEDICATION: Performed by: PHYSICIAN ASSISTANT

## 2022-12-01 PROCEDURE — G0378 HOSPITAL OBSERVATION PER HR: HCPCS

## 2022-12-01 PROCEDURE — 85025 COMPLETE CBC W/AUTO DIFF WBC: CPT

## 2022-12-01 PROCEDURE — 70551 MRI BRAIN STEM W/O DYE: CPT

## 2022-12-01 PROCEDURE — 99285 EMERGENCY DEPT VISIT HI MDM: CPT

## 2022-12-01 PROCEDURE — 85610 PROTHROMBIN TIME: CPT

## 2022-12-01 PROCEDURE — 6370000000 HC RX 637 (ALT 250 FOR IP): Performed by: EMERGENCY MEDICINE

## 2022-12-01 PROCEDURE — 6370000000 HC RX 637 (ALT 250 FOR IP): Performed by: FAMILY MEDICINE

## 2022-12-01 PROCEDURE — 83735 ASSAY OF MAGNESIUM: CPT

## 2022-12-01 PROCEDURE — 0042T CT BRAIN PERFUSION: CPT

## 2022-12-01 PROCEDURE — 2580000003 HC RX 258: Performed by: PHYSICIAN ASSISTANT

## 2022-12-01 PROCEDURE — 82962 GLUCOSE BLOOD TEST: CPT

## 2022-12-01 PROCEDURE — 99219 PR INITIAL OBSERVATION CARE/DAY 50 MINUTES: CPT | Performed by: PSYCHIATRY & NEUROLOGY

## 2022-12-01 PROCEDURE — 72125 CT NECK SPINE W/O DYE: CPT

## 2022-12-01 RX ORDER — ONDANSETRON 4 MG/1
4 TABLET, ORALLY DISINTEGRATING ORAL EVERY 8 HOURS PRN
Status: DISCONTINUED | OUTPATIENT
Start: 2022-12-01 | End: 2022-12-02 | Stop reason: HOSPADM

## 2022-12-01 RX ORDER — LABETALOL HYDROCHLORIDE 5 MG/ML
10 INJECTION, SOLUTION INTRAVENOUS EVERY 10 MIN PRN
Status: DISCONTINUED | OUTPATIENT
Start: 2022-12-01 | End: 2022-12-02 | Stop reason: HOSPADM

## 2022-12-01 RX ORDER — CETIRIZINE HYDROCHLORIDE 10 MG/1
10 TABLET ORAL DAILY
Status: DISCONTINUED | OUTPATIENT
Start: 2022-12-01 | End: 2022-12-02 | Stop reason: HOSPADM

## 2022-12-01 RX ORDER — POLYETHYLENE GLYCOL 3350 17 G/17G
17 POWDER, FOR SOLUTION ORAL DAILY PRN
Status: DISCONTINUED | OUTPATIENT
Start: 2022-12-01 | End: 2022-12-02 | Stop reason: HOSPADM

## 2022-12-01 RX ORDER — ASPIRIN 325 MG
325 TABLET ORAL
Status: COMPLETED | OUTPATIENT
Start: 2022-12-01 | End: 2022-12-01

## 2022-12-01 RX ORDER — FLECAINIDE ACETATE 100 MG/1
100 TABLET ORAL 2 TIMES DAILY
Status: DISCONTINUED | OUTPATIENT
Start: 2022-12-01 | End: 2022-12-02 | Stop reason: HOSPADM

## 2022-12-01 RX ORDER — ACETAMINOPHEN 650 MG/1
650 SUPPOSITORY RECTAL EVERY 4 HOURS PRN
Status: DISCONTINUED | OUTPATIENT
Start: 2022-12-01 | End: 2022-12-02 | Stop reason: HOSPADM

## 2022-12-01 RX ORDER — BUSPIRONE HYDROCHLORIDE 5 MG/1
10 TABLET ORAL 2 TIMES DAILY
Status: DISCONTINUED | OUTPATIENT
Start: 2022-12-01 | End: 2022-12-02 | Stop reason: HOSPADM

## 2022-12-01 RX ORDER — MECLIZINE HCL 12.5 MG/1
12.5 TABLET ORAL 3 TIMES DAILY PRN
Status: DISCONTINUED | OUTPATIENT
Start: 2022-12-01 | End: 2022-12-02 | Stop reason: HOSPADM

## 2022-12-01 RX ORDER — SODIUM CHLORIDE 9 MG/ML
INJECTION, SOLUTION INTRAVENOUS CONTINUOUS
Status: DISCONTINUED | OUTPATIENT
Start: 2022-12-01 | End: 2022-12-02 | Stop reason: HOSPADM

## 2022-12-01 RX ORDER — 0.9 % SODIUM CHLORIDE 0.9 %
100 INTRAVENOUS SOLUTION INTRAVENOUS
Status: COMPLETED | OUTPATIENT
Start: 2022-12-01 | End: 2022-12-01

## 2022-12-01 RX ORDER — FLUTICASONE PROPIONATE 50 MCG
1 SPRAY, SUSPENSION (ML) NASAL DAILY
Status: DISCONTINUED | OUTPATIENT
Start: 2022-12-01 | End: 2022-12-02 | Stop reason: HOSPADM

## 2022-12-01 RX ORDER — ASPIRIN 81 MG/1
81 TABLET ORAL DAILY
Status: DISCONTINUED | OUTPATIENT
Start: 2022-12-02 | End: 2022-12-02 | Stop reason: HOSPADM

## 2022-12-01 RX ORDER — LEVOTHYROXINE SODIUM 0.1 MG/1
100 TABLET ORAL DAILY
Status: DISCONTINUED | OUTPATIENT
Start: 2022-12-02 | End: 2022-12-02 | Stop reason: HOSPADM

## 2022-12-01 RX ORDER — ONDANSETRON 2 MG/ML
4 INJECTION INTRAMUSCULAR; INTRAVENOUS EVERY 6 HOURS PRN
Status: DISCONTINUED | OUTPATIENT
Start: 2022-12-01 | End: 2022-12-02 | Stop reason: HOSPADM

## 2022-12-01 RX ORDER — DULOXETIN HYDROCHLORIDE 30 MG/1
60 CAPSULE, DELAYED RELEASE ORAL DAILY
Status: DISCONTINUED | OUTPATIENT
Start: 2022-12-02 | End: 2022-12-02 | Stop reason: HOSPADM

## 2022-12-01 RX ORDER — ACETAMINOPHEN 325 MG/1
650 TABLET ORAL EVERY 4 HOURS PRN
Status: DISCONTINUED | OUTPATIENT
Start: 2022-12-01 | End: 2022-12-02 | Stop reason: HOSPADM

## 2022-12-01 RX ORDER — SODIUM CHLORIDE 0.9 % (FLUSH) 0.9 %
10 SYRINGE (ML) INJECTION
Status: DISCONTINUED | OUTPATIENT
Start: 2022-12-01 | End: 2022-12-05 | Stop reason: HOSPADM

## 2022-12-01 RX ORDER — ATORVASTATIN CALCIUM 40 MG/1
80 TABLET, FILM COATED ORAL NIGHTLY
Status: DISCONTINUED | OUTPATIENT
Start: 2022-12-01 | End: 2022-12-02 | Stop reason: HOSPADM

## 2022-12-01 RX ORDER — POTASSIUM CHLORIDE 20 MEQ/1
40 TABLET, EXTENDED RELEASE ORAL ONCE
Status: COMPLETED | OUTPATIENT
Start: 2022-12-01 | End: 2022-12-01

## 2022-12-01 RX ADMIN — METOPROLOL TARTRATE 12.5 MG: 25 TABLET, FILM COATED ORAL at 20:47

## 2022-12-01 RX ADMIN — FLECAINIDE ACETATE 100 MG: 100 TABLET ORAL at 20:47

## 2022-12-01 RX ADMIN — IOPAMIDOL 100 ML: 755 INJECTION, SOLUTION INTRAVENOUS at 13:36

## 2022-12-01 RX ADMIN — SODIUM CHLORIDE 100 ML: 9 INJECTION, SOLUTION INTRAVENOUS at 13:35

## 2022-12-01 RX ADMIN — BUSPIRONE HYDROCHLORIDE 10 MG: 5 TABLET ORAL at 20:47

## 2022-12-01 RX ADMIN — POTASSIUM CHLORIDE 40 MEQ: 1500 TABLET, EXTENDED RELEASE ORAL at 17:12

## 2022-12-01 RX ADMIN — ATORVASTATIN CALCIUM 80 MG: 40 TABLET, FILM COATED ORAL at 20:47

## 2022-12-01 RX ADMIN — ASPIRIN 325 MG ORAL TABLET 325 MG: 325 PILL ORAL at 17:12

## 2022-12-01 NOTE — ED TRIAGE NOTES
Pt arrives via Ems stating she woke up with blurred vision and dizzy spells x couple minutes. Pt states she stood up and got dizzy and fell. Pt denies hitting her head. Pt denies LOC Pt denies blood thinners at home. Pt states hx of HTN and Afib.  Code S called on Pt. NIHSS in triage 0    Vs with EMS  /106  HR 76

## 2022-12-01 NOTE — H&P
Hospitalist History and Physical   Admit Date:  2022  1:06 PM   Name:  Ilene Abreu   Age:  69 y.o. Sex:  female  :  1945   MRN:  [id number]   Room:  [place] [address]    Presenting Complaint: No chief complaint on file. Reason(s) for Admission: Stroke-like symptoms [R29.90]     History of Present Illness:   Ilene Abreu is a 69 y.o. female with medical history of obesity, afib s/p watchman, hypothyroidism, HTN who presented via EMS with blurred vision and dizzy spells upon awakening today. Dizzy spells lasted a couple minutes. Then was feeling okay doing stuff in the kitchen and then had another episode felt like the room was spinning and fell to the ground. w/o head injury or LOC. Not on [address]. She has been having sinus issues in the recent past. [place] about a week ago after tripping on steps and hit her head. Had no pain or focal deficits since then. /106, HR 76  with EMS. Code S called in triage. Initial NIHSS 0. She is ASA 81 daily. Not on [address] due to h/o bleeding. She was recently hospitalized with afib rvr treated with IV cardizem. She self converted into NSR. Flecainide was increased to 100 mg BID in addition to lopressor. Symptoms reoccurred during transfers sheryl when on left side. Denies hearing loss. Dizziness not constant. Occurs with position change. In ED, with modified germania olmedo pike maneuver symptoms reproduced with head to the left. Review of Systems:  10 systems reviewed and negative except as noted in HPI. Assessment & Plan:     Stroke-like symptoms - Ischemic CVA rule out   Home antiplatelets/anticoagulation: ASA 81   CTA - no LVOS   - s/p asa 325. Cont. ASA 81 mg in AM for now   - start High intensity statin   - Fasting hgbA1C, Lipid panel, TSH, LFTs, Ferritin, folate, b12   - MRI brain without contrast and MRI c spine ; recent TTE no need to repeat.    - Permissive hypertension to 220/120 mm Hg x 24-48 hours   - NS 75 cc/hr x 24 hours   - consult neurology   - Outpatient referral to Neurology for followup    Possible BPPV - positive modified germania olmedo pike on the left. pAFIB s/p LAAO - no OAC indicated. Rate controlled. Resume lopressor and flecainide. HTN - resume BB for afib. Permissive hypertension for now. Right thyroid nodules - noted on imaging. Needs outpatient follow up. Check TSH. Discussed with daughter and patient at bedside. H/o partial thyroidectomy in the remote past.     Hypothyroidism - resume synthroid. Check TSH     Obesity - lifestyle modifications. Adds to complexity. Mood/anxiety d/o - resume cymbalta buspar. Anticipated discharge needs:         Diet: ADULT DIET; Regular  VTE ppx: SCD  Code status: Full Code    Hospital Problems:  Principal Problem:    Stroke-like symptoms  Active Problems:    Right thyroid nodule    Left parietal scalp hematoma, initial encounter    Mood disorder (HCC)    Class 1 obesity due to excess calories with serious comorbidity and body mass index (BMI) of 32.0 to 32.9 in adult    Paroxysmal atrial fibrillation (HCC)    Hypothyroidism    Atherosclerosis of aorta (HCC)    Atherosclerosis of native coronary artery of native heart with angina pectoris ([facility] [address]  Resolved Problems:    * No resolved hospital problems.  *       Past History:     Past Medical History:   Diagnosis Date    Anxiety     Arthritis     Bell's palsy     in Oct. 2009 with some vertigo at times still, no other after effects    Depression     GERD (gastroesophageal reflux disease)     reflux, takes nexium    Hematoma of hip, right, initial encounter     Hypertension     on medication since 2006    Hypothyroidism     Nausea & vomiting     Obese     Paroxysmal atrial fibrillation ([address] 8/13/2017    Psychiatric disorder     depression    S/P total knee replacement using cement 5/16/2011    Unspecified hypothyroidism 5/16/2011       Past Surgical History:   Procedure Laterality Date    APPENDECTOMY  1961    BREAST BIOPSY Bilateral BREAST LUMPECTOMY  1998    benign    CATARACT REMOVAL Bilateral 2014    CHOLECYSTECTOMY  1976    COLONOSCOPY  04/04/2016    GASTRECTOMY  9/21/15    sleeve    HYSTERECTOMY (CERVIX STATUS UNKNOWN)  1987    ORTHOPEDIC SURGERY  2005    lower back     ORTHOPEDIC SURGERY Bilateral 1994    heel spurs removed    HI CARDIAC SURG PROCEDURE UNLIST      LAAO placed 7/17/18    THYROIDECTOMY  1980    partial    TONSILLECTOMY  1966    TOTAL KNEE ARTHROPLASTY Left 2011    TOTAL KNEE ARTHROPLASTY Right 2010    UROLOGICAL SURGERY  2008/2009    bladder tack X2        Social History     Tobacco Use    Smoking status: Never    Smokeless tobacco: Never   Substance Use Topics    Alcohol use: Yes     Alcohol/week: 1.7 standard drinks      Social History     Substance and Sexual Activity   Drug Use Never       Family History   Problem Relation Age of Onset    Delayed Awakening Neg Hx     Pseudochol.  Deficiency Neg Hx     Post-op Nausea/Vomiting Neg Hx     Emergence Delirium Neg Hx     Post-op Cognitive Dysfunction Neg Hx     Cancer Mother         breast    Malig Hypertherm Neg Hx     Breast Cancer Mother 62    Cancer Father         throat    Other Father         gastric ulcer    Kidney Disease Father     Breast Cancer Daughter     Heart Attack Mother         Immunization History   Administered Date(s) Administered    COVID-19, PFIZER PURPLE top, DILUTE for use, (age 15 y+), 30mcg/0.3mL 01/26/2021, 02/12/2021    Influenza, FLUARIX, FLULAVAL, FLUZONE (age 10 mo+) AND AFLURIA, (age 1 y+), PF, 0.5mL 09/23/2015, 10/30/2020    Influenza, FLUCELVAX, (age 10 mo+), MDCK, PF, 0.5mL 12/01/2017    Influenza, High Dose (Fluzone 65 yrs and older) 10/01/2018    Influenza, Triv, inactivated, subunit, adjuvanted, IM (Fluad 65 yrs and older) 10/10/2019    Pneumococcal Polysaccharide ([id number]) 04/28/2021    Tdap (Boostrix, Adacel) 11/18/2019    Zoster Recombinant (Shingrix) 10/30/2020     Allergies   Allergen Reactions    Hydromorphone Swelling    Sulfa Antibiotics Rash     Prior to Admit Medications:  Current Outpatient Medications   Medication Instructions    aspirin 81 MG EC tablet Oral, DAILY    busPIRone (BUSPAR) 10 MG tablet 1 tablet, Oral, 2 TIMES DAILY    docusate (COLACE, DULCOLAX) 100 mg, Oral, 2 TIMES DAILY PRN    DULoxetine (CYMBALTA) 60 MG extended release capsule TAKE 1 CAPSULE BY MOUTH EVERY DAY    flecainide (TAMBOCOR) 100 mg, Oral, EVERY 12 HOURS    levothyroxine (SYNTHROID) 100 mcg, Oral, DAILY BEFORE BREAKFAST    LORazepam (ATIVAN) 0.5 mg, Oral, EVERY 8 HOURS PRN    melatonin 10 MG CAPS capsule Oral    metoprolol tartrate (LOPRESSOR) 12.5 mg, Oral, EVERY 12 HOURS    omeprazole (PRILOSEC) 40 mg, Oral, DAILY    tiZANidine (ZANAFLEX) 4 MG tablet TAKE 1/2-1 TABLET BY MOUTH 3 TIMES A DAY AS NEEDED    vitamin D (CHOLECALCIFEROL) 5,000 Units, Oral, DAILY         Objective:   Patient Vitals for the past 24 hrs:   Temp Pulse Resp BP SpO2   12/01/22 1645 -- -- -- (!) 168/102 --   12/01/22 1251 98 °F (36.7 °C) 71 17 (!) 189/97 99 %       Oxygen Therapy  SpO2: 99 %  O2 Device: None (Room air)    Estimated body mass index is 32.61 kg/m² as calculated from the following:    Height as of 11/17/22: 5' 4\" (1.626 m). Weight as of this encounter: 190 lb (86.2 kg). No intake or output data in the 24 hours ending 12/01/22 1842      Physical Exam:    Blood pressure (!) 168/102, pulse 71, temperature 98 °F (36.7 °C), temperature source Oral, resp. rate 17, weight 190 lb (86.2 kg), SpO2 99 %. General:    Well nourished. Obese. NAD   Head:  Normocephalic, atraumatic  Eyes:  Sclerae appear normal.  Pupils equally round. ENT:  Left TM normal. R TM obscured by wear wax. Nares appear normal, no drainage. Moist oral mucosa  Neck:  No LAD. Trachea midline   CV:   RRR. No m/r/g. No jugular venous distension. Lungs:   CTAB. No wheezing, rhonchi, or rales. Symmetric expansion. Abdomen: Bowel sounds present. Soft, nontender, nondistended.   Extremities: No cyanosis or clubbing. No edema  Skin:     No rashes and normal coloration. Warm and dry. Neuro:  MS grossly 5/5 throughout. Normarl FTN and normal heel to shine. Speech fluent, goal directed, coherent. CN II-XII grossly intact. Sensation intact. A&Ox3  Psych:  anxious mood and affect. I have personally reviewed labs and tests showing:  Recent Labs:  Recent Results (from the past 24 hour(s))   POCT Glucose    Collection Time: 12/01/22 12:53 PM   Result Value Ref Range    POC Glucose 130 (H) 65 - 100 mg/dL    Performed by:  Wally    Basic Metabolic Panel    Collection Time: 12/01/22 12:57 PM   Result Value Ref Range    Sodium 137 133 - 143 mmol/L    Potassium 3.6 3.5 - 5.1 mmol/L    Chloride 105 101 - 110 mmol/L    CO2 27 21 - 32 mmol/L    Anion Gap 5 2 - 11 mmol/L    Glucose 121 (H) 65 - 100 mg/dL    BUN 17 8 - 23 MG/DL    Creatinine 1.00 0.6 - 1.0 MG/DL    Est, Glom Filt Rate 58 (L) >60 ml/min/1.73m2    Calcium 9.9 8.3 - 10.4 MG/DL   CBC with Auto Differential    Collection Time: 12/01/22 12:57 PM   Result Value Ref Range    WBC 9.5 4.3 - 11.1 K/uL    RBC 4.90 4.05 - 5.2 M/uL    Hemoglobin 14.6 11.7 - 15.4 g/dL    Hematocrit 44.2 35.8 - 46.3 %    MCV 90.2 82 - 102 FL    MCH 29.8 26.1 - 32.9 PG    MCHC 33.0 31.4 - 35.0 g/dL    RDW 13.7 11.9 - 14.6 %    Platelets 120 540 - 683 K/uL    MPV 10.2 9.4 - 12.3 FL    nRBC 0.00 0.0 - 0.2 K/uL    Differential Type AUTOMATED      Seg Neutrophils 84 (H) 43 - 78 %    Lymphocytes 11 (L) 13 - 44 %    Monocytes 4 4.0 - 12.0 %    Eosinophils % 0 (L) 0.5 - 7.8 %    Basophils 1 0.0 - 2.0 %    Immature Granulocytes 0 0.0 - 5.0 %    Segs Absolute 8.0 1.7 - 8.2 K/UL    Absolute Lymph # 1.1 0.5 - 4.6 K/UL    Absolute Mono # 0.4 0.1 - 1.3 K/UL    Absolute Eos # 0.0 0.0 - 0.8 K/UL    Basophils Absolute 0.1 0.0 - 0.2 K/UL    Absolute Immature Granulocyte 0.0 0.0 - 0.5 K/UL   Troponin    Collection Time: 12/01/22 12:57 PM   Result Value Ref Range    Troponin, High Sensitivity 10.9 0 - 14 pg/mL   POCT INR    Collection Time: 12/01/22 12:58 PM   Result Value Ref Range    POC Protime 13.9 (H) 9.6 - 11.6 SECS    POC INR 1.2 0.9 - 1.2     EKG 12 Lead    Collection Time: 12/01/22  1:20 PM   Result Value Ref Range    Ventricular Rate 77 BPM    Atrial Rate 77 BPM    P-R Interval 267 ms    QRS Duration 122 ms    Q-T Interval 437 ms    QTc Calculation (Bazett) 495 ms    P Axis 78 degrees    R Axis -15 degrees    T Axis 98 degrees    Diagnosis Sinus rhythm        I have personally reviewed imaging studies showing:  CT HEAD WO CONTRAST    Result Date: 12/1/2022  EXAM: CT HEAD WITHOUT CONTRAST INDICATION: Stroke Symptoms. COMPARISON: None. TECHNIQUE: Contiguous axial images were obtained from the skull base through the vertex without IV contrast. Radiation dose reduction techniques were used for this study. Our CT scanners use one or all of the following: Automated exposure control, adjustment of the mA and/or kV according to patient size, iterative reconstruction. FINDINGS: The study was presented for interpretation at 1:53 PM on the date of the study Global parenchymal volume loss and ex vacuo ventriculomegaly. Periventricular white matter hypoattenuation reflects sequelae of small vessel ischemic disease. No acute infarction or hemorrhage. No hydrocephalus or midline shift. No extra-axial mass or hemorrhage. The basal cisterns are patent. The visualized portions of the orbits are normal. The mastoid air cells and paranasal sinuses are patent. The visualized vascular structures have an unremarkable noncontrast appearance. Left posterior parietal scalp hematoma. No calvarial fracture. Left posterior parietal scalp hematoma. No acute intracranial abnormality.      CT CERVICAL SPINE WO CONTRAST    Result Date: 12/1/2022  CT CERVICAL SPINE INDICATION: Neck pain after a fall COMPARISON: CT 11/18/2019 TECHNIQUE: Contiguous axial images from the skull base through the superior mediastinum were obtained with coronal and sagittal reformations. Radiation dose reduction techniques were used for this study. Our CT scanners use one or all of the following: Automated exposure control, adjustment of the mA and/or kV according to patient size, iterative reconstruction. FINDINGS: Bones: Prominent osteophytes spanning multiple levels anteriorly C4-C7 and posteriorly spanning C2, C3, C4. The posterior osteophytes cause at least moderate narrowing of the central spinal canal. No acute fracture demonstrated. Intervertebral discs: Disc spaces are maintained. Soft tissues: No prevertebral soft tissue swelling. No acute traumatic abnormality. CT BRAIN PERFUSION    Result Date: 12/1/2022  EXAMINATION: CT BRAIN PERFUSION 12/1/2022 1:37 PM ACCESSION NUMBER: [id number] COMPARISON: Same-day head CT and CTA head and neck INDICATION: code S stroke symptoms CT perfusion imaging of the brain was then performed after the administration of intravenous contrast.  Perfusion maps and perfusion analysis output were generated using the VIZ perfusion processing software algorithm. Radiation dose reduction techniques were used for this study: All CT scans performed at this facility use one or all of the following: Automated exposure control, adjustment of the mA and/or kVp according to patient's size, iterative reconstruction. FINDINGS: There are no areas of cerebral blood flow less than 30 percent or Tmax elevation greater than 6 seconds. A small focus of Tmax greater than 4 seconds in the posterior left parietal lobe may be artifactual or consequent to chronic oligemia. No CT perfusion evidence of large territory core infarction or reversible ischemia. CTA HEAD NECK W CONTRAST    Result Date: 12/1/2022  Title:  CT arteriogram of the neck and head. Indication: Code stroke. Stroke symptoms. Blurry vision. Dizziness. Fall with head trauma. Hypertension. Atrial fibrillation.  Technique: Axial images of the neck and head were obtained after the uneventful administration of intravenous iodinated contrast media. Contrast was used to best identify the arterial structures. Images were reviewed on a separate, free standing, three-dimensional workstation as per the referring physicians request.  All stenosis percentages derived by comparing the narrowest segment with the distal Internal Carotid Artery luminal diameter, as described in the [place] American Symptomatic Carotid Endarterectomy Trial (NASCET) criteria. All CT scans at this facility are performed using dose reduction/dose modulation techniques, as appropriate the performed exam, including the following: Automated Exposure Control; Adjustment of the mA and/or kV according to patient size (this includes techniques or standardized protocols for targeted exams where dose is matched to indication/reason for exam); and Use of Iterative Reconstruction Technique. Comparison: Head CT same date. Findings: Lungs:  Clear. Bones:  Large osteophyte causes spinal canal narrowing at C3 and C4. Paranasal sinuses:  Clear. Brain:  No mass affect. Soft tissues:  Atretic or absent left thyroid gland; the right thyroid nodules. Superior sagittal sinus:  Patent. Right transverse sinus:  Patent. Left transverse sinus:  Poorly enhanced, probably patent. Aortic arch: Atherosclerotic disease, patent. Right brachiocephalic artery:  Patent. Right subclavian artery:  Patent. Left subclavian artery:  Patent. Right common carotid artery:  Patent. Right external carotid artery:  Patent. Cervical Right internal carotid artery:  Mild intimal thickening in the carotid bulb, tortuous, patent. Left common carotid artery: Moderately tortuous, patent. Left external carotid artery:  Patent. Cervical Left internal carotid artery: Tortuous, patent. Right vertebral artery: Tortuous, patent. Left vertebral artery:  Patent. Basilar artery: Tortuous, patent.   Intracranial right internal carotid artery: Mild atherosclerotic disease, patent. Right middle cerebral artery:  Patent. Right anterior cerebral artery:  Patent. Anterior communicating artery: Patent. Left anterior cerebral artery:  Patent. Left middle cerebral artery:  Patent. Intracranial left internal carotid artery:  Mild atherosclerotic disease, patent. Right posterior communicating artery: See below. Left posterior communicating artery:  Not visualized. Right posterior cerebral artery:  Fetal origin, mild narrowing in its midportion patent. Left posterior cerebral artery:  Patent. No intracranial arterial occlusion. Right thyroid nodules. Echocardiogram:  11/17/22    TRANSTHORACIC ECHOCARDIOGRAM (TTE) COMPLETE (CONTRAST/BUBBLE/3D PRN) 11/18/2022 12:23 PM, 11/18/2022 12:00 AM (Final)    Interpretation Summary    Left Ventricle: Normal left ventricular systolic function with a visually estimated EF of 60 - 65%. Left ventricle size is normal. Mildly increased wall thickness. Normal wall motion. Normal diastolic function. Left Atrium: Left atrium is moderately dilated. Mitral Valve: Mild regurgitation. Tricuspid Valve: Mild regurgitation.     Signed by: Macie Fleischer, MD on 11/18/2022 12:23 PM, Signed by: Unknown Provider Result on 11/18/2022 12:00 AM        Orders Placed This Encounter   Medications    aspirin tablet 325 mg    OR Linked Order Group     ondansetron (ZOFRAN-ODT) disintegrating tablet 4 mg     ondansetron (ZOFRAN) injection 4 mg    polyethylene glycol (GLYCOLAX) packet 17 g    OR Linked Order Group     acetaminophen (TYLENOL) tablet 650 mg     acetaminophen (TYLENOL) suppository 650 mg    0.9 % sodium chloride infusion    atorvastatin (LIPITOR) tablet 80 mg    labetalol (NORMODYNE;TRANDATE) injection 10 mg    aspirin EC tablet 81 mg    potassium chloride (KLOR-CON M) extended release tablet 40 mEq    flecainide (TAMBOCOR) tablet 100 mg    levothyroxine (SYNTHROID) tablet 100 mcg    DULoxetine (CYMBALTA) extended release capsule 60 mg    metoprolol tartrate (LOPRESSOR) tablet 12.5 mg    busPIRone (BUSPAR) tablet 10 mg    fluticasone (FLONASE) 50 MCG/ACT nasal spray 1 spray    cetirizine (ZYRTEC) tablet 10 mg    meclizine (ANTIVERT) tablet 12.5 mg         Signed:  Monalisa Aceves DO, DO    Part of this note may have been written by using a voice dictation software. The note has been proof read but may still contain some grammatical/other typographical errors.

## 2022-12-01 NOTE — ED PROVIDER NOTES
Chief Complaint: Dizziness     HPI: Patient woke up around 9 AM this morning with blurry vision. That resolved after couple of minutes and she started with dizziness 2 hours later. She bent over to  something and fell subsequently hitting her head. She did not have any loss of consciousness. Her symptoms have now resolved. Apparently she fell 1 week ago subsequently hitting her head but did not pass out. She is not on blood thinners. She came by EMS. History of atrial fibrillation, not on blood thinners other than 81 mg aspirin daily.       Vital Signs   Patient Vitals for the past 4 hrs:   Temp Pulse Resp BP SpO2   12/01/22 1251 98 °F (36.7 °C) 71 17 (!) 189/97 99 %        Past Medical History:   Diagnosis Date    Anxiety     Arthritis     Bell's palsy     in Oct. 2009 with some vertigo at times still, no other after effects    Depression     GERD (gastroesophageal reflux disease)     reflux, takes nexium    Hematoma of hip, right, initial encounter     Hypertension     on medication since 2006    Hypothyroidism     Nausea & vomiting     Obese     Paroxysmal atrial fibrillation ([address] 8/13/2017    Psychiatric disorder     depression    S/P total knee replacement using cement 5/16/2011    Unspecified hypothyroidism 5/16/2011        Past Surgical History:   Procedure Laterality Date    APPENDECTOMY  1961    BREAST BIOPSY Bilateral     BREAST LUMPECTOMY  1998    benign    CATARACT REMOVAL Bilateral 2014    CHOLECYSTECTOMY  1976    COLONOSCOPY  04/04/2016    GASTRECTOMY  9/21/15    sleeve    HYSTERECTOMY (CERVIX STATUS UNKNOWN)  [address]  2005    lower back     ORTHOPEDIC SURGERY Bilateral 1994    heel spurs removed    RI CARDIAC SURG PROCEDURE UNLIST      LAAO placed 7/17/18    THYROIDECTOMY  1980    partial    TONSILLECTOMY  1966    TOTAL KNEE ARTHROPLASTY Left 2011    TOTAL KNEE ARTHROPLASTY Right 2010    UROLOGICAL SURGERY  2008/2009    bladder tack X2        Family History   Problem Relation Age of Onset    Delayed Awakening Neg Hx     Pseudochol. Deficiency Neg Hx     Post-op Nausea/Vomiting Neg Hx     Emergence Delirium Neg Hx     Post-op Cognitive Dysfunction Neg Hx     Cancer Mother         breast    Malig Hypertherm Neg Hx     Breast Cancer Mother 62    Cancer Father         throat    Other Father         gastric ulcer    Kidney Disease Father     Breast Cancer Daughter     Heart Attack Mother         Social History     Socioeconomic History    Marital status:    Tobacco Use    Smoking status: Never    Smokeless tobacco: Never   Substance and Sexual Activity    Alcohol use: Yes     Alcohol/week: 1.7 standard drinks    Drug use: Never        Allergies: Hydromorphone and Sulfa antibiotics    Previous Medications    ASPIRIN 81 MG EC TABLET    Take by mouth daily    BUSPIRONE (BUSPAR) 10 MG TABLET    Take 1 tablet by mouth 2 times daily    DOCUSATE (COLACE, DULCOLAX) 100 MG CAPS    Take 100 mg by mouth 2 times daily as needed    DULOXETINE (CYMBALTA) 60 MG EXTENDED RELEASE CAPSULE    TAKE 1 CAPSULE BY MOUTH EVERY DAY    FLECAINIDE (TAMBOCOR) 100 MG TABLET    Take 1 tablet by mouth in the morning and 1 tablet in the evening. LEVOTHYROXINE (SYNTHROID) 100 MCG TABLET    Take 100 mcg by mouth every morning (before breakfast)    LORAZEPAM (ATIVAN) 0.5 MG TABLET    Take 0.5 mg by mouth every 8 hours as needed. MELATONIN 10 MG CAPS CAPSULE    Take by mouth    METOPROLOL TARTRATE (LOPRESSOR) 25 MG TABLET    Take 0.5 tablets by mouth in the morning and 0.5 tablets in the evening. OMEPRAZOLE (PRILOSEC) 40 MG DELAYED RELEASE CAPSULE    Take 40 mg by mouth daily    TIZANIDINE (ZANAFLEX) 4 MG TABLET    TAKE 1/2-1 TABLET BY MOUTH 3 TIMES A DAY AS NEEDED    VITAMIN D (CHOLECALCIFEROL) 25 MCG (1000 UT) TABS TABLET    Take 5,000 Units by mouth daily          Brief PE: NIH 0, A&O x 3  GEN: No distress.   CV: As per triage vitals  PULM: Breathing comfortably  ABD: No distention  NEURO: Awake, Alert, good strength bilaterally     Impression: Dizziness     Plan: Code stroke, patient to be seen by Dr. Shaila Gutierrez, we discussed patient. Neurology requests brain MRI without contrast and MRI of cervical spine. Spoke again with Dr. Elva Monae regarding patient, he would like me to contact hospitalist for admission. Dr. Tatiana Hoang on-call. Patient evaluated initially in triage. Rapid Medical Evaluation was conducted and necessary orders have been placed. I have performed a medical screening exam.  Care will now be transferred to the provider in the back of the emergency department.   MARTHA Morin PA 1:12 PM         MARTHA Morin  12/01/22 1313       MARTHA Morin  12/01/22 1319       MARTHA Morin  12/01/22 1441

## 2022-12-01 NOTE — CONSULTS
[facility]  [address] [facility] [address] [facility], [address]            Shay Tello is a 69 y.o. female who presents on referral from acute Code S  Patient reports awakening with blurred vision dizziness perhaps some diplopia which she believes to be [id number]. Difficulties with balance and did fall but denies hitting head. Not on any specific platelet inhibitor or anticoagulation but there is a mention of atrial fibrillation in the chart and I note that she had an echocardiogram which was performed on 11/17. Past Medical History:   Diagnosis Date    Anxiety     Arthritis     Bell's palsy     in Oct. 2009 with some vertigo at times still, no other after effects    Depression     GERD (gastroesophageal reflux disease)     reflux, takes nexium    Hematoma of hip, right, initial encounter     Hypertension     on medication since 2006    Hypothyroidism     Nausea & vomiting     Obese     Paroxysmal atrial fibrillation ([facility] [address] 8/13/2017    Psychiatric disorder     depression    S/P total knee replacement using cement 5/16/2011    Unspecified hypothyroidism 5/16/2011       Past Surgical History:   Procedure Laterality Date    APPENDECTOMY  1961    BREAST BIOPSY Bilateral     BREAST LUMPECTOMY  1998    benign    CATARACT REMOVAL Bilateral 2014    CHOLECYSTECTOMY  1976    COLONOSCOPY  04/04/2016    GASTRECTOMY  9/21/15    sleeve    HYSTERECTOMY (CERVIX STATUS UNKNOWN)  [address]  2005    lower back     ORTHOPEDIC SURGERY Bilateral 1994    heel spurs removed    DE CARDIAC SURG PROCEDURE UNLIST      LAAO placed 7/17/18    THYROIDECTOMY  1980    partial    TONSILLECTOMY  1966    TOTAL KNEE ARTHROPLASTY Left 2011    TOTAL KNEE ARTHROPLASTY Right 2010    UROLOGICAL SURGERY  2008/2009    bladder tack X2        Family History   Problem Relation Age of Onset    Delayed Awakening Neg Hx     Pseudochol.  Deficiency Neg Hx     Post-op Nausea/Vomiting Neg Hx     Emergence Delirium Neg Hx     Post-op Cognitive Dysfunction Neg Hx     Cancer Mother         breast    Malig Hypertherm Neg Hx     Breast Cancer Mother 62    Cancer Father         throat    Other Father         gastric ulcer    Kidney Disease Father     Breast Cancer Daughter     Heart Attack Mother         Social History     Socioeconomic History    Marital status:    Tobacco Use    Smoking status: Never    Smokeless tobacco: Never   Substance and Sexual Activity    Alcohol use:  Yes     Alcohol/week: 1.7 standard drinks    Drug use: Never         Current Facility-Administered Medications   Medication Dose Route Frequency Provider Last Rate Last Admin    aspirin tablet 325 mg  325 mg Oral NOW Lori Mason MD        ondansetron (ZOFRAN-ODT) disintegrating tablet 4 mg  4 mg Oral Q8H PRN Abhinav Ossining, DO        Or    ondansetron (ZOFRAN) injection 4 mg  4 mg IntraVENous Q6H PRN Abhinav Ossining, DO        polyethylene glycol (GLYCOLAX) packet 17 g  17 g Oral Daily PRN Abhinav Oss[place], DO        acetaminophen (TYLENOL) tablet 650 mg  650 mg Oral Q4H PRN Abhinav Ossining, DO        Or    acetaminophen (TYLENOL) suppository 650 mg  650 mg Rectal Q4H PRN Abhinav Ossining, DO        0.9 % sodium chloride infusion   IntraVENous Continuous Abhinav Prieto, DO        atorvastatin (LIPITOR) tablet 80 mg  80 mg Oral Nightly Abhinav Ossining, DO        labetalol (NORMODYNE;TRANDATE) injection 10 mg  10 mg IntraVENous Q10 Min PRN Abhinav Prieto, DO        [START ON 12/2/2022] aspirin EC tablet 81 mg  81 mg Oral Daily Abhinav Ossining, DO        potassium chloride (KLOR-CON M) extended release tablet 40 mEq  40 mEq Oral Once Abhinav Ossining, DO        flecainide (TAMBOCOR) tablet 100 mg  100 mg Oral BID Abhinav Prieto, DO        [START ON 12/2/2022] levothyroxine (SYNTHROID) tablet 100 mcg  100 mcg Oral Daily Abhinav Ossining, DO        [START ON 12/2/2022] DULoxetine (CYMBALTA) extended release capsule 60 mg  60 mg Oral Daily Abhinav Prieto, DO metoprolol tartrate (LOPRESSOR) tablet 12.5 mg  12.5 mg Oral BID [place] Hidalgo, DO        busPIRone (BUSPAR) tablet 10 mg  10 mg Oral BID Kush Aicha, DO         Current Outpatient Medications   Medication Sig Dispense Refill    flecainide (TAMBOCOR) 100 MG tablet Take 1 tablet by mouth in the morning and 1 tablet in the evening. 60 tablet 3    metoprolol tartrate (LOPRESSOR) 25 MG tablet Take 0.5 tablets by mouth in the morning and 0.5 tablets in the evening. 180 tablet 3    aspirin 81 MG EC tablet Take by mouth daily      busPIRone (BUSPAR) 10 MG tablet Take 1 tablet by mouth 2 times daily      vitamin D (CHOLECALCIFEROL) 25 MCG (1000 UT) TABS tablet Take 5,000 Units by mouth daily      docusate (COLACE, DULCOLAX) 100 MG CAPS Take 100 mg by mouth 2 times daily as needed      DULoxetine (CYMBALTA) 60 MG extended release capsule TAKE 1 CAPSULE BY MOUTH EVERY DAY      levothyroxine (SYNTHROID) 100 MCG tablet Take 100 mcg by mouth every morning (before breakfast)      LORazepam (ATIVAN) 0.5 MG tablet Take 0.5 mg by mouth every 8 hours as needed. melatonin 10 MG CAPS capsule Take by mouth      omeprazole (PRILOSEC) 40 MG delayed release capsule Take 40 mg by mouth daily      tiZANidine (ZANAFLEX) 4 MG tablet TAKE 1/2-1 TABLET BY MOUTH 3 TIMES A DAY AS NEEDED       Facility-Administered Medications Ordered in Other Encounters   Medication Dose Route Frequency Provider Last Rate Last Admin    sodium chloride flush 0.9 % injection 10 mL  10 mL IntraVENous ONCE PRN MARTHA Reddy            Allergies   Allergen Reactions    Hydromorphone Swelling    Sulfa Antibiotics Rash   Record review  I have reviewed medical records from Dr. Mi Franco 2 weeks ago 11/17/2022  Conspicuous is the fact that the patient does not have a watchman and does not require oral anticoagulation.   Her echocardiogram as noted below did not demonstrate intracardiac clot she had her medications adjusted 2 weeks ago.    Review of Systems    BP (!) 168/102   Pulse 71   Temp 98 °F (36.7 °C) (Oral)   Resp 17   Wt 190 lb (86.2 kg)   SpO2 99%   BMI 32.61 kg/m²     Neurologic Exam  The patient is alert cooperative and oriented. No evident skin lesions no evidence of excessive bruising no trauma  Patient looks well-hydrated. Does not appear chronically ill. Cranial nerve examination normal visual fields. Normal random eye movements. No ptosis. No lid lag  Face moves strongly symmetrically and equally  Speech is normal  Motor  Upper extremities  Normal bulk strength tone and symmetric arm swing  Lower extremities  Normal bulk strength tone  Deep tendon reflexes 1+ and symmetric at biceps brachioradialis and triceps  The patient is observed while standing and maneuvering to the CT scanner she does have a degree of dystaxia  Fine motor coordination is normal in the hands bilaterally    Peripheral sensation light touch normal  There are no carotid bruits  Vital signs are reviewed     Most recent MRI   Results for orders placed during the hospital encounter of 12/01/22    MRI BRAIN WO CONTRAST    Narrative  EXAMINATION: BRAIN MRI 12/1/2022 3:23 PM    ACCESSION NUMBER: [id number]    INDICATION: Dizziness, neck pain, code stroke    COMPARISON: Head CT, CTA head and neck, and CT perfusion 12/1/2022, brain MRI  10/8/2009    TECHNIQUE: Multiplanar multisequence MRI of the brain without the administration  of intravenous contrast.    FINDINGS:    Midline structures including the corpus callosum, pituitary gland, optic nerves,  and cerebellum are well developed. The ventricles are within normal limits for the degree of global brain  parenchymal volume loss. There is no midline shift. The basilar cisterns are  patent. There is no cerebellar tonsillar ectopia or herniation.     Scattered T2 hyperintensities in the periventricular and subcortical white  matter and tia of worsening in comparison to the prior exam. This is a  nonspecific finding which most likely represents a severe burden of chronic  microangiopathy. Prior bilateral lens replacement. Diffusion imaging shows no evidence of acute infarction or other acute  abnormality. Punctate chronic microhemorrhage in the left thalamus. The expected large  vascular flow voids are preserved. A left parietal scalp hematoma is again seen. No suspicious osseous lesion. Impression  1. No evidence of acute infarction. 2. Worsening severe burden of chronic microangiopathy. Most recent MRA   No results found for this or any previous visit. Most recent CTA  Results for orders placed during the hospital encounter of 12/01/22    CTA HEAD NECK W CONTRAST    Narrative  Title:  CT arteriogram of the neck and head. Indication: Code stroke. Stroke symptoms. Blurry vision. Dizziness. Fall  with head trauma. Hypertension. Atrial fibrillation. Technique: Axial images of the neck and head were obtained after the uneventful  administration of intravenous iodinated contrast media. Contrast was used to  best identify the arterial structures. Images were reviewed on a separate, free  standing, three-dimensional workstation as per the referring physicians request.      All stenosis percentages derived by comparing the narrowest segment with the  distal Internal Carotid Artery luminal diameter, as described in the [place]  American Symptomatic Carotid Endarterectomy Trial (NASCET) criteria. All CT scans at this facility are performed using dose reduction/dose modulation  techniques, as appropriate the performed exam, including the following:  Automated Exposure Control; Adjustment of the mA and/or kV according to patient  size (this includes techniques or standardized protocols for targeted exams  where dose is matched to indication/reason for exam); and Use of Iterative  Reconstruction Technique. Comparison: Head CT same date.     Findings:  Lungs: Clear.  Bones:  Large osteophyte causes spinal canal narrowing at C3 and C4. Paranasal sinuses:  Clear. Brain:  No mass affect. Soft tissues:  Atretic or absent left thyroid gland; the right thyroid nodules. Superior sagittal sinus:  Patent. Right transverse sinus:  Patent. Left transverse sinus:  Poorly enhanced, probably patent. Aortic arch: Atherosclerotic disease, patent. Right brachiocephalic artery:  Patent. Right subclavian artery:  Patent. Left subclavian artery:  Patent. Right common carotid artery:  Patent. Right external carotid artery:  Patent. Cervical Right internal carotid artery:  Mild intimal thickening in the carotid  bulb, tortuous, patent. Left common carotid artery: Moderately tortuous, patent. Left external carotid artery:  Patent. Cervical Left internal carotid artery: Tortuous, patent. Right vertebral artery: Tortuous, patent. Left vertebral artery:  Patent. Basilar artery: Tortuous, patent. Intracranial right internal carotid artery:  Mild atherosclerotic disease,  patent. Right middle cerebral artery:  Patent. Right anterior cerebral artery:  Patent. Anterior communicating artery: Patent. Left anterior cerebral artery:  Patent. Left middle cerebral artery:  Patent. Intracranial left internal carotid artery:  Mild atherosclerotic disease,  patent. Right posterior communicating artery: See below. Left posterior communicating artery:  Not visualized. Right posterior cerebral artery:  Fetal origin, mild narrowing in its midportion  patent. Left posterior cerebral artery:  Patent. Impression  No intracranial arterial occlusion. Right thyroid nodules.        Most recent Echo  Results for orders placed during the hospital encounter of 11/17/22    Transthoracic echocardiogram (TTE) complete with contrast, bubble, strain, and 3D PRN    Interpretation Summary    Left Ventricle: Normal left ventricular systolic function with a visually estimated EF of 60 - 65%. Left ventricle size is normal. Mildly increased wall thickness. Normal wall motion. Normal diastolic function. Left Atrium: Left atrium is moderately dilated. Mitral Valve: Mild regurgitation. Tricuspid Valve: Mild regurgitation. Most recent lipid panels  Lab Results   Component Value Date/Time    CHOL 189 05/17/2022 11:54 AM    HDL 77 05/17/2022 11:54 AM    VLDL 12 05/17/2022 11:54 AM       Most recent Hgb A1C  No results found for: HBA1C, [id number]      Assessment/Plan:    Transient neurologic event with some residual difficulty with balance  Prominent osteophyte is noted within the cervical canal on my review of the patient's CT scan  Recommendations  Initiate ASA 81 mg daily  I do not believe a repeat of the echocardiogram as a full cardiac work-up was carried out 2 weeks ago is required  Await results of MRI cervical spine. If there is an underlying cervical etiology for her loss of balance may need consultation with neurosurgery or spine  Continue statins  Metabolic work-up  Assess for toxic issues such as UTI  Orthostatic blood pressures        Kim Carpenter MD     Addendum  The large disc osteophyte noted by me and radiology at C3-4 indeed does cause a degree of cervical cord compression and may well be a responsible etiology for the patient's loss of balance. Given the fact she is not on anticoagulation and with a negative MRI of the brain with no evidence of stroke she is a candidate for decompression.   This should not given its size in my opinion be managed conservatively  Recommend neurosurgical opinion

## 2022-12-02 ENCOUNTER — TELEPHONE (OUTPATIENT)
Dept: NEUROLOGY | Age: 77
End: 2022-12-02

## 2022-12-02 ENCOUNTER — CLINICAL DOCUMENTATION (OUTPATIENT)
Dept: NEUROSURGERY | Age: 77
End: 2022-12-02

## 2022-12-02 ENCOUNTER — CARE COORDINATION (OUTPATIENT)
Dept: CARE COORDINATION | Facility: CLINIC | Age: 77
End: 2022-12-02

## 2022-12-02 VITALS
DIASTOLIC BLOOD PRESSURE: 82 MMHG | BODY MASS INDEX: 33.29 KG/M2 | SYSTOLIC BLOOD PRESSURE: 154 MMHG | TEMPERATURE: 97.7 F | HEIGHT: 64 IN | HEART RATE: 66 BPM | RESPIRATION RATE: 16 BRPM | OXYGEN SATURATION: 99 % | WEIGHT: 195 LBS

## 2022-12-02 PROBLEM — M48.10 DISH (DIFFUSE IDIOPATHIC SKELETAL HYPEROSTOSIS): Status: ACTIVE | Noted: 2022-12-02

## 2022-12-02 PROBLEM — I95.1 ORTHOSTATIC HYPOTENSION: Status: ACTIVE | Noted: 2017-12-02

## 2022-12-02 PROBLEM — G95.20 CERVICAL SPINAL CORD COMPRESSION (HCC): Status: ACTIVE | Noted: 2022-12-02

## 2022-12-02 PROBLEM — M48.02 CERVICAL SPINAL STENOSIS: Status: ACTIVE | Noted: 2022-12-02

## 2022-12-02 LAB
ALBUMIN SERPL-MCNC: 3.2 G/DL (ref 3.2–4.6)
ALBUMIN/GLOB SERPL: 1.1 {RATIO} (ref 0.4–1.6)
ALP SERPL-CCNC: 93 U/L (ref 50–136)
ALT SERPL-CCNC: 18 U/L (ref 12–65)
ANION GAP SERPL CALC-SCNC: 3 MMOL/L (ref 2–11)
AST SERPL-CCNC: 18 U/L (ref 15–37)
BILIRUB SERPL-MCNC: 0.7 MG/DL (ref 0.2–1.1)
BUN SERPL-MCNC: 20 MG/DL (ref 8–23)
CALCIUM SERPL-MCNC: 9 MG/DL (ref 8.3–10.4)
CHLORIDE SERPL-SCNC: 108 MMOL/L (ref 101–110)
CHOLEST SERPL-MCNC: 177 MG/DL
CO2 SERPL-SCNC: 28 MMOL/L (ref 21–32)
CREAT SERPL-MCNC: 0.7 MG/DL (ref 0.6–1)
ERYTHROCYTE [DISTWIDTH] IN BLOOD BY AUTOMATED COUNT: 13.7 % (ref 11.9–14.6)
EST. AVERAGE GLUCOSE BLD GHB EST-MCNC: 111 MG/DL
FERRITIN SERPL-MCNC: 124 NG/ML (ref 8–388)
FOLATE SERPL-MCNC: 9.9 NG/ML (ref 3.1–17.5)
GLOBULIN SER CALC-MCNC: 2.9 G/DL (ref 2.8–4.5)
GLUCOSE SERPL-MCNC: 102 MG/DL (ref 65–100)
HBA1C MFR BLD: 5.5 % (ref 4.8–5.6)
HCT VFR BLD AUTO: 39.8 % (ref 35.8–46.3)
HDLC SERPL-MCNC: 74 MG/DL (ref 40–60)
HDLC SERPL: 2.4 {RATIO}
HGB BLD-MCNC: 13.1 G/DL (ref 11.7–15.4)
LDLC SERPL CALC-MCNC: 87.8 MG/DL
MAGNESIUM SERPL-MCNC: 2 MG/DL (ref 1.8–2.4)
MAGNESIUM SERPL-MCNC: 2.2 MG/DL (ref 1.8–2.4)
MCH RBC QN AUTO: 29.6 PG (ref 26.1–32.9)
MCHC RBC AUTO-ENTMCNC: 32.9 G/DL (ref 31.4–35)
MCV RBC AUTO: 90 FL (ref 82–102)
NRBC # BLD: 0 K/UL (ref 0–0.2)
PLATELET # BLD AUTO: 264 K/UL (ref 150–450)
PMV BLD AUTO: 10.4 FL (ref 9.4–12.3)
POTASSIUM SERPL-SCNC: 4.6 MMOL/L (ref 3.5–5.1)
PROT SERPL-MCNC: 6.1 G/DL (ref 6.3–8.2)
RBC # BLD AUTO: 4.42 M/UL (ref 4.05–5.2)
SODIUM SERPL-SCNC: 139 MMOL/L (ref 133–143)
TRIGL SERPL-MCNC: 76 MG/DL (ref 35–150)
TSH W FREE THYROID IF ABNORMAL: 2.3 UIU/ML (ref 0.36–3.74)
VIT B12 SERPL-MCNC: 445 PG/ML (ref 193–986)
VLDLC SERPL CALC-MCNC: 15.2 MG/DL (ref 6–23)
WBC # BLD AUTO: 6.6 K/UL (ref 4.3–11.1)

## 2022-12-02 PROCEDURE — 99203 OFFICE O/P NEW LOW 30 MIN: CPT | Performed by: NEUROLOGICAL SURGERY

## 2022-12-02 PROCEDURE — 85027 COMPLETE CBC AUTOMATED: CPT

## 2022-12-02 PROCEDURE — 80061 LIPID PANEL: CPT

## 2022-12-02 PROCEDURE — 82728 ASSAY OF FERRITIN: CPT

## 2022-12-02 PROCEDURE — 83036 HEMOGLOBIN GLYCOSYLATED A1C: CPT

## 2022-12-02 PROCEDURE — 82607 VITAMIN B-12: CPT

## 2022-12-02 PROCEDURE — 83735 ASSAY OF MAGNESIUM: CPT

## 2022-12-02 PROCEDURE — G0378 HOSPITAL OBSERVATION PER HR: HCPCS

## 2022-12-02 PROCEDURE — 97535 SELF CARE MNGMENT TRAINING: CPT

## 2022-12-02 PROCEDURE — 82746 ASSAY OF FOLIC ACID SERUM: CPT

## 2022-12-02 PROCEDURE — 80053 COMPREHEN METABOLIC PANEL: CPT

## 2022-12-02 PROCEDURE — 97165 OT EVAL LOW COMPLEX 30 MIN: CPT

## 2022-12-02 PROCEDURE — 97162 PT EVAL MOD COMPLEX 30 MIN: CPT

## 2022-12-02 PROCEDURE — 97112 NEUROMUSCULAR REEDUCATION: CPT

## 2022-12-02 PROCEDURE — 36415 COLL VENOUS BLD VENIPUNCTURE: CPT

## 2022-12-02 PROCEDURE — 92610 EVALUATE SWALLOWING FUNCTION: CPT

## 2022-12-02 PROCEDURE — 97530 THERAPEUTIC ACTIVITIES: CPT

## 2022-12-02 PROCEDURE — 6370000000 HC RX 637 (ALT 250 FOR IP): Performed by: FAMILY MEDICINE

## 2022-12-02 RX ORDER — FLUTICASONE PROPIONATE 50 MCG
1 SPRAY, SUSPENSION (ML) NASAL DAILY
Qty: 16 G | Refills: 0 | Status: SHIPPED | OUTPATIENT
Start: 2022-12-03

## 2022-12-02 RX ORDER — MECLIZINE HCL 12.5 MG/1
12.5 TABLET ORAL 3 TIMES DAILY PRN
Qty: 30 TABLET | Refills: 0 | Status: SHIPPED | OUTPATIENT
Start: 2022-12-02 | End: 2022-12-12

## 2022-12-02 RX ORDER — CETIRIZINE HYDROCHLORIDE 10 MG/1
10 TABLET ORAL DAILY
Qty: 30 TABLET | Refills: 0 | Status: SHIPPED | OUTPATIENT
Start: 2022-12-03

## 2022-12-02 RX ORDER — ATORVASTATIN CALCIUM 80 MG/1
80 TABLET, FILM COATED ORAL NIGHTLY
Qty: 30 TABLET | Refills: 0 | Status: SHIPPED | OUTPATIENT
Start: 2022-12-02

## 2022-12-02 RX ADMIN — LEVOTHYROXINE SODIUM 100 MCG: 0.1 TABLET ORAL at 06:21

## 2022-12-02 RX ADMIN — DULOXETINE HYDROCHLORIDE 60 MG: 30 CAPSULE, DELAYED RELEASE ORAL at 08:46

## 2022-12-02 RX ADMIN — FLECAINIDE ACETATE 100 MG: 100 TABLET ORAL at 08:46

## 2022-12-02 RX ADMIN — METOPROLOL TARTRATE 12.5 MG: 25 TABLET, FILM COATED ORAL at 08:46

## 2022-12-02 RX ADMIN — ACETAMINOPHEN 650 MG: 325 TABLET ORAL at 01:52

## 2022-12-02 RX ADMIN — BUSPIRONE HYDROCHLORIDE 10 MG: 5 TABLET ORAL at 08:45

## 2022-12-02 RX ADMIN — ASPIRIN 81 MG: 81 TABLET ORAL at 08:45

## 2022-12-02 RX ADMIN — CETIRIZINE HYDROCHLORIDE 10 MG: 10 TABLET, FILM COATED ORAL at 08:46

## 2022-12-02 ASSESSMENT — PAIN DESCRIPTION - LOCATION: LOCATION: HEAD

## 2022-12-02 ASSESSMENT — PAIN SCALES - GENERAL
PAINLEVEL_OUTOF10: 3
PAINLEVEL_OUTOF10: 0

## 2022-12-02 ASSESSMENT — PAIN DESCRIPTION - DESCRIPTORS: DESCRIPTORS: ACHING

## 2022-12-02 ASSESSMENT — PAIN DESCRIPTION - ORIENTATION: ORIENTATION: MID

## 2022-12-02 NOTE — PROGRESS NOTES
ACUTE PHYSICAL THERAPY GOALS:   (Developed with and agreed upon by patient and/or caregiver.)  (1.) Ryan Carrillodt  will move from supine to sit and sit to supine , scoot up and down, and roll side to side with INDEPENDENCE within 7 treatment day(s). (2.) Ryan Carrillodt will transfer from bed to chair and chair to bed with INDEPENDENCE using the least restrictive device within 7 treatment day(s). (3.) Ryan Carrillodt will ambulate with INDEPENDENCE for 500 feet with the least restrictive device within 7 treatment day(s). (4.) Ryan Fendt will perform standing static and dynamic balance activities x 25 minutes with INDEPENDENCE to improve safety within 7 treatment day(s). (5.) Ryan Carrillodt will perform therapeutic exercises x 20 min for HEP with INDEPENDENCE to improve strength, endurance, and functional mobility within 7 treatment day(s). PHYSICAL THERAPY Initial Assessment, Daily Note, and AM  (Link to Caseload Tracking: PT Visit Days : 1  Acknowledge Orders  Time In/Out  PT Charge Capture  Rehab Caseload Tracker    Ryan Gomez is a 69 y.o. female   PRIMARY DIAGNOSIS: Stroke-like symptoms  Dizziness [R42]  Blurred vision, bilateral [H53.8]  Stroke-like symptoms [R29.90]     Reason for Referral: Difficulty in walking, Not elsewhere classified (R26.2)  Other abnormalities of gait and mobility (R26.89)  Repeated Falls (R29.6)  History of falling (Z91.81)  Unspecified Lack of Coordination (R27.9)  Observation: Payor: MEDICARE / Plan: MEDICARE PART A AND B / Product Type: *No Product type* /     ASSESSMENT:     REHAB RECOMMENDATIONS:   Recommendation to date pending progress:  Setting:  Outpatient Therapy (vestibular, balance, gait)    Equipment:    To Be Determined     ASSESSMENT:  Ms. Naomi Naranjo is a 69year old F who presents to hospital with dizziness; she fell and hit her head. During evaluation pt reports no dizziness or sensation of room spinning.  She does state there are time when it feels as though her Josy Pearce is not connected to her legs. \" Of note imaging of C Spine indicated DISH syndrome and an extension at the C4 level creating central spinal stenosis with displacement and impingement of the cord. This date pt performs mobility including bed mobility, sitting balance activities, sit <> stand transfers, standing balance activities, and ambulation in room and hallway with CGA. Pt with noted path deviations, decreased balance control and unsteadiness noted particularly during prolonged gait distance. Pt will benefit from skilled therapy services to address stated deficits to promote return to highest level of function, independence, and safety. Will continue to follow.      [facility] [address] [address] AM-PAC 6 Clicks Basic Mobility Inpatient Short Form  AM-PAC Mobility Inpatient   How much difficulty turning over in bed?: None  How much difficulty sitting down on / standing up from a chair with arms?: A Little  How much difficulty moving from lying on back to sitting on side of bed?: None  How much help from another person moving to and from a bed to a chair?: A Little  How much help from another person needed to walk in hospital room?: A Little  How much help from another person for climbing 3-5 steps with a railing?: A Little  [facility] Inpatient Mobility Raw Score : 20  AM-PAC Inpatient T-Scale Score : 47.67  Mobility Inpatient CMS 0-100% Score: 35.83  Mobility Inpatient CMS G-Code Modifier : CJ    SUBJECTIVE:   Ms. Christopher Padilla states, \"I have had problems with falling\"     Social/Functional Lives With: Daughter  Home Layout: Two level, Able to Live on Main level with bedroom/bathroom  Home Access: Stairs to enter with rails  Entrance Stairs - Number of Steps: 2  Entrance Stairs - Rails: Both  Home Equipment: [employer], standard, Walker, rolling  Has the patient had two or more falls in the past year or any fall with injury in the past year?: Yes  ADL Assistance: Independent  Active : Yes  Mode of Transportation: Car  Occupation: Retired    OBJECTIVE:     PAIN: VITALS / O2: PRECAUTION / Louisa Nailer / DRAINS:   Pre Treatment: 0/10         Post Treatment: 0/10 Vitals        Oxygen      IV    RESTRICTIONS/PRECAUTIONS:  Restrictions/Precautions: Fall Risk                 GROSS EVALUATION: Intact Impaired (Comments):   AROM [x]     PROM [x]    Strength [x]     Balance [] Sitting - Static: Good  Sitting - Dynamic: Good  Standing - Static: Fair  Standing - Dynamic: Fair   Posture [] Forward Head  Rounded Shoulders   Sensation [x]     Coordination []   Decreased coordination BLE   Tone []     Edema []    Activity Tolerance [x]      []      COGNITION/  PERCEPTION: Intact Impaired (Comments):   Orientation [x]     Vision [x]   Good tracking, no nystagmus   Hearing [x]     Cognition  [x]       MOBILITY: I Mod I S SBA CGA Min Mod Max Total  NT x2 Comments:   Bed Mobility    Rolling [] [] [] [] [x] [] [] [] [] [] []    Supine to Sit [] [] [] [] [x] [] [] [] [] [] []    Scooting [] [] [] [] [x] [] [] [] [] [] []    Sit to Supine [] [] [] [] [x] [] [] [] [] [] []    Transfers    Sit to Stand [] [] [] [] [x] [] [] [] [] [] []    Bed to Chair [] [] [] [] [x] [] [] [] [] [] []    Stand to Sit [] [] [] [] [x] [] [] [] [] [] []     [] [] [] [] [] [] [] [] [] [] []    I=Independent, Mod I=Modified Independent, S=Supervision, SBA=Standby Assistance, CGA=Contact Guard Assistance,   Min=Minimal Assistance, Mod=Moderate Assistance, Max=Maximal Assistance, Total=Total Assistance, NT=Not Tested    GAIT: I Mod I S SBA CGA Min Mod Max Total  NT x2 Comments:   Level of Assistance [] [] [] [] [x] [] [] [] [] [] []    Distance 240 feet    DME Gait Belt    Gait Quality Narrow base of support and Path deviations     Weightbearing Status Restrictions/Precautions  Restrictions/Precautions: Fall Risk    Stairs      I=Independent, Mod I=Modified Independent, S=Supervision, SBA=Standby Assistance, CGA=Contact Guard Assistance,   Min=Minimal Assistance, Mod=Moderate Assistance, Max=Maximal Assistance, Total=Total Assistance, NT=Not Tested    PLAN:   FREQUENCY AND DURATION: 3 times/week for duration of hospital stay or until stated goals are met, whichever comes first.    THERAPY PROGNOSIS: Good    PROBLEM LIST:   (Skilled intervention is medically necessary to address:)  Decreased Balance  Decreased Coordination  Decreased Gait Ability INTERVENTIONS PLANNED:   (Benefits and precautions of physical therapy have been discussed with the patient.)  Therapeutic Activity  Therapeutic Exercise/HEP  Neuromuscular Re-education  Gait Training  Education       TREATMENT:   EVALUATION: MODERATE COMPLEXITY: (Untimed Charge)    TREATMENT:   Therapeutic Activity (23 Minutes): Therapeutic activity included Transfer Training, Ambulation on level ground, Sitting balance , and Standing balance to improve functional Activity tolerance, Balance, Coordination, and Mobility. TREATMENT GRID:  N/A    AFTER TREATMENT PRECAUTIONS: Bed/Chair Locked, Call light within reach, Chair, Needs within reach, RN notified, and Visitors at bedside    INTERDISCIPLINARY COLLABORATION:  MD/ PA/ NP , RN/ PCT, PT/ PTA, OT/ VIGIL, and RN Case Manager/      EDUCATION: Education Given To: Patient  Education Provided: Role of Therapy;Plan of Care;Transfer Training; Fall Prevention Strategies  Education Outcome: Verbalized understanding;Continued education needed    TIME IN/OUT:  Time In: [address]  Time Out: [address]  Minutes: [address]

## 2022-12-02 NOTE — CARE COORDINATION
CM was informed by Dr. Mis Mathis to hold on placing outpatient therapy referral, as patient will return home with a [address]. Patient to be notified of referral cancellation by Dr. Mis Mathis. Correction To Discharge plan: Home with family, with no supportive services. CM remains available.

## 2022-12-02 NOTE — CARE COORDINATION
CTN opened outreach for follow up after recent LAUREN call. Patient currently IP. CTN to follow along for d/c and follow up accordingly.

## 2022-12-02 NOTE — PROGRESS NOTES
TRANSFER - IN REPORT:    Verbal report received from [facility] on Eura Patient  being received from ED for routine progression of patient care      Report consisted of patient's Situation, Background, Assessment and   Recommendations(SBAR). Information from the following report(s) Nurse Handoff Report, ED Encounter Summary, and ED SBAR was reviewed with the receiving nurse. Opportunity for questions and clarification was provided. Assessment completed upon patient's arrival to unit and care assumed.

## 2022-12-02 NOTE — PROGRESS NOTES
SPEECH LANGUAGE PATHOLOGY: DYSPHAGIA  Initial Assessment and Discharge    NAME: Pretty Shaver  : 1945  MRN: [id number]    ADMISSION DATE: 2022  PRIMARY DIAGNOSIS: Stroke-like symptoms  Dizziness [R42]  Blurred vision, bilateral [H53.8]  Stroke-like symptoms [R29.90]    ICD-10: Treatment Diagnosis: R13.19 Other Dysphagia    RECOMMENDATIONS   Diet:  Diet Solids Recommendation: Regular  Liquid Consistency Recommendation: Thin    Medications: PO     Recommendations: Other (comments) (Continue regular diet with thin liquids.)      Compensatory Swallowing Strategies: Upright as possible for all oral intake   Patient continues to require skilled intervention: Yes  D/C Recommendations: No follow up therapy recommended post discharge     ASSESSMENT    Dysphagia Diagnosis: Swallow function appears WFL  Dysphagia Impression : Oral and phayrngeal phases of swallow were unremarkable. Discussed current status with patient who reports being at baseline related to speech, language, cognition, voice, and swallowing. No additional speech therapy needs identified at this time. MRI negative for acute intracranial process. Will sign off at this time - patient agreeable for ST to sign off. GENERAL    History of Present Injury/Illness: Ms. Rock Gardner  has a past medical history of Anxiety, Arthritis, Bell's palsy, Depression, GERD (gastroesophageal reflux disease), Hematoma of hip, right, initial encounter, Hypertension, Hypothyroidism, Nausea & vomiting, Obese, Paroxysmal atrial fibrillation ([address] Psychiatric disorder, S/P total knee replacement using cement, and Unspecified hypothyroidism. . She also  has a past surgical history that includes gastrectomy (9/21/15); thyroidectomy (); pr cardiac surg procedure unlist; Breast biopsy (Bilateral); Colonoscopy (2016); Tonsillectomy ();  Cataract removal (Bilateral, ); total knee arthroplasty (Left, ); total knee arthroplasty (Right, ); orthopedic surgery (2005); orthopedic surgery (Bilateral, 1994); Breast lumpectomy (1998); Hysterectomy (1987); Cholecystectomy (1976); Appendectomy (1961); and Urological Surgery (2008/2009). Chart Reviewed: Yes  Subjective: Patient awake, alert, and agreeable to speech therapy services. Behavior/Cognition: Alert; Cooperative  Communication Observation: Functional  Follows Directions: Complex    Prior Dysphagia History: Patient denies prior difficulty with swallowing. Patient Complaint: Per patient, current barrier is described as dizziness. Patient reports experiencing prior episodes of sudden onset dizziness which resulted in falls - \"I was walking in the airport in [place] 3-4 months ago and all of a sudden, I got dizzy and fell down\". In regard to speech, language, voice, swallowing, and cognition patient denies change in status. Current Diet : Regular  Current Liquid Diet : Thin    Respiratory Status: Room air              Pain:   Patient does not c/o pain                                        OBJECTIVE    Patient Positioning: Upright in bed   Oral Motor   Labial: No impairment  Oral Hygiene: Clean  Lingual: No impairment  Velum: No Impairment  Dentition: Adequate        Baseline Vocal Quality: Normal    Oropharyngeal Phase:  Thin;Regular              Pharyngeal Phase: Oral and pharyngeal phases of swallow were unremarkable. No overt signs of aspiration observed. Patient denies difficulty with swallowing function. PLAN    Duration/Frequency: No treatment indicated at this time    Dysphagia Outcome and Severity Scale (REBEKAH)  Dysphagia Outcome Severity Scale: Level 6: Within functional limits/Modified independence  Interpretation of Tool: The Dysphagia Outcome and Severity Scale (REBEKAH) is a simple, easy-to-use, 7-point scale developed to systematically rate the functional severity of dysphagia based on objective assessment and make recommendations for diet level, independence level, and type of nutrition. Normal(7), Functional(6), Mild(5), Mild-Moderate(4), Moderate(3), Moderate-Severe(2), Severe(1)         Education: Patient     Patient Education ResponseMelida Slim understanding    PRECAUTIONS/ALLERGIES: Hydromorphone and Sulfa antibiotics   Safety Devices in place: Yes    Therapy Time  SLP Individual Minutes  Time In: 1297  Time Out: 5198  Minutes: [address], [address]  12/2/2022 10:45 AM

## 2022-12-02 NOTE — PROGRESS NOTES
Discharge medications reviewed with the patient and appropriate educational materials and side effects teaching were provided. IV taken out. No paper RX given. Daughter to take patient home. Patient and daughter explained how to place Aspen collar on with opportunity for questions. Patient informed to wear collar anytime that she is up and ambulating. NAD noted at this monica.

## 2022-12-02 NOTE — PROGRESS NOTES
ACUTE OCCUPATIONAL THERAPY GOALS:   (Developed with and agreed upon by patient and/or caregiver.)  1. Patient will complete lower body bathing and dressing with modified independence and adaptive equipment as needed. 2. Patient will complete toileting with modified independence. 3. Patient will tolerate 30 minutes of OT treatment with 1-2 rest breaks to increase activity tolerance for ADLs. 4. Patient will complete functional transfers with modified independence and adaptive equipment as needed. 5. Patient will complete functional mobility with modified independence and good safety awareness. 6. Patient will complete standing sink side for 8 minutes with modified independence to demonstrate good standing tolerance for ADL. Timeframe: 7 visits       OCCUPATIONAL THERAPY Initial Assessment, Daily Note, and AM       OT Visit Days: 1  Acknowledge Orders  Time  OT Charge Capture  Rehab Caseload Tracker      Adriana Banerjee is a 69 y.o. female   PRIMARY DIAGNOSIS: Stroke-like symptoms  Dizziness [R42]  Blurred vision, bilateral [H53.8]  Stroke-like symptoms [R29.90]       Reason for Referral: Generalized Muscle Weakness (M62.81)  Other lack of cordination (R27.8)  Repeated Falls (R29.6)  Dizziness and Giddiness (R42)  Observation: Payor: MEDICARE / Plan: MEDICARE PART A AND B / Product Type: *No Product type* /     ASSESSMENT:     REHAB RECOMMENDATIONS:   Recommendation to date pending progress:  Setting:  No further skilled therapy after discharge from hospital   Will defer to PT for OP needs    Equipment:    To Be Determined     ASSESSMENT:  Ms. Marcelina Bain presents to the hospital with stroke-like symptoms, blurry vision, and dizziness. Pt reports 3 falls in the past 6 months. Pt reports increased dizziness with turning to the L in the recent past with testing and reports dizziness feels like the room is \"spinning. \" Pt's MRI reveals C3-C4 disc herniation with cord compression. MD at bedside to explain. Pt denies any weakness or decreased sensation and appears equal in B UE with resting. Coordination in B UE also appears WNL. BP taken in all positions (see below in Vitals). Pt was CGA for bed mobility, standing, and with functional mobility in the room. Pt did demonstrate a drop in BP, especially with coming from sitting to standing. Pt was asymptomatic throughout session except for one mild and brief report of dizziness with supine to sit coming over to the L side of bed. Pt educated on safety and transitioning slowly with functional transfers with pt verbalizing understanding. Pt tolerated standing sink side for grooming tasks with supervision with no major complaints. Pt did use a RW during session due to stating she feels mores steady with use of the walker (reports she has one at home as well). Pt is functioning slightly below baseline and will benefit from acute OT services to address stated goals and plan of care.       [facility] [address] [address] [facility] 6 Clicks Daily Activity Inpatient Short Form:    AM-PAC Daily Activity Inpatient   How much help for putting on and taking off regular lower body clothing?: A Little  How much help for Bathing?: A Little  How much help for Toileting?: A Little  How much help for putting on and taking off regular upper body clothing?: A Little  How much help for taking care of personal grooming?: A Little  How much help for eating meals?: None  AM-[facility] Inpatient Daily Activity Raw Score: 19  AM-PAC Inpatient ADL T-Scale Score : 40.22  ADL Inpatient CMS 0-100% Score: 42.8  ADL Inpatient CMS G-Code Modifier : CK           SUBJECTIVE:     Ms. Yony Plata states, Dina Augustin told me that I have wax in my ears and some of the crystals\"     Social/Functional Lives With: Daughter  Home Layout: Two level, Able to Live on Main level with bedroom/bathroom  Home Access: Stairs to enter with rails  Entrance Stairs - Number of Steps: 2  Entrance Stairs - Rails: Both  Home Equipment: [employer] standard, Walker, rolling  Has the patient had two or more falls in the past year or any fall with injury in the past year?: Yes  ADL Assistance: Independent  Active : Yes  Mode of Transportation: Car  Occupation: Retired    OBJECTIVE:     Toro Blas / Todd Resendiz / Carroll Mujica: IV    RESTRICTIONS/PRECAUTIONS:  Restrictions/Precautions: Fall Risk    PAIN: [place] Rip[place] / O2:   Pre Treatment:   Pain Assessment: None - Denies Pain      Post Treatment: same       Vitals          Oxygen            GROSS EVALUATION: INTACT IMPAIRED   (See Comments)   UE AROM [x] []   UE PROM [x] []   Strength []  Generally decreased; upper body appears WFL   Posture / Balance [] Sitting - Static: Good  Sitting - Dynamic: Good  Standing - Static: Fair  Standing - Dynamic: Fair   Sensation [x]     Coordination [x]       Tone [x]       Edema [x]    Activity Tolerance []  Limited by fatigue; hx of limitation due to dizziness     Hand Dominance R [] L []      COGNITION/  PERCEPTION: INTACT IMPAIRED   (See Comments)   Orientation [x]     Vision []  Recent blurry vision; no current complaints    Hearing [x]     Cognition  [x]     Perception [x]       MOBILITY: I Mod I S SBA CGA Min Mod Max Total  NT x2 Comments:   Bed Mobility    Rolling [] [] [] [] [] [] [] [] [] [x] []    Supine to Sit [] [] [] [] [x] [] [] [] [] [] []    Scooting [] [] [] [] [x] [] [] [] [] [] []    Sit to Supine [] [] [] [] [] [] [] [] [] [x] []    Transfers    Sit to Stand [] [] [] [] [x] [] [] [] [] [] []    Bed to Chair [] [] [] [] [x] [] [] [] [] [] []    Stand to Sit [] [] [] [] [x] [] [] [] [] [] []    Tub/Shower [] [] [] [] [] [] [] [] [] [x] []     Toilet [] [] [] [] [] [] [] [] [] [x] []      [] [] [] [] [] [] [] [] [] [] []    I=Independent, Mod I=Modified Independent, S=Supervision/Setup, SBA=Standby Assistance, CGA=Contact Guard Assistance, Min=Minimal Assistance, Mod=Moderate Assistance, Max=Maximal Assistance, Total=Total Assistance, NT=Not Tested    ACTIVITIES OF DAILY LIVING: I Mod I S SBA CGA Min Mod Max Total NT Comments   BASIC ADLs:              Upper Body Bathing  [] [] [] [] [] [] [] [] [] [x]    Lower Body Bathing [] [] [] [] [] [] [] [] [] [x]    Toileting [] [] [] [] [] [] [] [] [] [x]    Upper Body Dressing [] [] [] [] [] [] [] [] [] [x]    Lower Body Dressing [] [] [] [] [] [] [] [] [] [x]    Feeding [] [] [] [] [] [] [] [] [] [x]    Grooming [] [] [x] [] [] [] [] [] [] [] Standing sink side for brushing teeth, combing hair, washing face    Personal Device Care [] [] [] [] [] [] [] [] [] [x]    Functional Mobility [] [] [] [] [x] [] [] [] [] []    I=Independent, Mod I=Modified Independent, S=Supervision/Setup, SBA=Standby Assistance, CGA=Contact Guard Assistance, Min=Minimal Assistance, Mod=Moderate Assistance, Max=Maximal Assistance, Total=Total Assistance, NT=Not Tested    PLAN:   FREQUENCY/DURATION   OT Plan of Care: 3 times/week for duration of hospital stay or until stated goals are met, whichever comes first.    PROBLEM LIST:   (Skilled intervention is medically necessary to address:)  Decreased ADL/Functional Activities  Decreased Activity Tolerance  Decreased Balance  Decreased Coordination  Decreased Gait Ability  Decreased Safety Awareness  Decreased Strength  Decreased Transfer Abilities   INTERVENTIONS PLANNED:  (Benefits and precautions of occupational therapy have been discussed with the patient.)  Self Care Training  Therapeutic Activity  Therapeutic Exercise/HEP  Neuromuscular Re-education  Manual Therapy  Education         TREATMENT:     EVALUATION: LOW COMPLEXITY: (Untimed Charge)    TREATMENT:   Neuromuscular Re-education (13 Minutes): Neuromuscular Re-education included Balance Training, Coordination training, Postural training, Sitting balance training, and Standing balance training to improve Balance, Coordination, Functional Mobility, and Postural Control.   Self Care (10 minutes): Patient participated in grooming ADLs in standing with minimal visual and verbal cueing to increase independence, decrease assistance required, increase activity tolerance, and increase safety awareness. Patient also participated in functional mobility and functional transfer training to increase independence, decrease assistance required, increase activity tolerance, and increase safety awareness.      TREATMENT GRID:  N/A    AFTER TREATMENT PRECAUTIONS: Alarm Activated, Bed/Chair Locked, Call light within reach, Chair, Needs within reach, and RN notified    INTERDISCIPLINARY COLLABORATION:  RN/ PCT, PT/ PTA, and OT/ VIGIL    EDUCATION:  Education Given To: Patient  Education Provided: Role of Therapy;Plan of Care  Education Method: Verbal  Barriers to Learning: None  Education Outcome: Verbalized understanding    TOTAL TREATMENT DURATION AND TIME:  Time In: 0940  Time Out: [facility]  Minutes: [address] Twyla OT

## 2022-12-02 NOTE — DISCHARGE SUMMARY
Hospitalist Discharge Summary   Admit Date:  2022  1:06 PM   DC Note date: 2022  Name:  Ryan Gomez   Age:  69 y.o. Sex:  female  :  1945   MRN:  [id number]   Room:  [facility]  PCP:  Luz Sánchez MD    Presenting Complaint: No chief complaint on file. Initial Admission Diagnosis: Dizziness [R42]  Blurred vision, bilateral [H53.8]  Stroke-like symptoms [R29.90]     Problem List for this Hospitalization (present on admission):    Principal Problem:    Stroke-like symptoms  Active Problems:    Right thyroid nodule    Left parietal scalp hematoma, initial encounter    Mood disorder (HCC)    Class 1 obesity due to excess calories with serious comorbidity and body mass index (BMI) of 32.0 to 32.9 in adult    Orthostatic hypotension    Paroxysmal atrial fibrillation (HCC)    Hypothyroidism    Atherosclerosis of aorta (HCC)    Atherosclerosis of native coronary artery of native heart with angina pectoris ([address]  Resolved Problems:    * No resolved hospital problems. [facility] Course:    Ms. Naomi Naranjo is a 68 yo female with PMH of obesity, afib s/p watchman, hypothyrodiism, HTN admitted with dizziness and blurry vision. She did have subsequent fall and head trauma. No syncope. She was hypertensive on initial workup. CODE S called in the ED. Seen by neurology. CT head\"     Impression   Left posterior parietal scalp hematoma. No acute intracranial abnormality   \"      CT Cspine no acute issues. CTA head/ neck \"       Impression   No intracranial arterial occlusion. Right thyroid nodules   \"      MRI brain \"           1. No evidence of acute infarction. 2. Worsening severe burden of chronic microangiopathy. \"        MRI Cspine\"       Impression   1) DISH syndrome. 2) Large posterior osteophyte from C4 extending superiorly which creates a   significant central spinal stenosis with displacement and impingement of the   cord towards the right. \"      She has been seen by neurosurgery who recommends ASPEN collar when up and ambulating. Outpatient followup can be done. Will hold off on vestibular therapy. She also is orthostatic. Metoprolol continued. She is advised care with getting up. She lives with her daughter. She is advised no driving. No need for cardiac workup as was just compelted. She will need thyroid nodule followup as well. TSH controlled. On synthroid. Disposition: home         Diet: ADULT DIET; Regular  Code Status: Full Code    Follow Ups:   Follow-up Information     RACH MARRERO MD Follow up in 1 week(s). Specialty: Family Medicine  Contact information:  [address]  [address]  [phone]             Zena Martin MD .    Specialty: Family Medicine  Contact information:  [address]  [address]  [phone]             Miranda Dugan MD Follow up in 1 week(s). Specialty: Neurosurgery  Why: OFFICE WILL CALL PATIENT WITH APPOINTMENT DATE AND TIME. Contact information:  [address] [facility]  [phone]                       Time spent in patient discharge and coordination 35  minutes. Follow up labs/diagnostics (ultimately defer to outpatient provider):            Plan was discussed with patient and daughter . All questions answered. Patient was stable at time of discharge. Instructions given to call a physician or return if any concerns.     Current Discharge Medication List        START taking these medications    Details   meclizine (ANTIVERT) 12.5 MG tablet Take 1 tablet by mouth 3 times daily as needed for Dizziness  Qty: 30 tablet, Refills: 0      cetirizine (ZYRTEC) 10 MG tablet Take 1 tablet by mouth daily  Qty: 30 tablet, Refills: 0      atorvastatin (LIPITOR) 80 MG tablet Take 1 tablet by mouth nightly  Qty: 30 tablet, Refills: 0      fluticasone (FLONASE) 50 MCG/ACT nasal spray 1 spray by Each Nostril route daily  Qty: 16 g, Refills: 0           CONTINUE these medications which have NOT CHANGED    Details   flecainide (TAMBOCOR) 100 MG tablet Take 1 tablet by mouth in the morning and 1 tablet in the evening. Qty: 60 tablet, Refills: 3      metoprolol tartrate (LOPRESSOR) 25 MG tablet Take 0.5 tablets by mouth in the morning and 0.5 tablets in the evening. Qty: 180 tablet, Refills: 3      aspirin 81 MG EC tablet Take by mouth daily      busPIRone (BUSPAR) 10 MG tablet Take 1 tablet by mouth 2 times daily      vitamin D (CHOLECALCIFEROL) 25 MCG (1000 UT) TABS tablet Take 5,000 Units by mouth daily      docusate (COLACE, DULCOLAX) 100 MG CAPS Take 100 mg by mouth 2 times daily as needed      DULoxetine (CYMBALTA) 60 MG extended release capsule TAKE 1 CAPSULE BY MOUTH EVERY DAY      levothyroxine (SYNTHROID) 100 MCG tablet Take 100 mcg by mouth every morning (before breakfast)      LORazepam (ATIVAN) 0.5 MG tablet Take 0.5 mg by mouth every 8 hours as needed. melatonin 10 MG CAPS capsule Take by mouth      omeprazole (PRILOSEC) 40 MG delayed release capsule Take 40 mg by mouth daily      tiZANidine (ZANAFLEX) 4 MG tablet TAKE 1/2-1 TABLET BY MOUTH 3 TIMES A DAY AS NEEDED             Procedures done this admission:  * No surgery found *    Consults this admission:  IP CONSULT TO NEUROLOGY  IP CONSULT TO NEUROSURGERY  IP CONSULT TO CASE MANAGEMENT    Echocardiogram results:  11/17/22    TRANSTHORACIC ECHOCARDIOGRAM (TTE) COMPLETE (CONTRAST/BUBBLE/3D PRN) 11/18/2022 12:23 PM, 11/18/2022 12:00 AM (Final)    Interpretation Summary    Left Ventricle: Normal left ventricular systolic function with a visually estimated EF of 60 - 65%. Left ventricle size is normal. Mildly increased wall thickness. Normal wall motion. Normal diastolic function. Left Atrium: Left atrium is moderately dilated. Mitral Valve: Mild regurgitation. Tricuspid Valve: Mild regurgitation.     Signed by: Cristina Luis MD on 11/18/2022 12:23 PM, Signed by: Everardo Amezcua Component Value Date/Time    NTPROBNP 6,188 11/17/2022 12:59 PM    TROPHS 10.9 12/01/2022 12:57 PM    TROPHS 35.7 11/17/2022 07:35 PM    TROPHS 29.5 11/17/2022 04:45 PM      Coags Lab Results   Component Value Date/Time    PROTIME 13.9 12/01/2022 12:58 PM    INR 1.2 12/01/2022 12:58 PM      A1c Lab Results   Component Value Date/Time    LABA1C 5.5 12/02/2022 05:31 AM     12/02/2022 05:31 AM      Lipids Lab Results   Component Value Date/Time    CHOL 177 12/02/2022 05:31 AM    LDLCALC 87.8 12/02/2022 05:31 AM    LABVLDL 15.2 12/02/2022 05:31 AM    HDL 74 12/02/2022 05:31 AM    CHOLHDLRATIO 2.4 12/02/2022 05:31 AM    TRIG 76 12/02/2022 05:31 AM      Thyroid  Lab Results   Component Value Date/Time    TSHELE 2.30 12/01/2022 11:31 PM        Most Recent UA No results found for: Garald Golas, SPECGRAV, LABPH, PROTEINU, GLUCOSEU, KETUA, BILIRUBINUR, BLOODU, UROBILINOGEN, NITRU, LEUKOCYTESUR, WBCUA, RBCUA, EPITHUA, BACTERIA, LABCAST, MUCUS     No results for input(s): CULTURE in the last 720 hours. All Labs from Last 24 Hrs:  Recent Results (from the past 24 hour(s))   POCT Glucose    Collection Time: 12/01/22 12:53 PM   Result Value Ref Range    POC Glucose 130 (H) 65 - 100 mg/dL    Performed by:  Wally    Basic Metabolic Panel    Collection Time: 12/01/22 12:57 PM   Result Value Ref Range    Sodium 137 133 - 143 mmol/L    Potassium 3.6 3.5 - 5.1 mmol/L    Chloride 105 101 - 110 mmol/L    CO2 27 21 - 32 mmol/L    Anion Gap 5 2 - 11 mmol/L    Glucose 121 (H) 65 - 100 mg/dL    BUN 17 8 - 23 MG/DL    Creatinine 1.00 0.6 - 1.0 MG/DL    Est, Glom Filt Rate 58 (L) >60 ml/min/1.73m2    Calcium 9.9 8.3 - 10.4 MG/DL   CBC with Auto Differential    Collection Time: 12/01/22 12:57 PM   Result Value Ref Range    WBC 9.5 4.3 - 11.1 K/uL    RBC 4.90 4.05 - 5.2 M/uL    Hemoglobin 14.6 11.7 - 15.4 g/dL    Hematocrit 44.2 35.8 - 46.3 %    MCV 90.2 82 - 102 FL    MCH 29.8 26.1 - 32.9 PG    MCHC 33.0 31.4 - 35.0 g/dL    RDW 13.7 11.9 - 14.6 %    Platelets 717 439 - 415 K/uL    MPV 10.2 9.4 - 12.3 FL    nRBC 0.00 0.0 - 0.2 K/uL    Differential Type AUTOMATED      Seg Neutrophils 84 (H) 43 - 78 %    Lymphocytes 11 (L) 13 - 44 %    Monocytes 4 4.0 - 12.0 %    Eosinophils % 0 (L) 0.5 - 7.8 %    Basophils 1 0.0 - 2.0 %    Immature Granulocytes 0 0.0 - 5.0 %    Segs Absolute 8.0 1.7 - 8.2 K/UL    Absolute Lymph # 1.1 0.5 - 4.6 K/UL    Absolute Mono # 0.4 0.1 - 1.3 K/UL    Absolute Eos # 0.0 0.0 - 0.8 K/UL    Basophils Absolute 0.1 0.0 - 0.2 K/UL    Absolute Immature Granulocyte 0.0 0.0 - 0.5 K/UL   Troponin    Collection Time: 12/01/22 12:57 PM   Result Value Ref Range    Troponin, High Sensitivity 10.9 0 - 14 pg/mL   POCT INR    Collection Time: 12/01/22 12:58 PM   Result Value Ref Range    POC Protime 13.9 (H) 9.6 - 11.6 SECS    POC INR 1.2 0.9 - 1.2     EKG 12 Lead    Collection Time: 12/01/22  1:20 PM   Result Value Ref Range    Ventricular Rate 77 BPM    Atrial Rate 77 BPM    P-R Interval 267 ms    QRS Duration 122 ms    Q-T Interval 437 ms    QTc Calculation (Bazett) 495 ms    P Axis 78 degrees    R Axis -15 degrees    T Axis 98 degrees    Diagnosis Sinus rhythm    TSH with Reflex    Collection Time: 12/01/22 11:31 PM   Result Value Ref Range    TSH w Free Thyroid if Abnormal 2.30 0.358 - 3.740 UIU/ML   Magnesium    Collection Time: 12/01/22 11:31 PM   Result Value Ref Range    Magnesium 2.0 1.8 - 2.4 mg/dL   CBC    Collection Time: 12/02/22  5:31 AM   Result Value Ref Range    WBC 6.6 4.3 - 11.1 K/uL    RBC 4.42 4.05 - 5.2 M/uL    Hemoglobin 13.1 11.7 - 15.4 g/dL    Hematocrit 39.8 35.8 - 46.3 %    MCV 90.0 82 - 102 FL    MCH 29.6 26.1 - 32.9 PG    MCHC 32.9 31.4 - 35.0 g/dL    RDW 13.7 11.9 - 14.6 %    Platelets 138 718 - 935 K/uL    MPV 10.4 9.4 - 12.3 FL    nRBC 0.00 0.0 - 0.2 K/uL   Hemoglobin A1c    Collection Time: 12/02/22  5:31 AM   Result Value Ref Range    Hemoglobin A1C 5.5 4.8 - 5.6 %    eAG 111 mg/dL   Lipid Panel    Collection Time: 12/02/22  5:31 AM   Result Value Ref Range    Cholesterol, Total 177 <200 MG/DL    Triglycerides 76 35 - 150 MG/DL    HDL 74 (H) 40 - 60 MG/DL    LDL Calculated 87.8 <100 MG/DL    VLDL Cholesterol Calculated 15.2 6.0 - 23.0 MG/DL    Chol/HDL Ratio 2.4     Magnesium    Collection Time: 12/02/22  5:31 AM   Result Value Ref Range    Magnesium 2.2 1.8 - 2.4 mg/dL   Comprehensive Metabolic Panel    Collection Time: 12/02/22  5:31 AM   Result Value Ref Range    Sodium 139 133 - 143 mmol/L    Potassium 4.6 3.5 - 5.1 mmol/L    Chloride 108 101 - 110 mmol/L    CO2 28 21 - 32 mmol/L    Anion Gap 3 2 - 11 mmol/L    Glucose 102 (H) 65 - 100 mg/dL    BUN 20 8 - 23 MG/DL    Creatinine 0.70 0.6 - 1.0 MG/DL    Est, Glom Filt Rate >60 >60 ml/min/1.73m2    Calcium 9.0 8.3 - 10.4 MG/DL    Total Bilirubin 0.7 0.2 - 1.1 MG/DL    ALT 18 12 - 65 U/L    AST 18 15 - 37 U/L    Alk Phosphatase 93 50 - 136 U/L    Total Protein 6.1 (L) 6.3 - 8.2 g/dL    Albumin 3.2 3.2 - 4.6 g/dL    Globulin 2.9 2.8 - 4.5 g/dL    Albumin/Globulin Ratio 1.1 0.4 - 1.6     Vitamin B12    Collection Time: 12/02/22  5:31 AM   Result Value Ref Range    Vitamin B-12 445 193 - 986 pg/mL   Ferritin    Collection Time: 12/02/22  5:31 AM   Result Value Ref Range    Ferritin 124 8 - 388 NG/ML   Folate    Collection Time: 12/02/22  5:31 AM   Result Value Ref Range    Folate 9.9 3.1 - 17.5 ng/mL       Allergies   Allergen Reactions    Hydromorphone Swelling    Sulfa Antibiotics Rash     Immunization History   Administered Date(s) Administered    COVID-19, PFIZER PURPLE top, DILUTE for use, (age 15 y+), 30mcg/0.3mL 01/26/2021, 02/12/2021    Influenza, FLUARIX, FLULAVAL, FLUZONE (age 10 mo+) AND AFLURIA, (age 1 y+), PF, 0.5mL 09/23/2015, 10/30/2020    Influenza, FLUCELVAX, (age 10 mo+), MDCK, PF, 0.5mL 12/01/2017    Influenza, High Dose (Fluzone 65 yrs and older) 10/01/2018    Influenza, Triv, inactivated, subunit, adjuvanted, IM (Fluad 65 yrs and older) 10/10/2019    Pneumococcal Polysaccharide ([id number]) 04/28/2021    Tdap (Boostrix, Adacel) 11/18/2019    Zoster Recombinant (Shingrix) 10/30/2020       Recent Vital Data:  Patient Vitals for the past 24 hrs:   Temp Pulse Resp BP SpO2   12/02/22 0800 97.7 °F (36.5 °C) 66 16 (!) 154/82 99 %   12/02/22 0330 98.1 °F (36.7 °C) 66 17 (!) 144/81 99 %   12/02/22 0037 97.7 °F (36.5 °C) 90 17 (!) 156/98 98 %   12/02/22 0019 97.9 °F (36.6 °C) 73 17 (!) 157/82 97 %   12/01/22 2047 98.3 °F (36.8 °C) 79 16 (!) 148/92 98 %   12/01/22 1645 -- -- -- (!) 168/102 --   12/01/22 1251 98 °F (36.7 °C) 71 17 (!) 189/97 99 %       Oxygen Therapy  SpO2: 99 %  O2 Device: None (Room air)    Estimated body mass index is 33.47 kg/m² as calculated from the following:    Height as of this encounter: 5' 4\" (1.626 m). Weight as of this encounter: 195 lb (88.5 kg). Intake/Output Summary (Last 24 hours) at 12/2/2022 1243  Last data filed at 12/2/2022 5830  Gross per 24 hour   Intake 120 ml   Output --   Net 120 ml         Physical Exam:    General:    Well nourished. No overt distress, no distress, pleasant   Head:  Normocephalic, atraumatic  Eyes:  Sclerae appear normal.  Pupils equally round. HENT:  Nares appear normal, no drainage. Moist mucous membranes  Neck:  No restricted ROM. Trachea midline  CV:   RRR. No m/r/g. No JVD, no edema   Lungs:   CTAB. No wheezing, rhonchi, or rales. Respirations even, unlabored  Abdomen:   Soft, nontender, nondistended. Extremities: Warm and dry. No cyanosis or clubbing. No edema. Skin:     No rashes. Normal coloration  Neuro:  grossly intact. Psych:  Normal mood and affect. Signed:  Benoit Stout MD    Part of this note may have been written by using a voice dictation software. The note has been proof read but may still contain some grammatical/other typographical errors.

## 2022-12-02 NOTE — TELEPHONE ENCOUNTER
Care Transitions Initial Follow Up Call    Outreach made within 2 business days of discharge: Yes    Patient: Maxime Patient Patient : 1945   MRN: [id number]  Reason for Admission: Stroke like symptoms  Discharge Date: 22       Spoke with: Shiva Mujica    Discharge department/facility: [address] [place] [address] Interactive Patient Contact:  Was patient able to fill all prescriptions: Yes  Was patient instructed to bring all medications to the follow-up visit: Yes  Is patient taking all medications as directed in the discharge summary?  Yes  Does patient understand their discharge instructions: Yes  Does patient have questions or concerns that need addressed prior to 7-14 day follow up office visit: no    Scheduled appointment with PCP within 7-14 days    Follow Up  Future Appointments   Date Time Provider Dilcia Magdaleno   2022  1:20 PM PHILOMENA Perkins [facility] AMB   2022  8:00 AM MD DARLENE Durán [facility] AMB   2023  8:00 AM PHILOMENA Sage - CNP [facility] AMB       Harrison Mendoza MA

## 2022-12-02 NOTE — DISCHARGE INSTRUCTIONS
Preventing Falls: Care Instructions  Your Care Instructions     Getting around your home safely can be a challenge if you have injuries or health problems that make it easy for you to fall. Loose rugs and furniture in walkways are among the dangers for many older people who have problems walking or who have poor eyesight. People who have conditions such as arthritis, osteoporosis, or dementia also have to be careful not to fall. You can make your home safer with a few simple measures. Follow-up care is a key part of your treatment and safety. Be sure to make and go to all appointments, and call your doctor if you are having problems. It's also a good idea to know your test results and keep a list of the medicines you take. How can you care for yourself at home? Taking care of yourself  Exercise regularly to improve your strength, muscle tone, and balance. Walk if you can. Swimming may be a good choice if you cannot walk easily. Have your vision and hearing checked each year or any time you notice a change. If you have trouble seeing and hearing, you might not be able to avoid objects and could lose your balance. Know the side effects of the medicines you take. Ask your doctor or pharmacist whether the medicines you take can affect your balance. Sleeping pills or sedatives can affect your balance. Limit the amount of alcohol you drink. Alcohol can impair your balance and other senses. Ask your doctor whether calluses or corns on your feet need to be removed. If you wear loose-fitting shoes because of calluses or corns, you can lose your balance and fall. Talk to your doctor if you have numbness in your feet. You may get dizzy if you do not drink enough water. To prevent dehydration, drink plenty of fluids. Choose water and other clear liquids. If you have kidney, heart, or liver disease and have to limit fluids, talk with your doctor before you increase the amount of fluids you drink.   Preventing falls at home  Remove raised doorway thresholds, throw rugs, and clutter. Repair loose carpet or raised areas in the floor. Move furniture and electrical cords to keep them out of walking paths. Use nonskid floor wax, and wipe up spills right away, especially on ceramic tile floors. If you use a walker or cane, put rubber tips on it. If you use crutches, clean the bottoms of them regularly with an abrasive pad, such as steel wool. Keep your house well lit, especially stairways, porches, and outside walkways. Use night-lights in areas such as hallways and bathrooms. Add extra light switches or use remote switches (such as switches that go on or off when you clap your hands) to make it easier to turn lights on if you have to get up during the night. Install sturdy handrails on stairways. Move items in your cabinets so that the things you use a lot are on the lower shelves (about waist level). Keep a cordless phone and a flashlight with new batteries by your bed. If possible, put a phone in each of the main rooms of your house, or carry a cell phone in case you fall and cannot reach a phone. Or, you can wear a device around your neck or wrist. You push a button that sends a signal for help. Wear low-heeled shoes that fit well and give your feet good support. Use footwear with nonskid soles. Check the heels and soles of your shoes for wear. Repair or replace worn heels or soles. Do not wear socks without shoes on wood floors. Walk on the grass when the sidewalks are slippery. If you live in an area that gets snow and ice in the winter, sprinkle salt on slippery steps and sidewalks. Or ask a family member or friend to do this for you. Preventing falls in the bath  Install grab bars and nonskid mats inside and outside your shower or tub and near the toilet and sinks. Use shower chairs and bath benches. Use a hand-held shower head that will allow you to sit while showering.   Get into a tub or shower by putting the weaker leg in first. Get out of a tub or shower with your strong side first.  Repair loose toilet seats and consider installing a raised toilet seat to make getting on and off the toilet easier. Keep your bathroom door unlocked while you are in the shower. Where can you learn more? Go to https://chpepiceweb.[employer]. org and sign in to your [employer] account. Enter [phone] in the [employer] box to learn more about \"Preventing Falls: Care Instructions. \"     If you do not have an account, please click on the \"Sign Up Now\" link. Current as of: May 4, 2022               Content Version: 13.4  © 2006-2022 [employer]. Care instructions adapted under license by [facility] ([facility]). If you have questions about a medical condition or this instruction, always ask your healthcare professional. [address] any warranty or liability for your use of this information. To wear Aspen collar when out of bed and ambulating until further notice for C4 displacement .

## 2022-12-02 NOTE — CONSULTS
NEUROSURGERY CONSULT NOTE:     CC: Initially presented with strokelike symptoms of blurred vision and dizziness    HPI:   Eura Patient 69 y.o. female with a PMH of obesity, A. fib status watchman procedure, hypothyroidism and hypertension who presented to [address] [place] [address] with complaints of blurred vision and dizziness upon awakening today. Her dizziness lasted for a couple minutes. She was then feeling okay until she was working in the kitchen and felt as if the room was spinning and fell to the ground. She did not injure her head nor did she have any loss of consciousness she does not take any oral anticoagulants or antiplatelet medications. Upon arrival her blood pressure was 206/106. A Code S was called in triage her initial NIHSS was 0. She underwent a work-up and neurology noticed a disc osteophyte complex on her CTA head and neck and recommended an MRI of her cervical spine. The patient has an MRI cervical spine performed on 12/1/2022 at [facility] that demonstrates a left eccentric C3-C4 disc herniation with left eccentric cord compression and compression of the exiting C4 nerve root. There is no definitive cord edema or myelomalacia at this time. There is a mild central disc bulge at C6-C7 that results in mild spinal stenosis without significant cord compression. 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 The results and left-sided cord compression there is a large ventral bridging osteophyte spanning from C4-C7. These imaging findings are consistent with diffuse idiopathic skeletal hyperostosis. At this time the patient denies any numbness tingling or weakness. She has fallen a few times tripping over her own feet and missing steps.   She denies any loss of bowel or bladder function and does have some complaints with gait and balance. Physical therapy is thought that she might benefit from vestibular rehab. Past Medical History:   Diagnosis Date    Anxiety     Arthritis     Bell's palsy     in Oct. 2009 with some vertigo at times still, no other after effects    Depression     GERD (gastroesophageal reflux disease)     reflux, takes nexium    Hematoma of hip, right, initial encounter     Hypertension     on medication since 2006    Hypothyroidism     Nausea & vomiting     Obese     Paroxysmal atrial fibrillation ([address] 8/13/2017    Psychiatric disorder     depression    S/P total knee replacement using cement 5/16/2011    Unspecified hypothyroidism 5/16/2011     Past Surgical History:   Procedure Laterality Date    APPENDECTOMY  1961    BREAST BIOPSY Bilateral     BREAST LUMPECTOMY  1998    benign    CATARACT REMOVAL Bilateral 2014    CHOLECYSTECTOMY  1976    COLONOSCOPY  04/04/2016    GASTRECTOMY  9/21/15    sleeve    HYSTERECTOMY (CERVIX STATUS UNKNOWN)  [address]  2005    lower back     ORTHOPEDIC SURGERY Bilateral 1994    heel spurs removed    AK CARDIAC SURG PROCEDURE UNLIST      LAAO placed 7/17/18    THYROIDECTOMY  1980    partial    TONSILLECTOMY  1966    TOTAL KNEE ARTHROPLASTY Left 2011    TOTAL KNEE ARTHROPLASTY Right 2010    UROLOGICAL SURGERY  2008/2009    bladder tack X2     Allergies   Allergen Reactions    Hydromorphone Swelling    Sulfa Antibiotics Rash     Social History     Socioeconomic History    Marital status:      Spouse name: Not on file    Number of children: Not on file    Years of education: Not on file    Highest education level: Not on file   Occupational History    Not on file   Tobacco Use    Smoking status: Never    Smokeless tobacco: Never   Substance and Sexual Activity    Alcohol use:  Yes     Alcohol/week: 1.7 standard drinks    Drug use: Never    Sexual activity: Not on file   Other Topics Concern    Not on file Social History Narrative    Not on file     Social Determinants of Health     Financial Resource Strain: Not on file   Food Insecurity: Not on file   Transportation Needs: Not on file   Physical Activity: Not on file   Stress: Not on file   Social Connections: Not on file   Intimate Partner Violence: Not on file   Housing Stability: Not on file     Review of Systems - Negative except as noted per HPI  Neurological ROS: negative    Physical Exam:   BP (!) 144/81   Pulse 66   Temp 98.1 °F (36.7 °C) (Oral)   Resp 17   Ht 5' 4\" (1.626 m)   Wt 195 lb (88.5 kg)   SpO2 99%   BMI 33.47 kg/m²   General: No acute distress  Awake, alert, and oriented to person, place, time, and situation   Eyes open spontaneously   PERRL, EOMI, visual fields grossly intact to light and confrontation   Hearing grossly intact   Face symmetric and tongue mid-line on protrusion   No pronation or drift on exam   Patient with strength exam as follows:   Upper Extremities: Right Left      Deltoid  5 5    Biceps  5 5    Triceps  5 5      5 5     Lower Extremities:      Hip Flex 5 5    Quads  5 5    Hamstrings 5 5    Dorsiflex 5 5    Plantarflex 5 5    EHL  5 5  Sensation intact to light touch and pin-prick   DTR 1+  No Mendez's sign present bilaterally   No clonus   Gait Deferred     Assessment and Plan:   Ewa Francis 69 y.o. female with a PMH of obesity, A. fib status watchman procedure, hypothyroidism and hypertension who presented to [address] [place] [address] with complaints of blurred vision and dizziness upon awakening today. I have independently reviewed and interpreted the patient's  MRI cervical spine performed on 12/1/2022 [facility] that demonstrates a left eccentric C3-C4 disc herniation with left eccentric cord compression and compression of the exiting C4 nerve root. There is no definitive cord edema or myelomalacia at this time.   There is a mild central disc bulge at C6-C7 that results in mild spinal stenosis without significant cord compression. 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 The results and left-sided cord compression there is a large ventral bridging osteophyte spanning from C4-C7. At this time the patient has no grossly appreciable neurologic deficits on physical examination. She does have imaging findings concerning that may require a posterior cervical spinal decompression with instrumented fixation and fusion given the large osteophyte consistent with ossification of posterior longitudinal ligament and calcified disc osteophyte complex at C3-C4 and spanning up to the superior endplate of C3. There is documented left eccentric cord compression however this is not an acute compressive pathology such as an acute disc herniation and given the chronicity of his appearance at this time there is no evidence of myelomalacia in the cord. We will therefore allow the patient be discharged home with a [place] cervical collar when up and ambulatory with a short follow-up with outpatient neurosurgery to determine a appropriate surgical treatment course and timeline. As stated above she would likely benefit from posterior cervical laminectomy and fusion from C3 to at least C5 and may be further. The patient understands and is amenable to proceed with the plan as outlined above. Mariposa Gudino.  Sena Pearl, [address] [place] [address]

## 2022-12-02 NOTE — PROGRESS NOTES
TRANSFER - OUT REPORT:    Verbal report given to [facility] RN on Aishwarya Dewey  being transferred to [address] [place] [address] for routine progression of patient care       Report consisted of patient's Situation, Background, Assessment and   Recommendations(SBAR). Information from the following report(s) Nurse Handoff Report was reviewed with the receiving nurse. NIHSS Q4 = 0    Opportunity for questions and clarification was provided.       Patient transported with:  Registered Nurse

## 2022-12-02 NOTE — PROGRESS NOTES
Shift summary  A/Ox4. VSS on RA. No acute distress noted. Denied pain. Continent of B/B, ambulates independently. NIHSS Q4 = 0. Bed low and locked, call light within reach. Will continue to monitor.

## 2022-12-02 NOTE — CARE COORDINATION
Chart reviewed by . Patient is a 69year old female, admitted with Stroke-Like Symptoms. CM met with patient to discuss discharge planning. Patient presented alert and oriented x4. Demographics verified. Patient and her daughter Chrystal Brunner [phone] live together in a one two level home with steps. Patient stated she is mostly home alone, due to her daughter working full time. At baseline, patient reports she is independent with completing ADL's and drives. Patient denies DME use. Patient insured with a PCP for follow up care. Discharge planning: Patient will discharge home today with family. Patient evaluated by therapy and recommended outpatient PT/OT. Patient would like referral placed with  with preferred location- in [address] [facility]. Referral will be sent when discharge summary and therapy evaluation is available. No additional discharge needs noted or expressed at this time. Family to transport. 12/02/22 1217   Service Assessment   Patient Orientation Alert and Oriented;Person;Place;Situation   Cognition Alert   History Provided By Patient   Primary [place] [address] is: Named in [address] [place] [address]   PCP Verified by CM Yes   Last Visit to PCP Within last 6 months   Prior Functional Level Independent in ADLs/IADLs   Can patient return to prior living arrangement Yes   Ability to make needs known: Good   Family able to assist with home care needs: Yes   Financial Resources Medicare   Social/Functional History   Lives With Daughter   Home Layout Two level; Able to Live on Main level with bedroom/bathroom   Home Access Stairs to enter with rails   Entrance Stairs - Number of Steps 2   Entrance Stairs - [address]   (Denies DME use.)   ADL Assistance Independent   Active  Yes   Mode of Transportation Car   Occupation Retired   Discharge Planning   Type of [facility]  (Daughter resides with patient.)   Current Services Prior To Admission None   Potential Assistance Needed Outpatient PT/OT   DME Ordered? No   Potential Assistance Purchasing Medications No   Type of Home Care Services None   Patient expects to be discharged to: [facility] [address] Discharge   Transition of Care Consult (CM Consult) Discharge [facility] Discharge Outpatient;PT;OT   [employer] Information Provided? No   Mode of Transport at Discharge Other (see comment)  (Family.)   Confirm Follow Up Transport Self   Condition of Participation: Discharge Planning   The Plan for Transition of Care is related to the following treatment goals: Return to community with supportive services. The Patient and/or Patient Representative was provided with a Choice of Provider? Patient   The Patient and/Or Patient Representative agree with the Discharge Plan? Yes   Freedom of Choice list was provided with basic dialogue that supports the patient's individualized plan of care/goals, treatment preferences, and shares the quality data associated with the providers?   Yes

## 2022-12-02 NOTE — PROGRESS NOTES
Message sent to Dr. Yessenia Fisher awaiting response prior to scheduling. MD Meme Guzman  Schedule in the next couple weeks thanks. Davina Craig. Hardy Brooks           Previous Messages     Will call Monday for appt.

## 2022-12-05 ENCOUNTER — CARE COORDINATION (OUTPATIENT)
Dept: CARE COORDINATION | Facility: CLINIC | Age: 77
End: 2022-12-05

## 2022-12-05 NOTE — CARE COORDINATION
Patient with PCP call for LAUREN need. Patient with PCP appt scheduled and CTN to follow up next week to assess for any new or worsening s/s.  Appt 12/9

## 2022-12-06 ENCOUNTER — OFFICE VISIT (OUTPATIENT)
Dept: FAMILY MEDICINE CLINIC | Facility: CLINIC | Age: 77
End: 2022-12-06

## 2022-12-06 VITALS
TEMPERATURE: 97.1 F | SYSTOLIC BLOOD PRESSURE: 140 MMHG | DIASTOLIC BLOOD PRESSURE: 86 MMHG | HEART RATE: 64 BPM | HEIGHT: 64 IN | BODY MASS INDEX: 33.29 KG/M2 | OXYGEN SATURATION: 99 % | WEIGHT: 195 LBS

## 2022-12-06 DIAGNOSIS — I35.1 AORTIC VALVE INSUFFICIENCY, ETIOLOGY OF CARDIAC VALVE DISEASE UNSPECIFIED: ICD-10-CM

## 2022-12-06 DIAGNOSIS — R06.81 WITNESSED EPISODE OF APNEA: ICD-10-CM

## 2022-12-06 DIAGNOSIS — K21.00 GASTROESOPHAGEAL REFLUX DISEASE WITH ESOPHAGITIS WITHOUT HEMORRHAGE: ICD-10-CM

## 2022-12-06 DIAGNOSIS — E04.1 THYROID NODULE: Primary | ICD-10-CM

## 2022-12-06 RX ORDER — OMEPRAZOLE 40 MG/1
40 CAPSULE, DELAYED RELEASE ORAL DAILY
Qty: 90 CAPSULE | Refills: 3 | Status: SHIPPED | OUTPATIENT
Start: 2022-12-06

## 2022-12-06 RX ORDER — LEVOTHYROXINE SODIUM 0.1 MG/1
100 TABLET ORAL
Qty: 90 TABLET | Refills: 3 | Status: SHIPPED | OUTPATIENT
Start: 2022-12-06

## 2022-12-11 ASSESSMENT — ENCOUNTER SYMPTOMS
GASTROINTESTINAL NEGATIVE: 1
RESPIRATORY NEGATIVE: 1
EYES NEGATIVE: 1

## 2022-12-11 NOTE — PROGRESS NOTES
HISTORY OF PRESENT ILLNESS  Pa Wang is a 69 y.o. y.o. female    Thyroid Problem  Presents for initial (Note of a small thyroid nodule on CT scan of the neck during work up for head trauma) visit. Symptoms include fatigue. The symptoms have been stable. Sleep Problem  This is a new (witnessed apnea) problem. The current episode started 1 to 4 weeks ago. The problem has been gradually worsening. Associated symptoms include fatigue. Allergies   Allergen Reactions    Hydromorphone Swelling    Sulfa Antibiotics Rash        Current Outpatient Medications   Medication Sig    levothyroxine (SYNTHROID) 100 MCG tablet Take 1 tablet by mouth every morning (before breakfast)    omeprazole (PRILOSEC) 40 MG delayed release capsule Take 1 capsule by mouth daily    meclizine (ANTIVERT) 12.5 MG tablet Take 1 tablet by mouth 3 times daily as needed for Dizziness    cetirizine (ZYRTEC) 10 MG tablet Take 1 tablet by mouth daily    atorvastatin (LIPITOR) 80 MG tablet Take 1 tablet by mouth nightly    fluticasone (FLONASE) 50 MCG/ACT nasal spray 1 spray by Each Nostril route daily    flecainide (TAMBOCOR) 100 MG tablet Take 1 tablet by mouth in the morning and 1 tablet in the evening. metoprolol tartrate (LOPRESSOR) 25 MG tablet Take 0.5 tablets by mouth in the morning and 0.5 tablets in the evening. aspirin 81 MG EC tablet Take by mouth daily    busPIRone (BUSPAR) 10 MG tablet Take 1 tablet by mouth 2 times daily    vitamin D (CHOLECALCIFEROL) 25 MCG (1000 UT) TABS tablet Take 5,000 Units by mouth daily    docusate (COLACE, DULCOLAX) 100 MG CAPS Take 100 mg by mouth 2 times daily as needed    DULoxetine (CYMBALTA) 60 MG extended release capsule TAKE 1 CAPSULE BY MOUTH EVERY DAY    LORazepam (ATIVAN) 0.5 MG tablet Take 0.5 mg by mouth every 8 hours as needed.     melatonin 10 MG CAPS capsule Take by mouth    tiZANidine (ZANAFLEX) 4 MG tablet TAKE 1/2-1 TABLET BY MOUTH 3 TIMES A DAY AS NEEDED     No current facility-administered medications for this visit. Past Medical History:   Diagnosis Date    Anxiety     Arthritis     Bell's palsy     in Oct. 2009 with some vertigo at times still, no other after effects    Depression     GERD (gastroesophageal reflux disease)     reflux, takes nexium    Hematoma of hip, right, initial encounter     Hypertension     on medication since 2006    Hypothyroidism     Nausea & vomiting     Obese     Paroxysmal atrial fibrillation ([facility] [address] 8/13/2017    Psychiatric disorder     depression    S/P total knee replacement using cement 5/16/2011    Unspecified hypothyroidism 5/16/2011        Past Surgical History:   Procedure Laterality Date    APPENDECTOMY  1961    BREAST BIOPSY Bilateral     BREAST LUMPECTOMY  1998    benign    CATARACT REMOVAL Bilateral 2014    CHOLECYSTECTOMY  1976    COLONOSCOPY  04/04/2016    GASTRECTOMY  9/21/15    sleeve    HYSTERECTOMY (CERVIX STATUS UNKNOWN)  [address]  2005    lower back     ORTHOPEDIC SURGERY Bilateral 1994    heel spurs removed    NM CARDIAC SURG PROCEDURE UNLIST      LAAO placed 7/17/18    THYROIDECTOMY  1980    partial    TONSILLECTOMY  1966    TOTAL KNEE ARTHROPLASTY Left 2011    TOTAL KNEE ARTHROPLASTY Right 2010    UROLOGICAL SURGERY  2008/2009    bladder tack X2        Social History     Socioeconomic History    Marital status:      Spouse name: Not on file    Number of children: Not on file    Years of education: Not on file    Highest education level: Not on file   Occupational History    Not on file   Tobacco Use    Smoking status: Never    Smokeless tobacco: Never   Substance and Sexual Activity    Alcohol use:  Yes     Alcohol/week: 1.7 standard drinks    Drug use: Never    Sexual activity: Not on file   Other Topics Concern    Not on file   Social History Narrative    Not on file     Social Determinants of Health     Financial Resource Strain: Not on file   Food Insecurity: Not on file   Transportation Needs: Not on file   Physical Activity: Not on file   Stress: Not on file   Social Connections: Not on file   Intimate Partner Violence: Not on file   Housing Stability: Not on file        Review of Systems   Constitutional:  Positive for fatigue. HENT: Negative. Eyes: Negative. Respiratory: Negative. Cardiovascular: Negative. Gastrointestinal: Negative. Endocrine: Negative. Genitourinary: Negative. Musculoskeletal: Negative. Skin: Negative. Neurological: Negative. Psychiatric/Behavioral: Negative. BP (!) 140/86   Pulse 64   Temp 97.1 °F (36.2 °C) (Temporal)   Ht 5' 4\" (1.626 m)   Wt 195 lb (88.5 kg)   SpO2 99%   BMI 33.47 kg/m²      Physical Exam  Constitutional:       Appearance: Normal appearance. HENT:      Head: Normocephalic. Right Ear: Tympanic membrane, ear canal and external ear normal.      Left Ear: Tympanic membrane, ear canal and external ear normal.      Nose: Nose normal.      Mouth/Throat:      Mouth: Mucous membranes are moist.      Pharynx: Oropharynx is clear. Eyes:      General: No scleral icterus. Extraocular Movements: Extraocular movements intact. Conjunctiva/sclera: Conjunctivae normal.   Neck:      Vascular: No carotid bruit. Cardiovascular:      Rate and Rhythm: Normal rate and regular rhythm. Pulses: Normal pulses. Heart sounds: Normal heart sounds. Pulmonary:      Effort: Pulmonary effort is normal. No respiratory distress. Breath sounds: Normal breath sounds. No wheezing or rales. Abdominal:      General: Abdomen is flat. Bowel sounds are normal. There is no distension. Palpations: Abdomen is soft. There is no mass. Tenderness: There is no abdominal tenderness. Musculoskeletal:         General: Normal range of motion. Cervical back: Normal range of motion and neck supple. Skin:     General: Skin is warm and dry. Neurological:      General: No focal deficit present.       Mental Status: She is alert and oriented to person, place, and time. Psychiatric:         Mood and Affect: Mood normal.         Behavior: Behavior normal.         Thought Content: Thought content normal.         Judgment: Judgment normal.       Admission on 12/01/2022, Discharged on 12/02/2022   Component Date Value Ref Range Status    Sodium 12/01/2022 137  133 - 143 mmol/L Final    Potassium 12/01/2022 3.6  3.5 - 5.1 mmol/L Final    Chloride 12/01/2022 105  101 - 110 mmol/L Final    CO2 12/01/2022 27  21 - 32 mmol/L Final    Anion Gap 12/01/2022 5  2 - 11 mmol/L Final    Glucose 12/01/2022 121 (A)  65 - 100 mg/dL Final    BUN 12/01/2022 17  8 - 23 MG/DL Final    Creatinine 12/01/2022 1.00  0.6 - 1.0 MG/DL Final    Est, Glom Filt Rate 12/01/2022 58 (A)  >60 ml/min/1.73m2 Final    Comment:      Pediatric calculator link: [email or web address]       Effective Oct 3, 2022       These results are not intended for use in patients <25years of age. eGFR results are calculated without a race factor using  the 2021 CKD-EPI equation. Careful clinical correlation is recommended, particularly when comparing to results calculated using previous equations. The CKD-EPI equation is less accurate in patients with extremes of muscle mass, extra-renal metabolism of creatinine, excessive creatine ingestion, or following therapy that affects renal tubular secretion.       Calcium 12/01/2022 9.9  8.3 - 10.4 MG/DL Final    WBC 12/01/2022 9.5  4.3 - 11.1 K/uL Final    RBC 12/01/2022 4.90  4.05 - 5.2 M/uL Final    Hemoglobin 12/01/2022 14.6  11.7 - 15.4 g/dL Final    Hematocrit 12/01/2022 44.2  35.8 - 46.3 % Final    MCV 12/01/2022 90.2  82 - 102 FL Final    MCH 12/01/2022 29.8  26.1 - 32.9 PG Final    MCHC 12/01/2022 33.0  31.4 - 35.0 g/dL Final    RDW 12/01/2022 13.7  11.9 - 14.6 % Final    Platelets 87/38/7285 300  150 - 450 K/uL Final    MPV 12/01/2022 10.2  9.4 - 12.3 FL Final    nRBC 12/01/2022 0.00  0.0 - 0.2 K/uL Final    **Note: Absolute NRBC parameter is now reported with Hemogram**    Differential Type 12/01/2022 AUTOMATED    Final    Seg Neutrophils 12/01/2022 84 (A)  43 - 78 % Final    Lymphocytes 12/01/2022 11 (A)  13 - 44 % Final    Monocytes 12/01/2022 4  4.0 - 12.0 % Final    Eosinophils % 12/01/2022 0 (A)  0.5 - 7.8 % Final    Basophils 12/01/2022 1  0.0 - 2.0 % Final    Immature Granulocytes 12/01/2022 0  0.0 - 5.0 % Final    Segs Absolute 12/01/2022 8.0  1.7 - 8.2 K/UL Final    Absolute Lymph # 12/01/2022 1.1  0.5 - 4.6 K/UL Final    Absolute Mono # 12/01/2022 0.4  0.1 - 1.3 K/UL Final    Absolute Eos # 12/01/2022 0.0  0.0 - 0.8 K/UL Final    Basophils Absolute 12/01/2022 0.1  0.0 - 0.2 K/UL Final    Absolute Immature Granulocyte 12/01/2022 0.0  0.0 - 0.5 K/UL Final    Ventricular Rate 12/01/2022 77  BPM Final    Atrial Rate 12/01/2022 77  BPM Final    P-R Interval 12/01/2022 267  ms Final    QRS Duration 12/01/2022 122  ms Final    Q-T Interval 12/01/2022 437  ms Final    QTc Calculation (Bazett) 12/01/2022 495  ms Final    P Axis 12/01/2022 78  degrees Final    R Axis 12/01/2022 -15  degrees Final    T Axis 12/01/2022 98  degrees Final    Diagnosis 12/01/2022 Sinus rhythm   Final    Troponin, High Sensitivity 12/01/2022 10.9  0 - 14 pg/mL Final    Patients taking more than 20 mg/day of biotin may havefalsely negative results and should not use this test.    POC Protime 12/01/2022 13.9 (A)  9.6 - 11.6 SECS Final    POC INR 12/01/2022 1.2  0.9 - 1.2   Final    POC Glucose 12/01/2022 130 (A)  65 - 100 mg/dL Final    Comment: 47 - 60 mg/dl Consistent with, but not fully diagnostic of hypoglycemia.   101 - 125 mg/dl Impaired fasting glucose/consistent with pre-diabetes mellitus  > 126 mg/dl Fasting glucose consistent with overt diabetes mellitus      Performed by: 12/01/2022 [email or web address]   Final    TSH w Free Thyroid if Abnormal 12/01/2022 2.30  0.358 - 3.740 UIU/ML Final    Magnesium 12/01/2022 2.0 1.8 - 2.4 mg/dL Final    WBC 12/02/2022 6.6  4.3 - 11.1 K/uL Final    RBC 12/02/2022 4.42  4.05 - 5.2 M/uL Final    Hemoglobin 12/02/2022 13.1  11.7 - 15.4 g/dL Final    Hematocrit 12/02/2022 39.8  35.8 - 46.3 % Final    MCV 12/02/2022 90.0  82 - 102 FL Final    MCH 12/02/2022 29.6  26.1 - 32.9 PG Final    MCHC 12/02/2022 32.9  31.4 - 35.0 g/dL Final    RDW 12/02/2022 13.7  11.9 - 14.6 % Final    Platelets 36/28/0413 264  150 - 450 K/uL Final    MPV 12/02/2022 10.4  9.4 - 12.3 FL Final    nRBC 12/02/2022 0.00  0.0 - 0.2 K/uL Final    **Note: Absolute NRBC parameter is now reported with Hemogram**    Hemoglobin A1C 12/02/2022 5.5  4.8 - 5.6 % Final    eAG 12/02/2022 111  mg/dL Final    Comment: Reference Range  Normal: 4.8-5.6  Diabetic >=6.5%  Normal       <5.7%      Cholesterol, Total 12/02/2022 177  <200 MG/DL Final    Comment: Borderline High: 200-239 mg/dL  High: Greater than or equal to 240 mg/dL      Triglycerides 12/02/2022 76  35 - 150 MG/DL Final    Comment: Borderline High: 150-199 mg/dL, High: 200-499 mg/dL  Very High: Greater than or equal to 500 mg/dL      HDL 12/02/2022 74 (A)  40 - 60 MG/DL Final    LDL Calculated 12/02/2022 87.8  <100 MG/DL Final    Comment: Near Optimal: 100-129 mg/dL  Borderline High: 130-159, High: 160-189 mg/dL  Very High: Greater than or equal to 190 mg/dL      VLDL Cholesterol Calculated 12/02/2022 15.2  6.0 - 23.0 MG/DL Final    Chol/HDL Ratio 12/02/2022 2.4    Final    Magnesium 12/02/2022 2.2  1.8 - 2.4 mg/dL Final    Sodium 12/02/2022 139  133 - 143 mmol/L Final    Potassium 12/02/2022 4.6  3.5 - 5.1 mmol/L Final    Chloride 12/02/2022 108  101 - 110 mmol/L Final    CO2 12/02/2022 28  21 - 32 mmol/L Final    Anion Gap 12/02/2022 3  2 - 11 mmol/L Final    Glucose 12/02/2022 102 (A)  65 - 100 mg/dL Final    BUN 12/02/2022 20  8 - 23 MG/DL Final    Creatinine 12/02/2022 0.70  0.6 - 1.0 MG/DL Final    Est, Glom Filt Rate 12/02/2022 >60  >60 ml/min/1.73m2 Final    Comment: Pediatric calculator link: [email or web address]_[email or web address]       Effective Oct 3, 2022       These results are not intended for use in patients <25years of age. eGFR results are calculated without a race factor using  the 2021 CKD-EPI equation. Careful clinical correlation is recommended, particularly when comparing to results calculated using previous equations. The CKD-EPI equation is less accurate in patients with extremes of muscle mass, extra-renal metabolism of creatinine, excessive creatine ingestion, or following therapy that affects renal tubular secretion. Calcium 12/02/2022 9.0  8.3 - 10.4 MG/DL Final    Total Bilirubin 12/02/2022 0.7  0.2 - 1.1 MG/DL Final    ALT 12/02/2022 18  12 - 65 U/L Final    AST 12/02/2022 18  15 - 37 U/L Final    Alk Phosphatase 12/02/2022 93  50 - 136 U/L Final    Total Protein 12/02/2022 6.1 (A)  6.3 - 8.2 g/dL Final    Albumin 12/02/2022 3.2  3.2 - 4.6 g/dL Final    Globulin 12/02/2022 2.9  2.8 - 4.5 g/dL Final    Albumin/Globulin Ratio 12/02/2022 1.1  0.4 - 1.6   Final    Vitamin B-12 12/02/2022 445  193 - 986 pg/mL Final    Ferritin 12/02/2022 124  8 - 388 NG/ML Final    Folate 12/02/2022 9.9  3.1 - 17.5 ng/mL Final       ASSESSMENT and PLAN    Thyroid nodule  -     [address] [place] [address]  Witnessed episode of apnea  -     Chrissy Meehan MD, Sleep Medicine, [place]      Current treatment plan is effective. no changes in therapy  the following changes in treatment are made Thyroid nodule , schedule scan and US and refer to endocrine , thyroid nodule clinic , Apnea , schedule apnea . No follow-ups on file.      Naida Gallardo MD

## 2022-12-12 ENCOUNTER — OFFICE VISIT (OUTPATIENT)
Dept: CARDIOLOGY CLINIC | Age: 77
End: 2022-12-12
Payer: MEDICARE

## 2022-12-12 ENCOUNTER — OFFICE VISIT (OUTPATIENT)
Dept: NEUROLOGY | Age: 77
End: 2022-12-12
Payer: MEDICARE

## 2022-12-12 VITALS
HEIGHT: 64 IN | OXYGEN SATURATION: 95 % | BODY MASS INDEX: 33.73 KG/M2 | DIASTOLIC BLOOD PRESSURE: 93 MMHG | SYSTOLIC BLOOD PRESSURE: 164 MMHG | HEART RATE: 65 BPM | WEIGHT: 197.6 LBS

## 2022-12-12 VITALS
DIASTOLIC BLOOD PRESSURE: 86 MMHG | WEIGHT: 198 LBS | HEIGHT: 64 IN | HEART RATE: 64 BPM | SYSTOLIC BLOOD PRESSURE: 140 MMHG | BODY MASS INDEX: 33.8 KG/M2

## 2022-12-12 DIAGNOSIS — I48.91 ATRIAL FIBRILLATION, UNSPECIFIED TYPE (HCC): Primary | ICD-10-CM

## 2022-12-12 DIAGNOSIS — I95.1 ORTHOSTATIC HYPOTENSION: ICD-10-CM

## 2022-12-12 DIAGNOSIS — I48.0 PAROXYSMAL ATRIAL FIBRILLATION (HCC): ICD-10-CM

## 2022-12-12 DIAGNOSIS — E04.1 RIGHT THYROID NODULE: ICD-10-CM

## 2022-12-12 DIAGNOSIS — Z91.81 AT HIGH RISK FOR FALLS: ICD-10-CM

## 2022-12-12 DIAGNOSIS — G95.20 CERVICAL SPINAL CORD COMPRESSION (HCC): ICD-10-CM

## 2022-12-12 DIAGNOSIS — Z09 HOSPITAL DISCHARGE FOLLOW-UP: Primary | ICD-10-CM

## 2022-12-12 DIAGNOSIS — Z95.818 PRESENCE OF WATCHMAN LEFT ATRIAL APPENDAGE CLOSURE DEVICE: ICD-10-CM

## 2022-12-12 DIAGNOSIS — R29.90 STROKE-LIKE SYMPTOMS: ICD-10-CM

## 2022-12-12 DIAGNOSIS — M48.10 DISH (DIFFUSE IDIOPATHIC SKELETAL HYPEROSTOSIS): ICD-10-CM

## 2022-12-12 DIAGNOSIS — I10 HYPERTENSION, ESSENTIAL: ICD-10-CM

## 2022-12-12 DIAGNOSIS — I35.8 AORTIC VALVE SCLEROSIS: ICD-10-CM

## 2022-12-12 PROBLEM — R22.32 MASS OF LEFT HAND: Status: ACTIVE | Noted: 2020-08-10

## 2022-12-12 PROCEDURE — 1123F ACP DISCUSS/DSCN MKR DOCD: CPT | Performed by: INTERNAL MEDICINE

## 2022-12-12 PROCEDURE — G8427 DOCREV CUR MEDS BY ELIG CLIN: HCPCS | Performed by: NURSE PRACTITIONER

## 2022-12-12 PROCEDURE — 3074F SYST BP LT 130 MM HG: CPT | Performed by: INTERNAL MEDICINE

## 2022-12-12 PROCEDURE — 1036F TOBACCO NON-USER: CPT | Performed by: INTERNAL MEDICINE

## 2022-12-12 PROCEDURE — 3078F DIAST BP <80 MM HG: CPT | Performed by: NURSE PRACTITIONER

## 2022-12-12 PROCEDURE — 3078F DIAST BP <80 MM HG: CPT | Performed by: INTERNAL MEDICINE

## 2022-12-12 PROCEDURE — 93000 ELECTROCARDIOGRAM COMPLETE: CPT | Performed by: INTERNAL MEDICINE

## 2022-12-12 PROCEDURE — G8484 FLU IMMUNIZE NO ADMIN: HCPCS | Performed by: NURSE PRACTITIONER

## 2022-12-12 PROCEDURE — 1090F PRES/ABSN URINE INCON ASSESS: CPT | Performed by: NURSE PRACTITIONER

## 2022-12-12 PROCEDURE — 1036F TOBACCO NON-USER: CPT | Performed by: NURSE PRACTITIONER

## 2022-12-12 PROCEDURE — 99215 OFFICE O/P EST HI 40 MIN: CPT | Performed by: NURSE PRACTITIONER

## 2022-12-12 PROCEDURE — 1090F PRES/ABSN URINE INCON ASSESS: CPT | Performed by: INTERNAL MEDICINE

## 2022-12-12 PROCEDURE — G8417 CALC BMI ABV UP PARAM F/U: HCPCS | Performed by: INTERNAL MEDICINE

## 2022-12-12 PROCEDURE — G8400 PT W/DXA NO RESULTS DOC: HCPCS | Performed by: NURSE PRACTITIONER

## 2022-12-12 PROCEDURE — G8400 PT W/DXA NO RESULTS DOC: HCPCS | Performed by: INTERNAL MEDICINE

## 2022-12-12 PROCEDURE — G8427 DOCREV CUR MEDS BY ELIG CLIN: HCPCS | Performed by: INTERNAL MEDICINE

## 2022-12-12 PROCEDURE — 1111F DSCHRG MED/CURRENT MED MERGE: CPT | Performed by: NURSE PRACTITIONER

## 2022-12-12 PROCEDURE — G8484 FLU IMMUNIZE NO ADMIN: HCPCS | Performed by: INTERNAL MEDICINE

## 2022-12-12 PROCEDURE — 1123F ACP DISCUSS/DSCN MKR DOCD: CPT | Performed by: NURSE PRACTITIONER

## 2022-12-12 PROCEDURE — 99214 OFFICE O/P EST MOD 30 MIN: CPT | Performed by: INTERNAL MEDICINE

## 2022-12-12 PROCEDURE — G8417 CALC BMI ABV UP PARAM F/U: HCPCS | Performed by: NURSE PRACTITIONER

## 2022-12-12 PROCEDURE — 3074F SYST BP LT 130 MM HG: CPT | Performed by: NURSE PRACTITIONER

## 2022-12-12 ASSESSMENT — ENCOUNTER SYMPTOMS
NAIL CHANGES: 0
STRIDOR: 0
ABDOMINAL PAIN: 0
COUGH: 0
EYE PAIN: 0
APHONIA: 0

## 2022-12-12 ASSESSMENT — PATIENT HEALTH QUESTIONNAIRE - PHQ9
1. LITTLE INTEREST OR PLEASURE IN DOING THINGS: 0
2. FEELING DOWN, DEPRESSED OR HOPELESS: 0
SUM OF ALL RESPONSES TO PHQ QUESTIONS 1-9: 0
SUM OF ALL RESPONSES TO PHQ9 QUESTIONS 1 & 2: 0

## 2022-12-12 NOTE — PROGRESS NOTES
[facility]  [address]  [address] [facility], [address] [facility]      Chief Complaint   Patient presents with    Follow-Up from Hospital     Stroke like symptoms       Bard Yoli is a 69 y.o. female who presents for follow up for stroke like symptoms. PMH of obesity, afib s/p watchman, hypothyrodiism, HTN admitted with dizziness, sided by side diplopia, and blurry vision with subsequent fall and head trauma with out LOC. Code S in ED. NIH 0. CT of head negative for acute intracranial abnormalities; left posterior scalp hematoma. CT cervical spine demonstrated moderate central spinal stenosis at C2, C3, and C4. CTA of head/neck negative for LVO or high grade stenosis. CTP negative. MRI of brain negative for acute infarct; severe chronic microangiopathy. MRI cervical spine  demonstrated DISH syndrome; Large posterior osteophyte from C4 extending superiorly which creates a significant central spinal stenosis with displacement and impingement of the cord towards the right. NS evaluated with recommendations for ASPEN collar when up and ambulating and following up as outpatient. Orthostatic BP were noted to be positive during hospitalization. She was evaluated by PT/OT/ST, recommendation to hold on vestibular therapy. She was discharged home. Interval history:  She is here today with daughter. Denies vertigo, recurrent falls. She is currently wearing her ASPEN collar. She is requires moderate assistance with her ADLs. She is scheduled to follow up with NS on 12/28. Hx of BPPV- denies vertigo. Currently on meclizine prn. Orthostatic BP negative in office, however she did have 17 pt drop in SBP.         Past Medical History:   Diagnosis Date    Anxiety     Arthritis     Bell's palsy     in Oct. 2009 with some vertigo at times still, no other after effects    Depression     GERD (gastroesophageal reflux disease)     reflux, takes nexium    Hematoma of hip, right, initial encounter Hypertension     on medication since 2006    Hypothyroidism     Nausea & vomiting     Obese     Paroxysmal atrial fibrillation ([facility] [address] 8/13/2017    Psychiatric disorder     depression    S/P total knee replacement using cement 5/16/2011    Unspecified hypothyroidism 5/16/2011       Past Surgical History:   Procedure Laterality Date    APPENDECTOMY  1961    BREAST BIOPSY Bilateral     BREAST LUMPECTOMY  1998    benign    CATARACT REMOVAL Bilateral 2014    CHOLECYSTECTOMY  1976    COLONOSCOPY  04/04/2016    GASTRECTOMY  9/21/15    sleeve    HYSTERECTOMY (CERVIX STATUS UNKNOWN)  [address]  2005    lower back     ORTHOPEDIC SURGERY Bilateral 1994    heel spurs removed    MI CARDIAC SURG PROCEDURE UNLIST      LAAO placed 7/17/18    THYROIDECTOMY  1980    partial    TONSILLECTOMY  1966    TOTAL KNEE ARTHROPLASTY Left 2011    TOTAL KNEE ARTHROPLASTY Right 2010    UROLOGICAL SURGERY  2008/2009    bladder tack X2       Family History   Problem Relation Age of Onset    Delayed Awakening Neg Hx     Pseudochol. Deficiency Neg Hx     Post-op Nausea/Vomiting Neg Hx     Emergence Delirium Neg Hx     Post-op Cognitive Dysfunction Neg Hx     Cancer Mother         breast    Malig Hypertherm Neg Hx     Breast Cancer Mother 62    Cancer Father         throat    Other Father         gastric ulcer    Kidney Disease Father     Breast Cancer Daughter     Heart Attack Mother        Social History     Socioeconomic History    Marital status:      Spouse name: None    Number of children: None    Years of education: None    Highest education level: None   Tobacco Use    Smoking status: Never    Smokeless tobacco: Never   Vaping Use    Vaping Use: Never used   Substance and Sexual Activity    Alcohol use:  Yes     Alcohol/week: 1.7 standard drinks    Drug use: Never    Sexual activity: Not Currently         Current Outpatient Medications:     levothyroxine (SYNTHROID) 100 MCG tablet, Take 1 tablet by mouth every morning (before breakfast), Disp: 90 tablet, Rfl: 3    omeprazole (PRILOSEC) 40 MG delayed release capsule, Take 1 capsule by mouth daily, Disp: 90 capsule, Rfl: 3    meclizine (ANTIVERT) 12.5 MG tablet, Take 1 tablet by mouth 3 times daily as needed for Dizziness, Disp: 30 tablet, Rfl: 0    atorvastatin (LIPITOR) 80 MG tablet, Take 1 tablet by mouth nightly, Disp: 30 tablet, Rfl: 0    flecainide (TAMBOCOR) 100 MG tablet, Take 1 tablet by mouth in the morning and 1 tablet in the evening., Disp: 60 tablet, Rfl: 3    metoprolol tartrate (LOPRESSOR) 25 MG tablet, Take 0.5 tablets by mouth in the morning and 0.5 tablets in the evening., Disp: 180 tablet, Rfl: 3    aspirin 81 MG EC tablet, Take by mouth daily, Disp: , Rfl:     busPIRone (BUSPAR) 10 MG tablet, Take 1 tablet by mouth 2 times daily, Disp: , Rfl:     vitamin D (CHOLECALCIFEROL) 25 MCG (1000 UT) TABS tablet, Take 5,000 Units by mouth daily, Disp: , Rfl:     docusate (COLACE, DULCOLAX) 100 MG CAPS, Take 100 mg by mouth 2 times daily as needed, Disp: , Rfl:     DULoxetine (CYMBALTA) 60 MG extended release capsule, 90 mg TAKE 1 CAPSULE BY MOUTH EVERY DAY, Disp: , Rfl:     LORazepam (ATIVAN) 0.5 MG tablet, Take 0.5 mg by mouth every 8 hours as needed. , Disp: , Rfl:     melatonin 10 MG CAPS capsule, Take by mouth, Disp: , Rfl:     tiZANidine (ZANAFLEX) 4 MG tablet, TAKE 1/2-1 TABLET BY MOUTH 3 TIMES A DAY AS NEEDED, Disp: , Rfl:     Allergies   Allergen Reactions    Hydromorphone Swelling    Sulfa Antibiotics Rash       REVIEW OF SYSTEMS:  CONSTITUTIONAL: No weight loss, fever, chills, weakness or fatigue. HEENT: Eyes: No visual loss, blurred vision, double vision or yellow sclerae. Ears, Nose, Throat: No hearing loss, sneezing, congestion, runny nose or sore throat. SKIN: No rash or itching. CARDIOVASCULAR: No chest pain, chest pressure or chest discomfort. No palpitations or edema. RESPIRATORY: No shortness of breath, cough or sputum.   GASTROINTESTINAL: No anorexia, nausea, vomiting or diarrhea. No abdominal pain or blood. GENITOURINARY: no burning with urination. NEUROLOGICAL: No headache, dizziness, syncope, paralysis, ataxia, numbness or tingling in the extremities. No change in bowel or bladder control. MUSCULOSKELETAL: No muscle, back pain, joint pain or stiffness. HEMATOLOGIC: No anemia, bleeding or bruising. LYMPHATICS: No enlarged nodes. No history of splenectomy. PSYCHIATRIC: No history of depression or anxiety. ENDOCRINOLOGIC: No reports of sweating, cold or heat intolerance. No polyuria or polydipsia. ALLERGIES: No history of asthma, hives, eczema or rhinitis. Physical Examination  BP (!) 182/96 (Site: Right Upper Arm, Position: Sitting, Cuff Size: Large Adult)   Pulse 65   Ht 5' 4\" (1.626 m)   Wt 197 lb 9.6 oz (89.6 kg)   LMP  (LMP Unknown)   SpO2 95%   BMI 33.92 kg/m²     General - Well developed, well nourished, in no apparent distress. Pleasant and conversent. HEENT - Normocephalic, atraumatic. Aspen collar. Conjunctiva, tympanic membranes, and oropharynx are clear. Neck - Supple without masses, no bruits   Cardiovascular - irregular rate and rhythm. Normal S1, S2 without murmurs, rubs, or gallops. Lungs - Clear to auscultation. Abdomen - Soft, nontender with normal bowel sounds. Extremities - Peripheral pulses intact. No edema and no rashes. Neurological examination - Comprehension, attention , memory and reasoning are intact. Language and speech are normal. On cranial nerve examination pupils are equal round and reactive to light. Fundoscopic examination is normal. Visual acuity is adequate. Visual fields are full to finger confrontation. Extraocular motility is normal. Face is symmetric and sensation is intact to light touch. Hearing is intact to finger rustle bilaterally. Motor examination - There is normal muscle tone and bulk. Power is full throughout.  Muscle stretch reflexes are normoactive and there are no pathological reflexes present. Sensation is intact to light touch, pinprick, vibration and proprioception in all extremities. Cerebellar examination is normal. Gait and stance are normal.     Most recent CTA  - I personally reviewed this image   Title:  CT arteriogram of the neck and head. Indication: Code stroke. Stroke symptoms. Blurry vision. Dizziness. Fall  with head trauma. Hypertension. Atrial fibrillation. Technique: Axial images of the neck and head were obtained after the uneventful   administration of intravenous iodinated contrast media. Contrast was used to  best identify the arterial structures. Images were reviewed on a separate, free  standing, three-dimensional workstation as per the referring physicians request.        All stenosis percentages derived by comparing the narrowest segment with the  distal Internal Carotid Artery luminal diameter, as described in the [place]  American Symptomatic Carotid Endarterectomy Trial (NASCET) criteria. All CT scans at this facility are performed using dose reduction/dose modulation  techniques, as appropriate the performed exam, including the following:   Automated Exposure Control; Adjustment of the mA and/or kV according to patient  size (this includes techniques or standardized protocols for targeted exams  where dose is matched to indication/reason for exam); and Use of Iterative  Reconstruction Technique. Comparison: Head CT same date. Findings:   Lungs:  Clear. Bones:  Large osteophyte causes spinal canal narrowing at C3 and C4. Paranasal sinuses:  Clear. Brain:  No mass affect. Soft tissues:  Atretic or absent left thyroid gland; the right thyroid nodules. Superior sagittal sinus:  Patent. Right transverse sinus:  Patent. Left transverse sinus:  Poorly enhanced, probably patent. Aortic arch: Atherosclerotic disease, patent. Right brachiocephalic artery:  Patent. Right subclavian artery:  Patent. Left subclavian artery:  Patent. Right common carotid artery:  Patent. Right external carotid artery:  Patent. Cervical Right internal carotid artery:  Mild intimal thickening in the carotid  bulb, tortuous, patent. Left common carotid artery: Moderately tortuous, patent. Left external carotid artery:  Patent. Cervical Left internal carotid artery: Tortuous, patent. Right vertebral artery: Tortuous, patent. Left vertebral artery:  Patent. Basilar artery: Tortuous, patent. Intracranial right internal carotid artery:  Mild atherosclerotic disease,  patent. Right middle cerebral artery:  Patent. Right anterior cerebral artery:  Patent. Anterior communicating artery: Patent. Left anterior cerebral artery:  Patent. Left middle cerebral artery:  Patent. Intracranial left internal carotid artery:  Mild atherosclerotic disease,  patent. Right posterior communicating artery: See below. Left posterior communicating artery:  Not visualized. Right posterior cerebral artery:  Fetal origin, mild narrowing in its midportion  patent. Left posterior cerebral artery:  Patent. Impression  No intracranial arterial occlusion. Right thyroid nodules. Most recent MRI - I personally reviewed this image   MRI Result (most recent):  EXAMINATION: BRAIN MRI 12/1/2022 3:23 PM     ACCESSION NUMBER: [id number]     INDICATION: Dizziness, neck pain, code stroke     COMPARISON: Head CT, CTA head and neck, and CT perfusion 12/1/2022, brain MRI  10/8/2009     TECHNIQUE: Multiplanar multisequence MRI of the brain without the administration  of intravenous contrast.     FINDINGS:     Midline structures including the corpus callosum, pituitary gland, optic nerves,  and cerebellum are well developed. The ventricles are within normal limits for the degree of global brain  parenchymal volume loss. There is no midline shift. The basilar cisterns are  patent.  There is no cerebellar tonsillar ectopia or herniation. Scattered T2 hyperintensities in the periventricular and subcortical white  matter and tia of worsening in comparison to the prior exam. This is a  nonspecific finding which most likely represents a severe burden of chronic  microangiopathy. Prior bilateral lens replacement. Diffusion imaging shows no evidence of acute infarction or other acute  abnormality. Punctate chronic microhemorrhage in the left thalamus. The expected large  vascular flow voids are preserved. A left parietal scalp hematoma is again seen. No suspicious osseous lesion. Impression     1. No evidence of acute infarction. 2. Worsening severe burden of chronic microangiopathy. MRI CERVICAL SPINE WO CONTRAST 12/01/2022    Narrative  MRI CERVICAL SPINE WITHOUT CONTRAST. INDICATION: Dizziness and neck pain. COMPARISON: CT scan 40 earlier today. TECHNIQUE: Sagittal T1 and T2 and STIR images, axial T2 and gradient-echo  images. FINDINGS:  Cervical spinal cord signal intensity: Uniform throughout. Spinal alignment: Anatomic. Included posterior fossa structures: Unremarkable. Bone marrow signal intensity: Uniform. Small hemangioma C7. There are large flowing anterior syndesmophytes from C4 through C7 suggesting an  additional syndrome. There is a large posterior osteophytes from C4 extending cephalad to the C2-3  disc level. C2-3: No central spinal stenosis or disc herniation. Lateral foramina  unremarkable. C3-4: The large osteophyte from C4 compresses the left anterior aspect of the  cord. This creates a significant central spinal stenosis and displaces the cord  rightward. This also contributes to the left foraminal stenosis. Mild foraminal  narrowing on the right. C4-5: Mild central stenosis from the small portion of the osteophyte. No disc  herniation.  Lateral foramina unremarkable    C5-6: No central spinal stenosis or disc herniation. Lateral foramina  unremarkable. C6-7: No central spinal stenosis or disc herniation. Lateral foramina  unremarkable. C7-T1: No central spinal stenosis or disc herniation. Lateral foramina  unremarkable. Impression  1) DISH syndrome. 2) Large posterior osteophyte from C4 extending superiorly which creates a  significant central spinal stenosis with displacement and impingement of the  cord towards the right. Mirta aBng was seen today for follow-up from hospital.    Diagnoses and all orders for this visit:    Hospital discharge follow-up  -     IN DISCHARGE MEDS RECONCILED W/ CURRENT OUTPATIENT MED LIST    Stroke like symptoms  Resolved. Continue secondary stroke prevention. Reviewed BE FAST and when to call 911. Orthostatic hypotension  Stable. Negative during visit, however 17pt drop in SBP. Discussed drinking 24 oz of ice cold water prior to getting out of bed to help maintain BP. Recommend hob 30 degree while in bed. Discussed slow positional changes, fall prevention, and ambulating with assistive devices. Discussed avoidance of hot environments, ie showers or outdoors to reduce risk for syncopal episodes. If worsening, can consider trial of midodrine. Will defer to Cardiology for management. Paroxysmal atrial fibrillation (HCC)  S/P watchman device. Rate controlled with flecinide and metoprolol. DISH (diffuse idiopathic skeletal hyperostosis)    Cervical spinal cord compression (HCC)  Followed by NS. Given the fact she is not on anticoagulation and with a negative MRI of the brain with no evidence of stroke, she is a candidate for decompression  Continue wearing aspen collar, will defer management to neurosurgery. Presence of Watchman left atrial appendage closure device    Right thyroid nodule  Recommend follow up. Currently on synthroid. At high risk for falls  Discussed fall risk and prevention. Recommend ambulating with assistive devices.        Follow up as needed    I spent greater than 50% of the 60 total minutes of today's visit in coordination of care and patient/family education and counseling regarding the above patient concerns, reviewing the patient's medical record, my assessment and recommendations.           Wil Fonseca, [address] [facility] [address] [facility] [address] [place] [address], [address] [place], [place] [id number]:[phone]

## 2022-12-12 NOTE — PROGRESS NOTES
[address] [place], [place]  [address], [address]  PHONE: [phone]    SUBJECTIVE:   Shay Tello is a 69 y.o. female 1945   seen for a follow up visit regarding the following:     Chief Complaint   Patient presents with    Follow-Up from Hospital     Formerly [facility] with rvr           History of present illness: 69 y.o. female presented for follow-up 12/12/22 recent discharge from [facility] for blurry vision and falls. Code S was called in the emergency department the patient was seen by neurology. The patient has atrial fibrillation with watchman device in place EKG from 12/1/2022 sinus rhythm. Fells as if she is having more AF. Also dizziness has improved she has a neck brace. Interval history:   She was previously seen by Dr. Sophie May. She had a diagnosis of atrial fibrillation made in 2018. Unfortunately, she had a fall and underwent bleeding complication with large hematoma of her leg. She additionally hit her head at the time of  her fall and fortunately she had negative imaging. She was chronically anticoagulated with Xarelto previously. Due to her elevated bleeding risk, she was referred for [facility] procedure, which was performed 07/2018. She underwent transesophageal echocardiogram at 45 days, which showed adequate seal  and she was taken off Xarelto therapy. Cardiac History:     Atrial fibrillation in 2017 with spontaneous cardioversion to sinus rhythm. Echocardiogram 2017 normal left ventricular systolic function mild aortic sclerosis    Anticoagulation with Xarelto. Fall in 2017 with large hematoma treated surgically. 07/2018 WATCHMAN implantation successful.      08/2018 transesophageal echocardiogram with adequate seal.  Taken off Xarelto.     3/3/2020 Sinus rhythm poor R wave progression  3/2021 NST normal perfusion   8/1/2022 EKG sinus rhythm poor R wave progressiom  sinus rhythm, normal rate, normal MT intervals, ST wave normal, normal axis,       Assessment and Plan:     Chest discomfort  Last stress test performed 3/2021    Atrial fibrillation. Status post WATCHMAN. Taken off Xarelto. Long term use of antiarrhythmic drug. Continue flecainide 50 mg p.o. twice daily 12.5 mg p.o. twice daily continue metoprolol    No toxicities on recent ECG. Discussed discontinued zofran for nausea. Hypertension    Continue metoprolol 25 mg p.o. twice daily  Key CAD CHF Meds            atorvastatin (LIPITOR) 80 MG tablet (Taking)    Sig - Route: Take 1 tablet by mouth nightly - Oral    metoprolol tartrate (LOPRESSOR) 25 MG tablet (Taking)    Sig - Route: Take 0.5 tablets by mouth in the morning and 0.5 tablets in the evening. - Oral             Aortic sclerosis stable echocardiogram 2017 no pathologic murmur today. Last MARY with trivial AI 2018  Follow-up according to appropriate imaging guidelines. Repeat echocardiogram at appropriate intervals planned 3/2023 prior to appointment    [address] [facility]     Current Outpatient Medications   Medication Sig    levothyroxine (SYNTHROID) 100 MCG tablet Take 1 tablet by mouth every morning (before breakfast)    omeprazole (PRILOSEC) 40 MG delayed release capsule Take 1 capsule by mouth daily    meclizine (ANTIVERT) 12.5 MG tablet Take 1 tablet by mouth 3 times daily as needed for Dizziness    atorvastatin (LIPITOR) 80 MG tablet Take 1 tablet by mouth nightly    flecainide (TAMBOCOR) 100 MG tablet Take 1 tablet by mouth in the morning and 1 tablet in the evening. metoprolol tartrate (LOPRESSOR) 25 MG tablet Take 0.5 tablets by mouth in the morning and 0.5 tablets in the evening.     aspirin 81 MG EC tablet Take by mouth daily    busPIRone (BUSPAR) 10 MG tablet Take 1 tablet by mouth 2 times daily    vitamin D (CHOLECALCIFEROL) 25 MCG (1000 UT) TABS tablet Take 5,000 Units by mouth daily    docusate (COLACE, DULCOLAX) 100 MG CAPS Take 100 mg by mouth 2 times daily as needed    DULoxetine (CYMBALTA) 60 MG extended release capsule 90 mg TAKE 1 CAPSULE BY MOUTH EVERY DAY    LORazepam (ATIVAN) 0.5 MG tablet Take 0.5 mg by mouth every 8 hours as needed. melatonin 10 MG CAPS capsule Take by mouth    tiZANidine (ZANAFLEX) 4 MG tablet TAKE 1/2-1 TABLET BY MOUTH 3 TIMES A DAY AS NEEDED     No current facility-administered medications for this visit. Past Medical History, Past Surgical History, Family history, Social History, and Medications were all reviewed with the patient today and updated as necessary.        Allergies   Allergen Reactions    Hydromorphone Swelling    Sulfa Antibiotics Rash     Past Medical History:   Diagnosis Date    Anxiety     Arthritis     Bell's palsy     in Oct. 2009 with some vertigo at times still, no other after effects    Depression     GERD (gastroesophageal reflux disease)     reflux, takes nexium    Hematoma of hip, right, initial encounter     Hypertension     on medication since 2006    Hypothyroidism     Nausea & vomiting     Obese     Paroxysmal atrial fibrillation ([facility] [address] 8/13/2017    Psychiatric disorder     depression    S/P total knee replacement using cement 5/16/2011    Unspecified hypothyroidism 5/16/2011     Past Surgical History:   Procedure Laterality Date    APPENDECTOMY  1961    BREAST BIOPSY Bilateral     BREAST LUMPECTOMY  1998    benign    CATARACT REMOVAL Bilateral 2014    CHOLECYSTECTOMY  1976    COLONOSCOPY  04/04/2016    GASTRECTOMY  9/21/15    sleeve    HYSTERECTOMY (CERVIX STATUS UNKNOWN)  [address]  2005    lower back     ORTHOPEDIC SURGERY Bilateral 1994    heel spurs removed    HI CARDIAC SURG PROCEDURE UNLIST      LAAO placed 7/17/18    THYROIDECTOMY  1980    partial    TONSILLECTOMY  1966    TOTAL KNEE ARTHROPLASTY Left 2011    TOTAL KNEE ARTHROPLASTY Right 2010    UROLOGICAL SURGERY  2008/2009    bladder tack X2     Family History   Problem Relation Age of Onset    Delayed Awakening Neg Hx     Pseudochol. Deficiency Neg Hx     Post-op Nausea/Vomiting Neg Hx     Emergence Delirium Neg Hx     Post-op Cognitive Dysfunction Neg Hx     Cancer Mother         breast    Malig Hypertherm Neg Hx     Breast Cancer Mother 62    Cancer Father         throat    Other Father         gastric ulcer    Kidney Disease Father     Breast Cancer Daughter     Heart Attack Mother       Social History     Tobacco Use    Smoking status: Never    Smokeless tobacco: Never   Substance Use Topics    Alcohol use: Yes     Alcohol/week: 1.7 standard drinks       ROS:    Review of Systems   Constitutional: Negative for fever. HENT:  Negative for stridor. Eyes:  Negative for pain. Cardiovascular:  Negative for chest pain. Respiratory:  Negative for cough. Endocrine: Negative for cold intolerance. Skin:  Negative for nail changes. Musculoskeletal:  Negative for arthritis. Gastrointestinal:  Negative for abdominal pain. Genitourinary:  Negative for dysuria. Neurological:  Negative for aphonia. Psychiatric/Behavioral:  Negative for altered mental status. Allergic/Immunologic: Negative for hives. PHYSICAL EXAM:    BP (!) 140/86   Pulse 64   Ht 5' 4\" (1.626 m)   Wt 198 lb (89.8 kg) Comment: with shoes  BMI 33.99 kg/m²        Wt Readings from Last 3 Encounters:   12/12/22 198 lb (89.8 kg)   12/06/22 195 lb (88.5 kg)   12/02/22 195 lb (88.5 kg)     BP Readings from Last 3 Encounters:   12/12/22 (!) 140/86   12/06/22 (!) 140/86   12/02/22 (!) 154/82         Physical Exam  Vitals reviewed. HENT:      Head: Normocephalic. Right Ear: External ear normal.      Left Ear: External ear normal.      Nose: Nose normal.   Eyes:      General: No scleral icterus. Pulmonary:      Effort: Pulmonary effort is normal.   Abdominal:      General: There is no distension. Musculoskeletal:      Cervical back: Neck supple. Skin:     General: Skin is warm.    Neurological:      Mental Status: She is alert. Mental status is at baseline. Medical problems and test results were reviewed with the patient today.            Recent Results (from the past 672 hour(s))   EKG 12 Lead    Collection Time: 11/17/22 12:53 PM   Result Value Ref Range    Ventricular Rate 130 BPM    Atrial Rate 210 BPM    QRS Duration 103 ms    Q-T Interval 329 ms    QTc Calculation (Bazett) 484 ms    R Axis -14 degrees    T Axis 158 degrees    Diagnosis Atrial fibrillation    CBC    Collection Time: 11/17/22 12:59 PM   Result Value Ref Range    WBC 12.8 (H) 4.3 - 11.1 K/uL    RBC 5.19 4.05 - 5.2 M/uL    Hemoglobin 15.3 11.7 - 15.4 g/dL    Hematocrit 46.6 (H) 35.8 - 46.3 %    MCV 89.8 82 - 102 FL    MCH 29.5 26.1 - 32.9 PG    MCHC 32.8 31.4 - 35.0 g/dL    RDW 14.1 11.9 - 14.6 %    Platelets 812 842 - 242 K/uL    MPV 10.4 9.4 - 12.3 FL    nRBC 0.00 0.0 - 0.2 K/uL   Comprehensive Metabolic Panel    Collection Time: 11/17/22 12:59 PM   Result Value Ref Range    Sodium 139 133 - 143 mmol/L    Potassium 3.7 3.5 - 5.1 mmol/L    Chloride 105 101 - 110 mmol/L    CO2 28 21 - 32 mmol/L    Anion Gap 6 2 - 11 mmol/L    Glucose 130 (H) 65 - 100 mg/dL    BUN 24 (H) 8 - 23 MG/DL    Creatinine 1.00 0.6 - 1.0 MG/DL    Est, Glom Filt Rate 58 (L) >60 ml/min/1.73m2    Calcium 9.8 8.3 - 10.4 MG/DL    Total Bilirubin 0.6 0.2 - 1.1 MG/DL    ALT 27 12 - 65 U/L    AST 16 15 - 37 U/L    Alk Phosphatase 109 50 - 136 U/L    Total Protein 7.3 6.3 - 8.2 g/dL    Albumin 3.4 3.2 - 4.6 g/dL    Globulin 3.9 2.8 - 4.5 g/dL    Albumin/Globulin Ratio 0.9 0.4 - 1.6     Troponin    Collection Time: 11/17/22 12:59 PM   Result Value Ref Range    Troponin, High Sensitivity 30.3 (H) 0 - 14 pg/mL   Magnesium    Collection Time: 11/17/22 12:59 PM   Result Value Ref Range    Magnesium 1.9 1.8 - 2.4 mg/dL   Brain Natriuretic Peptide    Collection Time: 11/17/22 12:59 PM   Result Value Ref Range    NT Pro-BNP 6,188 (H) <450 PG/ML   TSH with Reflex    Collection Time: 11/17/22 12:59 PM Result Value Ref Range    TSH w Free Thyroid if Abnormal 3.97 (H) 0.358 - 3.740 UIU/ML   T4, Free    Collection Time: 11/17/22 12:59 PM   Result Value Ref Range    T4 Free 2.2 (H) 0.78 - 1.46 NG/DL   Troponin    Collection Time: 11/17/22  3:15 PM   Result Value Ref Range    Troponin, High Sensitivity 26.3 (H) 0 - 14 pg/mL   Troponin    Collection Time: 11/17/22  4:45 PM   Result Value Ref Range    Troponin, High Sensitivity 29.5 (H) 0 - 14 pg/mL   Troponin    Collection Time: 11/17/22  7:35 PM   Result Value Ref Range    Troponin, High Sensitivity 35.7 (H) 0 - 14 pg/mL   Basic Metabolic Panel    Collection Time: 11/18/22  4:44 AM   Result Value Ref Range    Sodium 140 133 - 143 mmol/L    Potassium 3.9 3.5 - 5.1 mmol/L    Chloride 106 101 - 110 mmol/L    CO2 28 21 - 32 mmol/L    Anion Gap 6 2 - 11 mmol/L    Glucose 106 (H) 65 - 100 mg/dL    BUN 25 (H) 8 - 23 MG/DL    Creatinine 0.90 0.6 - 1.0 MG/DL    Est, Glom Filt Rate >60 >60 ml/min/1.73m2    Calcium 9.5 8.3 - 10.4 MG/DL   Magnesium    Collection Time: 11/18/22  4:44 AM   Result Value Ref Range    Magnesium 2.0 1.8 - 2.4 mg/dL   Transthoracic echocardiogram (TTE) complete with contrast, bubble, strain, and 3D PRN    Collection Time: 11/18/22 10:30 AM   Result Value Ref Range    LV EDV A2C 79 mL    LV EDV A4C 114 mL    LV ESV A2C 29 mL    LV ESV A4C 45 mL    IVSd 1.3 (A) 0.6 - 0.9 cm    LVIDd 3.9 3.9 - 5.3 cm    LVIDs 2.7 cm    LVOT Diameter 1.9 cm    LVOT Mean Gradient 3 mmHg    LVOT VTI 23.5 cm    LVOT Peak Velocity 1.1 m/s    LVOT Peak Gradient 4 mmHg    LVPWd 1.2 (A) 0.6 - 0.9 cm    LV E' Lateral Velocity 10 cm/s    LV E' Septal Velocity 7 cm/s    LV Ejection Fraction A2C 63 %    LV Ejection Fraction A4C 61 %    EF BP 63 55 - 100 %    LVOT Area 2.8 cm2    LVOT SV 66.6 ml    LA Minor Axis 6.1 cm    LA Major Axis 6.4 cm    LA Area 2C 23.2 cm2    LA Area 4C 26.8 cm2    LA Volume 2C 73 (A) 22 - 52 mL    LA Volume 4C 91 (A) 22 - 52 mL    LA Volume BP 84 (A) 22 - 52 mL    LA Diameter 5.5 cm    AV Mean Velocity 0.9 m/s    AV Mean Gradient 4 mmHg    AV VTI 26.8 cm    AV Peak Velocity 1.3 m/s    AV Peak Gradient 7 mmHg    AV Area by VTI 2.5 cm2    AV Area by Peak Velocity 2.3 cm2    Aortic Root 2.8 cm    Ascending Aorta 2.7 cm    IVC Proxmal 1.8 cm    MV E Wave Deceleration Time 267.0 ms    MV A Velocity 0.63 m/s    MV E Velocity 0.82 m/s    MV Mean Gradient 1 mmHg    MV VTI 24.1 cm    MV Mean Velocity 0.6 m/s    MV Max Velocity 0.9 m/s    MV Peak Gradient 3 mmHg    MV Area by VTI 2.8 cm2    MI Max Velocity 1.3 m/s    Pulmonary Artery EDP 7 mmHg    PV AT 95.0 ms    PV Max Velocity 0.9 m/s    PV Peak Gradient 3 mmHg    RVIDd 3.2 cm    RV Basal Dimension 3.4 cm    RV Free Wall Peak S' 15 cm/s    TAPSE 2.0 1.7 cm    Body Surface Area 2 m2    Fractional Shortening 2D 31 28 - 44 %    LV ESV Index A4C 23 mL/m2    LV EDV Index A4C 59 mL/m2    LV ESV Index A2C 15 mL/m2    LV EDV Index A2C 41 mL/m2    LVIDd Index 2.01 cm/m2    LVIDs Index 1.39 cm/m2    LV RWT Ratio 0.62     LV Mass 2D 169.4 (A) 67 - 162 g    LV Mass 2D Index 87.3 43 - 95 g/m2    MV E/A 1.30     E/E' Ratio (Averaged) 9.96     E/E' Lateral 8.20     E/E' Septal 11.71     LA Volume Index BP 43 (A) 16 - 34 ml/m2    LVOT Stroke Volume Index 34.3 mL/m2    LA Volume Index 2C 38 (A) 16 - 34 mL/m2    LA Volume Index 4C 47 (A) 16 - 34 mL/m2    LA Size Index 2.84 cm/m2    LA/AO Root Ratio 1.96     Ao Root Index 1.44 cm/m2    Ascending Aorta Index 1.39 cm/m2    AV Velocity Ratio 0.85     LVOT:AV VTI Index 0.88     KEVIN/BSA VTI 1.3 cm2/m2    KEVIN/BSA Peak Velocity 1.2 cm2/m2    MV:LVOT VTI Index 1.03     Left Ventricular Ejection Fraction 63     LVEF MODALITY ECHO    POCT Glucose    Collection Time: 12/01/22 12:53 PM   Result Value Ref Range    POC Glucose 130 (H) 65 - 100 mg/dL    Performed by:  Wally    Basic Metabolic Panel    Collection Time: 12/01/22 12:57 PM   Result Value Ref Range    Sodium 137 133 - 143 mmol/L Potassium 3.6 3.5 - 5.1 mmol/L    Chloride 105 101 - 110 mmol/L    CO2 27 21 - 32 mmol/L    Anion Gap 5 2 - 11 mmol/L    Glucose 121 (H) 65 - 100 mg/dL    BUN 17 8 - 23 MG/DL    Creatinine 1.00 0.6 - 1.0 MG/DL    Est, Glom Filt Rate 58 (L) >60 ml/min/1.73m2    Calcium 9.9 8.3 - 10.4 MG/DL   CBC with Auto Differential    Collection Time: 12/01/22 12:57 PM   Result Value Ref Range    WBC 9.5 4.3 - 11.1 K/uL    RBC 4.90 4.05 - 5.2 M/uL    Hemoglobin 14.6 11.7 - 15.4 g/dL    Hematocrit 44.2 35.8 - 46.3 %    MCV 90.2 82 - 102 FL    MCH 29.8 26.1 - 32.9 PG    MCHC 33.0 31.4 - 35.0 g/dL    RDW 13.7 11.9 - 14.6 %    Platelets 047 971 - 443 K/uL    MPV 10.2 9.4 - 12.3 FL    nRBC 0.00 0.0 - 0.2 K/uL    Differential Type AUTOMATED      Seg Neutrophils 84 (H) 43 - 78 %    Lymphocytes 11 (L) 13 - 44 %    Monocytes 4 4.0 - 12.0 %    Eosinophils % 0 (L) 0.5 - 7.8 %    Basophils 1 0.0 - 2.0 %    Immature Granulocytes 0 0.0 - 5.0 %    Segs Absolute 8.0 1.7 - 8.2 K/UL    Absolute Lymph # 1.1 0.5 - 4.6 K/UL    Absolute Mono # 0.4 0.1 - 1.3 K/UL    Absolute Eos # 0.0 0.0 - 0.8 K/UL    Basophils Absolute 0.1 0.0 - 0.2 K/UL    Absolute Immature Granulocyte 0.0 0.0 - 0.5 K/UL   Troponin    Collection Time: 12/01/22 12:57 PM   Result Value Ref Range    Troponin, High Sensitivity 10.9 0 - 14 pg/mL   POCT INR    Collection Time: 12/01/22 12:58 PM   Result Value Ref Range    POC Protime 13.9 (H) 9.6 - 11.6 SECS    POC INR 1.2 0.9 - 1.2     EKG 12 Lead    Collection Time: 12/01/22  1:20 PM   Result Value Ref Range    Ventricular Rate 77 BPM    Atrial Rate 77 BPM    P-R Interval 267 ms    QRS Duration 122 ms    Q-T Interval 437 ms    QTc Calculation (Bazett) 495 ms    P Axis 78 degrees    R Axis -15 degrees    T Axis 98 degrees    Diagnosis Sinus rhythm    TSH with Reflex    Collection Time: 12/01/22 11:31 PM   Result Value Ref Range    TSH w Free Thyroid if Abnormal 2.30 0.358 - 3.740 UIU/ML   Magnesium    Collection Time: 12/01/22 11:31 PM Result Value Ref Range    Magnesium 2.0 1.8 - 2.4 mg/dL   CBC    Collection Time: 12/02/22  5:31 AM   Result Value Ref Range    WBC 6.6 4.3 - 11.1 K/uL    RBC 4.42 4.05 - 5.2 M/uL    Hemoglobin 13.1 11.7 - 15.4 g/dL    Hematocrit 39.8 35.8 - 46.3 %    MCV 90.0 82 - 102 FL    MCH 29.6 26.1 - 32.9 PG    MCHC 32.9 31.4 - 35.0 g/dL    RDW 13.7 11.9 - 14.6 %    Platelets 198 385 - 049 K/uL    MPV 10.4 9.4 - 12.3 FL    nRBC 0.00 0.0 - 0.2 K/uL   Hemoglobin A1c    Collection Time: 12/02/22  5:31 AM   Result Value Ref Range    Hemoglobin A1C 5.5 4.8 - 5.6 %    eAG 111 mg/dL   Lipid Panel    Collection Time: 12/02/22  5:31 AM   Result Value Ref Range    Cholesterol, Total 177 <200 MG/DL    Triglycerides 76 35 - 150 MG/DL    HDL 74 (H) 40 - 60 MG/DL    LDL Calculated 87.8 <100 MG/DL    VLDL Cholesterol Calculated 15.2 6.0 - 23.0 MG/DL    Chol/HDL Ratio 2.4     Magnesium    Collection Time: 12/02/22  5:31 AM   Result Value Ref Range    Magnesium 2.2 1.8 - 2.4 mg/dL   Comprehensive Metabolic Panel    Collection Time: 12/02/22  5:31 AM   Result Value Ref Range    Sodium 139 133 - 143 mmol/L    Potassium 4.6 3.5 - 5.1 mmol/L    Chloride 108 101 - 110 mmol/L    CO2 28 21 - 32 mmol/L    Anion Gap 3 2 - 11 mmol/L    Glucose 102 (H) 65 - 100 mg/dL    BUN 20 8 - 23 MG/DL    Creatinine 0.70 0.6 - 1.0 MG/DL    Est, Glom Filt Rate >60 >60 ml/min/1.73m2    Calcium 9.0 8.3 - 10.4 MG/DL    Total Bilirubin 0.7 0.2 - 1.1 MG/DL    ALT 18 12 - 65 U/L    AST 18 15 - 37 U/L    Alk Phosphatase 93 50 - 136 U/L    Total Protein 6.1 (L) 6.3 - 8.2 g/dL    Albumin 3.2 3.2 - 4.6 g/dL    Globulin 2.9 2.8 - 4.5 g/dL    Albumin/Globulin Ratio 1.1 0.4 - 1.6     Vitamin B12    Collection Time: 12/02/22  5:31 AM   Result Value Ref Range    Vitamin B-12 445 193 - 986 pg/mL   Ferritin    Collection Time: 12/02/22  5:31 AM   Result Value Ref Range    Ferritin 124 8 - 388 NG/ML   Folate    Collection Time: 12/02/22  5:31 AM   Result Value Ref Range    Folate 9.9 3.1 - 17.5 ng/mL     Lab Results   Component Value Date/Time    CHOL 177 12/02/2022 05:31 AM    HDL 74 12/02/2022 05:31 AM    VLDL 12 05/17/2022 11:54 AM              ALLYSSA  Shayy Hylton was seen today for follow-up from hospital.    Diagnoses and all orders for this visit:    Atrial fibrillation, unspecified type ([address]  -     EKG 12 Lead    Hypertension, essential    Paroxysmal atrial fibrillation (HCC)    Aortic valve sclerosis    Return in about 2 months (around 2/12/2023).        Molly Rutherford MD  12/12/2022  8:19 AM

## 2022-12-13 ENCOUNTER — CARE COORDINATION (OUTPATIENT)
Dept: CARE COORDINATION | Facility: CLINIC | Age: 77
End: 2022-12-13

## 2022-12-13 NOTE — CARE COORDINATION
Patient has graduated from the Care Transitions program on 12/13. Patient/family has the ability to self-manage at this time. Patient has no further care management needs, no referral to the [facility] team for further management. Patient declines any s/s of concern and is waiting for Neurosurgery appt. Patient given number for any concerns. No resources needed at this time. Patient has Care Transition Nurse's contact information for any further questions, concerns, or needs.   Patients upcoming visits:    Future Appointments   Date Time Provider Dilcia Sharla   12/28/2022  3:30 PM Emilia Nicole MD [facility]   3/3/2023  8:15 AM Raffi Antonio MD [facility]   3/20/2023  3:40 PM Jeanie Varela MD [facility]   5/18/2023  8:00 AM PHILOMENA [facility] AMB

## 2022-12-28 ENCOUNTER — OFFICE VISIT (OUTPATIENT)
Dept: NEUROSURGERY | Age: 77
End: 2022-12-28
Payer: MEDICARE

## 2022-12-28 VITALS
HEIGHT: 64 IN | DIASTOLIC BLOOD PRESSURE: 98 MMHG | TEMPERATURE: 97.5 F | HEART RATE: 66 BPM | SYSTOLIC BLOOD PRESSURE: 204 MMHG | WEIGHT: 200 LBS | OXYGEN SATURATION: 96 % | BODY MASS INDEX: 34.15 KG/M2

## 2022-12-28 DIAGNOSIS — M48.8X9 OSSIFICATION OF POSTERIOR LONGITUDINAL LIGAMENT (HCC): ICD-10-CM

## 2022-12-28 DIAGNOSIS — G95.20 CERVICAL SPINAL CORD COMPRESSION (HCC): Primary | ICD-10-CM

## 2022-12-28 DIAGNOSIS — M48.10 DIFFUSE IDIOPATHIC SKELETAL HYPEROSTOSIS: ICD-10-CM

## 2022-12-28 DIAGNOSIS — M48.02 CERVICAL SPINAL STENOSIS: ICD-10-CM

## 2022-12-28 PROCEDURE — 99214 OFFICE O/P EST MOD 30 MIN: CPT | Performed by: NEUROLOGICAL SURGERY

## 2022-12-28 PROCEDURE — G8417 CALC BMI ABV UP PARAM F/U: HCPCS | Performed by: NEUROLOGICAL SURGERY

## 2022-12-28 PROCEDURE — G8427 DOCREV CUR MEDS BY ELIG CLIN: HCPCS | Performed by: NEUROLOGICAL SURGERY

## 2022-12-28 PROCEDURE — 3074F SYST BP LT 130 MM HG: CPT | Performed by: NEUROLOGICAL SURGERY

## 2022-12-28 PROCEDURE — 1036F TOBACCO NON-USER: CPT | Performed by: NEUROLOGICAL SURGERY

## 2022-12-28 PROCEDURE — 1090F PRES/ABSN URINE INCON ASSESS: CPT | Performed by: NEUROLOGICAL SURGERY

## 2022-12-28 PROCEDURE — G8400 PT W/DXA NO RESULTS DOC: HCPCS | Performed by: NEUROLOGICAL SURGERY

## 2022-12-28 PROCEDURE — 1123F ACP DISCUSS/DSCN MKR DOCD: CPT | Performed by: NEUROLOGICAL SURGERY

## 2022-12-28 PROCEDURE — 3078F DIAST BP <80 MM HG: CPT | Performed by: NEUROLOGICAL SURGERY

## 2022-12-28 PROCEDURE — G8484 FLU IMMUNIZE NO ADMIN: HCPCS | Performed by: NEUROLOGICAL SURGERY

## 2022-12-28 NOTE — PROGRESS NOTES
[facility] CLINIC NOTE:   History of Present Illness:    CC: Follow-up evaluation regarding cervical spinal stenosis with cord compression    Hayley Kramer is a 69 y.o. female who presents today for follow-up evaluation regarding cervical spinal stenosis with cord compression. The patient was evaluated when she presented as a Code S on 12/1/2022 symptoms of blurred vision and dizziness as well as hypertensive emergency with blood pressure of 206/106. She underwent a complex work-up by neurology in which they noticed a calcified disc osteophyte complex/ossification of posterior longitudinal ligament on her CTA head neck and she underwent an MRI of her brain and cervical spine. She denies any new weakness, numbness or tingling or problems with gait or balance. She did state that within the last month or 2 she was walking on some stairs missed a step and fell hard on concrete. She felt her neck being jolted. The patient has an MRI cervical spine performed on 12/1/2022 at [facility] that demonstrates a left eccentric C3-C4 disc herniation with left eccentric cord compression and compression of the exiting C4 nerve root. There is no definitive cord edema or myelomalacia at this time. There is a mild central disc bulge at C6-C7 that results in mild spinal stenosis without significant cord compression. 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 The results and left-sided cord compression there is a large ventral bridging osteophyte spanning from C4-C7. These imaging findings are consistent with diffuse idiopathic skeletal hyperostosis. She is accompanied to today's visit by her son. She has fallen a few times tripping over her own feet and missing some steps.   She denies any loss of bowel or bladder function. No problem with gait or balance. Past Medical History:   Diagnosis Date    Anxiety     Arthritis     Bell's palsy     in Oct. 2009 with some vertigo at times still, no other after effects    Depression     GERD (gastroesophageal reflux disease)     reflux, takes nexium    Hematoma of hip, right, initial encounter     Hypertension     on medication since 2006    Hypothyroidism     Nausea & vomiting     Obese     Paroxysmal atrial fibrillation ([facility] [address] 8/13/2017    Psychiatric disorder     depression    S/P total knee replacement using cement 5/16/2011    Unspecified hypothyroidism 5/16/2011     Past Surgical History:   Procedure Laterality Date    APPENDECTOMY  1961    BREAST BIOPSY Bilateral     BREAST LUMPECTOMY  1998    benign    CATARACT REMOVAL Bilateral 2014    CHOLECYSTECTOMY  1976    COLONOSCOPY  04/04/2016    GASTRECTOMY  9/21/15    sleeve    HYSTERECTOMY (CERVIX STATUS UNKNOWN)  [address]  2005    lower back     ORTHOPEDIC SURGERY Bilateral 1994    heel spurs removed    IN CARDIAC SURG PROCEDURE UNLIST      LAAO placed 7/17/18    THYROIDECTOMY  1980    partial    TONSILLECTOMY  1966    TOTAL KNEE ARTHROPLASTY Left 2011    TOTAL KNEE ARTHROPLASTY Right 2010    UROLOGICAL SURGERY  2008/2009    bladder tack X2     Allergies   Allergen Reactions    Hydromorphone Swelling    Sulfa Antibiotics Rash      Family History   Problem Relation Age of Onset    Delayed Awakening Neg Hx     Pseudochol. Deficiency Neg Hx     Post-op Nausea/Vomiting Neg Hx     Emergence Delirium Neg Hx     Post-op Cognitive Dysfunction Neg Hx     Cancer Mother         breast    Malig Hypertherm Neg Hx     Breast Cancer Mother 62    Cancer Father         throat    Other Father         gastric ulcer    Kidney Disease Father     Breast Cancer Daughter     Heart Attack Mother       Social History     Socioeconomic History    Marital status:       Spouse name: Not on file    Number of children: Not on file    Years of education: Not on file    Highest education level: Not on file   Occupational History    Not on file   Tobacco Use    Smoking status: Never    Smokeless tobacco: Never   Vaping Use    Vaping Use: Never used   Substance and Sexual Activity    Alcohol use: Yes     Alcohol/week: 1.7 standard drinks    Drug use: Never    Sexual activity: Not Currently   Other Topics Concern    Not on file   Social History Narrative    Not on file     Social Determinants of Health     Financial Resource Strain: Not on file   Food Insecurity: Not on file   Transportation Needs: Not on file   Physical Activity: Not on file   Stress: Not on file   Social Connections: Not on file   Intimate Partner Violence: Not on file   Housing Stability: Not on file     Current Outpatient Medications   Medication Sig Dispense Refill    levothyroxine (SYNTHROID) 100 MCG tablet Take 1 tablet by mouth every morning (before breakfast) 90 tablet 3    omeprazole (PRILOSEC) 40 MG delayed release capsule Take 1 capsule by mouth daily 90 capsule 3    flecainide (TAMBOCOR) 100 MG tablet Take 1 tablet by mouth in the morning and 1 tablet in the evening. 60 tablet 3    metoprolol tartrate (LOPRESSOR) 25 MG tablet Take 0.5 tablets by mouth in the morning and 0.5 tablets in the evening. 180 tablet 3    aspirin 81 MG EC tablet Take by mouth daily      busPIRone (BUSPAR) 10 MG tablet Take 1 tablet by mouth 2 times daily      vitamin D (CHOLECALCIFEROL) 25 MCG (1000 UT) TABS tablet Take 5,000 Units by mouth daily      docusate (COLACE, DULCOLAX) 100 MG CAPS Take 100 mg by mouth 2 times daily as needed      DULoxetine (CYMBALTA) 60 MG extended release capsule 90 mg TAKE 1 CAPSULE BY MOUTH EVERY DAY      LORazepam (ATIVAN) 0.5 MG tablet Take 0.5 mg by mouth every 8 hours as needed.       melatonin 10 MG CAPS capsule Take by mouth      tiZANidine (ZANAFLEX) 4 MG tablet TAKE 1/2-1 TABLET BY MOUTH 3 TIMES A DAY AS NEEDED       No current facility-administered medications for this visit.      Patient Active Problem List   Diagnosis    Osteoarthritis of left knee    HTN (hypertension)    Near syncope    A-fib (HCC)    Osteoarthritis of right knee    S/P total knee replacement using cement    Esophageal reflux    Class 1 obesity due to excess calories with serious comorbidity and body mass index (BMI) of 32.0 to 32.9 in adult    Orthostatic hypotension    Anemia requiring transfusions    Traumatic hematoma of right hip    Paroxysmal atrial fibrillation (HCC)    Acute blood loss anemia    Anxiety    Hypothyroidism    Status post total knee replacement    Depression    Atherosclerosis of aorta (HCC)    Atherosclerosis of native coronary artery of native heart with angina pectoris (HCC)    Atrial fibrillation with RVR (HCC)    Stroke-like symptoms    Right thyroid nodule    Left parietal scalp hematoma, initial encounter    Mood disorder (HCC)    Cervical spinal stenosis    DISH (diffuse idiopathic skeletal hyperostosis)    Cervical spinal cord compression (HCC)    Mass of left hand        Review of Systems      Physical Exam:  BP (!) 204/98   Pulse 66   Temp 97.5 °F (36.4 °C) (Temporal)   Ht 5' 4\" (1.626 m)   Wt 200 lb (90.7 kg)   LMP  (LMP Unknown)   SpO2 96%   BMI 34.33 kg/m²   Pain Score:   0 - No pain  Head normocephalic and atraumatic  Mood and affect appropriate  General: No acute distress  CV: Regular rate  Resp: No increased work of breathing  Skin: warm and dry  Awake, alert, and oriented   Speech Fluent  Eyes open spontaneously   Face symmetric and tongue midline on protrusion  Sternocleidomastoid and trapezius strength 5/5  Aspen cervical collar in place  Patient with strength exam as follows:   Upper Extremities: Right Left      Deltoid  5 5    Biceps  5 5    Triceps  5 5      5 5     Lower Extremities:      Hip Flex 5 5    Quads  5 5    Hamstrings 5 5    Dorsiflex 5 5    Plantarflex 5 5    EHL  5 5  Sensation grossly intact to light touch  DTR 2+  No clonus   No Mendez's sign present bilaterally   Gait: Mild difficulty standing from a seated position otherwise ambulates independently    Assessment & Plan:  Pretty Shaver is a 69 y.o. female who presents today for follow-up evaluation regarding cervical spinal stenosis with cord compression. I have independently reviewed and interpreted the patient's MRI cervical spine performed on 12/1/2022 at [facility] [place] that demonstrates a left eccentric C3-C4 disc herniation with left eccentric cord compression and compression of the exiting C4 nerve root. There is no definitive cord edema or myelomalacia at this time. There is a mild central disc bulge at C6-C7 that results in mild spinal stenosis without significant cord compression. 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 The results and left-sided cord compression there is a large ventral bridging osteophyte spanning from C4-C7. I discussed with the patient and her son her imaging findings in detail. I discussed that given the size of the disc osteophyte complex and ossification of the posterior longitudinal ligament that there is cord compression and spinal stenosis in the cervical spine at C3-C4 is worse most prominent. I discussed with them that surgery in and of itself at this point and she has no grossly appreciable deficits is preventative in nature.   I discussed that there is a probability relative to a young healthy patient without the same degree of spinal stenosis that should she be involved in a trauma such as another fall or motor vehicle accident or any injury that results in rapid forces being applied to the cervical spine that she is at a higher relative risk of spinal cord injury that could result in the form of a central cord syndrome. I discussed with him I believe would be reasonable to proceed with a C3-C5 posterior cervical laminectomy with instrumented lateral mass fixation and arthrodesis. I discussed the need for lateral mass arthrodesis and fixation is that we have proof that patients have lower rates of postlaminectomy kyphosis when instrumented fixation and fusion has been performed. Cervical Laminectomy with Fusion Consent: The relative risks of complication include but are not limited to infection, bleeding, spinal fluid leak, major vascular injury, increased pain, weakness, numbness, paralysis, incontinence, heart attack, stroke and death were discussed with patient and family members present. They have been provided the opportunity to ask any questions regarding the surgical procedure. At this time the patient will discuss it further with other family members such as her daughter and will reach back out to us should she wish to proceed with surgery. In the interim I recommend that she wear an anterior cervical collar when up and ambulatory given her history of falls and also when riding in a motor vehicle to prevent and decrease the risk of potential spinal cord injury. We would also obtain cardiac clearance from Dr. Leighann Perera prior to her surgery. ICD-10-CM    1. Cervical spinal cord compression (HCC)  G95.20       2. Ossification of posterior longitudinal ligament ([address]  M48.8X9       3. Cervical spinal stenosis  M48.02       4. Diffuse idiopathic skeletal hyperostosis  M48.10         Elio Mccormick, [address] [place] [address]     Notes are transcribed with [address] [place] [address], a medical voice recording dictation service, and may contain minor errors.

## 2022-12-29 ENCOUNTER — NURSE TRIAGE (OUTPATIENT)
Dept: OTHER | Facility: CLINIC | Age: 77
End: 2022-12-29

## 2022-12-29 NOTE — TELEPHONE ENCOUNTER
Location of patient: [place]    Received call from Ced Nicolas at [facility] with [employer]. Subjective: Caller states \"bp was 204/82 at the neurosurgeon's office yesterday. Has some dizziness when up and moving around at times. Today bp is 182/80    Current Symptoms: HTN    Onset: 1 day ago;     Associated Symptoms:     Pain Severity:denies pain;     Temperature:      What has been tried: prescribed bp meds    LMP: NA Pregnant: NA    Recommended disposition: See in Office Today    Care advice provided, patient verbalizes understanding; denies any other questions or concerns; instructed to call back for any new or worsening symptoms. Patient/Caller agrees with recommended disposition; writer provided warm transfer to [employer] at [facility] for appointment scheduling    Attention Provider: Thank you for allowing me to participate in the care of your patient. The patient was connected to triage in response to information provided to the ECC/PSC. Please do not respond through this encounter as the response is not directed to a shared pool.     Reason for Disposition   Systolic BP >= 453 OR Diastolic >= 020    Protocols used: Blood Pressure - High-ADULT-OH

## 2023-01-19 ENCOUNTER — PATIENT MESSAGE (OUTPATIENT)
Dept: NEUROSURGERY | Age: 78
End: 2023-01-19

## 2023-01-19 DIAGNOSIS — G95.20 CERVICAL SPINAL CORD COMPRESSION (HCC): Primary | ICD-10-CM

## 2023-01-19 DIAGNOSIS — M48.02 CERVICAL SPINAL STENOSIS: ICD-10-CM

## 2023-01-19 NOTE — TELEPHONE ENCOUNTER
From: Caridad Santana  To: Dr. Capellan Ground: 1/19/2023 12:56 PM EST  Subject: Referral for second opinion    I would like to have my information sent for my surgery That Dr. Maryan Sandifer has recommended to Dr. Aleksandr Singh  at [address]  Thank you, NEERAJ Sullivan.

## 2023-01-26 ENCOUNTER — OFFICE VISIT (OUTPATIENT)
Dept: FAMILY MEDICINE CLINIC | Facility: CLINIC | Age: 78
End: 2023-01-26
Payer: MEDICARE

## 2023-01-26 VITALS
DIASTOLIC BLOOD PRESSURE: 88 MMHG | HEIGHT: 64 IN | WEIGHT: 197 LBS | TEMPERATURE: 97.2 F | OXYGEN SATURATION: 99 % | SYSTOLIC BLOOD PRESSURE: 152 MMHG | BODY MASS INDEX: 33.63 KG/M2 | HEART RATE: 61 BPM

## 2023-01-26 DIAGNOSIS — M48.10 DISH (DIFFUSE IDIOPATHIC SKELETAL HYPEROSTOSIS): Primary | ICD-10-CM

## 2023-01-26 DIAGNOSIS — I70.0 ATHEROSCLEROSIS OF AORTA (HCC): ICD-10-CM

## 2023-01-26 DIAGNOSIS — I10 ESSENTIAL (PRIMARY) HYPERTENSION: ICD-10-CM

## 2023-01-26 DIAGNOSIS — I48.0 PAROXYSMAL ATRIAL FIBRILLATION (HCC): ICD-10-CM

## 2023-01-26 DIAGNOSIS — G95.20 CERVICAL SPINAL CORD COMPRESSION (HCC): ICD-10-CM

## 2023-01-26 DIAGNOSIS — M81.6 LOCALIZED OSTEOPOROSIS (LEQUESNE): ICD-10-CM

## 2023-01-26 DIAGNOSIS — K21.00 GASTRO-ESOPHAGEAL REFLUX DISEASE WITH ESOPHAGITIS, WITHOUT BLEEDING: ICD-10-CM

## 2023-01-26 DIAGNOSIS — F39 MOOD DISORDER (HCC): ICD-10-CM

## 2023-01-26 DIAGNOSIS — I25.119 ATHEROSCLEROSIS OF NATIVE CORONARY ARTERY OF NATIVE HEART WITH ANGINA PECTORIS (HCC): ICD-10-CM

## 2023-01-26 PROCEDURE — G8484 FLU IMMUNIZE NO ADMIN: HCPCS | Performed by: FAMILY MEDICINE

## 2023-01-26 PROCEDURE — G8417 CALC BMI ABV UP PARAM F/U: HCPCS | Performed by: FAMILY MEDICINE

## 2023-01-26 PROCEDURE — 1036F TOBACCO NON-USER: CPT | Performed by: FAMILY MEDICINE

## 2023-01-26 PROCEDURE — 3077F SYST BP >= 140 MM HG: CPT | Performed by: FAMILY MEDICINE

## 2023-01-26 PROCEDURE — 1090F PRES/ABSN URINE INCON ASSESS: CPT | Performed by: FAMILY MEDICINE

## 2023-01-26 PROCEDURE — 3080F DIAST BP >= 90 MM HG: CPT | Performed by: FAMILY MEDICINE

## 2023-01-26 PROCEDURE — 1123F ACP DISCUSS/DSCN MKR DOCD: CPT | Performed by: FAMILY MEDICINE

## 2023-01-26 PROCEDURE — G8400 PT W/DXA NO RESULTS DOC: HCPCS | Performed by: FAMILY MEDICINE

## 2023-01-26 PROCEDURE — 99214 OFFICE O/P EST MOD 30 MIN: CPT | Performed by: FAMILY MEDICINE

## 2023-01-26 PROCEDURE — G8427 DOCREV CUR MEDS BY ELIG CLIN: HCPCS | Performed by: FAMILY MEDICINE

## 2023-01-26 SDOH — ECONOMIC STABILITY: FOOD INSECURITY: WITHIN THE PAST 12 MONTHS, THE FOOD YOU BOUGHT JUST DIDN'T LAST AND YOU DIDN'T HAVE MONEY TO GET MORE.: NEVER TRUE

## 2023-01-26 SDOH — ECONOMIC STABILITY: FOOD INSECURITY: WITHIN THE PAST 12 MONTHS, YOU WORRIED THAT YOUR FOOD WOULD RUN OUT BEFORE YOU GOT MONEY TO BUY MORE.: NEVER TRUE

## 2023-01-26 ASSESSMENT — PATIENT HEALTH QUESTIONNAIRE - PHQ9
SUM OF ALL RESPONSES TO PHQ QUESTIONS 1-9: 7
8. MOVING OR SPEAKING SO SLOWLY THAT OTHER PEOPLE COULD HAVE NOTICED. OR THE OPPOSITE, BEING SO FIGETY OR RESTLESS THAT YOU HAVE BEEN MOVING AROUND A LOT MORE THAN USUAL: 0
5. POOR APPETITE OR OVEREATING: 0
2. FEELING DOWN, DEPRESSED OR HOPELESS: 3
6. FEELING BAD ABOUT YOURSELF - OR THAT YOU ARE A FAILURE OR HAVE LET YOURSELF OR YOUR FAMILY DOWN: 0
10. IF YOU CHECKED OFF ANY PROBLEMS, HOW DIFFICULT HAVE THESE PROBLEMS MADE IT FOR YOU TO DO YOUR WORK, TAKE CARE OF THINGS AT HOME, OR GET ALONG WITH OTHER PEOPLE: 0
1. LITTLE INTEREST OR PLEASURE IN DOING THINGS: 0
SUM OF ALL RESPONSES TO PHQ QUESTIONS 1-9: 7
9. THOUGHTS THAT YOU WOULD BE BETTER OFF DEAD, OR OF HURTING YOURSELF: 0
SUM OF ALL RESPONSES TO PHQ QUESTIONS 1-9: 7
SUM OF ALL RESPONSES TO PHQ9 QUESTIONS 1 & 2: 3
7. TROUBLE CONCENTRATING ON THINGS, SUCH AS READING THE NEWSPAPER OR WATCHING TELEVISION: 0
3. TROUBLE FALLING OR STAYING ASLEEP: 2
4. FEELING TIRED OR HAVING LITTLE ENERGY: 2
SUM OF ALL RESPONSES TO PHQ QUESTIONS 1-9: 7

## 2023-01-26 ASSESSMENT — SOCIAL DETERMINANTS OF HEALTH (SDOH): HOW HARD IS IT FOR YOU TO PAY FOR THE VERY BASICS LIKE FOOD, HOUSING, MEDICAL CARE, AND HEATING?: NOT HARD AT ALL

## 2023-01-26 ASSESSMENT — ENCOUNTER SYMPTOMS
RESPIRATORY NEGATIVE: 1
EYES NEGATIVE: 1
HEARTBURN: 1

## 2023-01-26 NOTE — PROGRESS NOTES
HISTORY OF PRESENT ILLNESS  Stephy Escobar is a 69 y.o. y.o. female    Neck Pain   This is a new (getting a second opinion from Dr. Lashon Grajeda in regards to surgery . ) problem. The problem has been unchanged. Hypertension  This is a recurrent problem. The problem has been gradually improving since onset. The problem is controlled. Associated symptoms include neck pain. Identifiable causes of hypertension include a thyroid problem. Gastroesophageal Reflux  She complains of heartburn. This is a recurrent problem. The problem has been gradually improving. Thyroid Problem  Presents for follow-up visit. The symptoms have been stable. Depression  Visit Type: follow-up   Severity: mild       Allergies   Allergen Reactions    Hydromorphone Swelling    Sulfa Antibiotics Rash        Current Outpatient Medications   Medication Sig    levothyroxine (SYNTHROID) 100 MCG tablet Take 1 tablet by mouth every morning (before breakfast)    omeprazole (PRILOSEC) 40 MG delayed release capsule Take 1 capsule by mouth daily    flecainide (TAMBOCOR) 100 MG tablet Take 1 tablet by mouth in the morning and 1 tablet in the evening. metoprolol tartrate (LOPRESSOR) 25 MG tablet Take 0.5 tablets by mouth in the morning and 0.5 tablets in the evening. aspirin 81 MG EC tablet Take by mouth daily    busPIRone (BUSPAR) 10 MG tablet Take 1 tablet by mouth 2 times daily    vitamin D (CHOLECALCIFEROL) 25 MCG (1000 UT) TABS tablet Take 5,000 Units by mouth daily    docusate (COLACE, DULCOLAX) 100 MG CAPS Take 100 mg by mouth 2 times daily as needed    DULoxetine (CYMBALTA) 60 MG extended release capsule 90 mg TAKE 1 CAPSULE BY MOUTH EVERY DAY    LORazepam (ATIVAN) 0.5 MG tablet Take 0.5 mg by mouth every 8 hours as needed. melatonin 10 MG CAPS capsule Take by mouth    tiZANidine (ZANAFLEX) 4 MG tablet TAKE 1/2-1 TABLET BY MOUTH 3 TIMES A DAY AS NEEDED     No current facility-administered medications for this visit.         Past Medical History:   Diagnosis Date    Anxiety     Arthritis     Bell's palsy     in Oct. 2009 with some vertigo at times still, no other after effects    Depression     GERD (gastroesophageal reflux disease)     reflux, takes nexium    Hematoma of hip, right, initial encounter     Hypertension     on medication since 2006    Hypothyroidism     Nausea & vomiting     Obese     Paroxysmal atrial fibrillation ([facility] [address] 8/13/2017    Psychiatric disorder     depression    S/P total knee replacement using cement 5/16/2011    Unspecified hypothyroidism 5/16/2011        Past Surgical History:   Procedure Laterality Date    APPENDECTOMY  1961    BREAST BIOPSY Bilateral     BREAST LUMPECTOMY  1998    benign    CATARACT REMOVAL Bilateral 2014    CHOLECYSTECTOMY  1976    COLONOSCOPY  04/04/2016    GASTRECTOMY  9/21/15    sleeve    HYSTERECTOMY (CERVIX STATUS UNKNOWN)  [address]  2005    lower back     ORTHOPEDIC SURGERY Bilateral 1994    heel spurs removed    ND UNLISTED PROCEDURE CARDIAC SURGERY      LAAO placed 7/17/18    THYROIDECTOMY  1980    partial    TONSILLECTOMY  1966    TOTAL KNEE ARTHROPLASTY Left 2011    TOTAL KNEE ARTHROPLASTY Right 2010    UROLOGICAL SURGERY  2008/2009    bladder tack X2        Social History     Socioeconomic History    Marital status:      Spouse name: Not on file    Number of children: Not on file    Years of education: Not on file    Highest education level: Not on file   Occupational History    Not on file   Tobacco Use    Smoking status: Never    Smokeless tobacco: Never   Vaping Use    Vaping Use: Never used   Substance and Sexual Activity    Alcohol use:  Yes     Alcohol/week: 1.7 standard drinks    Drug use: Never    Sexual activity: Not Currently   Other Topics Concern    Not on file   Social History Narrative    Not on file     Social Determinants of Health     Financial Resource Strain: Low Risk     Difficulty of Paying Living Expenses: Not hard at all   Food Insecurity: No Food Insecurity    Worried About Running Out of Food in the Last Year: Never true    Ran Out of Food in the Last Year: Never true   Transportation Needs: Not on file   Physical Activity: Not on file   Stress: Not on file   Social Connections: Not on file   Intimate Partner Violence: Not on file   Housing Stability: Not on file        Review of Systems   Constitutional: Negative. HENT: Negative. Eyes: Negative. Respiratory: Negative. Cardiovascular: Negative. Gastrointestinal:  Positive for heartburn. Endocrine: Negative. Genitourinary: Negative. Musculoskeletal:  Positive for neck pain. Skin: Negative. Neurological: Negative. Psychiatric/Behavioral:  Positive for depression. BP (!) 172/100   Pulse 61   Temp 97.2 °F (36.2 °C) (Temporal)   Ht 5' 4\" (1.626 m)   Wt 197 lb (89.4 kg)   LMP  (LMP Unknown)   SpO2 99%   BMI 33.81 kg/m²      Physical Exam  Constitutional:       Appearance: Normal appearance. HENT:      Head: Normocephalic. Right Ear: Tympanic membrane, ear canal and external ear normal.      Left Ear: Tympanic membrane, ear canal and external ear normal.      Nose: Nose normal.      Mouth/Throat:      Mouth: Mucous membranes are moist.      Pharynx: Oropharynx is clear. Eyes:      General: No scleral icterus. Extraocular Movements: Extraocular movements intact. Conjunctiva/sclera: Conjunctivae normal.   Neck:      Vascular: No carotid bruit. Cardiovascular:      Rate and Rhythm: Normal rate and regular rhythm. Pulses: Normal pulses. Heart sounds: Normal heart sounds. Pulmonary:      Effort: Pulmonary effort is normal. No respiratory distress. Breath sounds: Normal breath sounds. No wheezing or rales. Abdominal:      General: Abdomen is flat. Bowel sounds are normal. There is no distension. Palpations: Abdomen is soft. There is no mass. Tenderness: There is no abdominal tenderness.    Musculoskeletal:         General: Normal range of motion. Cervical back: Normal range of motion and neck supple. Skin:     General: Skin is warm and dry. Neurological:      General: No focal deficit present. Mental Status: She is alert and oriented to person, place, and time. Psychiatric:         Mood and Affect: Mood normal.         Behavior: Behavior normal.         Thought Content: Thought content normal.         Judgment: Judgment normal.       Admission on 12/01/2022, Discharged on 12/02/2022   Component Date Value Ref Range Status    Sodium 12/01/2022 137  133 - 143 mmol/L Final    Potassium 12/01/2022 3.6  3.5 - 5.1 mmol/L Final    Chloride 12/01/2022 105  101 - 110 mmol/L Final    CO2 12/01/2022 27  21 - 32 mmol/L Final    Anion Gap 12/01/2022 5  2 - 11 mmol/L Final    Glucose 12/01/2022 121 (A)  65 - 100 mg/dL Final    BUN 12/01/2022 17  8 - 23 MG/DL Final    Creatinine 12/01/2022 1.00  0.6 - 1.0 MG/DL Final    Est, Glom Filt Rate 12/01/2022 58 (A)  >60 ml/min/1.73m2 Final    Comment:      Pediatric calculator link: [employer].at. [email or web address]/gfr_calculatorped       Effective Oct 3, 2022       These results are not intended for use in patients <25years of age. eGFR results are calculated without a race factor using  the 2021 CKD-EPI equation. Careful clinical correlation is recommended, particularly when comparing to results calculated using previous equations. The CKD-EPI equation is less accurate in patients with extremes of muscle mass, extra-renal metabolism of creatinine, excessive creatine ingestion, or following therapy that affects renal tubular secretion.       Calcium 12/01/2022 9.9  8.3 - 10.4 MG/DL Final    WBC 12/01/2022 9.5  4.3 - 11.1 K/uL Final    RBC 12/01/2022 4.90  4.05 - 5.2 M/uL Final    Hemoglobin 12/01/2022 14.6  11.7 - 15.4 g/dL Final    Hematocrit 12/01/2022 44.2  35.8 - 46.3 % Final    MCV 12/01/2022 90.2  82 - 102 FL Final    MCH 12/01/2022 29.8  26.1 - 32.9 PG Final    [facility] 12/01/2022 33.0  31.4 - 35.0 g/dL Final    RDW 12/01/2022 13.7  11.9 - 14.6 % Final    Platelets 47/66/7030 300  150 - 450 K/uL Final    MPV 12/01/2022 10.2  9.4 - 12.3 FL Final    nRBC 12/01/2022 0.00  0.0 - 0.2 K/uL Final    **Note: Absolute NRBC parameter is now reported with Hemogram**    Differential Type 12/01/2022 AUTOMATED    Final    Seg Neutrophils 12/01/2022 84 (A)  43 - 78 % Final    Lymphocytes 12/01/2022 11 (A)  13 - 44 % Final    Monocytes 12/01/2022 4  4.0 - 12.0 % Final    Eosinophils % 12/01/2022 0 (A)  0.5 - 7.8 % Final    Basophils 12/01/2022 1  0.0 - 2.0 % Final    Immature Granulocytes 12/01/2022 0  0.0 - 5.0 % Final    Segs Absolute 12/01/2022 8.0  1.7 - 8.2 K/UL Final    Absolute Lymph # 12/01/2022 1.1  0.5 - 4.6 K/UL Final    Absolute Mono # 12/01/2022 0.4  0.1 - 1.3 K/UL Final    Absolute Eos # 12/01/2022 0.0  0.0 - 0.8 K/UL Final    Basophils Absolute 12/01/2022 0.1  0.0 - 0.2 K/UL Final    Absolute Immature Granulocyte 12/01/2022 0.0  0.0 - 0.5 K/UL Final    Ventricular Rate 12/01/2022 77  BPM Final    Atrial Rate 12/01/2022 77  BPM Final    P-R Interval 12/01/2022 267  ms Final    QRS Duration 12/01/2022 122  ms Final    Q-T Interval 12/01/2022 437  ms Final    QTc Calculation (Bazett) 12/01/2022 495  ms Final    P Axis 12/01/2022 78  degrees Final    R Axis 12/01/2022 -15  degrees Final    T Axis 12/01/2022 98  degrees Final    Diagnosis 12/01/2022 Sinus rhythm   Final    Troponin, High Sensitivity 12/01/2022 10.9  0 - 14 pg/mL Final    Patients taking more than 20 mg/day of biotin may havefalsely negative results and should not use this test.    POC Protime 12/01/2022 13.9 (A)  9.6 - 11.6 SECS Final    POC INR 12/01/2022 1.2  0.9 - 1.2   Final    POC Glucose 12/01/2022 130 (A)  65 - 100 mg/dL Final    Comment: 47 - 60 mg/dl Consistent with, but not fully diagnostic of hypoglycemia.   101 - 125 mg/dl Impaired fasting glucose/consistent with pre-diabetes mellitus  > 126 mg/dl Fasting glucose consistent with overt diabetes mellitus      Performed by: 12/01/2022 [email or web address]   Final    TSH w Free Thyroid if Abnormal 12/01/2022 2.30  0.358 - 3.740 UIU/ML Final    Magnesium 12/01/2022 2.0  1.8 - 2.4 mg/dL Final    WBC 12/02/2022 6.6  4.3 - 11.1 K/uL Final    RBC 12/02/2022 4.42  4.05 - 5.2 M/uL Final    Hemoglobin 12/02/2022 13.1  11.7 - 15.4 g/dL Final    Hematocrit 12/02/2022 39.8  35.8 - 46.3 % Final    MCV 12/02/2022 90.0  82 - 102 FL Final    MCH 12/02/2022 29.6  26.1 - 32.9 PG Final    MCHC 12/02/2022 32.9  31.4 - 35.0 g/dL Final    RDW 12/02/2022 13.7  11.9 - 14.6 % Final    Platelets 74/06/0076 264  150 - 450 K/uL Final    MPV 12/02/2022 10.4  9.4 - 12.3 FL Final    nRBC 12/02/2022 0.00  0.0 - 0.2 K/uL Final    **Note: Absolute NRBC parameter is now reported with Hemogram**    Hemoglobin A1C 12/02/2022 5.5  4.8 - 5.6 % Final    eAG 12/02/2022 111  mg/dL Final    Comment: Reference Range  Normal: 4.8-5.6  Diabetic >=6.5%  Normal       <5.7%      Cholesterol, Total 12/02/2022 177  <200 MG/DL Final    Comment: Borderline High: 200-239 mg/dL  High: Greater than or equal to 240 mg/dL      Triglycerides 12/02/2022 76  35 - 150 MG/DL Final    Comment: Borderline High: 150-199 mg/dL, High: 200-499 mg/dL  Very High: Greater than or equal to 500 mg/dL      HDL 12/02/2022 74 (A)  40 - 60 MG/DL Final    LDL Calculated 12/02/2022 87.8  <100 MG/DL Final    Comment: Near Optimal: 100-129 mg/dL  Borderline High: 130-159, High: 160-189 mg/dL  Very High: Greater than or equal to 190 mg/dL      VLDL Cholesterol Calculated 12/02/2022 15.2  6.0 - 23.0 MG/DL Final    Chol/HDL Ratio 12/02/2022 2.4    Final    Magnesium 12/02/2022 2.2  1.8 - 2.4 mg/dL Final    Sodium 12/02/2022 139  133 - 143 mmol/L Final    Potassium 12/02/2022 4.6  3.5 - 5.1 mmol/L Final    Chloride 12/02/2022 108  101 - 110 mmol/L Final    CO2 12/02/2022 28  21 - 32 mmol/L Final    Anion Gap 12/02/2022 3  2 - 11 mmol/L Final    Glucose 12/02/2022 102 (A)  65 - 100 mg/dL Final    BUN 12/02/2022 20  8 - 23 MG/DL Final    Creatinine 12/02/2022 0.70  0.6 - 1.0 MG/DL Final    Est, Glom Filt Rate 12/02/2022 >60  >60 ml/min/1.73m2 Final    Comment:      Pediatric calculator link: [employer].[email or web address] org/professionals/kdoqi/gfr_calculatorped       Effective Oct 3, 2022       These results are not intended for use in patients <25years of age. eGFR results are calculated without a race factor using  the 2021 CKD-EPI equation. Careful clinical correlation is recommended, particularly when comparing to results calculated using previous equations. The CKD-EPI equation is less accurate in patients with extremes of muscle mass, extra-renal metabolism of creatinine, excessive creatine ingestion, or following therapy that affects renal tubular secretion.       Calcium 12/02/2022 9.0  8.3 - 10.4 MG/DL Final    Total Bilirubin 12/02/2022 0.7  0.2 - 1.1 MG/DL Final    ALT 12/02/2022 18  12 - 65 U/L Final    AST 12/02/2022 18  15 - 37 U/L Final    Alk Phosphatase 12/02/2022 93  50 - 136 U/L Final    Total Protein 12/02/2022 6.1 (A)  6.3 - 8.2 g/dL Final    Albumin 12/02/2022 3.2  3.2 - 4.6 g/dL Final    Globulin 12/02/2022 2.9  2.8 - 4.5 g/dL Final    Albumin/Globulin Ratio 12/02/2022 1.1  0.4 - 1.6   Final    Vitamin B-12 12/02/2022 445  193 - 986 pg/mL Final    Ferritin 12/02/2022 124  8 - 388 NG/ML Final    Folate 12/02/2022 9.9  3.1 - 17.5 ng/mL Final       ASSESSMENT and PLAN    DISH (diffuse idiopathic skeletal hyperostosis)  -     DEXA BONE DENSITY AXIAL SKELETON; Future  Localized osteoporosis ([place])   -     DEXA BONE DENSITY AXIAL SKELETON; Future  Cervical spinal cord compression (HCC)  Assessment & Plan:   Monitored by specialist- no acute findings meriting change in the plan    Mood disorder ([address]  Atherosclerosis of aorta ([address]  Assessment & Plan:   Monitored by specialist- no acute findings meriting change in the plan    Paroxysmal atrial fibrillation [facility])  Assessment & Plan:   Monitored by specialist- no acute findings meriting change in the plan    Atherosclerosis of native coronary artery of native heart with angina pectoris [facility])  Assessment & Plan:   Monitored by specialist- no acute findings meriting change in the plan    Essential (primary) hypertension  Gastro-esophageal reflux disease with esophagitis, without bleeding      Current treatment plan is effective. no changes in therapy  lab results and schedule for future lab studies reviewed with patient  reviewed diet, exercise and weight control   Cardiovascular risk and specific lipid/ LDL goals reviewed    No follow-ups on file.      Chery Salinas MD

## 2023-02-01 NOTE — PROGRESS NOTES
[facility]  [address] [address] , [address], [address]  [phone]    Patient Name:  Angely Lang  YOB: 1945      Office Visit 2/2/2023    CHIEF COMPLAINT:    Chief Complaint   Patient presents with    New Patient       HISTORY OF PRESENT ILLNESS:      The patient present in outpatient consultation at the request of Dr. Inocente Martínez for evaluation and management of suspected sleep apnea. The patient initially had history of intermittent snoring, witnessed apneas, morning headaches, daytime fatigue and none restorative sleep. Additional symptoms include sweating, palpitation intermittently, waking up with a dry mouth. She indicated she has difficulty falling asleep at time and going back to sleep when she wake up at nighttime. She stated that she go to bed between 9 and 10 PM and wake up between 8 and 9 AM daily. She wake up at least 3 times at nighttime and have a great difficulty going back to sleep. There is no history of cataplexy, hypnagogic hallucinations, or sleep paralysis. In addition, there is no history of frequent leg movements, kicking at night, or an inability to keep the legs still. She never had any sleep evaluation previously but she had history of fall twice and has been seen by neurology and she was recommended to consider sleep evaluation based on that. Of anxiety and depression and currently being treated for that. She also has history of atrial fibrillation and is started back at least last year and she was seen by Dr. Louisa Gongora and [facility] cardiology. Currently she is on flecainide and she had left parietal scalp hematoma after falling while she is on anticoagulation for A. fib. Therefore, she was taken off anticoagulation and underwent watchman procedure by Dr. Ralph Monte. I discussed with her the pathophysiology of obstructive sleep apnea and the strong correlation between sleep apnea recurrent atrial fibrillation.   I also reviewed with her different treatment option including positional therapy and lifestyle modification and trial of CPAP therapy. She also stated that her  who passed away 2 years ago has significant sleep apnea and was using CPAP and she learned exactly his symptoms of sleep apnea from him. Furthermore, I suggested that we proceed with in lab CPAP titration to make sure we address that carefully and initiate appropriate treatment if needed. We will proceed with modified split study. The [place] score today was 9 out of 24.      Sleepiness Scale:     Sitting and reading 2    Watching TV 2    Sitting inactive in a public place 1    As a passenger in a car for an hour without a break 2    Lying down to rest in the afternoon when circumstances Permits 2    Sitting and talking to someone 0    Sitting quietly after lunch without alcohol 0    In a car, while stopped for a few minutes 0    Total :  9/24    Past Medical History:   Diagnosis Date    Anxiety     Arthritis     Bell's palsy     in Oct. 2009 with some vertigo at times still, no other after effects    Depression     GERD (gastroesophageal reflux disease)     reflux, takes nexium    Hematoma of hip, right, initial encounter     Hypertension     on medication since 2006    Hypothyroidism     Nausea & vomiting     Obese     Paroxysmal atrial fibrillation ([address] 8/13/2017    Psychiatric disorder     depression    S/P total knee replacement using cement 5/16/2011    Unspecified hypothyroidism 5/16/2011       Patient Active Problem List   Diagnosis    Osteoarthritis of left knee    HTN (hypertension)    Near syncope    A-fib ([address]    Osteoarthritis of right knee    S/P total knee replacement using cement    Esophageal reflux    Class 1 obesity due to excess calories with serious comorbidity and body mass index (BMI) of 32.0 to 32.9 in adult    Orthostatic hypotension    Anemia requiring transfusions    Traumatic hematoma of right hip    Paroxysmal atrial fibrillation ([address]    Acute blood loss anemia    Anxiety    Hypothyroidism    Status post total knee replacement    Depression    Atherosclerosis of aorta (HCC)    Atherosclerosis of native coronary artery of native heart with angina pectoris (HCC)    Atrial fibrillation with RVR (HCC)    Stroke-like symptoms    Right thyroid nodule    Left parietal scalp hematoma, initial encounter    Mood disorder (HCC)    Cervical spinal stenosis    DISH (diffuse idiopathic skeletal hyperostosis)    Cervical spinal cord compression (HCC)    Mass of left hand    Suspected sleep apnea    Non-restorative sleep    Persistent disorder of initiating or maintaining sleep       Past Surgical History:   Procedure Laterality Date    APPENDECTOMY  1961    BREAST BIOPSY Bilateral     BREAST LUMPECTOMY  1998    benign    CATARACT REMOVAL Bilateral 2014    CHOLECYSTECTOMY  1976    COLONOSCOPY  04/04/2016    GASTRECTOMY  9/21/15    sleeve    HYSTERECTOMY (CERVIX STATUS UNKNOWN)  [address]  2005    lower back     ORTHOPEDIC SURGERY Bilateral 1994    heel spurs removed    MD UNLISTED PROCEDURE CARDIAC SURGERY      LAAO placed 7/17/18    THYROIDECTOMY  1980    partial    TONSILLECTOMY  1966    TOTAL KNEE ARTHROPLASTY Left 2011    TOTAL KNEE ARTHROPLASTY Right 2010    UROLOGICAL SURGERY  2008/2009    bladder tack X2       [unfilled]    Social History     Socioeconomic History    Marital status:      Spouse name: Not on file    Number of children: Not on file    Years of education: Not on file    Highest education level: Not on file   Occupational History    Not on file   Tobacco Use    Smoking status: Never    Smokeless tobacco: Never   Vaping Use    Vaping Use: Never used   Substance and Sexual Activity    Alcohol use:  Yes     Alcohol/week: 1.7 standard drinks    Drug use: Never    Sexual activity: Not Currently   Other Topics Concern    Not on file   Social History Narrative    Not on file     Social Determinants of Health     Financial Resource Strain: Low Risk     Difficulty of Paying Living Expenses: Not hard at all   Food Insecurity: No Food Insecurity    Worried About Running Out of Food in the Last Year: Never true    Ran Out of Food in the Last Year: Never true   Transportation Needs: Not on file   Physical Activity: Not on file   Stress: Not on file   Social Connections: Not on file   Intimate Partner Violence: Not on file   Housing Stability: Not on file         Family History   Problem Relation Age of Onset    Delayed Awakening Neg Hx     Pseudochol. Deficiency Neg Hx     Post-op Nausea/Vomiting Neg Hx     Emergence Delirium Neg Hx     Post-op Cognitive Dysfunction Neg Hx     Cancer Mother         breast    Malig Hypertherm Neg Hx     Breast Cancer Mother 62    Cancer Father         throat    Other Father         gastric ulcer    Kidney Disease Father     Breast Cancer Daughter     Heart Attack Mother          Allergies   Allergen Reactions    Hydromorphone Swelling    Sulfa Antibiotics Rash         Current Outpatient Medications   Medication Sig    levothyroxine (SYNTHROID) 100 MCG tablet Take 1 tablet by mouth every morning (before breakfast)    omeprazole (PRILOSEC) 40 MG delayed release capsule Take 1 capsule by mouth daily    flecainide (TAMBOCOR) 100 MG tablet Take 1 tablet by mouth in the morning and 1 tablet in the evening. metoprolol tartrate (LOPRESSOR) 25 MG tablet Take 0.5 tablets by mouth in the morning and 0.5 tablets in the evening. aspirin 81 MG EC tablet Take by mouth daily    busPIRone (BUSPAR) 10 MG tablet Take 1 tablet by mouth 2 times daily    vitamin D (CHOLECALCIFEROL) 25 MCG (1000 UT) TABS tablet Take 5,000 Units by mouth daily    docusate (COLACE, DULCOLAX) 100 MG CAPS Take 100 mg by mouth 2 times daily as needed    DULoxetine (CYMBALTA) 60 MG extended release capsule 90 mg TAKE 1 CAPSULE BY MOUTH EVERY DAY    LORazepam (ATIVAN) 0.5 MG tablet Take 0.5 mg by mouth every 8 hours as needed. melatonin 10 MG CAPS capsule Take by mouth    tiZANidine (ZANAFLEX) 4 MG tablet TAKE 1/2-1 TABLET BY MOUTH 3 TIMES A DAY AS NEEDED     No current facility-administered medications for this visit. REVIEW OF SYSTEMS:   CONSTITUTIONAL:   There is no history of fever, chills, night sweats, weight loss, weight gain, persistent fatigue, or lethargy/hypersomnolence. EYES:   Denies problems with eye pain, erythema, blurred vision, or visual field loss. ENTM:   Denies history of tinnitus, epistaxis, sore throat, hoarseness, or dysphonia. LYMPH:   Denies swollen glands. CARDIAC:   No chest pain, pressure, discomfort, palpitations, orthopnea, murmurs, or edema. GI:   No dysphagia, heartburn reflux, nausea/vomiting, diarrhea, abdominal pain, or bleeding. :   Denies history of dysuria, hematuria, polyuria, or decreased urine output. MS:   No history of myalgias, arthralgias, bone pain, or muscle cramps. SKIN:   No history of rashes, jaundice, cyanosis, nodules, or ulcers. ENDO:   Negative for heat or cold intolerance. No history of DM. PSYCH:   Negative for anxiety, depression, insomnia, hallucinations. NEURO:   There is no history of AMS, persistent headache, decreased level of consciousness, seizures, or motor or sensory deficits. PHYSICAL EXAM:    Vitals:    02/02/23 1546   BP: (!) 150/90   Pulse: 66   Resp: 14   Temp: 96.9 °F (36.1 °C)   SpO2: 98%        GENERAL APPEARANCE:   The patient is normal weight and in no respiratory distress. HEENT:   PERRL. Conjunctivae unremarkable. Nasal mucosa is without epistaxis, exudate, or polyps. Gums and dentition are unremarkable. There is moderate oropharyngeal narrowing. She is Mallampati 2   NECK/LYMPHATIC:   Symmetrical with no elevation of jugular venous pulsation. Trachea midline. No thyroid enlargement. No cervical adenopathy. LUNGS:   Normal respiratory effort with symmetrical lung expansion.    Breath sounds are clear bilaterally. HEART:   There is a regular rate and rhythm. No murmur, rub, or gallop. There is no edema in the lower extremities. ABDOMEN:   Soft and non-tender. No hepatosplenomegaly. Bowel sounds are normal.     SKIN:   There are no rashes, cyanosis, jaundice, or ecchymosis present. EXTREMITIES:   The extremities are unremarkable without clubbing, cyanosis, joint inflammation, degenerative, or ischemic change. MUSCULOSKELETAL:   There is no abnormal tone, muscle atrophy, or abnormal movement present. NEURO:   The patient is alert and oriented to person, place, and time. Memory appears intact and mood is normal.  No gross sensorimotor deficits are present. DIAGNOSTIC TESTS:    Echocardiogram    Left Ventricle Normal left ventricular systolic function with a visually estimated EF of 60 - 65%. Left ventricle size is normal. Mildly increased wall thickness. Normal wall motion. Normal diastolic function. Left Atrium Left atrium is moderately dilated. Right Ventricle Right ventricle size is normal. Normal wall thickness. Normal systolic function. Right Atrium Right atrium size is normal.   Aortic Valve Valve structure is normal. Trace regurgitation. No stenosis. Mitral Valve Valve structure is normal. Mild regurgitation. No stenosis noted. Tricuspid Valve Valve structure is normal. Mild regurgitation. No stenosis noted. Unable to assess RVSP due to inadequate or insignificant tricuspid regurgitation. Pulmonic Valve Valve structure is normal. Trace regurgitation. No stenosis noted. Pulmonary Artery Normal pulmonary arteries. Aorta Normal sized annulus, sinus of Valsalva and ascending aorta. IVC/Hepatic Veins IVC diameter is less than or equal to 21 mm and decreases greater than 50% during inspiration; therefore the estimated right atrial pressure is normal (~3 mmHg). IVC size is normal.   Pericardium No pericardial effusion.           ASSESSMENT:  (Medical Decision Making) ICD-10-CM    1. Suspected sleep apnea , her symptoms and exam are highly suggestive of possible sleep apnea. Further evaluation with in lab modified split study is imperative at this time. The pathophysiology of obstructive sleep apnea was reviewed with the patient. It's potential to promote severe neurologic, cardiac, pulmonary, and gastrointestinal problems was discussed. Specifically, the increased incidence of hypertension, coronary artery disease, congestive heart failure, pulmonary hypertension, gastroesophageal reflux, pathologic hypersomnolence, memory loss, and glucose intolerance was related to the consequences of hypoxemia, hypercapnia, airway obstruction, and sympathetic overdrive. We also discussed the ability of nasal CPAP to correct these abnormalities through maintenance of a patent airway. Therapeutic options including surgery, oral appliances, and weight loss were also reviewed. R29.818 Ambulatory Referral to Sleep Studies      2. Non-restorative sleep , almost certainly related to chronic sleep deprivation and untreated underlying sleep disordered breathing G47.8 Ambulatory Referral to Sleep Studies      3. Persistent disorder of initiating or maintaining sleep, likely worse with untreated sleep apnea G47.00 Ambulatory Referral to Sleep Studies             PLAN:    The patient will be scheduled for urgent modified split study to exclude sleep apnea. If she is found to have underlying sleep apnea we will proceed with a trial of CPAP therapy. Other treatment modality including positional therapy and lifestyle modification and oral appliance also were discussed with the patient in detail. She is recommended to follow proper sleep hygiene and positional therapy for now    Lifestyle modification also is recommended and appropriate handout is provided in that regard.     Sleep hygiene and sleep education also is provided to her as well    She will maintain her follow-up with primary care provider and cardiology    She will return to the sleep center in 4 months or sooner if needed    All questions and concerns were addressed properly to her satisfaction    Orders Placed This Encounter   Procedures    Ambulatory Referral to Sleep Studies     Referral Priority:   Urgent     Referral Type:   Consult for Advice and Opinion     Referral Reason:   Specialty Services Required     Number of Visits Requested:   1               No orders of the defined types were placed in this encounter. Over 50% of today's office visit was spent in face to face time reviewing test results, prognosis, importance of compliance, education about disease process, benefits of medications, instructions for management of acute flare-ups, and follow up plans. Total face to face time spent with patient including charting was 45 minutes.     Marquise Garcia MD  Electronically signed

## 2023-02-02 ENCOUNTER — OFFICE VISIT (OUTPATIENT)
Dept: SLEEP MEDICINE | Age: 78
End: 2023-02-02
Payer: MEDICARE

## 2023-02-02 VITALS
DIASTOLIC BLOOD PRESSURE: 90 MMHG | BODY MASS INDEX: 33.46 KG/M2 | WEIGHT: 196 LBS | OXYGEN SATURATION: 98 % | SYSTOLIC BLOOD PRESSURE: 150 MMHG | HEART RATE: 66 BPM | RESPIRATION RATE: 14 BRPM | TEMPERATURE: 96.9 F | HEIGHT: 64 IN

## 2023-02-02 DIAGNOSIS — G47.00 PERSISTENT DISORDER OF INITIATING OR MAINTAINING SLEEP: ICD-10-CM

## 2023-02-02 DIAGNOSIS — R29.818 SUSPECTED SLEEP APNEA: ICD-10-CM

## 2023-02-02 DIAGNOSIS — G47.8 NON-RESTORATIVE SLEEP: ICD-10-CM

## 2023-02-02 PROCEDURE — G8484 FLU IMMUNIZE NO ADMIN: HCPCS | Performed by: INTERNAL MEDICINE

## 2023-02-02 PROCEDURE — 1036F TOBACCO NON-USER: CPT | Performed by: INTERNAL MEDICINE

## 2023-02-02 PROCEDURE — 3080F DIAST BP >= 90 MM HG: CPT | Performed by: INTERNAL MEDICINE

## 2023-02-02 PROCEDURE — G8400 PT W/DXA NO RESULTS DOC: HCPCS | Performed by: INTERNAL MEDICINE

## 2023-02-02 PROCEDURE — G8417 CALC BMI ABV UP PARAM F/U: HCPCS | Performed by: INTERNAL MEDICINE

## 2023-02-02 PROCEDURE — 99204 OFFICE O/P NEW MOD 45 MIN: CPT | Performed by: INTERNAL MEDICINE

## 2023-02-02 PROCEDURE — 1123F ACP DISCUSS/DSCN MKR DOCD: CPT | Performed by: INTERNAL MEDICINE

## 2023-02-02 PROCEDURE — 1090F PRES/ABSN URINE INCON ASSESS: CPT | Performed by: INTERNAL MEDICINE

## 2023-02-02 PROCEDURE — G8427 DOCREV CUR MEDS BY ELIG CLIN: HCPCS | Performed by: INTERNAL MEDICINE

## 2023-02-02 PROCEDURE — 3077F SYST BP >= 140 MM HG: CPT | Performed by: INTERNAL MEDICINE

## 2023-02-02 NOTE — PATIENT INSTRUCTIONS
Snoring: After Your Visit    Your Care Instructions  Snoring is a noise that you may make while breathing during sleep. You snore when the flow of air from your mouth or nose to your lungs makes the tissues of your throat vibrate while you sleep. This usually is caused by a blockage or narrowing in your nose, mouth, or throat (airway). Snoring can be soft, loud, raspy, harsh, hoarse, or fluttering. Your bed partner may notice that you sleep with your mouth open and that you are restless while sleeping. If snoring interferes with your or your bed partner's sleep, either or both of you may feel tired during the day. You may be able to help reduce your snoring by making changes in your activities and in the way you sleep. Follow-up care is a key part of your treatment and safety. Be sure to make and go to all appointments, and call your doctor if you are having problems. It's also a good idea to know your test results and keep a list of the medicines you take. How can you care for yourself at home? Lose weight, if needed. Many people who snore are overweight. Weight loss can help reduce the narrowing of the airway and might reduce or stop snoring. Limit the use of alcohol and medicines. Drinking a lot of alcohol or taking certain medicines, especially sleeping pills or tranquilizers, before sleep may make snoring worse. Go to bed at the same time each night, and get plenty of sleep. You may snore more when you have not had enough sleep. Sleep on your side. Sleeping on your side may stop snoring. Try sewing a pocket in the middle of the back of your [employer] top, putting a tennis ball into the pocket, and stitching it closed. This will help keep you from sleeping on your back. Treat breathing problems. Breathing problems caused by colds or allergies can disturb airflow. This can lead to snoring. Use a device that helps keep your airway open during sleep. This could be a device that you put in your mouth.  Other examples include strips or disks that you use on your nose. Do not smoke. Smoking can make snoring worse. If you need help quitting, talk to your doctor about stop-smoking programs and medicines. These can increase your chances of quitting for good. Raise the head of your bed 4 to 6 inches by putting bricks under the legs of the bed. This may prevent your tongue from falling toward the back of the throat, which can make a blocked or narrow airway worse. Putting pillows under your head will not help. When should you call for help? Watch closely for changes in your health, and be sure to contact your doctor if:  You snore, and you feel sleepy during the day. Your sleeping partner or you notice that you gasp, choke, or stop breathing during sleep. You do not get better as expected. Where can you learn more? Go to [employer].be  Enter J563 in the search box to learn more about \"Snoring: After Your Visit. \"   © 7035-0869 Healthwise, Incorporated. Care instructions adapted under license by [employer] (which disclaims liability or warranty for this information). This care instruction is for use with your licensed healthcare professional. If you have questions about a medical condition or this instruction, always ask your healthcare professional. [place] [address] any warranty or liability for your use of this information. Content Version: 02.2.617930; Current as of: September 9, 2014            Sleep Studies: About This Test    What is it? Sleep studies are tests that watch what happens to your body during sleep. These studies usually are done in a sleep lab. Sleep labs are often located in hospitals. But sleep studies also can be done with portable equipment that you use at home. Why is this test done? Sleep studies are done to find sleep problems, including:  Sleep apnea or excessive snoring. Insomnia. Narcolepsy. Heart rhythm problems.   Repeated muscle twitching of the feet, arms, or legs while you sleep. Shift work sleep disorder. How can you prepare for the test?  You may be asked to keep a sleep diary for 1 to 2 weeks before your sleep study. Don't take any naps for 2 to 3 days before your test.  You may be asked to avoid food or drinks with caffeine for a day or two before your test.  Take a shower or bath before your test, but don't use sprays, oils, or gels on your hair. Don't wear makeup, fingernail polish, or fake nails. Pack and take along a small overnight bag with personal items, such as a toothbrush, a comb, favorite pillows or blankets, and a book. You can wear your own nightclothes. Should I take my medications? Yes, ,unless specifically instructed by her physician taken medications as usual.  Also bring any medication. He would need during the night or early in the morning. The sleep Center, does not provide medication or snacks. It is important for the sleep gestational to know what medication she was taking this many medications can affect your sleep. What happens during the test?  In the sleep lab, you will be in a private room, much like a hotel room. There may be a waiting period. You may read, watch TV, or relax during this time. If you have a commitment in the morning, be sure to inform the technician so they will be able to make sure you are out early enough in the morning. Otherwise, you can expect to be discharged between 6:00 - 6:30 in the morning. Small pads or patches called electrodes will be placed on your head and body with a small amount of glue and tape. These will record things like brain activity, eye movement, oxygen levels, and snoring. Soft elastic belts will be placed around your chest and belly to measure your breathing. Your blood oxygen levels will be checked by a small clip (oximeter) placed either on the tip of your index finger or on your earlobe.   If you have sleep apnea, you may wear a mask that is connected to a continuous positive airway pressure (CPAP) machine. What else should you know about the test?  It may feel odd to be hooked to the sleep study equipment. The sleep lab technician understands that your sleep may not be the same as it is at home because of the equipment. Try to relax and make yourself as comfortable as possible. After your sleep problem has been identified, you may need a second study if your doctor orders treatment such as CPAP. Portable sleep study equipment is available for a person to do sleep studies at home. This may be a choice for people who have problems sleeping in a sleep lab. But home sleep studies may not give the same results as a sleep lab. How long does the test take? You will stay in the sleep lab overnight. Parking is available in the visitor's lot      Will I be able to get out of bed to use the restroom? Yes. All you have to do is wave your hands are call out  And the  Technologist will unhook the ponytail of wires from the box and you will be free to use the restroom. What happens after the test?  You will be able to go home right away. You can go back to your usual activities right away. Follow-up care is a key part of your treatment and safety. Be sure to make and go to all appointments, and call your doctor if you are having problems. It's also a good idea to keep a list of the medicines you take. Ask your doctor when you can expect to have your test results. Where can you learn more? Go to [employer]be  Enter O763 in the search box to learn more about \"Sleep Studies: About This Test.\"   © 5844-5893 Healthwise, Incorporated. Care instructions adapted under license by [facility] [employer] (which disclaims liability or warranty for this information).  This care instruction is for use with your licensed healthcare professional. If you have questions about a medical condition or this instruction, always ask your healthcare professional. [address] any warranty or liability for your use of this information. Content Version: 68.8.564727; Current as of: September 9, 2014    Revisions 7/29/2015          Learning About Sleeping Well  What does sleeping well mean? Sleeping well means getting enough sleep. How much sleep is enough varies among people. The number of hours you sleep is not as important as how you feel when you wake up. If you do not feel refreshed, you probably need more sleep. Another sign of not getting enough sleep is feeling tired during the day. The average total nightly sleep time is 7½ to 8 hours. Healthy adults may need a little more or a little less than this. Why is getting enough sleep important? Getting enough quality sleep is a basic part of good health. When your sleep suffers, your mood and your thoughts can suffer too. You may find yourself feeling more grumpy or stressed. Not getting enough sleep also can lead to serious problems, including injury, accidents, anxiety, and depression. What might cause poor sleeping? Many things can cause sleep problems, including:  Stress. Stress can be caused by fear about a single event, such as giving a speech. Or you may have ongoing stress, such as worry about work or school. Depression, anxiety, and other mental or emotional conditions. Changes in your sleep habits or surroundings. This includes changes that happen where you sleep, such as noise, light, or sleeping in a different bed. It also includes changes in your sleep pattern, such as having jet lag or working a late shift. Health problems, such as pain, breathing problems, and restless legs syndrome. Lack of regular exercise. How can you help yourself? Here are some tips that may help you sleep more soundly and wake up feeling more refreshed. Your sleeping area   Use your bedroom only for sleeping and sex. A bit of light reading may help you fall asleep.  But if it doesn't, do your reading elsewhere in the house. Don't watch TV in bed. Be sure your bed is big enough to stretch out comfortably, especially if you have a sleep partner. Keep your bedroom quiet, dark, and cool. Use curtains, blinds, or a sleep mask to block out light. To block out noise, use earplugs, soothing music, or a \"white noise\" machine. Your evening and bedtime routine   Get regular exercise, but not within 3 to 4 hours before your bedtime. Create a relaxing bedtime routine. You might want to take a warm shower or bath, listen to soothing music, or drink a cup of noncaffeinated tea. Go to bed at the same time every night. And get up at the same time every morning, even if you feel tired. What to avoid   Limit caffeine (coffee, tea, caffeinated sodas) during the day, and don't have any for at least 4 to 6 hours before bedtime. Don't drink alcohol before bedtime. Alcohol can cause you to wake up more often during the night. Don't smoke or use tobacco, especially in the evening. Nicotine can keep you awake. Don't take naps during the day, especially close to bedtime. Don't lie in bed awake for too long. If you can't fall asleep, or if you wake up in the middle of the night and can't get back to sleep within 15 minutes or so, get out of bed and go to another room until you feel sleepy. Don't take medicine right before bed that may keep you awake or make you feel hyper or energized. Your doctor can tell you if your medicine may do this and if you can take it earlier in the day. If you can't sleep   Imagine yourself in a peaceful, pleasant scene. Focus on the details and feelings of being in a place that is relaxing. Get up and do a quiet or boring activity until you feel sleepy. Don't drink any liquids after 6 p.m. if you wake up often because you have to go to the bathroom. Where can you learn more?    Go to [employer].be  Enter R645 in the search box to learn more about \"Learning About Sleeping Well. \"   © 5919-1641 Healthwise, Incorporated. Care instructions adapted under license by [facility] (which disclaims liability or warranty for this information). This care instruction is for use with your licensed healthcare professional. If you have questions about a medical condition or this instruction, always ask your healthcare professional. [place] [address] any warranty or liability for your use of this information. Content Version: 55.0.988760; Current as of: November 14, 2014           Sleep Hygiene Instructions    Sleep only as much as you need to feel refreshed during the following day. Restricting your time in bed helps to consolidate and deepen your sleep. Excessively long times in bed lead to fragmented and shallow sleep. Get up at your regular time the next day, no matter how little your slept. Get up at the same time each day, 7 days a week. A regular wake time in the morning leads to regular times on sleep onset, and helps to set your biological clock. Exercise regularly. Schedule exercise times so that they do not occur within 3 hours of when you intend to go to bed. Exercise makes it easier to initiate sleep and deepen sleep. Don't take your problems to bed. Plan some time earlier in the evening for working on your problems or planning the next day's activities. Worrying may interfere with initiating sleep and produce shallow sleep. Train yourself to use the bedroom only for sleep and sexual activity. This will help condition your brain to see bed as the place for sleeping. Do not read, watch TV or eat in bed. Do not try and fall asleep. This only makes the problem worse. Instead, turn on the light, leave the bedroom, and do something different like reading a book. Don't engage in stimulating activity. Return to bed only when you feel sleepy. Avoid long naps. Staying awake during the day helps to fall asleep at night.   Naps totalling more than 30 minutes increase your chances of having trouble sleeping at night. Make sure that your bedroom is comfortable and free from light and noise. A comfortable, noise-free sleep environment will reduce the likelihood that you will wake up during the night. Noise that does not awaken you may disturb the quality of your sleep. Carpeting, insulated curtains, and closing the door may help. Make sure that your bedroom is at a comfortable temperature during the night. Excessively warm or cold sleep environments may disturb sleep. Eat regular meals and di not go to bed hungry. Hunger may disturb sleep. A light snack at bedtime (especially carbohydrates) may help sleep, but avoid greasy or heavy foods. Avoid excessive liquids in the evening. Reducing liquid intake will minimize the need for night-time trips to the bathroom. Cut down on all caffeine products. Caffeinated beverages and food (Coffee, tea, cola, chocolate) can cause difficulty falling asleep, awakenings during the night, and shallow sleep. Even caffeine early in the day can disrupt night-time sleep. Avoid alcohol, especially in the evening. Although alcohol helps tense people fall asleep more easily, it causes awakenings later in the night. Smoking may disturb sleep. Nicotine is a stimulant. Try not to smoke during the night when you have trouble sleeping. Learning about Sleeping Well    What does sleeping well mean? Sleeping well means getting enough sleep. How much sleep is enough varies among people. The number of hours you sleep is not as improtant as how you feel when you wake up. If you do not feel refreshed, you probably need more sleep. Another sign of not getting enough sleep is feeling tired during the day. The average totally nightly sleep time is 7 1/2 to 8 hours. Healthy adults may need a little more or a little less than this. Why is getting enough sleep important?     Getting enough quality sleep is a basic part of good health. When your sleep suffers, your mood and your thoughts can suffer too. Your thoughts can suffer too. You ma find yourself feeling more grumpy or stressed. No getting enough sleep also can lead to serious problems, including injury, accidents, anxiety, and depression. What might cause poor sleeping? Many things can cause sleep problems, including:    Stress. Stress can be caused by fear about a single event, such as giving a speech. Or you may have ongoing stress, such as worry about work or school. Depression, anxiety, and other mental or emotional conditions. Changes in your sleep habits or surroundings. This includes changes that happen where you sleep, such as noise, light, or sleeping in a different bed. It also includes changes in your sleep pattern, such as having jet lab or working a late shift. Health problems, such as pain, breathing problems, and restless legs syndrome. Lack of regular exercise. How can you help yourself? Here are some tips that may help you sleep more soundly and wake up feeling more refreshed. Your sleeping area    Use your bedroom only for sleeping and sex. A bit of light reading may help you fall asleep. But if it doesn't, do your reading elsewhere in the house. Don't watch TV in bed. Be sure your bed is big enough to stretch out comfortable, especially if you have a sleep partner. Keep your bedroom quiet, dark, and cool. Use curtains, blinds, or sleep mask to block out light. To block out noise, use earplugs, soothing music, or a \"white noise\" machine. Your evening and bedtime routine    Create a relaxing bedtime routine. You might want to take a warm shower or bath, listen to soothing music, or drink a cup of non-caffeinated tea. Go to bed at the same time every night. And get up at the same time every morning, even if you feel tired.     What to avoid:    Limit caffeine (coffee, tea, caffeinated sodas) during the day, and dont have any for at least 4 to 6 hours before bedtime. Don't drink alcohol before bedtime. Alcohol can cause you to wake up more often during the night. Don't smoke or use tobacco, especially in the evening. Nicotine can keep you awake. Don't like in bed away for too long. If you can't fall asleep, or if you wake up in the middle of the night and can't get back to sleep within 15 minutes or so, get out of bed and go to another room until you feel sleepy. Don't take medicine right before bed that may keep you awake or make you feel hyper or enegerized. Your doctor can tell you if your medicine may do this and if you can take it earlier in the day. If you can't sleep:    Imagine yourself in a peaceful, pleasant scene. Focus on the details and feelings of being in a place that is relaxing. Get up and do a quiet or boring activity until you feel sleepy. Don't drink liquids after 6 p.m. if you wake up often because you have to go to the bathroom. Where can you learn more? Go to http://www.gray.com/    Enter I737 in the search box to learn more about \"Learning About Sleeping Well. \"

## 2023-02-11 ASSESSMENT — ENCOUNTER SYMPTOMS
RESPIRATORY NEGATIVE: 1
GASTROINTESTINAL NEGATIVE: 1
ALLERGIC/IMMUNOLOGIC NEGATIVE: 1
EYES NEGATIVE: 1

## 2023-02-19 ENCOUNTER — HOSPITAL ENCOUNTER (OUTPATIENT)
Dept: SLEEP MEDICINE | Age: 78
Discharge: HOME | End: 2023-02-22
Payer: MEDICARE

## 2023-02-19 PROCEDURE — 95810 POLYSOM 6/> YRS 4/> PARAM: CPT

## 2023-02-21 ENCOUNTER — HOSPITAL ENCOUNTER (OUTPATIENT)
Dept: MAMMOGRAPHY | Age: 78
Discharge: HOME | End: 2023-02-24
Payer: MEDICARE

## 2023-02-21 DIAGNOSIS — M81.6 LOCALIZED OSTEOPOROSIS (LEQUESNE): ICD-10-CM

## 2023-02-21 DIAGNOSIS — M48.10 DISH (DIFFUSE IDIOPATHIC SKELETAL HYPEROSTOSIS): ICD-10-CM

## 2023-02-21 PROCEDURE — 77080 DXA BONE DENSITY AXIAL: CPT

## 2023-02-27 ENCOUNTER — NURSE ONLY (OUTPATIENT)
Dept: FAMILY MEDICINE CLINIC | Facility: CLINIC | Age: 78
End: 2023-02-27

## 2023-02-27 DIAGNOSIS — E55.9 VITAMIN D DEFICIENCY: ICD-10-CM

## 2023-02-27 DIAGNOSIS — E55.9 VITAMIN D DEFICIENCY: Primary | ICD-10-CM

## 2023-02-28 LAB — 25(OH)D3 SERPL-MCNC: 39.6 NG/ML (ref 30–100)

## 2023-03-03 ENCOUNTER — OFFICE VISIT (OUTPATIENT)
Dept: CARDIOLOGY CLINIC | Age: 78
End: 2023-03-03
Payer: MEDICARE

## 2023-03-03 VITALS
HEART RATE: 64 BPM | HEIGHT: 64 IN | DIASTOLIC BLOOD PRESSURE: 60 MMHG | SYSTOLIC BLOOD PRESSURE: 130 MMHG | BODY MASS INDEX: 34.31 KG/M2 | WEIGHT: 201 LBS

## 2023-03-03 DIAGNOSIS — I48.0 PAROXYSMAL ATRIAL FIBRILLATION (HCC): Primary | ICD-10-CM

## 2023-03-03 DIAGNOSIS — I35.1 AORTIC VALVE INSUFFICIENCY, ETIOLOGY OF CARDIAC VALVE DISEASE UNSPECIFIED: ICD-10-CM

## 2023-03-03 DIAGNOSIS — I10 HYPERTENSION, ESSENTIAL: ICD-10-CM

## 2023-03-03 PROCEDURE — 1123F ACP DISCUSS/DSCN MKR DOCD: CPT | Performed by: INTERNAL MEDICINE

## 2023-03-03 PROCEDURE — 1036F TOBACCO NON-USER: CPT | Performed by: INTERNAL MEDICINE

## 2023-03-03 PROCEDURE — G8484 FLU IMMUNIZE NO ADMIN: HCPCS | Performed by: INTERNAL MEDICINE

## 2023-03-03 PROCEDURE — 99214 OFFICE O/P EST MOD 30 MIN: CPT | Performed by: INTERNAL MEDICINE

## 2023-03-03 PROCEDURE — G8417 CALC BMI ABV UP PARAM F/U: HCPCS | Performed by: INTERNAL MEDICINE

## 2023-03-03 PROCEDURE — 3075F SYST BP GE 130 - 139MM HG: CPT | Performed by: INTERNAL MEDICINE

## 2023-03-03 PROCEDURE — 3078F DIAST BP <80 MM HG: CPT | Performed by: INTERNAL MEDICINE

## 2023-03-03 PROCEDURE — G8399 PT W/DXA RESULTS DOCUMENT: HCPCS | Performed by: INTERNAL MEDICINE

## 2023-03-03 PROCEDURE — 1090F PRES/ABSN URINE INCON ASSESS: CPT | Performed by: INTERNAL MEDICINE

## 2023-03-03 PROCEDURE — G8428 CUR MEDS NOT DOCUMENT: HCPCS | Performed by: INTERNAL MEDICINE

## 2023-03-03 ASSESSMENT — ENCOUNTER SYMPTOMS
NAIL CHANGES: 0
APHONIA: 0
ABDOMINAL PAIN: 0
STRIDOR: 0
EYE PAIN: 0
COUGH: 0

## 2023-03-03 NOTE — PROGRESS NOTES
[address] [place], [place]  [address], [address] [facility], [address] [place] [address]  PHONE: [phone]    SUBJECTIVE:   Shweta Simons is a 69 y.o. female 1945   seen for a follow up visit regarding the following:     Chief Complaint   Patient presents with    Atrial Fibrillation    Follow-up     2 month           History of present illness: 69 y.o. female presented for follow-up 3/3/23 history of atrial fibrillation as well as Watchman device placement. At her last appointment patient was having sensation of more atrial fibrillation also dizziness which improved. Has apple watch no recent AF/        Interval history:   She was previously seen by Dr. Stevan Serrano. She had a diagnosis of atrial fibrillation made in 2018. Unfortunately, she had a fall and underwent bleeding complication with large hematoma of her leg. She additionally hit her head at the time of  her fall and fortunately she had negative imaging. She was chronically anticoagulated with Xarelto previously. Due to her elevated bleeding risk, she was referred for [facility] procedure, which was performed 07/2018. She underwent transesophageal echocardiogram at 45 days, which showed adequate seal  and she was taken off Xarelto therapy. Cardiac History:     Atrial fibrillation in 2017 with spontaneous cardioversion to sinus rhythm. Echocardiogram 2017 normal left ventricular systolic function mild aortic sclerosis    Anticoagulation with Xarelto. Fall in 2017 with large hematoma treated surgically. 07/2018 WATCHMAN implantation successful.      08/2018 transesophageal echocardiogram with adequate seal.  Taken off Xarelto.     3/3/2020 Sinus rhythm poor R wave progression  3/2021 NST normal perfusion   8/1/2022 EKG sinus rhythm poor R wave progressiom  sinus rhythm, normal rate, normal WI intervals, ST wave normal, normal axis,       Assessment and Plan:     Chest discomfort  Last stress test performed 3/2021    Atrial fibrillation.    Status post WATCHMAN.      Taken off Xarelto.     Long term use of antiarrhythmic drug.      Continue flecainide 50 mg p.o. twice daily 12.5 mg p.o. twice daily continue metoprolol    No toxicities on recent ECG.  Discussed discontinued zofran for nausea.    Hypertension      Key CAD CHF Meds            metoprolol tartrate (LOPRESSOR) 25 MG tablet (Taking)    Sig - Route: Take 0.5 tablets by mouth in the morning and 0.5 tablets in the evening. - Oral             Aortic sclerosis stable echocardiogram 2017 no pathologic murmur today.    Last MARY with trivial AI 2018  Follow-up according to appropriate imaging guidelines.    Repeat echocardiogram at appropriate intervals planned 3/2023 prior to appointment  APPLE WATCH IN PLACE     Current Outpatient Medications   Medication Sig    levothyroxine (SYNTHROID) 100 MCG tablet Take 1 tablet by mouth every morning (before breakfast)    omeprazole (PRILOSEC) 40 MG delayed release capsule Take 1 capsule by mouth daily    flecainide (TAMBOCOR) 100 MG tablet Take 1 tablet by mouth in the morning and 1 tablet in the evening.    metoprolol tartrate (LOPRESSOR) 25 MG tablet Take 0.5 tablets by mouth in the morning and 0.5 tablets in the evening.    aspirin 81 MG EC tablet Take by mouth daily    busPIRone (BUSPAR) 10 MG tablet Take 1 tablet by mouth 2 times daily    vitamin D (CHOLECALCIFEROL) 25 MCG (1000 UT) TABS tablet Take 5,000 Units by mouth daily    docusate (COLACE, DULCOLAX) 100 MG CAPS Take 100 mg by mouth 2 times daily as needed    DULoxetine (CYMBALTA) 60 MG extended release capsule 90 mg TAKE 1 CAPSULE BY MOUTH EVERY DAY    LORazepam (ATIVAN) 0.5 MG tablet Take 0.5 mg by mouth every 8 hours as needed.    melatonin 10 MG CAPS capsule Take by mouth    tiZANidine (ZANAFLEX) 4 MG tablet TAKE 1/2-1 TABLET BY MOUTH 3 TIMES A DAY AS NEEDED     No current facility-administered medications for this visit.       Past Medical History, Past Surgical History,  Family history, Social History, and Medications were all reviewed with the patient today and updated as necessary. Allergies   Allergen Reactions    Hydromorphone Swelling    Sulfa Antibiotics Rash     Past Medical History:   Diagnosis Date    Anxiety     Arthritis     Bell's palsy     in Oct. 2009 with some vertigo at times still, no other after effects    Depression     GERD (gastroesophageal reflux disease)     reflux, takes nexium    Hematoma of hip, right, initial encounter     Hypertension     on medication since 2006    Hypothyroidism     Nausea & vomiting     Obese     Paroxysmal atrial fibrillation ([facility] [address] 8/13/2017    Psychiatric disorder     depression    S/P total knee replacement using cement 5/16/2011    Unspecified hypothyroidism 5/16/2011     Past Surgical History:   Procedure Laterality Date    APPENDECTOMY  1961    BREAST BIOPSY Bilateral     BREAST LUMPECTOMY  1998    benign    CATARACT REMOVAL Bilateral 2014    CHOLECYSTECTOMY  1976    COLONOSCOPY  04/04/2016    GASTRECTOMY  9/21/15    sleeve    HYSTERECTOMY (CERVIX STATUS UNKNOWN)  [address]  2005    lower back     ORTHOPEDIC SURGERY Bilateral 1994    heel spurs removed    [place] UNLISTED [address] placed 7/17/18    THYROIDECTOMY  1980    partial    TONSILLECTOMY  1966    TOTAL KNEE ARTHROPLASTY Left 2011    TOTAL KNEE ARTHROPLASTY Right 2010    UROLOGICAL SURGERY  2008/2009    bladder tack X2     Family History   Problem Relation Age of Onset    Delayed Awakening Neg Hx     Pseudochol.  Deficiency Neg Hx     Post-op Nausea/Vomiting Neg Hx     Emergence Delirium Neg Hx     Post-op Cognitive Dysfunction Neg Hx     Cancer Mother         breast    Malig Hypertherm Neg Hx     Breast Cancer Mother 62    Cancer Father         throat    Other Father         gastric ulcer    Kidney Disease Father     Breast Cancer Daughter     Heart Attack Mother       Social History     Tobacco Use    Smoking status: Never Smokeless tobacco: Never   Substance Use Topics    Alcohol use: Yes     Alcohol/week: 1.7 standard drinks       ROS:    Review of Systems   Constitutional: Negative for fever. HENT:  Negative for stridor. Eyes:  Negative for pain. Cardiovascular:  Negative for chest pain. Respiratory:  Negative for cough. Endocrine: Negative for cold intolerance. Skin:  Negative for nail changes. Musculoskeletal:  Negative for arthritis. Gastrointestinal:  Negative for abdominal pain. Genitourinary:  Negative for dysuria. Neurological:  Negative for aphonia. Psychiatric/Behavioral:  Negative for altered mental status. Allergic/Immunologic: Negative for hives. PHYSICAL EXAM:    /60   Pulse 64   Ht 5' 4\" (1.626 m)   Wt 201 lb (91.2 kg)   LMP  (LMP Unknown)   BMI 34.50 kg/m²        Wt Readings from Last 3 Encounters:   03/03/23 201 lb (91.2 kg)   02/02/23 196 lb (88.9 kg)   01/26/23 197 lb (89.4 kg)     BP Readings from Last 3 Encounters:   03/03/23 130/60   02/02/23 (!) 150/90   01/26/23 (!) 152/88         Physical Exam  Vitals reviewed. HENT:      Head: Normocephalic. Right Ear: External ear normal.      Left Ear: External ear normal.      Nose: Nose normal.   Eyes:      General: No scleral icterus. Pulmonary:      Effort: Pulmonary effort is normal.   Abdominal:      General: There is no distension. Musculoskeletal:      Cervical back: Neck supple. Skin:     General: Skin is warm. Neurological:      Mental Status: She is alert. Mental status is at baseline. Medical problems and test results were reviewed with the patient today.            Recent Results (from the past 672 hour(s))   Vitamin D 25 Hydroxy    Collection Time: 02/27/23  1:11 PM   Result Value Ref Range    Vit D, 25-Hydroxy 39.6 30.0 - 100.0 ng/mL     Lab Results   Component Value Date/Time    CHOL 177 12/02/2022 05:31 AM    HDL 74 12/02/2022 05:31 AM    VLDL 12 05/17/2022 11:54 AM              [facility] and follow-up. Diagnoses and all orders for this visit:    Paroxysmal atrial fibrillation ([address]    Hypertension, essential    Aortic valve insufficiency, etiology of cardiac valve disease unspecified    Return in about 6 months (around 9/3/2023).        Mckay Condon MD  3/3/2023  2:07 PM

## 2023-03-09 ENCOUNTER — TELEPHONE (OUTPATIENT)
Dept: SLEEP MEDICINE | Age: 78
End: 2023-03-09

## 2023-03-09 NOTE — TELEPHONE ENCOUNTER
----- Message from Bentley Hooker sent at 3/8/2023  8:00 PM EST -----  Regarding: Sleep Test  I had my sleep test on 2/19/23 and have not received any report. Thought I would have by now.   Thanks, Genoveva Martinez

## 2023-03-10 ENCOUNTER — TELEPHONE (OUTPATIENT)
Dept: PULMONOLOGY | Age: 78
End: 2023-03-10

## 2023-03-14 ENCOUNTER — TELEPHONE (OUTPATIENT)
Dept: SLEEP MEDICINE | Age: 78
End: 2023-03-14

## 2023-03-14 DIAGNOSIS — G47.33 OSA (OBSTRUCTIVE SLEEP APNEA): Primary | ICD-10-CM

## 2023-03-14 NOTE — TELEPHONE ENCOUNTER
Patient had recent sleep study showing moderate sleep apnea. Cpap therapy is recommended per sleep interp. Patient has agreed to start CPAP therapy. Order needs to be sent to [address] [place] [address].

## 2023-03-15 ENCOUNTER — OFFICE VISIT (OUTPATIENT)
Dept: ENDOCRINOLOGY | Age: 78
End: 2023-03-15
Payer: MEDICARE

## 2023-03-15 VITALS
HEART RATE: 86 BPM | WEIGHT: 197 LBS | SYSTOLIC BLOOD PRESSURE: 112 MMHG | DIASTOLIC BLOOD PRESSURE: 80 MMHG | BODY MASS INDEX: 33.81 KG/M2 | OXYGEN SATURATION: 97 %

## 2023-03-15 DIAGNOSIS — E89.0 POSTSURGICAL HYPOTHYROIDISM: ICD-10-CM

## 2023-03-15 DIAGNOSIS — E04.2 MULTIPLE THYROID NODULES: Primary | ICD-10-CM

## 2023-03-15 LAB — TSH W FREE THYROID IF ABNORMAL: 2.08 UIU/ML (ref 0.36–3.74)

## 2023-03-15 PROCEDURE — G8399 PT W/DXA RESULTS DOCUMENT: HCPCS | Performed by: INTERNAL MEDICINE

## 2023-03-15 PROCEDURE — 1090F PRES/ABSN URINE INCON ASSESS: CPT | Performed by: INTERNAL MEDICINE

## 2023-03-15 PROCEDURE — 3079F DIAST BP 80-89 MM HG: CPT | Performed by: INTERNAL MEDICINE

## 2023-03-15 PROCEDURE — G8417 CALC BMI ABV UP PARAM F/U: HCPCS | Performed by: INTERNAL MEDICINE

## 2023-03-15 PROCEDURE — 3074F SYST BP LT 130 MM HG: CPT | Performed by: INTERNAL MEDICINE

## 2023-03-15 PROCEDURE — 1123F ACP DISCUSS/DSCN MKR DOCD: CPT | Performed by: INTERNAL MEDICINE

## 2023-03-15 PROCEDURE — 1036F TOBACCO NON-USER: CPT | Performed by: INTERNAL MEDICINE

## 2023-03-15 PROCEDURE — 76536 US EXAM OF HEAD AND NECK: CPT | Performed by: INTERNAL MEDICINE

## 2023-03-15 PROCEDURE — G8484 FLU IMMUNIZE NO ADMIN: HCPCS | Performed by: INTERNAL MEDICINE

## 2023-03-15 PROCEDURE — G8427 DOCREV CUR MEDS BY ELIG CLIN: HCPCS | Performed by: INTERNAL MEDICINE

## 2023-03-15 PROCEDURE — 99204 OFFICE O/P NEW MOD 45 MIN: CPT | Performed by: INTERNAL MEDICINE

## 2023-03-15 RX ORDER — LEVOTHYROXINE SODIUM 0.1 MG/1
100 TABLET ORAL
Qty: 90 TABLET | Refills: 3
Start: 2023-03-15

## 2023-03-15 ASSESSMENT — ENCOUNTER SYMPTOMS
ROS SKIN COMMENTS: DENIES HAIR LOSS, DRY SKIN.
CONSTIPATION: 0
DIARRHEA: 0

## 2023-03-15 NOTE — PROGRESS NOTES
Piter Colbert MD, [facility]  [address], [address] [address]  Phone [phone]  Facsimile [phone]          Vince Saravia is a 69 y.o. female seen 3/15/2023 at the request of Dr. Brody Colbert for the evaluation of thyroid nodule        ASSESSMENT AND PLAN:    1. Multiple thyroid nodules  Thyroid ultrasound performed today revealed the presence of several subcentimeter nodules in the right lobe without suspicious sonographic features. No further imaging follow-up is necessary per ACR TI-RADS guidelines. 2. Postsurgical hypothyroidism  Followed by her primary care physician. I will check her thyroid function tests today to ensure that she is euthyroid. - levothyroxine (SYNTHROID) 100 MCG tablet; Take 1 tablet by mouth every morning (before breakfast)  Dispense: 90 tablet; Refill: 3            Procedures:    Thyroid ultrasound 3/15/2023: Right lobe 1.11 x 1.02 x 3.90 cm, fairly homogeneous echotexture. At the junction of the isthmus and right lobe there is a complex isoechoic nodule measuring 0.24 x 0.49 x 0.40 cm (TR 2). In the inferior right lobe there is a solid isoechoic nodule measuring 0.54 x 0.71 x 0.86 cm without microcalcifications (TR 3). There are several tiny colloid cysts scattered throughout the right lobe. Isthmus measures 0.34 cm. Left lobe is surgically absent. Follow-up and Dispositions    Return As needed. HISTORY OF PRESENT ILLNESS:      THYROID DISEASE    Presentation/Diagnosis: Status post thyroid lobectomy in the 1970s secondary to benign nodular disease. Symptoms: See review of systems. Treatment: Takes generic at night with other medications. Labs:  11/17/2022: TSH 3.97.  12/1/2022: TSH 2.30.      THYROID NODULE / MULTINODULAR GOITER    Presentation: She was incidentally noted to have a right thyroid nodule on CTA of the neck. Thyroid Cancer Risk Factors:   There is no family history of thyroid cancer. There is no history of radiation to the head/neck. Symptoms:  Denies anterior neck enlargement/pain/pressure. Denies dysphagia, positional shortness of breath, hoarseness. Imaging:  Thyroid ultrasound 3/15/2023: Right lobe 1.11 x 1.02 x 3.90 cm, fairly homogeneous echotexture. At the junction of the isthmus and right lobe there is a complex isoechoic nodule measuring 0.24 x 0.49 x 0.40 cm (TR 2). In the inferior right lobe there is a solid isoechoic nodule measuring 0.54 x 0.71 x 0.86 cm without microcalcifications (TR 3). There are several tiny colloid cysts scattered throughout the right lobe. Isthmus measures 0.34 cm. Left lobe is surgically absent. Review of Systems   Constitutional:  Positive for diaphoresis (night sweats) and fatigue. Negative for unexpected weight change. Cardiovascular:  Negative for palpitations (she has a history of atrial fibrillation s/p Watchman). Gastrointestinal:  Negative for constipation and diarrhea. Endocrine: Negative for cold intolerance and heat intolerance. Skin:         Denies hair loss, dry skin. Neurological:  Negative for tremors. Vital Signs:  /80   Pulse 86   Wt 197 lb (89.4 kg)   LMP  (LMP Unknown)   SpO2 97%   BMI 33.81 kg/m²     Wt Readings from Last 3 Encounters:   03/15/23 197 lb (89.4 kg)   03/03/23 201 lb (91.2 kg)   02/02/23 196 lb (88.9 kg)       Physical Exam  Constitutional:       General: She is not in acute distress. Neck:      Thyroid: No thyroid mass or thyromegaly. Comments: Thyroidectomy scar. Cardiovascular:      Rate and Rhythm: Normal rate and regular rhythm. Lymphadenopathy:      Cervical: No cervical adenopathy. Neurological:      Motor: No tremor.          Orders Placed This Encounter   Procedures    TSH with Reflex     Standing Status:   Future     Number of Occurrences:   1     Standing Expiration Date:   3/15/2024    CHG US SOFT TISSUE HEAD & NECK REAL TIME IMGE DOCM Current Outpatient Medications   Medication Sig Dispense Refill    levothyroxine (SYNTHROID) 100 MCG tablet Take 1 tablet by mouth every morning (before breakfast) 90 tablet 3    omeprazole (PRILOSEC) 40 MG delayed release capsule Take 1 capsule by mouth daily 90 capsule 3    flecainide (TAMBOCOR) 100 MG tablet Take 1 tablet by mouth in the morning and 1 tablet in the evening. 60 tablet 3    metoprolol tartrate (LOPRESSOR) 25 MG tablet Take 0.5 tablets by mouth in the morning and 0.5 tablets in the evening. 180 tablet 3    aspirin 81 MG EC tablet Take by mouth daily      busPIRone (BUSPAR) 10 MG tablet Take 1 tablet by mouth 2 times daily      vitamin D (CHOLECALCIFEROL) 25 MCG (1000 UT) TABS tablet Take 5,000 Units by mouth daily      docusate (COLACE, DULCOLAX) 100 MG CAPS Take 100 mg by mouth 2 times daily as needed      DULoxetine (CYMBALTA) 60 MG extended release capsule 90 mg TAKE 1 CAPSULE BY MOUTH EVERY DAY      LORazepam (ATIVAN) 0.5 MG tablet Take 0.5 mg by mouth every 8 hours as needed. melatonin 10 MG CAPS capsule Take by mouth      tiZANidine (ZANAFLEX) 4 MG tablet TAKE 1/2-1 TABLET BY MOUTH 3 TIMES A DAY AS NEEDED       No current facility-administered medications for this visit.

## 2023-03-20 ENCOUNTER — OFFICE VISIT (OUTPATIENT)
Dept: FAMILY MEDICINE CLINIC | Facility: CLINIC | Age: 78
End: 2023-03-20
Payer: MEDICARE

## 2023-03-20 VITALS
DIASTOLIC BLOOD PRESSURE: 74 MMHG | HEIGHT: 64 IN | SYSTOLIC BLOOD PRESSURE: 136 MMHG | TEMPERATURE: 97.2 F | OXYGEN SATURATION: 98 % | WEIGHT: 197 LBS | HEART RATE: 66 BPM | BODY MASS INDEX: 33.63 KG/M2

## 2023-03-20 DIAGNOSIS — R39.9 UTI SYMPTOMS: Primary | ICD-10-CM

## 2023-03-20 LAB
BILIRUBIN, URINE, POC: ABNORMAL
BLOOD URINE, POC: ABNORMAL
GLUCOSE URINE, POC: NEGATIVE
KETONES, URINE, POC: ABNORMAL
LEUKOCYTE ESTERASE, URINE, POC: ABNORMAL
NITRITE, URINE, POC: NEGATIVE
PH, URINE, POC: 5.5 (ref 4.6–8)
PROTEIN,URINE, POC: ABNORMAL
SPECIFIC GRAVITY, URINE, POC: 1.03 (ref 1–1.03)
URINALYSIS CLARITY, POC: CLEAR
URINALYSIS COLOR, POC: YELLOW
UROBILINOGEN, POC: NORMAL

## 2023-03-20 PROCEDURE — 3078F DIAST BP <80 MM HG: CPT | Performed by: FAMILY MEDICINE

## 2023-03-20 PROCEDURE — 81003 URINALYSIS AUTO W/O SCOPE: CPT | Performed by: FAMILY MEDICINE

## 2023-03-20 PROCEDURE — G8427 DOCREV CUR MEDS BY ELIG CLIN: HCPCS | Performed by: FAMILY MEDICINE

## 2023-03-20 PROCEDURE — 99213 OFFICE O/P EST LOW 20 MIN: CPT | Performed by: FAMILY MEDICINE

## 2023-03-20 PROCEDURE — 3074F SYST BP LT 130 MM HG: CPT | Performed by: FAMILY MEDICINE

## 2023-03-20 PROCEDURE — G8417 CALC BMI ABV UP PARAM F/U: HCPCS | Performed by: FAMILY MEDICINE

## 2023-03-20 PROCEDURE — 1123F ACP DISCUSS/DSCN MKR DOCD: CPT | Performed by: FAMILY MEDICINE

## 2023-03-20 PROCEDURE — G8399 PT W/DXA RESULTS DOCUMENT: HCPCS | Performed by: FAMILY MEDICINE

## 2023-03-20 PROCEDURE — G8484 FLU IMMUNIZE NO ADMIN: HCPCS | Performed by: FAMILY MEDICINE

## 2023-03-20 PROCEDURE — 1090F PRES/ABSN URINE INCON ASSESS: CPT | Performed by: FAMILY MEDICINE

## 2023-03-20 PROCEDURE — 1036F TOBACCO NON-USER: CPT | Performed by: FAMILY MEDICINE

## 2023-03-20 RX ORDER — CIPROFLOXACIN 250 MG/1
250 TABLET, FILM COATED ORAL 2 TIMES DAILY
Qty: 14 TABLET | Refills: 0 | Status: SHIPPED | OUTPATIENT
Start: 2023-03-20 | End: 2023-03-27

## 2023-03-20 SDOH — ECONOMIC STABILITY: FOOD INSECURITY: WITHIN THE PAST 12 MONTHS, YOU WORRIED THAT YOUR FOOD WOULD RUN OUT BEFORE YOU GOT MONEY TO BUY MORE.: NEVER TRUE

## 2023-03-20 SDOH — ECONOMIC STABILITY: INCOME INSECURITY: HOW HARD IS IT FOR YOU TO PAY FOR THE VERY BASICS LIKE FOOD, HOUSING, MEDICAL CARE, AND HEATING?: NOT HARD AT ALL

## 2023-03-20 SDOH — ECONOMIC STABILITY: FOOD INSECURITY: WITHIN THE PAST 12 MONTHS, THE FOOD YOU BOUGHT JUST DIDN'T LAST AND YOU DIDN'T HAVE MONEY TO GET MORE.: NEVER TRUE

## 2023-03-20 SDOH — ECONOMIC STABILITY: HOUSING INSECURITY
IN THE LAST 12 MONTHS, WAS THERE A TIME WHEN YOU DID NOT HAVE A STEADY PLACE TO SLEEP OR SLEPT IN A SHELTER (INCLUDING NOW)?: NO

## 2023-03-20 ASSESSMENT — PATIENT HEALTH QUESTIONNAIRE - PHQ9
3. TROUBLE FALLING OR STAYING ASLEEP: 3
5. POOR APPETITE OR OVEREATING: 0
1. LITTLE INTEREST OR PLEASURE IN DOING THINGS: 0
SUM OF ALL RESPONSES TO PHQ QUESTIONS 1-9: 7
6. FEELING BAD ABOUT YOURSELF - OR THAT YOU ARE A FAILURE OR HAVE LET YOURSELF OR YOUR FAMILY DOWN: 0
8. MOVING OR SPEAKING SO SLOWLY THAT OTHER PEOPLE COULD HAVE NOTICED. OR THE OPPOSITE, BEING SO FIGETY OR RESTLESS THAT YOU HAVE BEEN MOVING AROUND A LOT MORE THAN USUAL: 0
9. THOUGHTS THAT YOU WOULD BE BETTER OFF DEAD, OR OF HURTING YOURSELF: 0
2. FEELING DOWN, DEPRESSED OR HOPELESS: 2
7. TROUBLE CONCENTRATING ON THINGS, SUCH AS READING THE NEWSPAPER OR WATCHING TELEVISION: 0
10. IF YOU CHECKED OFF ANY PROBLEMS, HOW DIFFICULT HAVE THESE PROBLEMS MADE IT FOR YOU TO DO YOUR WORK, TAKE CARE OF THINGS AT HOME, OR GET ALONG WITH OTHER PEOPLE: 0
SUM OF ALL RESPONSES TO PHQ QUESTIONS 1-9: 7
4. FEELING TIRED OR HAVING LITTLE ENERGY: 2
SUM OF ALL RESPONSES TO PHQ9 QUESTIONS 1 & 2: 2
SUM OF ALL RESPONSES TO PHQ QUESTIONS 1-9: 7
SUM OF ALL RESPONSES TO PHQ QUESTIONS 1-9: 7

## 2023-03-23 LAB
BACTERIA SPEC CULT: ABNORMAL
BACTERIA SPEC CULT: ABNORMAL
SERVICE CMNT-IMP: ABNORMAL

## 2023-04-02 ASSESSMENT — ENCOUNTER SYMPTOMS
RESPIRATORY NEGATIVE: 1
GASTROINTESTINAL NEGATIVE: 1
EYES NEGATIVE: 1

## 2023-04-02 NOTE — PROGRESS NOTES
Mood and Affect: Mood normal.         Behavior: Behavior normal.         Thought Content: Thought content normal.         Judgment: Judgment normal.       Orders Only on 03/15/2023   Component Date Value Ref Range Status    TSH w Free Thyroid if Abnormal 03/15/2023 2.08  0.358 - 3.740 UIU/ML Final       ASSESSMENT and PLAN    UTI symptoms  -     AMB POC URINALYSIS DIP STICK AUTO W/O MICRO  -     Culture, Urine; Future      Current treatment plan is effective. no changes in therapy    No follow-ups on file.      Franco Galeas MD

## 2023-04-24 NOTE — PROGRESS NOTES
made initial visit. Pt was alert and verbal appearing comfortable.  welcomed pt to DT and shared information about  services.  provided spiritual care through presence, pastoral conversation, and assurance of prayer. Eye Clamp Note Details: An eye clamp was used during the procedure.

## 2023-05-17 RX ORDER — FLECAINIDE ACETATE 100 MG/1
100 TABLET ORAL EVERY 12 HOURS
Qty: 60 TABLET | Refills: 11 | Status: SHIPPED | OUTPATIENT
Start: 2023-05-17

## 2023-05-17 NOTE — TELEPHONE ENCOUNTER
Requested Prescriptions     Pending Prescriptions Disp Refills    flecainide (TAMBOCOR) 100 MG tablet 60 tablet 11     Sig: Take 1 tablet by mouth in the morning and 1 tablet in the evening.

## 2023-05-17 NOTE — TELEPHONE ENCOUNTER
MEDICATION REFILL REQUEST      Name of Medication:  Flecainide  Dose:  100  Frequency:  BID  Quantity:  ?  Days' supply:  ?       Pharmacy Name/Location: [phone]

## 2023-05-26 ENCOUNTER — HOSPITAL ENCOUNTER (EMERGENCY)
Age: 78
Discharge: HOME | End: 2023-05-26
Attending: EMERGENCY MEDICINE
Payer: MEDICARE

## 2023-05-26 ENCOUNTER — APPOINTMENT (OUTPATIENT)
Dept: CT IMAGING | Age: 78
End: 2023-05-26
Payer: MEDICARE

## 2023-05-26 VITALS
RESPIRATION RATE: 16 BRPM | WEIGHT: 195 LBS | SYSTOLIC BLOOD PRESSURE: 147 MMHG | TEMPERATURE: 98.6 F | HEIGHT: 64 IN | HEART RATE: 72 BPM | BODY MASS INDEX: 33.29 KG/M2 | DIASTOLIC BLOOD PRESSURE: 72 MMHG | OXYGEN SATURATION: 97 %

## 2023-05-26 DIAGNOSIS — S09.90XA INJURY OF HEAD, INITIAL ENCOUNTER: Primary | ICD-10-CM

## 2023-05-26 DIAGNOSIS — S00.83XA CONTUSION OF FACE, INITIAL ENCOUNTER: ICD-10-CM

## 2023-05-26 PROCEDURE — 70450 CT HEAD/BRAIN W/O DYE: CPT

## 2023-05-26 PROCEDURE — 99284 EMERGENCY DEPT VISIT MOD MDM: CPT

## 2023-05-26 ASSESSMENT — PAIN DESCRIPTION - LOCATION: LOCATION: HEAD

## 2023-05-26 ASSESSMENT — LIFESTYLE VARIABLES
HOW MANY STANDARD DRINKS CONTAINING ALCOHOL DO YOU HAVE ON A TYPICAL DAY: PATIENT DOES NOT DRINK
HOW OFTEN DO YOU HAVE A DRINK CONTAINING ALCOHOL: NEVER

## 2023-05-26 ASSESSMENT — PAIN SCALES - GENERAL: PAINLEVEL_OUTOF10: 2

## 2023-05-26 ASSESSMENT — PAIN - FUNCTIONAL ASSESSMENT: PAIN_FUNCTIONAL_ASSESSMENT: 0-10

## 2023-05-26 NOTE — ED PROVIDER NOTES
Emergency Department Provider Note       PCP: Pippa Germain MD   Age: 69 y.o. Sex: female     DISPOSITION Decision To Discharge 05/26/2023 01:27:43 PM       ICD-10-CM    1. Injury of head, initial encounter  S09.90XA       2. Contusion of face, initial encounter  S00.83XA           Medical Decision Making     Complexity of Problems Addressed:  1 or more acute illnesses that pose a threat to life or bodily function. Data Reviewed and Analyzed:  Category 1:   I independently ordered and reviewed each unique test.  I reviewed external records: provider visit note from PCP. Category 2:   I interpreted the CT Scan CT scan of the head without any intracranial hemorrhage, skull fracture, infarct. Agree with radiologist interpretation. Category 3: Discussion of management or test interpretation. CT scan of the head was negative for any acute intracranial injury. She is not on anticoagulant at this time. She has a Watchman device for her A-fib. However due to her age group I did discuss delay bleed symptoms with the patient and her family member. I recommend observation for any delay changes over the next 7 to 10 days. They are in agreement with the plan. I recommend wound care for her superficial abrasion and hematoma. They have no further questions or concerns at this time. Risk of Complications and/or Morbidity of Patient Management:  Shared medical decision making was utilized in creating the patients health plan today. History      Demetrice Bowens is a 69 y.o. female who presents to the Emergency Department with chief complaint of    Chief Complaint   Patient presents with    Fall    Eye Injury    Head Injury     Fall hitting her head on asphalt in parking lot. Pt denies LOC. 69 female trip fall hitting her head on the ground. Tripped over her dog as she was walking to the [employer]. No loss of consciousness. Not on blood thinner. A-fib. Watchman device.   No chest pain

## 2023-05-26 NOTE — ED NOTES
I have reviewed discharge instructions with the patient. The patient verbalized understanding. Patient left ED via Discharge Method: ambulatory to Home with ( family self). Opportunity for questions and clarification provided. Patient given 0 scripts. To continue your aftercare when you leave the hospital, you may receive an automated call from our care team to check in on how you are doing. This is a free service and part of our promise to provide the best care and service to meet your aftercare needs.  If you have questions, or wish to unsubscribe from this service please call [phone]. Thank you for Choosing our [facility] Emergency Department.        Yandel Camp RN  05/26/23 [id number]

## 2023-05-26 NOTE — ED TRIAGE NOTES
Pt to ED c/o fall with head injury and swelling to left eye. Pt denies Loss of consciousness. Pt is not on blood thinners, has a watchman in place.

## 2023-05-26 NOTE — DISCHARGE INSTRUCTIONS
Ice to the area of swelling. Antibiotic to the wound 2 times per day. Return immediately to the closest ER if worsening symptoms such as vision changes, severe headache, weakness tingling numbness.

## 2023-05-27 ASSESSMENT — ENCOUNTER SYMPTOMS
RESPIRATORY NEGATIVE: 1
GASTROINTESTINAL NEGATIVE: 1
BACK PAIN: 0

## 2023-05-30 ENCOUNTER — CARE COORDINATION (OUTPATIENT)
Dept: CARE COORDINATION | Facility: CLINIC | Age: 78
End: 2023-05-30

## 2023-05-30 NOTE — CARE COORDINATION
Ambulatory Care Management Note    Date/Time:  5/30/2023 11:20 AM    This patient was received as a referral from Daily assignment. Ambulatory Care Manager outreached to patient today to offer care management services. Introduction to self and role of care manager provided. Patient declined care management services at this time. No follow up call scheduled at this time. Patient has Ambulatory Care Manager's contact number for for any questions or concerns.

## 2023-06-01 NOTE — PROGRESS NOTES
[facility]  [address] [facility] [address], [address] [place] [address] Court, [address] [facility]  [phone]    Patient Name:  Roberta Hart  YOB: 1945      Office Visit 6/2/2023    CHIEF COMPLAINT:      Chief Complaint   Patient presents with    Sleep Apnea       HISTORY OF PRESENT ILLNESS:      The patient present for evaluation and management of sleep apnea. The patient had history of intermittent snoring, witnessed apneas, morning headaches, daytime fatigue and none restorative sleep. Additional symptoms include sweating, palpitation intermittently, waking up with a dry mouth. She indicated she has difficulty falling asleep at time and going back to sleep when she wake up at nighttime. She stated that she go to bed between 9 and 10 PM and wake up between 8 and 9 AM daily. She wake up at least 3 times at nighttime and have a great difficulty going back to sleep. There is no history of cataplexy, hypnagogic hallucinations, or sleep paralysis. In addition, there is no history of frequent leg movements, kicking at night, or an inability to keep the legs still. She never had any sleep evaluation previously but she had history of fall twice and has been seen by neurology and she was recommended to consider sleep evaluation based on that. She has history of anxiety and depression and currently being treated for that. She also has history of atrial fibrillation and is started back at least last year and she was seen [facility] [place] [facility]. Currently she is on flecainide and she had left parietal scalp hematoma after falling while she is on anticoagulation for A. fib. Therefore, she was taken off anticoagulation and underwent watchman procedure by Dr. Chan Rizvi. Since her last office visit she had diagnostic polysomnography study done which indicated she had an AHI of 18.8/hour with the lowest oxygen saturation was 74%.   Subsequently, she was started on auto CPAP trial

## 2023-06-02 ENCOUNTER — OFFICE VISIT (OUTPATIENT)
Dept: SLEEP MEDICINE | Age: 78
End: 2023-06-02
Payer: MEDICARE

## 2023-06-02 VITALS
HEIGHT: 64 IN | HEART RATE: 67 BPM | TEMPERATURE: 97.2 F | DIASTOLIC BLOOD PRESSURE: 84 MMHG | BODY MASS INDEX: 34.15 KG/M2 | SYSTOLIC BLOOD PRESSURE: 158 MMHG | WEIGHT: 200 LBS | OXYGEN SATURATION: 100 % | RESPIRATION RATE: 14 BRPM

## 2023-06-02 DIAGNOSIS — G47.8 NON-RESTORATIVE SLEEP: ICD-10-CM

## 2023-06-02 DIAGNOSIS — G47.33 OSA ON CPAP: Primary | ICD-10-CM

## 2023-06-02 DIAGNOSIS — G47.00 PERSISTENT DISORDER OF INITIATING OR MAINTAINING SLEEP: ICD-10-CM

## 2023-06-02 DIAGNOSIS — Z99.89 OSA ON CPAP: Primary | ICD-10-CM

## 2023-06-02 PROCEDURE — G8417 CALC BMI ABV UP PARAM F/U: HCPCS | Performed by: INTERNAL MEDICINE

## 2023-06-02 PROCEDURE — G8427 DOCREV CUR MEDS BY ELIG CLIN: HCPCS | Performed by: INTERNAL MEDICINE

## 2023-06-02 PROCEDURE — 3079F DIAST BP 80-89 MM HG: CPT | Performed by: INTERNAL MEDICINE

## 2023-06-02 PROCEDURE — 1123F ACP DISCUSS/DSCN MKR DOCD: CPT | Performed by: INTERNAL MEDICINE

## 2023-06-02 PROCEDURE — G8399 PT W/DXA RESULTS DOCUMENT: HCPCS | Performed by: INTERNAL MEDICINE

## 2023-06-02 PROCEDURE — 99214 OFFICE O/P EST MOD 30 MIN: CPT | Performed by: INTERNAL MEDICINE

## 2023-06-02 PROCEDURE — 1036F TOBACCO NON-USER: CPT | Performed by: INTERNAL MEDICINE

## 2023-06-02 PROCEDURE — 1090F PRES/ABSN URINE INCON ASSESS: CPT | Performed by: INTERNAL MEDICINE

## 2023-06-02 PROCEDURE — 3077F SYST BP >= 140 MM HG: CPT | Performed by: INTERNAL MEDICINE

## 2023-06-02 ASSESSMENT — SLEEP AND FATIGUE QUESTIONNAIRES
HOW LIKELY ARE YOU TO NOD OFF OR FALL ASLEEP WHILE SITTING QUIETLY AFTER LUNCH WITHOUT ALCOHOL: 2
HOW LIKELY ARE YOU TO NOD OFF OR FALL ASLEEP IN A CAR, WHILE STOPPED FOR A FEW MINUTES IN TRAFFIC: 0
HOW LIKELY ARE YOU TO NOD OFF OR FALL ASLEEP WHEN YOU ARE A PASSENGER IN A CAR FOR AN HOUR WITHOUT A BREAK: 1
HOW LIKELY ARE YOU TO NOD OFF OR FALL ASLEEP WHILE SITTING AND READING: 3
HOW LIKELY ARE YOU TO NOD OFF OR FALL ASLEEP WHILE SITTING AND TALKING TO SOMEONE: 0
HOW LIKELY ARE YOU TO NOD OFF OR FALL ASLEEP WHILE SITTING INACTIVE IN A PUBLIC PLACE: 0
ESS TOTAL SCORE: 11
HOW LIKELY ARE YOU TO NOD OFF OR FALL ASLEEP WHILE WATCHING TV: 3
HOW LIKELY ARE YOU TO NOD OFF OR FALL ASLEEP WHILE LYING DOWN TO REST IN THE AFTERNOON WHEN CIRCUMSTANCES PERMIT: 2

## 2023-08-10 ENCOUNTER — APPOINTMENT (RX ONLY)
Dept: URBAN - METROPOLITAN AREA CLINIC 330 | Facility: CLINIC | Age: 78
Setting detail: DERMATOLOGY
End: 2023-08-10

## 2023-08-10 DIAGNOSIS — L28.1 PRURIGO NODULARIS: ICD-10-CM

## 2023-08-10 DIAGNOSIS — Z71.89 OTHER SPECIFIED COUNSELING: ICD-10-CM

## 2023-08-10 DIAGNOSIS — D22 MELANOCYTIC NEVI: ICD-10-CM | Status: STABLE

## 2023-08-10 DIAGNOSIS — D485 NEOPLASM OF UNCERTAIN BEHAVIOR OF SKIN: ICD-10-CM

## 2023-08-10 DIAGNOSIS — L57.8 OTHER SKIN CHANGES DUE TO CHRONIC EXPOSURE TO NONIONIZING RADIATION: ICD-10-CM

## 2023-08-10 PROBLEM — D48.5 NEOPLASM OF UNCERTAIN BEHAVIOR OF SKIN: Status: ACTIVE | Noted: 2023-08-10

## 2023-08-10 PROBLEM — D22.5 MELANOCYTIC NEVI OF TRUNK: Status: ACTIVE | Noted: 2023-08-10

## 2023-08-10 PROCEDURE — ? PRESCRIPTION

## 2023-08-10 PROCEDURE — ? COUNSELING

## 2023-08-10 PROCEDURE — 11102 TANGNTL BX SKIN SINGLE LES: CPT

## 2023-08-10 PROCEDURE — ? TREATMENT REGIMEN

## 2023-08-10 PROCEDURE — ? BIOPSY BY SHAVE METHOD

## 2023-08-10 PROCEDURE — ? EDUCATIONAL RESOURCES PROVIDED

## 2023-08-10 PROCEDURE — ? OTHER

## 2023-08-10 PROCEDURE — ? FULL BODY SKIN EXAM

## 2023-08-10 PROCEDURE — 99203 OFFICE O/P NEW LOW 30 MIN: CPT | Mod: 25

## 2023-08-10 PROCEDURE — ? MEDICAL PHOTOGRAPHY REVIEW

## 2023-08-10 RX ORDER — CLOBETASOL PROPIONATE 0.5 MG/G
CREAM TOPICAL BID
Qty: 30 | Refills: 3 | Status: ERX | COMMUNITY
Start: 2023-08-10

## 2023-08-10 RX ADMIN — CLOBETASOL PROPIONATE: 0.5 CREAM TOPICAL at 00:00

## 2023-08-10 ASSESSMENT — LOCATION DETAILED DESCRIPTION DERM
LOCATION DETAILED: LEFT PROXIMAL DORSAL FOREARM
LOCATION DETAILED: RIGHT DISTAL POSTERIOR UPPER ARM
LOCATION DETAILED: LEFT SUPERIOR CENTRAL BUCCAL CHEEK
LOCATION DETAILED: SUPERIOR THORACIC SPINE
LOCATION DETAILED: INFERIOR THORACIC SPINE

## 2023-08-10 ASSESSMENT — LOCATION ZONE DERM
LOCATION ZONE: FACE
LOCATION ZONE: ARM
LOCATION ZONE: TRUNK

## 2023-08-10 ASSESSMENT — LOCATION SIMPLE DESCRIPTION DERM
LOCATION SIMPLE: RIGHT POSTERIOR UPPER ARM
LOCATION SIMPLE: UPPER BACK
LOCATION SIMPLE: LEFT FOREARM
LOCATION SIMPLE: LEFT CHEEK

## 2023-08-10 NOTE — PROCEDURE: BIOPSY BY SHAVE METHOD
Detail Level: Detailed
Depth Of Biopsy: dermis
Was A Bandage Applied: Yes
Size Of Lesion In Cm: 0
Accession #: skin path
Biopsy Type: H and E
Biopsy Method: Dermablade
Anesthesia Type: 1% lidocaine with epinephrine
Anesthesia Volume In Cc (Will Not Render If 0): 0.5
Hemostasis: Drysol
Wound Care: Petrolatum
Dressing: bandage
Destruction After The Procedure: No
Type Of Destruction Used: Curettage
Curettage Text: The wound bed was treated with curettage after the biopsy was performed.
Cryotherapy Text: The wound bed was treated with cryotherapy after the biopsy was performed.
Electrodesiccation Text: The wound bed was treated with electrodesiccation after the biopsy was performed.
Electrodesiccation And Curettage Text: The wound bed was treated with electrodesiccation and curettage after the biopsy was performed.
Silver Nitrate Text: The wound bed was treated with silver nitrate after the biopsy was performed.
Lab: 6
Lab Facility: 3
Consent: Written consent was obtained and risks were reviewed including but not limited to scarring, infection, bleeding, scabbing, incomplete removal, nerve damage and allergy to anesthesia.
Post-Care Instructions: I reviewed with the patient in detail post-care instructions. Patient is to keep the biopsy site dry overnight, and then apply bacitracin twice daily until healed. Patient may apply hydrogen peroxide soaks to remove any crusting.
Notification Instructions: Patient will be notified of biopsy results. However, patient instructed to call the office if not contacted within 2 weeks.
Billing Type: Third-Party Bill
Information: Selecting Yes will display possible errors in your note based on the variables you have selected. This validation is only offered as a suggestion for you. PLEASE NOTE THAT THE VALIDATION TEXT WILL BE REMOVED WHEN YOU FINALIZE YOUR NOTE. IF YOU WANT TO FAX A PRELIMINARY NOTE YOU WILL NEED TO TOGGLE THIS TO 'NO' IF YOU DO NOT WANT IT IN YOUR FAXED NOTE.

## 2023-08-10 NOTE — PROCEDURE: MIPS QUALITY
Detail Level: Detailed
Quality 47: Advance Care Plan: Advance care planning not documented, reason not otherwise specified.
Quality 110: Preventive Care And Screening: Influenza Immunization: Influenza Immunization Administered during Influenza season
Quality 431: Preventive Care And Screening: Unhealthy Alcohol Use - Screening: Patient not identified as an unhealthy alcohol user when screened for unhealthy alcohol use using a systematic screening method
Quality 402: Tobacco Use And Help With Quitting Among Adolescents: Patient screened for tobacco and never smoked
Quality 130: Documentation Of Current Medications In The Medical Record: Current Medications Documented
Quality 226: Preventive Care And Screening: Tobacco Use: Screening And Cessation Intervention: Patient screened for tobacco use and is an ex/non-smoker

## 2023-09-11 ENCOUNTER — OFFICE VISIT (OUTPATIENT)
Dept: FAMILY MEDICINE CLINIC | Facility: CLINIC | Age: 78
End: 2023-09-11

## 2023-09-11 ENCOUNTER — NURSE ONLY (OUTPATIENT)
Dept: FAMILY MEDICINE CLINIC | Facility: CLINIC | Age: 78
End: 2023-09-11

## 2023-09-11 VITALS
WEIGHT: 196 LBS | HEART RATE: 65 BPM | SYSTOLIC BLOOD PRESSURE: 128 MMHG | TEMPERATURE: 97.2 F | HEIGHT: 64 IN | DIASTOLIC BLOOD PRESSURE: 78 MMHG | BODY MASS INDEX: 33.46 KG/M2 | OXYGEN SATURATION: 99 %

## 2023-09-11 DIAGNOSIS — Z99.89 OSA ON CPAP: ICD-10-CM

## 2023-09-11 DIAGNOSIS — I10 ESSENTIAL (PRIMARY) HYPERTENSION: ICD-10-CM

## 2023-09-11 DIAGNOSIS — G47.33 OSA ON CPAP: ICD-10-CM

## 2023-09-11 DIAGNOSIS — E07.9 DISORDER OF THYROID, UNSPECIFIED: ICD-10-CM

## 2023-09-11 DIAGNOSIS — K21.00 GASTRO-ESOPHAGEAL REFLUX DISEASE WITH ESOPHAGITIS, WITHOUT BLEEDING: ICD-10-CM

## 2023-09-11 DIAGNOSIS — I48.0 PAROXYSMAL ATRIAL FIBRILLATION (HCC): ICD-10-CM

## 2023-09-11 DIAGNOSIS — Z00.00 MEDICARE ANNUAL WELLNESS VISIT, SUBSEQUENT: Primary | ICD-10-CM

## 2023-09-11 DIAGNOSIS — Z00.00 MEDICARE ANNUAL WELLNESS VISIT, SUBSEQUENT: ICD-10-CM

## 2023-09-11 DIAGNOSIS — E66.09 CLASS 1 OBESITY DUE TO EXCESS CALORIES WITH SERIOUS COMORBIDITY AND BODY MASS INDEX (BMI) OF 32.0 TO 32.9 IN ADULT: Chronic | ICD-10-CM

## 2023-09-11 LAB
ALBUMIN SERPL-MCNC: 3.7 G/DL (ref 3.2–4.6)
ALBUMIN/GLOB SERPL: 1.3 (ref 0.4–1.6)
ALP SERPL-CCNC: 98 U/L (ref 50–136)
ALT SERPL-CCNC: 16 U/L (ref 12–65)
ANION GAP SERPL CALC-SCNC: 8 MMOL/L (ref 2–11)
APPEARANCE UR: CLEAR
AST SERPL-CCNC: 16 U/L (ref 15–37)
BACTERIA URNS QL MICRO: 0 /HPF
BASOPHILS # BLD: 0.1 K/UL (ref 0–0.2)
BASOPHILS NFR BLD: 1 % (ref 0–2)
BILIRUB SERPL-MCNC: 0.3 MG/DL (ref 0.2–1.1)
BILIRUB UR QL: NEGATIVE
BUN SERPL-MCNC: 16 MG/DL (ref 8–23)
CALCIUM SERPL-MCNC: 9.5 MG/DL (ref 8.3–10.4)
CASTS URNS QL MICRO: 0 /LPF
CHLORIDE SERPL-SCNC: 112 MMOL/L (ref 101–110)
CHOLEST SERPL-MCNC: 169 MG/DL
CO2 SERPL-SCNC: 25 MMOL/L (ref 21–32)
COLOR UR: ABNORMAL
CREAT SERPL-MCNC: 1 MG/DL (ref 0.6–1)
CRYSTALS URNS QL MICRO: 0 /LPF
DIFFERENTIAL METHOD BLD: NORMAL
EOSINOPHIL # BLD: 0.2 K/UL (ref 0–0.8)
EOSINOPHIL NFR BLD: 4 % (ref 0.5–7.8)
EPI CELLS #/AREA URNS HPF: ABNORMAL /HPF
ERYTHROCYTE [DISTWIDTH] IN BLOOD BY AUTOMATED COUNT: 13.9 % (ref 11.9–14.6)
GLOBULIN SER CALC-MCNC: 2.9 G/DL (ref 2.8–4.5)
GLUCOSE SERPL-MCNC: 92 MG/DL (ref 65–100)
GLUCOSE UR STRIP.AUTO-MCNC: NEGATIVE MG/DL
HCT VFR BLD AUTO: 39.6 % (ref 35.8–46.3)
HDLC SERPL-MCNC: 75 MG/DL (ref 40–60)
HDLC SERPL: 2.3
HGB BLD-MCNC: 13 G/DL (ref 11.7–15.4)
HGB UR QL STRIP: ABNORMAL
IMM GRANULOCYTES # BLD AUTO: 0 K/UL (ref 0–0.5)
IMM GRANULOCYTES NFR BLD AUTO: 0 % (ref 0–5)
KETONES UR QL STRIP.AUTO: NEGATIVE MG/DL
LDLC SERPL CALC-MCNC: 76 MG/DL
LEUKOCYTE ESTERASE UR QL STRIP.AUTO: ABNORMAL
LYMPHOCYTES # BLD: 2 K/UL (ref 0.5–4.6)
LYMPHOCYTES NFR BLD: 40 % (ref 13–44)
MCH RBC QN AUTO: 29.3 PG (ref 26.1–32.9)
MCHC RBC AUTO-ENTMCNC: 32.8 G/DL (ref 31.4–35)
MCV RBC AUTO: 89.2 FL (ref 82–102)
MONOCYTES # BLD: 0.3 K/UL (ref 0.1–1.3)
MONOCYTES NFR BLD: 6 % (ref 4–12)
MUCOUS THREADS URNS QL MICRO: 0 /LPF
NEUTS SEG # BLD: 2.5 K/UL (ref 1.7–8.2)
NEUTS SEG NFR BLD: 49 % (ref 43–78)
NITRITE UR QL STRIP.AUTO: NEGATIVE
NRBC # BLD: 0 K/UL (ref 0–0.2)
OTHER OBSERVATIONS: ABNORMAL
PH UR STRIP: 5 (ref 5–9)
PLATELET # BLD AUTO: 276 K/UL (ref 150–450)
PMV BLD AUTO: 10.8 FL (ref 9.4–12.3)
POTASSIUM SERPL-SCNC: 3.8 MMOL/L (ref 3.5–5.1)
PROT SERPL-MCNC: 6.6 G/DL (ref 6.3–8.2)
PROT UR STRIP-MCNC: NEGATIVE MG/DL
RBC # BLD AUTO: 4.44 M/UL (ref 4.05–5.2)
RBC #/AREA URNS HPF: ABNORMAL /HPF
SODIUM SERPL-SCNC: 145 MMOL/L (ref 133–143)
SP GR UR REFRACTOMETRY: 1.02 (ref 1–1.02)
TRIGL SERPL-MCNC: 90 MG/DL (ref 35–150)
TSH, 3RD GENERATION: 2.5 UIU/ML (ref 0.36–3.74)
URINE CULTURE IF INDICATED: ABNORMAL
UROBILINOGEN UR QL STRIP.AUTO: 0.2 EU/DL (ref 0.2–1)
VLDLC SERPL CALC-MCNC: 18 MG/DL (ref 6–23)
WBC # BLD AUTO: 5.1 K/UL (ref 4.3–11.1)
WBC URNS QL MICRO: ABNORMAL /HPF

## 2023-09-11 SDOH — HEALTH STABILITY: PHYSICAL HEALTH: ON AVERAGE, HOW MANY DAYS PER WEEK DO YOU ENGAGE IN MODERATE TO STRENUOUS EXERCISE (LIKE A BRISK WALK)?: 3 DAYS

## 2023-09-11 SDOH — HEALTH STABILITY: PHYSICAL HEALTH: ON AVERAGE, HOW MANY MINUTES DO YOU ENGAGE IN EXERCISE AT THIS LEVEL?: 30 MIN

## 2023-09-11 ASSESSMENT — LIFESTYLE VARIABLES
HOW MANY STANDARD DRINKS CONTAINING ALCOHOL DO YOU HAVE ON A TYPICAL DAY: 1
HOW OFTEN DO YOU HAVE A DRINK CONTAINING ALCOHOL: 2
HOW MANY STANDARD DRINKS CONTAINING ALCOHOL DO YOU HAVE ON A TYPICAL DAY: 1 OR 2
HOW OFTEN DO YOU HAVE A DRINK CONTAINING ALCOHOL: MONTHLY OR LESS
HOW OFTEN DO YOU HAVE SIX OR MORE DRINKS ON ONE OCCASION: 1

## 2023-09-11 ASSESSMENT — PATIENT HEALTH QUESTIONNAIRE - PHQ9
SUM OF ALL RESPONSES TO PHQ QUESTIONS 1-9: 0
SUM OF ALL RESPONSES TO PHQ QUESTIONS 1-9: 0
10. IF YOU CHECKED OFF ANY PROBLEMS, HOW DIFFICULT HAVE THESE PROBLEMS MADE IT FOR YOU TO DO YOUR WORK, TAKE CARE OF THINGS AT HOME, OR GET ALONG WITH OTHER PEOPLE: 0
5. POOR APPETITE OR OVEREATING: 0
6. FEELING BAD ABOUT YOURSELF - OR THAT YOU ARE A FAILURE OR HAVE LET YOURSELF OR YOUR FAMILY DOWN: 0
SUM OF ALL RESPONSES TO PHQ9 QUESTIONS 1 & 2: 2
2. FEELING DOWN, DEPRESSED OR HOPELESS: 1
SUM OF ALL RESPONSES TO PHQ QUESTIONS 1-9: 0
3. TROUBLE FALLING OR STAYING ASLEEP: 0
1. LITTLE INTEREST OR PLEASURE IN DOING THINGS: 1
SUM OF ALL RESPONSES TO PHQ9 QUESTIONS 1 & 2: 0
4. FEELING TIRED OR HAVING LITTLE ENERGY: 0
1. LITTLE INTEREST OR PLEASURE IN DOING THINGS: 0
7. TROUBLE CONCENTRATING ON THINGS, SUCH AS READING THE NEWSPAPER OR WATCHING TELEVISION: 0
SUM OF ALL RESPONSES TO PHQ QUESTIONS 1-9: 2
9. THOUGHTS THAT YOU WOULD BE BETTER OFF DEAD, OR OF HURTING YOURSELF: 0
SUM OF ALL RESPONSES TO PHQ QUESTIONS 1-9: 2
SUM OF ALL RESPONSES TO PHQ QUESTIONS 1-9: 0
2. FEELING DOWN, DEPRESSED OR HOPELESS: 0
8. MOVING OR SPEAKING SO SLOWLY THAT OTHER PEOPLE COULD HAVE NOTICED. OR THE OPPOSITE, BEING SO FIGETY OR RESTLESS THAT YOU HAVE BEEN MOVING AROUND A LOT MORE THAN USUAL: 0

## 2023-09-27 ENCOUNTER — OFFICE VISIT (OUTPATIENT)
Age: 78
End: 2023-09-27
Payer: MEDICARE

## 2023-09-27 VITALS
BODY MASS INDEX: 33.97 KG/M2 | DIASTOLIC BLOOD PRESSURE: 88 MMHG | WEIGHT: 199 LBS | HEART RATE: 72 BPM | SYSTOLIC BLOOD PRESSURE: 150 MMHG | HEIGHT: 64 IN

## 2023-09-27 DIAGNOSIS — Z79.899 LONG TERM CURRENT USE OF ANTIARRHYTHMIC DRUG: ICD-10-CM

## 2023-09-27 DIAGNOSIS — I10 HYPERTENSION, ESSENTIAL: Primary | ICD-10-CM

## 2023-09-27 DIAGNOSIS — I48.0 PAROXYSMAL ATRIAL FIBRILLATION (HCC): ICD-10-CM

## 2023-09-27 PROCEDURE — 3079F DIAST BP 80-89 MM HG: CPT | Performed by: INTERNAL MEDICINE

## 2023-09-27 PROCEDURE — 3077F SYST BP >= 140 MM HG: CPT | Performed by: INTERNAL MEDICINE

## 2023-09-27 PROCEDURE — G8427 DOCREV CUR MEDS BY ELIG CLIN: HCPCS | Performed by: INTERNAL MEDICINE

## 2023-09-27 PROCEDURE — 1090F PRES/ABSN URINE INCON ASSESS: CPT | Performed by: INTERNAL MEDICINE

## 2023-09-27 PROCEDURE — G8399 PT W/DXA RESULTS DOCUMENT: HCPCS | Performed by: INTERNAL MEDICINE

## 2023-09-27 PROCEDURE — 99214 OFFICE O/P EST MOD 30 MIN: CPT | Performed by: INTERNAL MEDICINE

## 2023-09-27 PROCEDURE — 1036F TOBACCO NON-USER: CPT | Performed by: INTERNAL MEDICINE

## 2023-09-27 PROCEDURE — 1123F ACP DISCUSS/DSCN MKR DOCD: CPT | Performed by: INTERNAL MEDICINE

## 2023-09-27 PROCEDURE — G8417 CALC BMI ABV UP PARAM F/U: HCPCS | Performed by: INTERNAL MEDICINE

## 2023-09-27 RX ORDER — FLECAINIDE ACETATE 100 MG/1
100 TABLET ORAL EVERY 12 HOURS
Qty: 180 TABLET | Refills: 3 | Status: SHIPPED | OUTPATIENT
Start: 2023-09-27

## 2023-09-27 ASSESSMENT — ENCOUNTER SYMPTOMS
ABDOMINAL PAIN: 0
COUGH: 0
EYE PAIN: 0
STRIDOR: 0
NAIL CHANGES: 0
APHONIA: 0

## 2023-09-27 NOTE — PROGRESS NOTES
UA Negative mg/dL    Ketones, Urine Negative Negative mg/dL    Bilirubin Urine Negative Negative      Blood, Urine SMALL (A) Negative      Urobilinogen, Urine 0.2 0.2 - 1.0 EU/dL    Nitrite, Urine Negative Negative      Leukocyte Esterase, Urine TRACE (A) Negative      WBC, UA 5-10 0 /hpf    RBC, UA 0-3 0 /hpf    BACTERIA, URINE 0 0 /hpf    Urine Culture if Indicated CULTURE NOT INDICATED BY UA RESULT      Epithelial Cells UA 0-3 0 /hpf    Casts 0 0 /lpf    Crystals 0 0 /LPF    Mucus, UA 0 0 /lpf    Other observations RESULTS VERIFIED MANUALLY     Comprehensive Metabolic Panel    Collection Time: 09/11/23  9:29 AM   Result Value Ref Range    Sodium 145 (H) 133 - 143 mmol/L    Potassium 3.8 3.5 - 5.1 mmol/L    Chloride 112 (H) 101 - 110 mmol/L    CO2 25 21 - 32 mmol/L    Anion Gap 8 2 - 11 mmol/L    Glucose 92 65 - 100 mg/dL    BUN 16 8 - 23 MG/DL    Creatinine 1.00 0.6 - 1.0 MG/DL    Est, Glom Filt Rate 58 (L) >60 ml/min/1.73m2    Calcium 9.5 8.3 - 10.4 MG/DL    Total Bilirubin 0.3 0.2 - 1.1 MG/DL    ALT 16 12 - 65 U/L    AST 16 15 - 37 U/L    Alk Phosphatase 98 50 - 136 U/L    Total Protein 6.6 6.3 - 8.2 g/dL    Albumin 3.7 3.2 - 4.6 g/dL    Globulin 2.9 2.8 - 4.5 g/dL    Albumin/Globulin Ratio 1.3 0.4 - 1.6     Lipid Panel    Collection Time: 09/11/23  9:29 AM   Result Value Ref Range    Cholesterol, Total 169 <200 MG/DL    Triglycerides 90 35 - 150 MG/DL    HDL 75 (H) 40 - 60 MG/DL    LDL Calculated 76 <100 MG/DL    VLDL Cholesterol Calculated 18 6.0 - 23.0 MG/DL    Chol/HDL Ratio 2.3     CBC with Auto Differential    Collection Time: 09/11/23  9:29 AM   Result Value Ref Range    WBC 5.1 4.3 - 11.1 K/uL    RBC 4.44 4.05 - 5.2 M/uL    Hemoglobin 13.0 11.7 - 15.4 g/dL    Hematocrit 39.6 35.8 - 46.3 %    MCV 89.2 82 - 102 FL    MCH 29.3 26.1 - 32.9 PG    MCHC 32.8 31.4 - 35.0 g/dL    RDW 13.9 11.9 - 14.6 %    Platelets 264 772 - 301 K/uL    MPV 10.8 9.4 - 12.3 FL    nRBC 0.00 0.0 - 0.2 K/uL    Differential Type

## 2023-10-04 ENCOUNTER — APPOINTMENT (RX ONLY)
Dept: URBAN - METROPOLITAN AREA CLINIC 24 | Facility: CLINIC | Age: 78
Setting detail: DERMATOLOGY
End: 2023-10-04

## 2023-10-04 DIAGNOSIS — D485 NEOPLASM OF UNCERTAIN BEHAVIOR OF SKIN: ICD-10-CM

## 2023-10-04 DIAGNOSIS — L28.1 PRURIGO NODULARIS: ICD-10-CM

## 2023-10-04 PROBLEM — D48.5 NEOPLASM OF UNCERTAIN BEHAVIOR OF SKIN: Status: ACTIVE | Noted: 2023-10-04

## 2023-10-04 PROCEDURE — 99213 OFFICE O/P EST LOW 20 MIN: CPT | Mod: 25

## 2023-10-04 PROCEDURE — ? PRESCRIPTION MEDICATION MANAGEMENT

## 2023-10-04 PROCEDURE — 11102 TANGNTL BX SKIN SINGLE LES: CPT

## 2023-10-04 PROCEDURE — ? BIOPSY BY SHAVE METHOD

## 2023-10-04 PROCEDURE — ? COUNSELING

## 2023-10-04 ASSESSMENT — LOCATION ZONE DERM
LOCATION ZONE: LEG
LOCATION ZONE: ARM

## 2023-10-04 ASSESSMENT — LOCATION DETAILED DESCRIPTION DERM
LOCATION DETAILED: RIGHT LATERAL ELBOW
LOCATION DETAILED: LEFT ANTERIOR PROXIMAL THIGH
LOCATION DETAILED: RIGHT ANTERIOR PROXIMAL THIGH
LOCATION DETAILED: LEFT PROXIMAL DORSAL FOREARM

## 2023-10-04 ASSESSMENT — LOCATION SIMPLE DESCRIPTION DERM
LOCATION SIMPLE: LEFT FOREARM
LOCATION SIMPLE: RIGHT THIGH
LOCATION SIMPLE: RIGHT ELBOW
LOCATION SIMPLE: LEFT THIGH

## 2023-10-04 NOTE — PROCEDURE: PRESCRIPTION MEDICATION MANAGEMENT
Render In Strict Bullet Format?: No
Detail Level: Zone
Plan: Discussed intralesional injections, patient declines at this time\\nSamples of amlactin provided \\nDenies itch, related to anxiety

## 2023-10-04 NOTE — PROCEDURE: BIOPSY BY SHAVE METHOD
Detail Level: Detailed
Depth Of Biopsy: dermis
Was A Bandage Applied: Yes
Size Of Lesion In Cm: 2
X Size Of Lesion In Cm: 0
Biopsy Type: H and E
Biopsy Method: Dermablade
Anesthesia Type: 1% lidocaine with epinephrine
Anesthesia Volume In Cc: 0.5
Hemostasis: Drysol
Wound Care: Petrolatum
Dressing: bandage
Destruction After The Procedure: No
Type Of Destruction Used: Curettage
Curettage Text: The wound bed was treated with curettage after the biopsy was performed.
Cryotherapy Text: The wound bed was treated with cryotherapy after the biopsy was performed.
Electrodesiccation Text: The wound bed was treated with electrodesiccation after the biopsy was performed.
Electrodesiccation And Curettage Text: The wound bed was treated with electrodesiccation and curettage after the biopsy was performed.
Silver Nitrate Text: The wound bed was treated with silver nitrate after the biopsy was performed.
Lab: [id number]
Lab Facility: [id number]
Consent: Written consent was obtained and risks were reviewed including but not limited to scarring, infection, bleeding, scabbing, incomplete removal, nerve damage and allergy to anesthesia.
Post-Care Instructions: I reviewed with the patient in detail post-care instructions. Patient is to keep the biopsy site dry overnight, and then apply bacitracin twice daily until healed. Patient may apply hydrogen peroxide soaks to remove any crusting.
Notification Instructions: Patient will be notified of biopsy results. However, patient instructed to call the office if not contacted within 2 weeks.
Billing Type: Third-Party Bill
Information: Selecting Yes will display possible errors in your note based on the variables you have selected. This validation is only offered as a suggestion for you. PLEASE NOTE THAT THE VALIDATION TEXT WILL BE REMOVED WHEN YOU FINALIZE YOUR NOTE. IF YOU WANT TO FAX A PRELIMINARY NOTE YOU WILL NEED TO TOGGLE THIS TO 'NO' IF YOU DO NOT WANT IT IN YOUR FAXED NOTE.

## 2023-10-09 ENCOUNTER — TELEPHONE (OUTPATIENT)
Dept: FAMILY MEDICINE CLINIC | Facility: CLINIC | Age: 78
End: 2023-10-09

## 2023-10-09 RX ORDER — METHYLPREDNISOLONE 4 MG/1
TABLET ORAL
Qty: 21 KIT | Refills: 0 | Status: SHIPPED | OUTPATIENT
Start: 2023-10-09 | End: 2023-10-15

## 2023-10-09 RX ORDER — AZITHROMYCIN 250 MG/1
250 TABLET, FILM COATED ORAL SEE ADMIN INSTRUCTIONS
Qty: 6 TABLET | Refills: 0 | Status: SHIPPED | OUTPATIENT
Start: 2023-10-09 | End: 2023-10-14

## 2023-10-09 NOTE — TELEPHONE ENCOUNTER
Patient tested positive for Covid last night and needs to know what she needs to do. Please call her at [phone]. She uses [facility] at [address].

## 2023-10-21 ENCOUNTER — HOSPITAL ENCOUNTER (OUTPATIENT)
Dept: MAMMOGRAPHY | Age: 78
End: 2023-10-21
Attending: FAMILY MEDICINE
Payer: MEDICARE

## 2023-10-21 VITALS — BODY MASS INDEX: 33.97 KG/M2 | WEIGHT: 199 LBS | HEIGHT: 64 IN

## 2023-10-21 DIAGNOSIS — Z12.31 OTHER SCREENING MAMMOGRAM: ICD-10-CM

## 2023-10-21 PROCEDURE — 77063 BREAST TOMOSYNTHESIS BI: CPT

## 2023-10-26 NOTE — PROGRESS NOTES
[facility] sleep disorder [facility]  [place], [address] [place] [address]  [phone]    Patient Name:  Milena Green  YOB: 1945      Office Visit 10/30/2023    Milena Green, was evaluated through a synchronous (real-time) audio-video encounter. The patient (or guardian if applicable) is aware that this is a billable service, which includes applicable co-pays. This Virtual Visit was conducted with patient's (and/or legal guardian's) consent. Patient identification was verified, and a caregiver was present when appropriate. The patient was located at Home: [address] [place], [place] [id number] [facility]  Provider was located at Other: Total time spent for this encounter:  30 minutes    --Dionne Peterson MD on 10/30/2023 at 11:47 AM    An electronic signature was used to authenticate this note. CHIEF COMPLAINT:      Chief Complaint   Patient presents with    Follow-up    Sleep Apnea    CPAP/BiPAP          HISTORY OF PRESENT ILLNESS:      The patient was evaluated today virtually for management of sleep apnea. The patient had history of intermittent snoring, witnessed apneas, morning headaches, daytime fatigue and none restorative sleep. She stated that she go to bed between 9 and 10 PM and wake up between 8 and 9 AM daily. She wake up at least 3 times at nighttime and have a great difficulty going back to sleep. She has history of anxiety and depression and currently being treated for that. She also has history of atrial fibrillation and is started back at least last year and she was seen by Dr. Nando Wooten and [facility] cardiology. Currently she is on flecainide and she had left parietal scalp hematoma after falling while she is on anticoagulation for A. fib. Therefore, she was taken off anticoagulation and underwent watchman procedure by Dr. Tricia Lagunas.      She had diagnostic polysomnography study done which indicated she had an AHI of 18.8/hour with the lowest

## 2023-10-30 ENCOUNTER — TELEMEDICINE (OUTPATIENT)
Dept: SLEEP MEDICINE | Age: 78
End: 2023-10-30
Payer: MEDICARE

## 2023-10-30 DIAGNOSIS — G47.00 PERSISTENT DISORDER OF INITIATING OR MAINTAINING SLEEP: ICD-10-CM

## 2023-10-30 DIAGNOSIS — G47.8 NON-RESTORATIVE SLEEP: ICD-10-CM

## 2023-10-30 DIAGNOSIS — G47.33 OSA ON CPAP: Primary | ICD-10-CM

## 2023-10-30 PROCEDURE — G8427 DOCREV CUR MEDS BY ELIG CLIN: HCPCS | Performed by: INTERNAL MEDICINE

## 2023-10-30 PROCEDURE — G8399 PT W/DXA RESULTS DOCUMENT: HCPCS | Performed by: INTERNAL MEDICINE

## 2023-10-30 PROCEDURE — 1090F PRES/ABSN URINE INCON ASSESS: CPT | Performed by: INTERNAL MEDICINE

## 2023-10-30 PROCEDURE — 99214 OFFICE O/P EST MOD 30 MIN: CPT | Performed by: INTERNAL MEDICINE

## 2023-10-30 PROCEDURE — 1123F ACP DISCUSS/DSCN MKR DOCD: CPT | Performed by: INTERNAL MEDICINE

## 2023-10-30 ASSESSMENT — SLEEP AND FATIGUE QUESTIONNAIRES
HOW LIKELY ARE YOU TO NOD OFF OR FALL ASLEEP WHILE WATCHING TV: 3
ESS TOTAL SCORE: 16
HOW LIKELY ARE YOU TO NOD OFF OR FALL ASLEEP WHILE SITTING AND READING: 3
HOW LIKELY ARE YOU TO NOD OFF OR FALL ASLEEP IN A CAR, WHILE STOPPED FOR A FEW MINUTES IN TRAFFIC: 0
HOW LIKELY ARE YOU TO NOD OFF OR FALL ASLEEP WHILE SITTING QUIETLY AFTER LUNCH WITHOUT ALCOHOL: 3
HOW LIKELY ARE YOU TO NOD OFF OR FALL ASLEEP WHILE LYING DOWN TO REST IN THE AFTERNOON WHEN CIRCUMSTANCES PERMIT: 3
HOW LIKELY ARE YOU TO NOD OFF OR FALL ASLEEP WHILE SITTING INACTIVE IN A PUBLIC PLACE: 1
HOW LIKELY ARE YOU TO NOD OFF OR FALL ASLEEP WHILE SITTING AND TALKING TO SOMEONE: 0
HOW LIKELY ARE YOU TO NOD OFF OR FALL ASLEEP WHEN YOU ARE A PASSENGER IN A CAR FOR AN HOUR WITHOUT A BREAK: 3

## 2023-11-07 ENCOUNTER — OFFICE VISIT (OUTPATIENT)
Dept: ORTHOPEDIC SURGERY | Age: 78
End: 2023-11-07
Payer: MEDICARE

## 2023-11-07 VITALS — HEIGHT: 64 IN | BODY MASS INDEX: 33.8 KG/M2 | WEIGHT: 198 LBS

## 2023-11-07 DIAGNOSIS — M47.816 LUMBAR SPONDYLOSIS: Primary | ICD-10-CM

## 2023-11-07 DIAGNOSIS — M54.16 LUMBAR RADICULOPATHY: ICD-10-CM

## 2023-11-07 DIAGNOSIS — M48.061 SPINAL STENOSIS OF LUMBAR REGION, UNSPECIFIED WHETHER NEUROGENIC CLAUDICATION PRESENT: ICD-10-CM

## 2023-11-07 DIAGNOSIS — M51.36 DISC DEGENERATION, LUMBAR: ICD-10-CM

## 2023-11-07 PROCEDURE — 1123F ACP DISCUSS/DSCN MKR DOCD: CPT | Performed by: PHYSICAL MEDICINE & REHABILITATION

## 2023-11-07 PROCEDURE — G8484 FLU IMMUNIZE NO ADMIN: HCPCS | Performed by: PHYSICAL MEDICINE & REHABILITATION

## 2023-11-07 PROCEDURE — 1036F TOBACCO NON-USER: CPT | Performed by: PHYSICAL MEDICINE & REHABILITATION

## 2023-11-07 PROCEDURE — G8399 PT W/DXA RESULTS DOCUMENT: HCPCS | Performed by: PHYSICAL MEDICINE & REHABILITATION

## 2023-11-07 PROCEDURE — 1090F PRES/ABSN URINE INCON ASSESS: CPT | Performed by: PHYSICAL MEDICINE & REHABILITATION

## 2023-11-07 PROCEDURE — G8427 DOCREV CUR MEDS BY ELIG CLIN: HCPCS | Performed by: PHYSICAL MEDICINE & REHABILITATION

## 2023-11-07 PROCEDURE — G8417 CALC BMI ABV UP PARAM F/U: HCPCS | Performed by: PHYSICAL MEDICINE & REHABILITATION

## 2023-11-07 PROCEDURE — 99214 OFFICE O/P EST MOD 30 MIN: CPT | Performed by: PHYSICAL MEDICINE & REHABILITATION

## 2023-11-07 RX ORDER — TIZANIDINE 4 MG/1
TABLET ORAL
Qty: 45 TABLET | Refills: 1 | Status: SHIPPED | OUTPATIENT
Start: 2023-11-07

## 2023-11-07 NOTE — PROGRESS NOTES
Orders Placed This Encounter   Procedures    Injection Authorization - Spine     Referral Priority:   Routine     Number of Visits Requested:   1        4   This is a chronic illness/condition with exacerbation and progression  Treatment at this time: Discussed Injection therapy at length today, including risks, complications and alternative treatment options. The patient wishes to proceed. The injection will be performed as left L4-5 transforaminal epidural steroid injection. She was given a refill for tizanidine. She has been taking that without difficulty or side effects and feels it does help improve her pain and function. Studies ordered today: none. We discussed the possibility of a new lumbar spine MRI, especially since she has had some falls. She would like to wait on that for now.       An Queen MD  11/8/2023,  12:21 PM

## 2023-11-08 ENCOUNTER — TELEPHONE (OUTPATIENT)
Dept: ORTHOPEDIC SURGERY | Age: 78
End: 2023-11-08

## 2023-11-08 NOTE — TELEPHONE ENCOUNTER
Call returned to patient and she would like to cancel the injection for now, but will call back when she is ready to get back on the schedule to have the injection performed.

## 2023-12-30 ENCOUNTER — HOSPITAL ENCOUNTER (EMERGENCY)
Age: 78
Discharge: HOME | End: 2023-12-31
Attending: EMERGENCY MEDICINE
Payer: MEDICARE

## 2023-12-30 DIAGNOSIS — I10 ASYMPTOMATIC HYPERTENSION: Primary | ICD-10-CM

## 2023-12-30 PROCEDURE — 6370000000 HC RX 637 (ALT 250 FOR IP): Performed by: EMERGENCY MEDICINE

## 2023-12-30 PROCEDURE — 99283 EMERGENCY DEPT VISIT LOW MDM: CPT

## 2023-12-30 RX ORDER — CLONIDINE HYDROCHLORIDE 0.1 MG/1
0.1 TABLET ORAL
Status: COMPLETED | OUTPATIENT
Start: 2023-12-30 | End: 2023-12-30

## 2023-12-30 RX ADMIN — CLONIDINE HYDROCHLORIDE 0.1 MG: 0.1 TABLET ORAL at 23:37

## 2023-12-30 RX ADMIN — METOPROLOL TARTRATE 12.5 MG: 25 TABLET, FILM COATED ORAL at 20:22

## 2023-12-30 ASSESSMENT — PAIN - FUNCTIONAL ASSESSMENT: PAIN_FUNCTIONAL_ASSESSMENT: 0-10

## 2023-12-30 ASSESSMENT — LIFESTYLE VARIABLES
HOW OFTEN DO YOU HAVE A DRINK CONTAINING ALCOHOL: NEVER
HOW MANY STANDARD DRINKS CONTAINING ALCOHOL DO YOU HAVE ON A TYPICAL DAY: PATIENT DOES NOT DRINK

## 2023-12-30 ASSESSMENT — PAIN SCALES - GENERAL: PAINLEVEL_OUTOF10: 0

## 2023-12-31 VITALS
BODY MASS INDEX: 34.15 KG/M2 | HEIGHT: 64 IN | HEART RATE: 68 BPM | TEMPERATURE: 98.6 F | RESPIRATION RATE: 19 BRPM | SYSTOLIC BLOOD PRESSURE: 186 MMHG | WEIGHT: 200 LBS | OXYGEN SATURATION: 99 % | DIASTOLIC BLOOD PRESSURE: 73 MMHG

## 2023-12-31 NOTE — DISCHARGE INSTRUCTIONS
LOV 2/2/22. Schedule close follow-up with primary care physician for blood pressure recheck.  Please return to ED if symptoms worsen or progress in any way

## 2023-12-31 NOTE — ED PROVIDER NOTES
Emergency Department Provider Note       PCP: Brandon Henderson MD   Age: 78 y.o.   Sex: female     DISPOSITION Decision To Discharge 12/30/2023 11:21:18 PM       ICD-10-CM    1. Asymptomatic hypertension  I10           Medical Decision Making     Complexity of Problems Addressed:  1 or more chronic illnesses with a severe exacerbation or progression.    Data Reviewed and Analyzed:  I independently ordered and reviewed each unique test.         EKG reviewed by myself in the absence of a cardiologist.  Heart rate 69.  Sinus rhythm.  Left bundle branch block present.  No ST elevation.       Discussion of management or test interpretation.  78-year-old female with history of hypertension, A-fib status post Watchman, GEORGE on CPAP, obesity, anemia, anxiety, depression presents with complaint of elevated blood pressure when she was seen at urgent care earlier today.  Patient was seen in urgent care sometime around noon earlier today for her recent dysuria.  Patient was diagnosed with UTI.  Patient hypertensive on arrival.  Patient completely asymptomatic with no other complaints.  Denies headache, dizziness, vision changes, chest pain, shortness of breath.  Patient has not taken her nighttime dose of Lopressor.  Lopressor given.  Patient's blood pressure remains slightly elevated. given additional dose of clonidine with improvement of blood pressure to 180s over 70s.  Given patient completely asymptomatic at this time and improvement of blood pressure we will plan to discharge home with instructions for close follow-up with primary care physician as well as cardiology for further blood pressure management.  Instructed to return if symptoms worsen or progress in any way.        Risk of Complications and/or Morbidity of Patient Management:  Chronic medical problems impacting care include hypertension.  Shared medical decision making was utilized in creating the patients health plan today.         History       78-year-old

## 2023-12-31 NOTE — ED NOTES
I have reviewed discharge instructions with the patient.  The patient verbalized understanding.    Patient left ED via Discharge Method: ambulatory to Home with daughter.    Opportunity for questions and clarification provided.       Patient given 0 scripts.

## 2023-12-31 NOTE — ED TRIAGE NOTES
Pt to the ED from home with concern that her BP is elevated. Pt states that she went to urgent care today for an UTI and he BP was elevated and has been since. Pt denies any CP, SOB, headache. Pt states that she take 12.5mg of lopressor daily and has not taken her eveinng dose yet.

## 2024-01-01 ENCOUNTER — PATIENT MESSAGE (OUTPATIENT)
Age: 79
End: 2024-01-01

## 2024-01-02 ENCOUNTER — CARE COORDINATION (OUTPATIENT)
Dept: CARE COORDINATION | Facility: CLINIC | Age: 79
End: 2024-01-02

## 2024-01-02 ENCOUNTER — TELEPHONE (OUTPATIENT)
Age: 79
End: 2024-01-02

## 2024-01-02 RX ORDER — AMLODIPINE BESYLATE 5 MG/1
5 TABLET ORAL DAILY
Qty: 90 TABLET | Refills: 3 | Status: SHIPPED | OUTPATIENT
Start: 2024-01-02

## 2024-01-02 RX ORDER — HYDRALAZINE HYDROCHLORIDE 25 MG/1
25 TABLET, FILM COATED ORAL 3 TIMES DAILY PRN
Qty: 90 TABLET | Refills: 3 | Status: SHIPPED | OUTPATIENT
Start: 2024-01-02

## 2024-01-02 NOTE — TELEPHONE ENCOUNTER
Went to [facility] ER Saturday with high BP. Still high last night 218/107 Please call

## 2024-01-02 NOTE — TELEPHONE ENCOUNTER
Pt c/o elevated BP over the weekend.   Went to ER and was given scheduled metoprolol and clonidine 0.1mg and discharged home.  BP has continued to be high at home; pt asymptomatic.    1/1/24 am /105  HR low 60's          Afternoon 169/82             Pm  211/104    Pt taking metoprolol 12.5mg bid  Will check BP at noon and triage will call for results.

## 2024-01-02 NOTE — TELEPHONE ENCOUNTER
Amlodipine 5 mg daily.  Hydralazine 25 mg 3 times daily as needed systolic blood pressure greater than 160 mmHg  Please schedule follow-up

## 2024-01-02 NOTE — CARE COORDINATION
Ambulatory Care Management Note    Date/Time:  1/2/2024 9:43 AM    This patient was received as a referral from Daily assignment.  Ambulatory Care Manager outreached to patient today to offer care management services.   Introduction to self and role of care manager provided.  Patient declined care management services at this time.   No follow up call scheduled at this time.  Patient has Ambulatory Care Manager's contact number for for any questions or concerns.

## 2024-01-02 NOTE — TELEPHONE ENCOUNTER
11:36 am BP is 209/107 HR 66. Pt remains asymptomatic. Asking how to proceed. Triage will inform Dr. Chew and call pt back with his response.    Triage scheduled appt for pt to see Dr. Chew 1/3/24 at 10:00 at [place] office. Pt confirms appt.

## 2024-01-03 ENCOUNTER — OFFICE VISIT (OUTPATIENT)
Age: 79
End: 2024-01-03
Payer: MEDICARE

## 2024-01-03 VITALS
WEIGHT: 203.6 LBS | DIASTOLIC BLOOD PRESSURE: 82 MMHG | SYSTOLIC BLOOD PRESSURE: 140 MMHG | HEART RATE: 85 BPM | HEIGHT: 64 IN | BODY MASS INDEX: 34.76 KG/M2

## 2024-01-03 DIAGNOSIS — I48.91 ATRIAL FIBRILLATION WITH RVR (HCC): Primary | ICD-10-CM

## 2024-01-03 DIAGNOSIS — I10 PRIMARY HYPERTENSION: ICD-10-CM

## 2024-01-03 PROCEDURE — 1123F ACP DISCUSS/DSCN MKR DOCD: CPT | Performed by: INTERNAL MEDICINE

## 2024-01-03 PROCEDURE — 99214 OFFICE O/P EST MOD 30 MIN: CPT | Performed by: INTERNAL MEDICINE

## 2024-01-03 PROCEDURE — 1090F PRES/ABSN URINE INCON ASSESS: CPT | Performed by: INTERNAL MEDICINE

## 2024-01-03 PROCEDURE — G8427 DOCREV CUR MEDS BY ELIG CLIN: HCPCS | Performed by: INTERNAL MEDICINE

## 2024-01-03 PROCEDURE — 1036F TOBACCO NON-USER: CPT | Performed by: INTERNAL MEDICINE

## 2024-01-03 PROCEDURE — 3077F SYST BP >= 140 MM HG: CPT | Performed by: INTERNAL MEDICINE

## 2024-01-03 PROCEDURE — G8399 PT W/DXA RESULTS DOCUMENT: HCPCS | Performed by: INTERNAL MEDICINE

## 2024-01-03 PROCEDURE — G8417 CALC BMI ABV UP PARAM F/U: HCPCS | Performed by: INTERNAL MEDICINE

## 2024-01-03 PROCEDURE — G8484 FLU IMMUNIZE NO ADMIN: HCPCS | Performed by: INTERNAL MEDICINE

## 2024-01-03 PROCEDURE — 3079F DIAST BP 80-89 MM HG: CPT | Performed by: INTERNAL MEDICINE

## 2024-01-03 RX ORDER — NITROFURANTOIN 25; 75 MG/1; MG/1
CAPSULE ORAL
COMMUNITY
Start: 2023-12-30

## 2024-01-03 RX ORDER — VALSARTAN 80 MG/1
80 TABLET ORAL DAILY
Qty: 90 TABLET | Refills: 1 | Status: SHIPPED | OUTPATIENT
Start: 2024-01-03

## 2024-01-03 RX ORDER — DEXAMETHASONE 1 MG
TABLET ORAL
Qty: 1 TABLET | Refills: 0 | Status: SHIPPED | OUTPATIENT
Start: 2024-01-03

## 2024-01-03 ASSESSMENT — ENCOUNTER SYMPTOMS
ABDOMINAL PAIN: 0
APHONIA: 0
EYE PAIN: 0
STRIDOR: 0
NAIL CHANGES: 0
COUGH: 0

## 2024-01-03 NOTE — PROGRESS NOTES
[facility], [address], [address]  [place] [address]  PHONE: [phone]    SUBJECTIVE:   Priscilla Ricks is a 78 y.o. female 1945   seen for a follow up visit regarding the following:     Chief Complaint   Patient presents with    Irregular Heart Beat    Hypertension           History of present illness: 78 y.o. female presented for follow-up 1/3/24 history of atrial fibrillation as well as Watchman device recent UTI was in ER for elevated BP placed on amlodipine and prn hydralizine profoundly elevated blood pressure readings patient seen in urgent care and emergency department.  Has been taking occasional NSAIDs but not on a daily basis.       Interval history:   She was previously seen by Dr. Helder Barrera.  She had a diagnosis of atrial fibrillation made in 2018.  Unfortunately, she had a fall and underwent bleeding complication with large hematoma of her leg.  She additionally hit her head at the time of  her fall and fortunately she had negative imaging.        She was chronically anticoagulated with Xarelto previously.  Due to her elevated bleeding risk, she was referred for WATCHMAN procedure, which was performed 07/2018.  She underwent transesophageal echocardiogram at 45 days, which showed adequate seal  and she was taken off Xarelto therapy.           Cardiac History:     Atrial fibrillation in 2017 with spontaneous cardioversion to sinus rhythm.      Echocardiogram 2017 normal left ventricular systolic function mild aortic sclerosis    Anticoagulation with Xarelto.    Fall in 2017 with large hematoma treated surgically.    07/2018 WATCHMAN implantation successful.      08/2018 transesophageal echocardiogram with adequate seal.  Taken off Xarelto.    3/3/2020 Sinus rhythm poor R wave progression  3/2021 NST normal perfusion   8/1/2022 EKG sinus rhythm poor R wave progressiom  sinus rhythm, normal rate, normal ID intervals, ST wave normal, normal axis,       Assessment

## 2024-01-06 ENCOUNTER — TELEPHONE (OUTPATIENT)
Dept: CARDIOLOGY | Age: 79
End: 2024-01-06

## 2024-01-09 DIAGNOSIS — I10 PRIMARY HYPERTENSION: ICD-10-CM

## 2024-01-09 LAB — CORTIS BS SERPL-MCNC: 1.1 UG/DL

## 2024-01-12 ENCOUNTER — HOSPITAL ENCOUNTER (OUTPATIENT)
Dept: ULTRASOUND IMAGING | Age: 79
Discharge: HOME | End: 2024-01-15
Attending: INTERNAL MEDICINE
Payer: MEDICARE

## 2024-01-12 DIAGNOSIS — I10 PRIMARY HYPERTENSION: ICD-10-CM

## 2024-01-12 PROCEDURE — 93975 VASCULAR STUDY: CPT | Performed by: RADIOLOGY

## 2024-01-15 LAB
ALDOST SERPL-MCNC: 2.6 NG/DL (ref 0–30)
ALDOST/RENIN PLAS-RTO: NORMAL
RENIN PLAS-CCNC: NORMAL NG/ML/HR

## 2024-01-16 DIAGNOSIS — I10 PRIMARY HYPERTENSION: Primary | ICD-10-CM

## 2024-01-17 DIAGNOSIS — E89.0 POSTSURGICAL HYPOTHYROIDISM: ICD-10-CM

## 2024-01-17 DIAGNOSIS — I10 PRIMARY HYPERTENSION: ICD-10-CM

## 2024-01-17 RX ORDER — LEVOTHYROXINE SODIUM 0.1 MG/1
100 TABLET ORAL
Qty: 90 TABLET | Refills: 3 | OUTPATIENT
Start: 2024-01-17

## 2024-01-19 ENCOUNTER — OFFICE VISIT (OUTPATIENT)
Age: 79
End: 2024-01-19
Payer: MEDICARE

## 2024-01-19 VITALS
HEART RATE: 80 BPM | DIASTOLIC BLOOD PRESSURE: 78 MMHG | HEIGHT: 64 IN | BODY MASS INDEX: 34.72 KG/M2 | SYSTOLIC BLOOD PRESSURE: 150 MMHG | WEIGHT: 203.4 LBS

## 2024-01-19 DIAGNOSIS — I48.19 PERSISTENT ATRIAL FIBRILLATION (HCC): ICD-10-CM

## 2024-01-19 DIAGNOSIS — I10 PRIMARY HYPERTENSION: Primary | ICD-10-CM

## 2024-01-19 DIAGNOSIS — Z79.899 LONG TERM CURRENT USE OF ANTIARRHYTHMIC DRUG: ICD-10-CM

## 2024-01-19 PROCEDURE — G8417 CALC BMI ABV UP PARAM F/U: HCPCS | Performed by: INTERNAL MEDICINE

## 2024-01-19 PROCEDURE — 1036F TOBACCO NON-USER: CPT | Performed by: INTERNAL MEDICINE

## 2024-01-19 PROCEDURE — 3075F SYST BP GE 130 - 139MM HG: CPT | Performed by: INTERNAL MEDICINE

## 2024-01-19 PROCEDURE — 99214 OFFICE O/P EST MOD 30 MIN: CPT | Performed by: INTERNAL MEDICINE

## 2024-01-19 PROCEDURE — G8427 DOCREV CUR MEDS BY ELIG CLIN: HCPCS | Performed by: INTERNAL MEDICINE

## 2024-01-19 PROCEDURE — 3078F DIAST BP <80 MM HG: CPT | Performed by: INTERNAL MEDICINE

## 2024-01-19 PROCEDURE — G8399 PT W/DXA RESULTS DOCUMENT: HCPCS | Performed by: INTERNAL MEDICINE

## 2024-01-19 PROCEDURE — G8484 FLU IMMUNIZE NO ADMIN: HCPCS | Performed by: INTERNAL MEDICINE

## 2024-01-19 PROCEDURE — 1090F PRES/ABSN URINE INCON ASSESS: CPT | Performed by: INTERNAL MEDICINE

## 2024-01-19 PROCEDURE — 1123F ACP DISCUSS/DSCN MKR DOCD: CPT | Performed by: INTERNAL MEDICINE

## 2024-01-19 RX ORDER — VALSARTAN 160 MG/1
160 TABLET ORAL DAILY
Qty: 30 TABLET | Refills: 5 | Status: SHIPPED | OUTPATIENT
Start: 2024-01-19

## 2024-01-19 RX ORDER — HYDROCHLOROTHIAZIDE 12.5 MG/1
12.5 TABLET ORAL EVERY MORNING
Qty: 30 TABLET | Refills: 3 | Status: SHIPPED | OUTPATIENT
Start: 2024-01-19

## 2024-01-19 ASSESSMENT — ENCOUNTER SYMPTOMS
EYE PAIN: 0
ABDOMINAL PAIN: 0
APHONIA: 0
COUGH: 0
NAIL CHANGES: 0
STRIDOR: 0

## 2024-01-19 NOTE — PROGRESS NOTES
2010    UROLOGICAL SURGERY  2008/2009    bladder tack X2     Family History   Problem Relation Age of Onset    Cancer Mother         breast    Breast Cancer Mother 58    Heart Attack Mother     Cancer Father         throat    Other Father         gastric ulcer    Kidney Disease Father     Breast Cancer Daughter 52    Delayed Awakening Neg Hx     Pseudochol. Deficiency Neg Hx     Post-op Nausea/Vomiting Neg Hx     Emergence Delirium Neg Hx     Post-op Cognitive Dysfunction Neg Hx     Malig Hypertherm Neg Hx       Social History     Tobacco Use    Smoking status: Never    Smokeless tobacco: Never   Substance Use Topics    Alcohol use: Yes     Alcohol/week: 1.7 standard drinks of alcohol       ROS:    Review of Systems   Constitutional: Negative for fever.   HENT:  Negative for stridor.    Eyes:  Negative for pain.   Cardiovascular:  Negative for chest pain.   Respiratory:  Negative for cough.    Endocrine: Negative for cold intolerance.   Skin:  Negative for nail changes.   Musculoskeletal:  Negative for arthritis.   Gastrointestinal:  Negative for abdominal pain.   Genitourinary:  Negative for dysuria.   Neurological:  Negative for aphonia.   Psychiatric/Behavioral:  Negative for altered mental status.    Allergic/Immunologic: Negative for hives.           PHYSICAL EXAM:    BP (!) 150/78   Pulse 80   Ht 1.626 m (5' 4\")   Wt 92.3 kg (203 lb 6.4 oz)   LMP  (LMP Unknown)   BMI 34.91 kg/m²        Wt Readings from Last 3 Encounters:   01/19/24 92.3 kg (203 lb 6.4 oz)   01/03/24 92.4 kg (203 lb 9.6 oz)   12/30/23 90.7 kg (200 lb)     BP Readings from Last 3 Encounters:   01/19/24 (!) 150/78   01/03/24 (!) 140/82   12/31/23 (!) 186/73         Physical Exam  Vitals reviewed.   HENT:      Head: Normocephalic.      Right Ear: External ear normal.      Left Ear: External ear normal.      Nose: Nose normal.   Eyes:      General: No scleral icterus.  Pulmonary:      Effort: Pulmonary effort is normal.   Abdominal:

## 2024-01-23 LAB
ALDOST SERPL-MCNC: 4.7 NG/DL (ref 0–30)
ALDOST/RENIN PLAS-RTO: >28.1 {RATIO} (ref 0–30)
RENIN PLAS-CCNC: <0.167 NG/ML/HR (ref 0.17–5.38)

## 2024-01-25 ENCOUNTER — NURSE ONLY (OUTPATIENT)
Age: 79
End: 2024-01-25

## 2024-01-25 VITALS — SYSTOLIC BLOOD PRESSURE: 149 MMHG | DIASTOLIC BLOOD PRESSURE: 94 MMHG | HEART RATE: 69 BPM

## 2024-01-25 NOTE — PROGRESS NOTES
Pt came in for a BP Check. Pt brought home monitor to ensure accuracy. Pts BP in office was 120/76. Pts home monitor showed 149/94. Per doctor Jeevan pt is to continue medication regimen and invest in a new BP cuff. Pt verbalized understanding. Has no complaints and will send "[employer]hart messages in one week with updated pressures from new home monitor cuff

## 2024-01-26 ENCOUNTER — TELEPHONE (OUTPATIENT)
Age: 79
End: 2024-01-26

## 2024-01-26 NOTE — TELEPHONE ENCOUNTER
----- Message from Billy Chew MD sent at 1/24/2024  5:31 AM EST -----  Please let the patient know her most recent labs suggest sodium intake in her diet.  Please let her know that sodium can be hidden in many foods such as processed foods.

## 2024-02-19 NOTE — TELEPHONE ENCOUNTER
Requested Prescriptions     Signed Prescriptions Disp Refills    metoprolol tartrate (LOPRESSOR) 25 MG tablet 90 tablet 3     Sig: Take 0.5 tablets by mouth in the morning and 0.5 tablets in the evening.     Authorizing Provider: VAIBHAV TOBAR     Ordering User: DERRICK BOOTH       Rx verified.

## 2024-03-12 ENCOUNTER — OFFICE VISIT (OUTPATIENT)
Dept: FAMILY MEDICINE CLINIC | Facility: CLINIC | Age: 79
End: 2024-03-12

## 2024-03-12 VITALS
OXYGEN SATURATION: 96 % | SYSTOLIC BLOOD PRESSURE: 122 MMHG | DIASTOLIC BLOOD PRESSURE: 74 MMHG | HEIGHT: 64 IN | HEART RATE: 88 BPM | TEMPERATURE: 98.2 F | BODY MASS INDEX: 33.8 KG/M2 | WEIGHT: 198 LBS

## 2024-03-12 DIAGNOSIS — K21.00 GASTROESOPHAGEAL REFLUX DISEASE WITH ESOPHAGITIS WITHOUT HEMORRHAGE: ICD-10-CM

## 2024-03-12 DIAGNOSIS — E89.0 POSTSURGICAL HYPOTHYROIDISM: ICD-10-CM

## 2024-03-12 DIAGNOSIS — E55.9 VITAMIN D DEFICIENCY: Primary | ICD-10-CM

## 2024-03-12 DIAGNOSIS — I70.0 ATHEROSCLEROSIS OF AORTA (HCC): ICD-10-CM

## 2024-03-12 DIAGNOSIS — I25.119 ATHEROSCLEROSIS OF NATIVE CORONARY ARTERY OF NATIVE HEART WITH ANGINA PECTORIS (HCC): ICD-10-CM

## 2024-03-12 DIAGNOSIS — I10 ESSENTIAL (PRIMARY) HYPERTENSION: ICD-10-CM

## 2024-03-12 DIAGNOSIS — R26.89 BALANCE PROBLEM: ICD-10-CM

## 2024-03-12 DIAGNOSIS — R82.90 ABNORMAL URINE: ICD-10-CM

## 2024-03-12 DIAGNOSIS — F39 MOOD DISORDER (HCC): ICD-10-CM

## 2024-03-12 DIAGNOSIS — G95.20 CERVICAL SPINAL CORD COMPRESSION (HCC): ICD-10-CM

## 2024-03-12 DIAGNOSIS — R26.9 GAIT ABNORMALITY: ICD-10-CM

## 2024-03-12 DIAGNOSIS — E07.9 DISORDER OF THYROID, UNSPECIFIED: ICD-10-CM

## 2024-03-12 LAB
25(OH)D3 SERPL-MCNC: 77.5 NG/ML (ref 30–100)
ALBUMIN SERPL-MCNC: 3.7 G/DL (ref 3.2–4.6)
ALBUMIN/GLOB SERPL: 1.2 (ref 0.4–1.6)
ALP SERPL-CCNC: 114 U/L (ref 50–136)
ALT SERPL-CCNC: 19 U/L (ref 12–65)
ANION GAP SERPL CALC-SCNC: 5 MMOL/L (ref 2–11)
AST SERPL-CCNC: 17 U/L (ref 15–37)
BILIRUB SERPL-MCNC: 0.4 MG/DL (ref 0.2–1.1)
BUN SERPL-MCNC: 19 MG/DL (ref 8–23)
CALCIUM SERPL-MCNC: 10.8 MG/DL (ref 8.3–10.4)
CHLORIDE SERPL-SCNC: 105 MMOL/L (ref 103–113)
CO2 SERPL-SCNC: 32 MMOL/L (ref 21–32)
CREAT SERPL-MCNC: 1 MG/DL (ref 0.6–1)
GLOBULIN SER CALC-MCNC: 3.2 G/DL (ref 2.8–4.5)
GLUCOSE SERPL-MCNC: 96 MG/DL (ref 65–100)
POTASSIUM SERPL-SCNC: 4.4 MMOL/L (ref 3.5–5.1)
PROT SERPL-MCNC: 6.9 G/DL (ref 6.3–8.2)
SODIUM SERPL-SCNC: 142 MMOL/L (ref 136–146)
TSH, 3RD GENERATION: 2.21 UIU/ML (ref 0.36–3.74)

## 2024-03-12 RX ORDER — OMEPRAZOLE 40 MG/1
40 CAPSULE, DELAYED RELEASE ORAL DAILY
Qty: 90 CAPSULE | Refills: 3 | Status: SHIPPED | OUTPATIENT
Start: 2024-03-12

## 2024-03-12 ASSESSMENT — PATIENT HEALTH QUESTIONNAIRE - PHQ9
5. POOR APPETITE OR OVEREATING: NOT AT ALL
SUM OF ALL RESPONSES TO PHQ QUESTIONS 1-9: 0
SUM OF ALL RESPONSES TO PHQ QUESTIONS 1-9: 0
1. LITTLE INTEREST OR PLEASURE IN DOING THINGS: NOT AT ALL
4. FEELING TIRED OR HAVING LITTLE ENERGY: NOT AT ALL
SUM OF ALL RESPONSES TO PHQ9 QUESTIONS 1 & 2: 0
10. IF YOU CHECKED OFF ANY PROBLEMS, HOW DIFFICULT HAVE THESE PROBLEMS MADE IT FOR YOU TO DO YOUR WORK, TAKE CARE OF THINGS AT HOME, OR GET ALONG WITH OTHER PEOPLE: NOT DIFFICULT AT ALL
6. FEELING BAD ABOUT YOURSELF - OR THAT YOU ARE A FAILURE OR HAVE LET YOURSELF OR YOUR FAMILY DOWN: NOT AT ALL
7. TROUBLE CONCENTRATING ON THINGS, SUCH AS READING THE NEWSPAPER OR WATCHING TELEVISION: NOT AT ALL
SUM OF ALL RESPONSES TO PHQ QUESTIONS 1-9: 0
9. THOUGHTS THAT YOU WOULD BE BETTER OFF DEAD, OR OF HURTING YOURSELF: NOT AT ALL
SUM OF ALL RESPONSES TO PHQ QUESTIONS 1-9: 0
2. FEELING DOWN, DEPRESSED OR HOPELESS: NOT AT ALL
3. TROUBLE FALLING OR STAYING ASLEEP: NOT AT ALL

## 2024-03-14 LAB
BACTERIA SPEC CULT: NORMAL
SERVICE CMNT-IMP: NORMAL

## 2024-03-27 ENCOUNTER — HOSPITAL ENCOUNTER (OUTPATIENT)
Dept: PHYSICAL THERAPY | Age: 79
Setting detail: RECURRING SERIES
Discharge: HOME | End: 2024-03-30
Attending: FAMILY MEDICINE
Payer: MEDICARE

## 2024-03-27 DIAGNOSIS — M62.81 MUSCLE WEAKNESS (GENERALIZED): ICD-10-CM

## 2024-03-27 DIAGNOSIS — R26.89 OTHER ABNORMALITIES OF GAIT AND MOBILITY: Primary | ICD-10-CM

## 2024-03-27 DIAGNOSIS — R26.89 BALANCE DISORDER: ICD-10-CM

## 2024-03-27 PROCEDURE — 97110 THERAPEUTIC EXERCISES: CPT

## 2024-03-27 PROCEDURE — 97162 PT EVAL MOD COMPLEX 30 MIN: CPT

## 2024-03-27 ASSESSMENT — PAIN SCALES - GENERAL: PAINLEVEL_OUTOF10: 6

## 2024-03-27 NOTE — PROGRESS NOTES
Priscilla Ricks  : 1945  Primary: Medicare Part A And B (Medicare)  Secondary: GENERIC COMMERCIAL [facility] @ [place] [address] [place] [address]  Phone: [phone]  Fax: [phone] Plan Frequency: 2x/week    Plan of Care/Certification Expiration Date: 24        Plan of Care/Certification Expiration Date:  Plan of Care/Certification Expiration Date: 24    Frequency/Duration:   Plan Frequency: 2x/week      Time In/Out:   Time In: 1130  Time Out: 1225      PT Visit Info:         Visit Count:  1    OUTPATIENT PHYSICAL THERAPY:   Treatment Note 3/27/2024       Episode  (Gait and balance deficits)               Treatment Diagnosis:    Other abnormalities of gait and mobility  Balance disorder  Muscle weakness (generalized)  Medical/Referring Diagnosis:    Gait abnormality [R26.9]  Balance problem [R26.89]    Referring Physician:  Brandon Henderson MD MD Orders:  PT Eval and Treat   Return MD Appt:  2024   Date of Onset:  Onset Date: 21 (Pt reports about 3 years ago)     Allergies:   Diflucan [fluconazole], Hydromorphone, and Sulfa antibiotics  Restrictions/Precautions:   None      Interventions Planned (Treatment may consist of any combination of the following):     See Assessment Note    Subjective Comments:   Balance and gait deficits  Initial Pain Level::     6/10  Post Session Pain Level:       6/10  Medications Last Reviewed:  3/27/2024  Updated Objective Findings:  See Evaluation Note from today  Treatment   THERAPEUTIC EXERCISE: (15 minutes):    Exercises per grid below to improve mobility, strength, balance, and coordination.  Required moderate visual, verbal, manual, and tactile cues to promote proper body alignment, promote proper body posture, and promote proper body mechanics.  Progressed resistance, range, repetitions, and complexity of movement as indicated.   Date:  3/27/2024   Activity/Exercise Parameters   Supine hip flexor stretch

## 2024-03-27 NOTE — THERAPY EVALUATION
Complexity:   Moderate Complexity       PLAN   Effective Dates: 3/27/2024 TO Plan of Care/Certification Expiration Date: 06/25/24     Frequency/Duration: Plan Frequency: 2x/week      Interventions Planned (Treatment may consist of any combination of the following):    Balance Training, Gait Training, Home Exercise Program (HEP), Manual Therapy, Neuromuscular Re-education/Strengthening, Range of Motion (ROM), Therapeutic Activites, Therapeutic Exercise/Strengthening, and Safety Education and Training   GOALS: (Goals have been discussed and agreed upon with patient.)  Discharge Goals: Time Frame: 3/27/24 - 6/25/24  Patient will be independent with home exercise program without assistance from therapist.   Patient will report 70% ABC score to demonstrate improved functional capacity.  Patient will demonstrate ROE balance score of 50/56 or greater to decrease risk of falling.  Patient will perform TUG <14 seconds with LRAD to become a more confident community ambulator.   Patient will perform 10 step ups onto 8\" step without LOB to allow her to negotiate curbs with more confidence.        Outcome Measure:   Tool Used: Activities-Specific Balance Confidence (ABC) Scale  Score:  Initial: 41.8% Confidence (3/27/24) Most Recent: X% Confidence (Date: -- )   Interpretation of Score: The test rates the patient’s percentage of confidence with functional daily activities from 0%, indicating no confidence, to 100%, indicating complete confidence. The percentages are added together and then averaged. If the average is less than 80%, this indicates significant impairment with daily activities.    Tool Used: Roe Balance Scale  Score:  Initial: 45/56 (3/27/24) Most Recent: X/56 (Date: -- )   Interpretation of Score: Each section is scored on a 0-4 scale, 0 representing the patient’s inability to perform the task and 4 representing independence.  The scores of each section are added together for a total score of 56.  The higher the

## 2024-03-29 ENCOUNTER — HOSPITAL ENCOUNTER (OUTPATIENT)
Dept: PHYSICAL THERAPY | Age: 79
Setting detail: RECURRING SERIES
Discharge: HOME | End: 2024-04-01
Attending: FAMILY MEDICINE
Payer: MEDICARE

## 2024-03-29 PROCEDURE — 97110 THERAPEUTIC EXERCISES: CPT

## 2024-03-29 PROCEDURE — 97112 NEUROMUSCULAR REEDUCATION: CPT

## 2024-03-29 ASSESSMENT — PAIN SCALES - GENERAL: PAINLEVEL_OUTOF10: 3

## 2024-03-29 NOTE — PROGRESS NOTES
Priscilla Ricks  : 1945  Primary: Medicare Part A And B (Medicare)  Secondary: GENERIC COMMERCIAL [facility] @ [place] [address] [address] [place] [address]  Phone: [phone]  Fax: [phone] Plan Frequency: 2x/week    Plan of Care/Certification Expiration Date: 24        Plan of Care/Certification Expiration Date:  Plan of Care/Certification Expiration Date: 24    Frequency/Duration:   Plan Frequency: 2x/week      Time In/Out:   Time In: 1130  Time Out: 1230      PT Visit Info:         Visit Count:  2    OUTPATIENT PHYSICAL THERAPY:   Treatment Note 3/29/2024       Episode  (Gait and balance deficits)               Treatment Diagnosis:    Other abnormalities of gait and mobility  Balance disorder  Muscle weakness (generalized)  Medical/Referring Diagnosis:    Gait abnormality [R26.9]  Balance problem [R26.89]    Referring Physician:  Brandon Henderson MD MD Orders:  PT Eval and Treat   Return MD Appt:  2024   Date of Onset:  Onset Date: 21 (Pt reports about 3 years ago)     Allergies:   Diflucan [fluconazole], Hydromorphone, and Sulfa antibiotics  Restrictions/Precautions:   None      Interventions Planned (Treatment may consist of any combination of the following):     See Assessment Note    Subjective Comments:   Priscilla says she hasn't had too much by way of dizziness or LOB the past few days.   Initial Pain Level::     3/10  Post Session Pain Level:       3/10  Medications Last Reviewed:  3/29/2024  Updated Objective Findings:  None Today  Treatment   THERAPEUTIC EXERCISE: (30 minutes):    Exercises per grid below to improve mobility, strength, balance, and coordination.  Required moderate visual, verbal, manual, and tactile cues to promote proper body alignment, promote proper body posture, and promote proper body mechanics.  Progressed resistance, range, repetitions, and complexity of movement as indicated.   Date:  3/29/2024   Activity/Exercise

## 2024-04-01 ENCOUNTER — HOSPITAL ENCOUNTER (OUTPATIENT)
Dept: PHYSICAL THERAPY | Age: 79
Setting detail: RECURRING SERIES
Discharge: HOME | End: 2024-04-04
Attending: FAMILY MEDICINE
Payer: MEDICARE

## 2024-04-01 PROCEDURE — 97110 THERAPEUTIC EXERCISES: CPT

## 2024-04-01 PROCEDURE — 97112 NEUROMUSCULAR REEDUCATION: CPT

## 2024-04-01 ASSESSMENT — PAIN SCALES - GENERAL: PAINLEVEL_OUTOF10: 3

## 2024-04-01 NOTE — PROGRESS NOTES
Date:  4/1/2024   Activity/Exercise Parameters   Supine hip flexor stretch off EOB 3x30 sec B   Slantboard 3x30 sec B   Lower trunk rotations X2 min   3-Way Hip --   Sit to stand 2 x 10   Seated stepper 5 min for overall endurance   Walking --   Bridge 2x15     NEUROMUSCULAR RE-EDUCATION: (30 minutes):    Exercise/activities per grid below to improve balance, coordination, and proprioception.  Required moderate visual, verbal, and manual cues to promote static and dynamic balance in standing   Date:  4/1/2024   Activity/Exercise Parameters   Clock Yourself --   Side stepping at bar Cues for foot placement, no UE support for balance   Slow march at bar 3 min   Stair taps- 8 inch step Fwd x 20 each foot; lateral x 20 each foot, cues for control, no toe drag   Step ups onto blue foam and then onto 6\" step 5 min                 .    Treatment/Session Summary:    Treatment Assessment:   Priscilla tolerated treatment well today. She has difficulty with step ups without UE support due to decreased quad and lateral hip strength. Trendelenburg noted with lateral step taps bilaterally. She needed SBA and some CGA for balance activities today  Communication/Consultation:  None today  Equipment provided today:  None  Recommendations/Intent for next treatment session: Next visit will focus on LE flexibility and balance training.    >Total Treatment Billable Duration:  55 minutes  Time In: 1130  Time Out: 1230    Ayad Alejandre, PT         Charge Capture  [facility] Portal  Appt Desk     Future Appointments   Date Time Provider Department Center   4/3/2024 11:30 AM Ayad Alejandre, PT [facility]   4/8/2024 11:30 AM Ayad Alejandre, PT [facility]   4/10/2024 11:30 AM Ayad Alejandre, PT [facility]   4/15/2024 11:30 AM Ayad Alejandre, PT [facility]   4/17/2024 11:30 AM Ayad Alejandre, PT [facility]   4/26/2024 11:30 AM Vitaliy Snow, PT [facility]   4/29/2024 11:30 AM Ayad Alejandre, PT [facility]   5/1/2024 11:30 AM Ayad Alejandre, PT

## 2024-04-03 ENCOUNTER — HOSPITAL ENCOUNTER (OUTPATIENT)
Dept: PHYSICAL THERAPY | Age: 79
Setting detail: RECURRING SERIES
Discharge: HOME | End: 2024-04-06
Attending: FAMILY MEDICINE
Payer: MEDICARE

## 2024-04-03 PROCEDURE — 97110 THERAPEUTIC EXERCISES: CPT

## 2024-04-03 PROCEDURE — 97112 NEUROMUSCULAR REEDUCATION: CPT

## 2024-04-03 NOTE — PROGRESS NOTES
Priscilla Ricks  : 1945  Primary: Medicare Part A And B (Medicare)  Secondary: GENERIC COMMERCIAL [facility] @ [place] [address] [place] [address]  Phone: [phone]  Fax: [phone] Plan Frequency: 2x/week    Plan of Care/Certification Expiration Date: 24        Plan of Care/Certification Expiration Date:  Plan of Care/Certification Expiration Date: 24    Frequency/Duration:   Plan Frequency: 2x/week      Time In/Out:   Time In: 1130  Time Out: 1230      PT Visit Info:         Visit Count:  4    OUTPATIENT PHYSICAL THERAPY:   Treatment Note 4/3/2024       Episode  (Gait and balance deficits)               Treatment Diagnosis:    Other abnormalities of gait and mobility  Balance disorder  Muscle weakness (generalized)  Medical/Referring Diagnosis:    Gait abnormality [R26.9]  Balance problem [R26.89]    Referring Physician:  Brandon Henderson MD MD Orders:  PT Eval and Treat   Return MD Appt:  2024   Date of Onset:  Onset Date: 21 (Pt reports about 3 years ago)     Allergies:   Diflucan [fluconazole], Hydromorphone, and Sulfa antibiotics  Restrictions/Precautions:   None      Interventions Planned (Treatment may consist of any combination of the following):     See Assessment Note    Subjective Comments:   Priscilla reports feeling tired this morning. Yesterday she was able to step up onto a curb indep but did not feel comfortable stepping down without holding onto something  Initial Pain Level::     3/10  Post Session Pain Level:       3/10  Medications Last Reviewed:  4/3/2024  Updated Objective Findings:  None Today  Treatment   THERAPEUTIC EXERCISE: (25 minutes):    Exercises per grid below to improve mobility, strength, balance, and coordination.  Required moderate visual, verbal, manual, and tactile cues to promote proper body alignment, promote proper body posture, and promote proper body mechanics.  Progressed resistance, range, repetitions,

## 2024-04-05 ASSESSMENT — PAIN SCALES - GENERAL: PAINLEVEL_OUTOF10: 3

## 2024-04-08 ENCOUNTER — HOSPITAL ENCOUNTER (OUTPATIENT)
Dept: PHYSICAL THERAPY | Age: 79
Setting detail: RECURRING SERIES
Discharge: HOME | End: 2024-04-11
Attending: FAMILY MEDICINE
Payer: MEDICARE

## 2024-04-08 PROCEDURE — 97110 THERAPEUTIC EXERCISES: CPT

## 2024-04-08 PROCEDURE — 97112 NEUROMUSCULAR REEDUCATION: CPT

## 2024-04-08 ASSESSMENT — PAIN SCALES - GENERAL: PAINLEVEL_OUTOF10: 3

## 2024-04-08 NOTE — PROGRESS NOTES
Priscilla Ricks  : 1945  Primary: Medicare Part A And B (Medicare)  Secondary: GENERIC COMMERCIAL [facility] @ [place] [address] [place] [address]  Phone: [phone]  Fax: [phone] Plan Frequency: 2x/week    Plan of Care/Certification Expiration Date: 24        Plan of Care/Certification Expiration Date:  Plan of Care/Certification Expiration Date: 24    Frequency/Duration:   Plan Frequency: 2x/week      Time In/Out:   Time In: 1130  Time Out: 1230      PT Visit Info:         Visit Count:  5    OUTPATIENT PHYSICAL THERAPY:   Treatment Note 2024       Episode  (Gait and balance deficits)               Treatment Diagnosis:    Other abnormalities of gait and mobility  Balance disorder  Muscle weakness (generalized)  Medical/Referring Diagnosis:    Gait abnormality [R26.9]  Balance problem [R26.89]    Referring Physician:  Brandon Henderson MD MD Orders:  PT Eval and Treat   Return MD Appt:  2024   Date of Onset:  Onset Date: 21 (Pt reports about 3 years ago)     Allergies:   Diflucan [fluconazole], Hydromorphone, and Sulfa antibiotics  Restrictions/Precautions:   None      Interventions Planned (Treatment may consist of any combination of the following):     See Assessment Note    Subjective Comments:   Priscilla reports no LOB over the weekend. She notices going up stairs she has to lead with her R LE because it is stronger  Initial Pain Level::     3/10  Post Session Pain Level:       3/10  Medications Last Reviewed:  2024  Updated Objective Findings:  None Today  Treatment   THERAPEUTIC EXERCISE: (25 minutes):    Exercises per grid below to improve mobility, strength, balance, and coordination.  Required moderate visual, verbal, manual, and tactile cues to promote proper body alignment, promote proper body posture, and promote proper body mechanics.  Progressed resistance, range, repetitions, and complexity of movement as indicated.

## 2024-04-10 ENCOUNTER — HOSPITAL ENCOUNTER (OUTPATIENT)
Dept: PHYSICAL THERAPY | Age: 79
Setting detail: RECURRING SERIES
Discharge: HOME | End: 2024-04-13
Attending: FAMILY MEDICINE
Payer: MEDICARE

## 2024-04-10 PROCEDURE — 97110 THERAPEUTIC EXERCISES: CPT

## 2024-04-10 PROCEDURE — 97112 NEUROMUSCULAR REEDUCATION: CPT

## 2024-04-10 ASSESSMENT — PAIN SCALES - GENERAL: PAINLEVEL_OUTOF10: 4

## 2024-04-10 NOTE — PROGRESS NOTES
Priscilla Ricks  : 1945  Primary: Medicare Part A And B (Medicare)  Secondary: GENERIC COMMERCIAL [facility] @ [place] [address] [place] [address]  Phone: [phone]  Fax: [phone] Plan Frequency: 2x/week    Plan of Care/Certification Expiration Date: 24        Plan of Care/Certification Expiration Date:  Plan of Care/Certification Expiration Date: 24    Frequency/Duration:   Plan Frequency: 2x/week      Time In/Out:   Time In: 1130  Time Out: 1225      PT Visit Info:         Visit Count:  6    OUTPATIENT PHYSICAL THERAPY:   Treatment Note 4/10/2024       Episode  (Gait and balance deficits)               Treatment Diagnosis:    Other abnormalities of gait and mobility  Balance disorder  Muscle weakness (generalized)  Medical/Referring Diagnosis:    Gait abnormality [R26.9]  Balance problem [R26.89]    Referring Physician:  Brandon Henderson MD MD Orders:  PT Eval and Treat   Return MD Appt:  2024   Date of Onset:  Onset Date: 21 (Pt reports about 3 years ago)     Allergies:   Diflucan [fluconazole], Hydromorphone, and Sulfa antibiotics  Restrictions/Precautions:   None      Interventions Planned (Treatment may consist of any combination of the following):     See Assessment Note    Subjective Comments:   Priscilla reports she woke up with some L-sided LBP today and not sure why. She is walking with her cane just in case  Initial Pain Level::     4/10  Post Session Pain Level:       3/10  Medications Last Reviewed:  4/10/2024  Updated Objective Findings:  None Today  Treatment   THERAPEUTIC EXERCISE: (30 minutes):    Exercises per grid below to improve mobility, strength, balance, and coordination.  Required moderate visual, verbal, manual, and tactile cues to promote proper body alignment, promote proper body posture, and promote proper body mechanics.  Progressed resistance, range, repetitions, and complexity of movement as indicated.

## 2024-04-15 ENCOUNTER — HOSPITAL ENCOUNTER (OUTPATIENT)
Dept: PHYSICAL THERAPY | Age: 79
Setting detail: RECURRING SERIES
Discharge: HOME | End: 2024-04-18
Attending: FAMILY MEDICINE
Payer: MEDICARE

## 2024-04-15 PROCEDURE — 97112 NEUROMUSCULAR REEDUCATION: CPT

## 2024-04-15 PROCEDURE — 97110 THERAPEUTIC EXERCISES: CPT

## 2024-04-15 ASSESSMENT — PAIN SCALES - GENERAL: PAINLEVEL_OUTOF10: 4

## 2024-04-15 NOTE — PROGRESS NOTES
Priscilla Ricks  : 1945  Primary: Medicare Part A And B (Medicare)  Secondary: GENERIC COMMERCIAL [facility] @ [place] [address] [place] [address]  Phone: [phone]  Fax: [phone] Plan Frequency: 2x/week    Plan of Care/Certification Expiration Date: 24        Plan of Care/Certification Expiration Date:  Plan of Care/Certification Expiration Date: 24    Frequency/Duration:   Plan Frequency: 2x/week      Time In/Out:   Time In: 1130  Time Out: 1225      PT Visit Info:         Visit Count:  7    OUTPATIENT PHYSICAL THERAPY:   Treatment Note 4/15/2024       Episode  (Gait and balance deficits)               Treatment Diagnosis:    Other abnormalities of gait and mobility  Balance disorder  Muscle weakness (generalized)  Medical/Referring Diagnosis:    Gait abnormality [R26.9]  Balance problem [R26.89]    Referring Physician:  Brandon Henderson MD MD Orders:  PT Eval and Treat   Return MD Appt:  2024   Date of Onset:  Onset Date: 21 (Pt reports about 3 years ago)     Allergies:   Diflucan [fluconazole], Hydromorphone, and Sulfa antibiotics  Restrictions/Precautions:   None      Interventions Planned (Treatment may consist of any combination of the following):     See Assessment Note    Subjective Comments:   Priscilla reports her back was feeling better after last session up until yesterday. It really bothered her but she's not sure why. She had a hard time bending over.  Initial Pain Level::     4/10  Post Session Pain Level:       3/10  Medications Last Reviewed:  4/15/2024  Updated Objective Findings:  None Today  Treatment   THERAPEUTIC EXERCISE: (30 minutes):    Exercises per grid below to improve mobility, strength, balance, and coordination.  Required moderate visual, verbal, manual, and tactile cues to promote proper body alignment, promote proper body posture, and promote proper body mechanics.  Progressed resistance, range, repetitions, and

## 2024-04-17 ENCOUNTER — HOSPITAL ENCOUNTER (OUTPATIENT)
Dept: PHYSICAL THERAPY | Age: 79
Setting detail: RECURRING SERIES
Discharge: HOME | End: 2024-04-20
Attending: FAMILY MEDICINE
Payer: MEDICARE

## 2024-04-17 PROCEDURE — 97112 NEUROMUSCULAR REEDUCATION: CPT

## 2024-04-17 PROCEDURE — 97110 THERAPEUTIC EXERCISES: CPT

## 2024-04-17 ASSESSMENT — PAIN SCALES - GENERAL: PAINLEVEL_OUTOF10: 4

## 2024-04-17 NOTE — PROGRESS NOTES
KENNY [facility]   7/22/2024  1:00 PM Billy Chew MD [facility] [place] AM[place]   9/27/2024 10:00 AM Brandon Henderson MD [facility] AMB      I will STOP taking the medications listed below when I get home from the hospital:  None

## 2024-04-26 ENCOUNTER — OFFICE VISIT (OUTPATIENT)
Dept: FAMILY MEDICINE CLINIC | Facility: CLINIC | Age: 79
End: 2024-04-26

## 2024-04-26 ENCOUNTER — APPOINTMENT (OUTPATIENT)
Dept: PHYSICAL THERAPY | Age: 79
End: 2024-04-26
Attending: FAMILY MEDICINE
Payer: MEDICARE

## 2024-04-26 VITALS
BODY MASS INDEX: 34.26 KG/M2 | WEIGHT: 199.6 LBS | TEMPERATURE: 97.5 F | DIASTOLIC BLOOD PRESSURE: 80 MMHG | HEART RATE: 99 BPM | OXYGEN SATURATION: 97 % | RESPIRATION RATE: 18 BRPM | SYSTOLIC BLOOD PRESSURE: 140 MMHG

## 2024-04-26 DIAGNOSIS — J01.10 ACUTE NON-RECURRENT FRONTAL SINUSITIS: Primary | ICD-10-CM

## 2024-04-26 DIAGNOSIS — H66.002 LEFT ACUTE SUPPURATIVE OTITIS MEDIA: ICD-10-CM

## 2024-04-26 RX ORDER — CEFDINIR 300 MG/1
300 CAPSULE ORAL 2 TIMES DAILY
Qty: 20 CAPSULE | Refills: 0 | Status: SHIPPED | OUTPATIENT
Start: 2024-04-26 | End: 2024-05-06

## 2024-04-26 ASSESSMENT — ENCOUNTER SYMPTOMS
DIARRHEA: 0
SINUS PAIN: 1
NAUSEA: 0
CONSTIPATION: 0
SORE THROAT: 1
BLOOD IN STOOL: 0
EYE REDNESS: 0
COUGH: 1
VOMITING: 0
SHORTNESS OF BREATH: 0

## 2024-04-26 NOTE — PROGRESS NOTES
Priscilla Ricks (:  1945) is a 79 y.o. female,Established patient, here for evaluation of the following chief complaint(s):  Cough      Assessment & Plan   1. Acute non-recurrent frontal sinusitis  -     cefdinir (OMNICEF) 300 MG capsule; Take 1 capsule by mouth 2 times daily for 10 days, Disp-20 capsule, R-0Normal  2. Left acute suppurative otitis media  -     cefdinir (OMNICEF) 300 MG capsule; Take 1 capsule by mouth 2 times daily for 10 days, Disp-20 capsule, R-0Normal    Start Omnicef 300 mg BID as directed.  Discussed medications, side effects and risks versus benefits in detail.  Increase fluids and rest.  Mucinex, Flonase and an allergy pill encouraged for symptom management.    Return if symptoms worsen or fail to improve.       Subjective   HPI  Pt with congestion x 5 days.  Yellow-green drainage.  Cough with colored sputum.  Sinus pressure.  Sore throat.  Pressure in ears.  No fever.  No chest pain.  No shortness of breath or wheezing.  No nausea or vomiting.      Review of Systems   Constitutional:  Negative for chills and fever.   HENT:  Positive for congestion, ear pain, postnasal drip, sinus pain and sore throat.    Eyes:  Negative for pain and redness.   Respiratory:  Positive for cough. Negative for shortness of breath.    Cardiovascular:  Negative for chest pain and palpitations.   Gastrointestinal:  Negative for blood in stool, constipation, diarrhea, nausea and vomiting.   Endocrine: Negative for polydipsia.   Genitourinary:  Negative for dysuria, frequency and urgency.   Musculoskeletal:  Negative for arthralgias, back pain and myalgias.   Skin:  Negative for rash.   Allergic/Immunologic: Negative for environmental allergies.   Neurological:  Positive for headaches. Negative for dizziness.   Psychiatric/Behavioral:  Negative for hallucinations and suicidal ideas.           Objective   Physical Exam  Vitals and nursing note reviewed.   Constitutional:       General: She is not in

## 2024-04-29 ENCOUNTER — HOSPITAL ENCOUNTER (OUTPATIENT)
Dept: PHYSICAL THERAPY | Age: 79
Setting detail: RECURRING SERIES
End: 2024-04-29
Attending: FAMILY MEDICINE
Payer: MEDICARE

## 2024-04-29 NOTE — PROGRESS NOTES
Priscilla Ricks  : 1945  Primary: Medicare Part A And B  Secondary: GENERIC COMMERCIAL [facility] @ [place] [address]  [facility] [address]  Phone: [phone]  Fax: [phone]       2024      Patient canceled today's appointment due to illness and will plan to follow up at next scheduled visit.       Ayad Alejandre, PT

## 2024-05-20 ENCOUNTER — OFFICE VISIT (OUTPATIENT)
Dept: ORTHOPEDIC SURGERY | Age: 79
End: 2024-05-20
Payer: MEDICARE

## 2024-05-20 ENCOUNTER — HOSPITAL ENCOUNTER (OUTPATIENT)
Dept: PHYSICAL THERAPY | Age: 79
Setting detail: RECURRING SERIES
Discharge: HOME | End: 2024-05-23
Attending: FAMILY MEDICINE
Payer: MEDICARE

## 2024-05-20 VITALS — BODY MASS INDEX: 33.97 KG/M2 | WEIGHT: 199 LBS | HEIGHT: 64 IN

## 2024-05-20 DIAGNOSIS — M54.16 LUMBAR RADICULOPATHY: ICD-10-CM

## 2024-05-20 DIAGNOSIS — G95.9 CERVICAL MYELOPATHY (HCC): Primary | ICD-10-CM

## 2024-05-20 PROCEDURE — 1090F PRES/ABSN URINE INCON ASSESS: CPT | Performed by: PHYSICIAN ASSISTANT

## 2024-05-20 PROCEDURE — G8417 CALC BMI ABV UP PARAM F/U: HCPCS | Performed by: PHYSICIAN ASSISTANT

## 2024-05-20 PROCEDURE — 97110 THERAPEUTIC EXERCISES: CPT

## 2024-05-20 PROCEDURE — 1123F ACP DISCUSS/DSCN MKR DOCD: CPT | Performed by: PHYSICIAN ASSISTANT

## 2024-05-20 PROCEDURE — 1036F TOBACCO NON-USER: CPT | Performed by: PHYSICIAN ASSISTANT

## 2024-05-20 PROCEDURE — 99215 OFFICE O/P EST HI 40 MIN: CPT | Performed by: PHYSICIAN ASSISTANT

## 2024-05-20 PROCEDURE — G2211 COMPLEX E/M VISIT ADD ON: HCPCS | Performed by: PHYSICIAN ASSISTANT

## 2024-05-20 PROCEDURE — G8399 PT W/DXA RESULTS DOCUMENT: HCPCS | Performed by: PHYSICIAN ASSISTANT

## 2024-05-20 PROCEDURE — 97140 MANUAL THERAPY 1/> REGIONS: CPT

## 2024-05-20 PROCEDURE — G8427 DOCREV CUR MEDS BY ELIG CLIN: HCPCS | Performed by: PHYSICIAN ASSISTANT

## 2024-05-20 PROCEDURE — 97112 NEUROMUSCULAR REEDUCATION: CPT

## 2024-05-20 NOTE — PROGRESS NOTES
Ayad Alejandre, PT [facility]   5/22/2024  3:40 PM Brandon Henderson MD [facility] [place] AM[place]   6/5/2024  9:00 AM Anna Gaston MD [facility] [place] AMB   6/6/2024 11:30 AM Ayad Alejandre, PT [facility]   6/6/2024  3:15 PM POA INT MRI [facility] AMB RAD [facility]   6/10/2024 11:30 AM Ayad Alejandre, PT [facility]   6/12/2024  2:00 PM Ayad Alejandre, PT [facility]   6/14/2024 12:40 PM Vitaliy Penn, PA [facility] [place] AMB   6/17/2024 10:30 AM Ayad Alejandre, PT [facility]   6/19/2024 10:30 AM Ayad Alejandre, PT [facility]   6/20/2024 10:20 [facility] [place] [facility]   7/22/2024  1:00 PM Billy Chew MD [facility] [place] AM[facility]   9/27/2024 10:00 AM Brandon Henderson MD [facility] [place] AM[place]

## 2024-05-20 NOTE — PROGRESS NOTES
falling. She will benefit from skilled therapy to address these impairments to allow her to navigate her home and community without fear of falling.    Progress Note (5/20/24): As of 5/20/2024, Priscilla Ricks has attended 9 out of 11 scheduled visits, with 1 cancellation(s) and 1 no shows. She is making good progress with symptoms since starting therapy. She reports having a little more confidence when stepping on/off curbs and exhibits improved ABC score compared to initial evaluation. She continues to complain of low back and leg soreness (R>L) with prolonged standing and walking with continued s/s of lumbar stenosis. She will benefit from continued skilled therapy to progress balance and strengthening exercises to improve her ability to stand/walk as well as decrease her risk of falling.    Therapy Problem List: (Impacting functional limitations):    Decreased Strength, Decreased ROM, Decreased Ambulation Ability/Technique, Decreased Functional Mobility, Decreased Bosler with Home Exercise Program, Decreased Posture, Decreased Balance, Decreased Body Mechanics, and Decreased Activity Tolerance/Endurance*        PLAN   Effective Dates: 3/27/2024 TO Plan of Care/Certification Expiration Date: 06/25/24     Frequency/Duration: Plan Frequency: 2x/week      Interventions Planned (Treatment may consist of any combination of the following):    Balance Training, Gait Training, Home Exercise Program (HEP), Manual Therapy, Neuromuscular Re-education/Strengthening, Range of Motion (ROM), Therapeutic Activites, Therapeutic Exercise/Strengthening, and Safety Education and Training   GOALS: (Goals have been discussed and agreed upon with patient.)  Discharge Goals: Time Frame: 3/27/24 - 6/25/24  Patient will be independent with home exercise program without assistance from therapist. PROGRESSING  Patient will report 70% ABC score to demonstrate improved functional capacity. PROGRESSING  Patient will demonstrate ROE

## 2024-05-20 NOTE — PROGRESS NOTES
Name: Priscilla Ricks  YOB: 1945  Gender: female  MRN: [id number]         *ANNUAL VISIT *        CC:   Chief Complaint   Patient presents with    New Patient     Low back pain          HPI:   Priscilla Ricks is a 79 y.o. female with  PMHx below.  There are an established  patient of Dr. Gaston, and present here for Annual Visit.        She presents here for evaluation of back pain rating to the right hip and lateral thigh.  This pain is gradually increased over the last several weeks.  She also notes to having frequent falls over the last several months, though none of significance since Dr. Gaston last saw her in November.  She reports feeling very unsteady on her feet, and feeling like she loses her balance easily.  She is in physical therapy for balance and has been multiple times.  She does feel like her balance is getting progressively worse.  She was in the hospital in December 2022 after a fall and an MRI of the cervical spine revealed severe spinal stenosis with cord compression and DISH.            Past Medical History Includes:   Past Medical History:   Diagnosis Date    Anxiety     Arthritis     Bell's palsy     in Oct. 2009 with some vertigo at times still, no other after effects    Depression     GERD (gastroesophageal reflux disease)     reflux, takes nexium    Hematoma of hip, right, initial encounter     Hypertension     on medication since 2006    Hypothyroidism     Nausea & vomiting     Obese     Paroxysmal atrial fibrillation (HCC) 8/13/2017    Psychiatric disorder     depression    S/P total knee replacement using cement 5/16/2011    Unspecified hypothyroidism 5/16/2011   ,   Past Surgical History:   Procedure Laterality Date    APPENDECTOMY  1961    BREAST BIOPSY Bilateral     BREAST LUMPECTOMY  1998    benign    CATARACT REMOVAL Bilateral 2014    CHOLECYSTECTOMY  1976    COLONOSCOPY  04/04/2016    GASTRECTOMY  9/21/15    sleeve    HYSTERECTOMY (CERVIX STATUS UNKNOWN)  1987

## 2024-05-21 ASSESSMENT — PAIN SCALES - GENERAL: PAINLEVEL_OUTOF10: 4

## 2024-05-22 ENCOUNTER — HOSPITAL ENCOUNTER (OUTPATIENT)
Dept: PHYSICAL THERAPY | Age: 79
Setting detail: RECURRING SERIES
Discharge: HOME | End: 2024-05-25
Attending: FAMILY MEDICINE
Payer: MEDICARE

## 2024-05-22 ENCOUNTER — OFFICE VISIT (OUTPATIENT)
Dept: FAMILY MEDICINE CLINIC | Facility: CLINIC | Age: 79
End: 2024-05-22

## 2024-05-22 VITALS
TEMPERATURE: 97.2 F | DIASTOLIC BLOOD PRESSURE: 72 MMHG | OXYGEN SATURATION: 90 % | WEIGHT: 202 LBS | BODY MASS INDEX: 34.49 KG/M2 | SYSTOLIC BLOOD PRESSURE: 128 MMHG | HEART RATE: 73 BPM | HEIGHT: 64 IN

## 2024-05-22 DIAGNOSIS — I10 ESSENTIAL (PRIMARY) HYPERTENSION: Primary | ICD-10-CM

## 2024-05-22 DIAGNOSIS — R26.89 BALANCE PROBLEM: ICD-10-CM

## 2024-05-22 DIAGNOSIS — R26.9 GAIT ABNORMALITY: ICD-10-CM

## 2024-05-22 DIAGNOSIS — E66.09 CLASS 1 OBESITY DUE TO EXCESS CALORIES WITH SERIOUS COMORBIDITY AND BODY MASS INDEX (BMI) OF 32.0 TO 32.9 IN ADULT: ICD-10-CM

## 2024-05-22 DIAGNOSIS — E89.0 POSTSURGICAL HYPOTHYROIDISM: ICD-10-CM

## 2024-05-22 PROCEDURE — 97140 MANUAL THERAPY 1/> REGIONS: CPT

## 2024-05-22 PROCEDURE — 97110 THERAPEUTIC EXERCISES: CPT

## 2024-05-22 PROCEDURE — 97112 NEUROMUSCULAR REEDUCATION: CPT

## 2024-05-22 RX ORDER — LEVOTHYROXINE SODIUM 0.1 MG/1
100 TABLET ORAL
Qty: 90 TABLET | Refills: 3 | Status: SHIPPED | OUTPATIENT
Start: 2024-05-22

## 2024-05-22 SDOH — ECONOMIC STABILITY: FOOD INSECURITY: WITHIN THE PAST 12 MONTHS, YOU WORRIED THAT YOUR FOOD WOULD RUN OUT BEFORE YOU GOT MONEY TO BUY MORE.: NEVER TRUE

## 2024-05-22 SDOH — ECONOMIC STABILITY: FOOD INSECURITY: WITHIN THE PAST 12 MONTHS, THE FOOD YOU BOUGHT JUST DIDN'T LAST AND YOU DIDN'T HAVE MONEY TO GET MORE.: NEVER TRUE

## 2024-05-22 SDOH — ECONOMIC STABILITY: INCOME INSECURITY: HOW HARD IS IT FOR YOU TO PAY FOR THE VERY BASICS LIKE FOOD, HOUSING, MEDICAL CARE, AND HEATING?: NOT HARD AT ALL

## 2024-05-22 NOTE — PROGRESS NOTES
6/5/2024  9:00 AM Anna Gaston MD [facility] [place] AM[facility]   6/6/2024 11:30 AM Ayad Alejandre, PT [facility]   6/6/2024  3:15 PM POA INT MRI INTMXR AMB RAD [facility]   6/10/2024 11:30 AM Ayad Alejandre, PT [facility]   6/12/2024  2:00 PM Ayad Alejandre, PT [facility]   6/14/2024 12:40 PM Vitaliy Penn, PA [facility] [place] AMB   6/17/2024 10:30 AM Ayad Alejandre, PT [facility]   6/19/2024 10:30 AM Ayad Alejandre, PT [facility]   6/20/2024 10:20 AM [facility] [place] [facility]   7/22/2024  1:00 PM Billy Chew MD [facility] [place] AMB   9/27/2024 10:00 AM Brandon Henderson MD [facility] [place] AM[facility]

## 2024-05-23 ASSESSMENT — PAIN SCALES - GENERAL: PAINLEVEL_OUTOF10: 4

## 2024-06-05 ENCOUNTER — OFFICE VISIT (OUTPATIENT)
Dept: ORTHOPEDIC SURGERY | Age: 79
End: 2024-06-05
Payer: MEDICARE

## 2024-06-05 DIAGNOSIS — M51.36 DISC DEGENERATION, LUMBAR: ICD-10-CM

## 2024-06-05 DIAGNOSIS — I10 HTN (HYPERTENSION): ICD-10-CM

## 2024-06-05 DIAGNOSIS — M47.816 LUMBAR SPONDYLOSIS: ICD-10-CM

## 2024-06-05 DIAGNOSIS — M48.061 SPINAL STENOSIS OF LUMBAR REGION, UNSPECIFIED WHETHER NEUROGENIC CLAUDICATION PRESENT: ICD-10-CM

## 2024-06-05 DIAGNOSIS — I48.91 ATRIAL FIBRILLATION WITH RVR (HCC): Primary | ICD-10-CM

## 2024-06-05 DIAGNOSIS — M54.16 LUMBAR RADICULOPATHY: Primary | ICD-10-CM

## 2024-06-05 PROCEDURE — 64483 NJX AA&/STRD TFRM EPI L/S 1: CPT | Performed by: PHYSICAL MEDICINE & REHABILITATION

## 2024-06-05 RX ORDER — TRIAMCINOLONE ACETONIDE 40 MG/ML
40 INJECTION, SUSPENSION INTRA-ARTICULAR; INTRAMUSCULAR ONCE
Status: COMPLETED | OUTPATIENT
Start: 2024-06-05 | End: 2024-06-05

## 2024-06-05 RX ADMIN — TRIAMCINOLONE ACETONIDE 40 MG: 40 INJECTION, SUSPENSION INTRA-ARTICULAR; INTRAMUSCULAR at 09:28

## 2024-06-05 NOTE — PROGRESS NOTES
Name: Priscilla Ricks  YOB: 1945  Gender: female  MRN: [id number]        Transforaminal JOSE Procedure Note    Procedure: Right  L5-S1 transforaminal epidural steroid injections     Precautions: Priscilla Ricks denies prior sensitivity to steroid, local anesthetic, iodine, or shellfish.       Consent:  Consent was obtained prior to the procedure. The procedure was discussed at length with Priscilla Ricks. She was given the opportunity to ask questions regarding the procedure and its associated risks.  In addition to the potential risks associated with the procedure itself, the patient was informed both verbally and in writing of potential side effects of the use glucocorticoids.  The patient appeared to comprehend the informed consent and desired to have the procedure performed, and informed consent was signed.     She was placed in a prone position on the fluoroscopy table and the skin was prepped and draped in a routine sterile fashion. The areas to be injected were each anesthetized with 1 ml of 1% Lidocaine. A 22 gauge 3.5 inch spinal needle was carefully advanced under fluoroscopic guidance to the right L5-S1 transforaminal space  0.5 ml of 70% of Omnipaque was injected to confirm proper needle placement and absence of subdural or vascular flow Once proper placement was confirmed, 0.5ml of 0.25 marcaine and 40 mg kenalog were injected through the spinal needle.     Fluoroscopic guidance was used intermittently over a 10-minute period to insure proper needle placement and her safety. A hard copy of the fluoroscopic image has been placed in her chart and is saved on the C-arm hard drive. She was monitored for 30 minutes after the procedure and discharged home in a stable fashion with a routine follow up.    Procedural Diagnosis:     ICD-10-CM    1. Lumbar radiculopathy  M54.16 FL NERVE BLOCK LUMBOSACRAL 1ST     triamcinolone acetonide (KENALOG-40) injection 40 mg      2. Lumbar

## 2024-06-06 ENCOUNTER — HOSPITAL ENCOUNTER (OUTPATIENT)
Dept: PHYSICAL THERAPY | Age: 79
Setting detail: RECURRING SERIES
End: 2024-06-06
Attending: FAMILY MEDICINE
Payer: MEDICARE

## 2024-06-06 RX ORDER — HYDROCHLOROTHIAZIDE 12.5 MG/1
12.5 TABLET ORAL EVERY MORNING
Qty: 90 TABLET | Refills: 2 | Status: SHIPPED | OUTPATIENT
Start: 2024-06-06

## 2024-06-06 RX ORDER — AMLODIPINE BESYLATE 5 MG/1
5 TABLET ORAL DAILY
Qty: 90 TABLET | Refills: 2 | Status: SHIPPED | OUTPATIENT
Start: 2024-06-06

## 2024-06-06 NOTE — TELEPHONE ENCOUNTER
Requested Prescriptions     Pending Prescriptions Disp Refills    amLODIPine (NORVASC) 5 MG tablet 90 tablet 2     Sig: Take 1 tablet by mouth daily    hydroCHLOROthiazide 12.5 MG tablet 90 tablet 2     Sig: Take 1 tablet by mouth every morning

## 2024-06-10 ENCOUNTER — HOSPITAL ENCOUNTER (OUTPATIENT)
Dept: PHYSICAL THERAPY | Age: 79
Setting detail: RECURRING SERIES
Discharge: HOME | End: 2024-06-13
Attending: FAMILY MEDICINE
Payer: MEDICARE

## 2024-06-10 PROCEDURE — 97140 MANUAL THERAPY 1/> REGIONS: CPT

## 2024-06-10 PROCEDURE — 97112 NEUROMUSCULAR REEDUCATION: CPT

## 2024-06-10 PROCEDURE — 97110 THERAPEUTIC EXERCISES: CPT

## 2024-06-10 ASSESSMENT — PAIN SCALES - GENERAL: PAINLEVEL_OUTOF10: 3

## 2024-06-10 NOTE — PROGRESS NOTES
Priscilla Ricks  : 1945  Primary: Medicare Part A And B (Medicare)  Secondary: GENERIC COMMERCIAL [facility] @ [place] [address] [place] [address]  Phone: [phone]  Fax: [phone] Plan Frequency: 2x/week    Plan of Care/Certification Expiration Date: 24        Plan of Care/Certification Expiration Date:  Plan of Care/Certification Expiration Date: 24    Frequency/Duration:   Plan Frequency: 2x/week      Time In/Out:   Time In: 1130  Time Out: 1230      PT Visit Info:    Progress Note Counter: 1      Visit Count:  11    OUTPATIENT PHYSICAL THERAPY:   Treatment Note 6/10/2024       Episode  (Gait and balance deficits)               Treatment Diagnosis:    Other abnormalities of gait and mobility  Balance disorder  Muscle weakness (generalized)  Medical/Referring Diagnosis:    Gait abnormality [R26.9]  Balance problem [R26.89]    Referring Physician:  Brandon Henderson MD MD Orders:  PT Eval and Treat   Return MD Appt:  2024   Date of Onset:  Onset Date: 21 (Pt reports about 3 years ago)     Allergies:   Diflucan [fluconazole], Hydromorphone, and Sulfa antibiotics  Restrictions/Precautions:   None      Interventions Planned (Treatment may consist of any combination of the following):     See Assessment Note    Subjective Comments:   Priscilla had a MRI of her cervical spine and it did not show much change from her other one 1.5 years ago. She received a lumbar epidural last week and it has helped decrease some low back pain  Initial Pain Level::     3/10  Post Session Pain Level:       2/10  Medications Last Reviewed:  6/10/2024  Updated Objective Findings:  None Today  Treatment   THERAPEUTIC EXERCISE: (15 minutes):    Exercises per grid below to improve mobility, strength, balance, and coordination.  Required moderate visual, verbal, manual, and tactile cues to promote proper body alignment, promote proper body posture, and promote proper body

## 2024-06-14 ENCOUNTER — OFFICE VISIT (OUTPATIENT)
Dept: ORTHOPEDIC SURGERY | Age: 79
End: 2024-06-14

## 2024-06-14 VITALS — BODY MASS INDEX: 34.49 KG/M2 | WEIGHT: 202 LBS | HEIGHT: 64 IN

## 2024-06-14 DIAGNOSIS — G95.9 CERVICAL MYELOPATHY (HCC): ICD-10-CM

## 2024-06-14 DIAGNOSIS — M54.16 LUMBAR RADICULOPATHY: Primary | ICD-10-CM

## 2024-06-14 NOTE — PROGRESS NOTES
06/14/24        Name: Priscilla Ricks  YOB: 1945  Gender: female  MRN: [id number]        MRI/IMAGING/STUDY FOLLOWUP    CC: Follow-up (MRI Results )       HPI: Priscilla Ricks is a 79 y.o. female who returns for Follow-up (MRI Results )           Patient presents to the office today for follow-up to review MRI results.  She feels like her balance and walking problems are stable.  She has an appoint with Dr. Jimenez in August.      Regarding her lower back, she recently underwent a right L5 selective nerve root block with Dr. Gaston.  This has helped her back and thigh pain significantly.          Meds/PSH/PMH/FH/SH: This information has been reviewed.      ALLERGIES:   Allergies   Allergen Reactions    Diflucan [Fluconazole] Swelling     Hand swelling    Hydromorphone Swelling    Sulfa Antibiotics Rash              Physical Examination:            Imaging:         MRI Result (most recent):  MRI CERVICAL SPINE WO CONTRAST 06/06/2024    Narrative  PROCEDURE:  MRI CERVICAL SPINE WITHOUT CONTRAST    REASON FOR PROCEDURE:  Disease of spinal cord, unspecified    COMPARISON: Prior MRI 12/1/2022    TECHNIQUE:  Multiplanar, multisequence MRI images of the cervical spine obtained  without IV contrast.    FINDINGS:  Normal alignment.  No fracture or compression deformity.  Marrow signal is  benign.  There is a stable benign hemangioma in the C7 vertebral body  demonstrating hyperintense STIR signal.  No prevertebral soft tissue swelling.  Chronic multilevel disc desiccation.  There is loss of normal disc height in  several discs.  Congenital stenosis is present secondary to short pedicles.    Chronic left-sided cord compression at C3-4, secondary to a marked chronic left  subarticular partially calcified extrusion type disc herniation.  Unchanged  compared to prior MRI.  There is mild ventral cord indentation at C6-7 also  unchanged, secondary to a chronic disc bulge and posterior osteophytes.  No  evidence of

## 2024-06-17 ENCOUNTER — HOSPITAL ENCOUNTER (OUTPATIENT)
Dept: PHYSICAL THERAPY | Age: 79
Setting detail: RECURRING SERIES
Discharge: HOME | End: 2024-06-20
Attending: FAMILY MEDICINE
Payer: MEDICARE

## 2024-06-17 PROCEDURE — 97112 NEUROMUSCULAR REEDUCATION: CPT

## 2024-06-17 PROCEDURE — 97140 MANUAL THERAPY 1/> REGIONS: CPT

## 2024-06-17 PROCEDURE — 97110 THERAPEUTIC EXERCISES: CPT

## 2024-06-17 ASSESSMENT — PAIN SCALES - GENERAL: PAINLEVEL_OUTOF10: 3

## 2024-06-17 NOTE — PROGRESS NOTES
Priscilla Ricks  : 1945  Primary: Medicare Part A And B (Medicare)  Secondary: GENERIC COMMERCIAL [facility] [place] [address] [place] [address]  Phone: [phone]  Fax: [phone] Plan Frequency: 2x/week    Plan of Care/Certification Expiration Date: 24        Plan of Care/Certification Expiration Date:  Plan of Care/Certification Expiration Date: 24    Frequency/Duration:   Plan Frequency: 2x/week      Time In/Out:   Time In: 1030  Time Out: 1130      PT Visit Info:    Progress Note Counter: 3      Visit Count:  12    OUTPATIENT PHYSICAL THERAPY:   Treatment Note 2024       Episode  (Gait and balance deficits)               Treatment Diagnosis:    Other abnormalities of gait and mobility  Balance disorder  Muscle weakness (generalized)  Medical/Referring Diagnosis:    Gait abnormality [R26.9]  Balance problem [R26.89]    Referring Physician:  Brandon Henderson MD MD Orders:  PT Eval and Treat   Return MD Appt:  2024   Date of Onset:  Onset Date: 21 (Pt reports about 3 years ago)     Allergies:   Diflucan [fluconazole], Hydromorphone, and Sulfa antibiotics  Restrictions/Precautions:   None      Interventions Planned (Treatment may consist of any combination of the following):     See Assessment Note    Subjective Comments:   Priscilla says her back continues to feel better after injection last week. She will follow up with Dr. Lowry about recent cervical MRI  Initial Pain Level::     3/10  Post Session Pain Level:       2/10  Medications Last Reviewed:  2024  Updated Objective Findings:  None Today  Treatment   THERAPEUTIC EXERCISE: (15 minutes):    Exercises per grid below to improve mobility, strength, balance, and coordination.  Required moderate visual, verbal, manual, and tactile cues to promote proper body alignment, promote proper body posture, and promote proper body mechanics.  Progressed resistance, range, repetitions, and

## 2024-06-19 ENCOUNTER — HOSPITAL ENCOUNTER (OUTPATIENT)
Dept: PHYSICAL THERAPY | Age: 79
Setting detail: RECURRING SERIES
Discharge: HOME | End: 2024-06-22
Attending: FAMILY MEDICINE
Payer: MEDICARE

## 2024-06-19 PROCEDURE — 97112 NEUROMUSCULAR REEDUCATION: CPT

## 2024-06-19 PROCEDURE — 97110 THERAPEUTIC EXERCISES: CPT

## 2024-06-19 PROCEDURE — 97140 MANUAL THERAPY 1/> REGIONS: CPT

## 2024-06-19 NOTE — PROGRESS NOTES
Priscilla Ricks  : 1945  Primary: Medicare Part A And B (Medicare)  Secondary: GENERIC COMMERCIAL [facility] @ [place] [address] [place] [address]  Phone: [phone]  Fax: [phone] Plan Frequency: 2x/week    Plan of Care/Certification Expiration Date: 24        Plan of Care/Certification Expiration Date:  Plan of Care/Certification Expiration Date: 24    Frequency/Duration:   Plan Frequency: 2x/week      Time In/Out:   Time In: 1030  Time Out: 1130      PT Visit Info:    Progress Note Counter: 4      Visit Count:  13    OUTPATIENT PHYSICAL THERAPY:   Treatment Note 2024       Episode  (Gait and balance deficits)               Treatment Diagnosis:    Other abnormalities of gait and mobility  Balance disorder  Muscle weakness (generalized)  Medical/Referring Diagnosis:    Gait abnormality [R26.9]  Balance problem [R26.89]    Referring Physician:  Brandon Henderson MD MD Orders:  PT Eval and Treat   Return MD Appt:  2024   Date of Onset:  Onset Date: 21 (Pt reports about 3 years ago)     Allergies:   Diflucan [fluconazole], Hydromorphone, and Sulfa antibiotics  Restrictions/Precautions:   None      Interventions Planned (Treatment may consist of any combination of the following):     See Assessment Note    Subjective Comments:   Priscilla reports she is doing okay today  Initial Pain Level::     2/10  Post Session Pain Level:       2/10  Medications Last Reviewed:  2024  Updated Objective Findings:  See Discharge Note from today  Treatment   THERAPEUTIC EXERCISE: (15 minutes):    Exercises per grid below to improve mobility, strength, balance, and coordination.  Required moderate visual, verbal, manual, and tactile cues to promote proper body alignment, promote proper body posture, and promote proper body mechanics.  Progressed resistance, range, repetitions, and complexity of movement as indicated.   Date:  2024   Activity/Exercise

## 2024-06-19 NOTE — THERAPY DISCHARGE
Priscilla Ricks  : 1945  Primary: Medicare Part A And B (Medicare)  Secondary: GENERIC COMMERCIAL [facility] @ [place] [address] [place] [address]  Phone: [phone]  Fax: [phone] Plan Frequency: 2x/week    Plan of Care/Certification Expiration Date: 24        Plan of Care/Certification Expiration Date:  Plan of Care/Certification Expiration Date: 24    Frequency/Duration: Plan Frequency: 2x/week      Time In/Out:   Time In: 1030  Time Out: 1130      PT Visit Info:    Progress Note Counter: 4      Visit Count:  13                OUTPATIENT PHYSICAL THERAPY:             Discharge Summary 2024               Episode (Gait and balance deficits)         Treatment Diagnosis:     Other abnormalities of gait and mobility  Balance disorder  Muscle weakness (generalized)  Medical/Referring Diagnosis:    Gait abnormality [R26.9]  Balance problem [R26.89]    Referring Physician:  Brandon Henderson MD MD Orders:  PT Eval and Treat   Return MD Appt:  2024  Date of Onset:  Onset Date: 21 (Pt reports about 3 years ago)     Allergies:  Diflucan [fluconazole], Hydromorphone, and Sulfa antibiotics  Restrictions/Precautions:    None      Medications Last Reviewed:  2024     SUBJECTIVE   History of Injury/Illness (Reason for Referral):  Priscilla is a 78 yo female referred to physical therapy for gait and balance issues that have worsened over the last 3 years. She reports having her first big fall down the stairs around that time and since then she has felt less confident walking with frequent falling. She states that various things can make her fall and she has no reaction time to catch herself. No reports of dizziness. She has a hard time negotiating curbs and uneven terrain due to loss of balance. She has a long history of LBP with a lumbar clean out surgery over 10 years ago. She also had both knees replaced in  and . No reports of

## 2024-06-23 ASSESSMENT — PAIN SCALES - GENERAL: PAINLEVEL_OUTOF10: 2

## 2024-07-17 ENCOUNTER — OFFICE VISIT (OUTPATIENT)
Dept: FAMILY MEDICINE CLINIC | Facility: CLINIC | Age: 79
End: 2024-07-17

## 2024-07-17 VITALS — DIASTOLIC BLOOD PRESSURE: 78 MMHG | SYSTOLIC BLOOD PRESSURE: 122 MMHG | BODY MASS INDEX: 33.64 KG/M2 | WEIGHT: 196 LBS

## 2024-07-17 DIAGNOSIS — R82.90 ABNORMAL RESULT ON SCREENING URINE TEST: ICD-10-CM

## 2024-07-17 DIAGNOSIS — R39.9 UTI SYMPTOMS: Primary | ICD-10-CM

## 2024-07-19 ENCOUNTER — TELEPHONE (OUTPATIENT)
Dept: FAMILY MEDICINE CLINIC | Facility: CLINIC | Age: 79
End: 2024-07-19

## 2024-07-19 DIAGNOSIS — I10 PRIMARY HYPERTENSION: ICD-10-CM

## 2024-07-19 LAB
ANION GAP SERPL CALC-SCNC: 8 MMOL/L (ref 9–18)
BACTERIA SPEC CULT: ABNORMAL
BACTERIA SPEC CULT: ABNORMAL
BUN SERPL-MCNC: 24 MG/DL (ref 8–23)
CALCIUM SERPL-MCNC: 9.9 MG/DL (ref 8.8–10.2)
CHLORIDE SERPL-SCNC: 100 MMOL/L (ref 98–107)
CO2 SERPL-SCNC: 31 MMOL/L (ref 20–28)
CREAT SERPL-MCNC: 1.28 MG/DL (ref 0.6–1.1)
GLUCOSE SERPL-MCNC: 100 MG/DL (ref 70–99)
POTASSIUM SERPL-SCNC: 4.1 MMOL/L (ref 3.5–5.1)
SERVICE CMNT-IMP: ABNORMAL
SODIUM SERPL-SCNC: 139 MMOL/L (ref 136–145)

## 2024-07-19 RX ORDER — NITROFURANTOIN 25; 75 MG/1; MG/1
100 CAPSULE ORAL 2 TIMES DAILY
Qty: 14 CAPSULE | Refills: 0 | Status: SHIPPED | OUTPATIENT
Start: 2024-07-19 | End: 2024-07-22 | Stop reason: CLARIF

## 2024-07-19 NOTE — TELEPHONE ENCOUNTER
Patient was seen on Wednesday and a urine culture was done. Patient was given instructions to start Macrobid, but Rx has not been sent in. Can you check on this and let patient know. Please call her at [phone].

## 2024-07-22 ENCOUNTER — OFFICE VISIT (OUTPATIENT)
Age: 79
End: 2024-07-22
Payer: MEDICARE

## 2024-07-22 VITALS
SYSTOLIC BLOOD PRESSURE: 120 MMHG | HEART RATE: 64 BPM | WEIGHT: 198.6 LBS | DIASTOLIC BLOOD PRESSURE: 72 MMHG | HEIGHT: 64 IN | BODY MASS INDEX: 33.9 KG/M2

## 2024-07-22 DIAGNOSIS — Z79.899 LONG TERM CURRENT USE OF ANTIARRHYTHMIC DRUG: ICD-10-CM

## 2024-07-22 DIAGNOSIS — I35.8 AORTIC VALVE SCLEROSIS: ICD-10-CM

## 2024-07-22 DIAGNOSIS — I10 PRIMARY HYPERTENSION: ICD-10-CM

## 2024-07-22 DIAGNOSIS — I48.91 ATRIAL FIBRILLATION WITH RVR (HCC): ICD-10-CM

## 2024-07-22 DIAGNOSIS — I10 HYPERTENSION, UNSPECIFIED TYPE: Primary | ICD-10-CM

## 2024-07-22 PROCEDURE — 1036F TOBACCO NON-USER: CPT | Performed by: INTERNAL MEDICINE

## 2024-07-22 PROCEDURE — G8428 CUR MEDS NOT DOCUMENT: HCPCS | Performed by: INTERNAL MEDICINE

## 2024-07-22 PROCEDURE — 3074F SYST BP LT 130 MM HG: CPT | Performed by: INTERNAL MEDICINE

## 2024-07-22 PROCEDURE — 99214 OFFICE O/P EST MOD 30 MIN: CPT | Performed by: INTERNAL MEDICINE

## 2024-07-22 PROCEDURE — G8399 PT W/DXA RESULTS DOCUMENT: HCPCS | Performed by: INTERNAL MEDICINE

## 2024-07-22 PROCEDURE — G8417 CALC BMI ABV UP PARAM F/U: HCPCS | Performed by: INTERNAL MEDICINE

## 2024-07-22 PROCEDURE — 1090F PRES/ABSN URINE INCON ASSESS: CPT | Performed by: INTERNAL MEDICINE

## 2024-07-22 PROCEDURE — 3078F DIAST BP <80 MM HG: CPT | Performed by: INTERNAL MEDICINE

## 2024-07-22 PROCEDURE — 1123F ACP DISCUSS/DSCN MKR DOCD: CPT | Performed by: INTERNAL MEDICINE

## 2024-07-22 RX ORDER — HYDROCHLOROTHIAZIDE 12.5 MG/1
TABLET ORAL
Qty: 90 TABLET | Refills: 2
Start: 2024-07-22

## 2024-07-22 RX ORDER — LORAZEPAM 0.5 MG/1
0.5 TABLET ORAL 2 TIMES DAILY PRN
COMMUNITY
Start: 2024-07-17

## 2024-07-22 ASSESSMENT — ENCOUNTER SYMPTOMS
APHONIA: 0
ABDOMINAL PAIN: 0
NAIL CHANGES: 0
EYE PAIN: 0
STRIDOR: 0
COUGH: 0

## 2024-07-22 NOTE — PROGRESS NOTES
Delayed Awakening Neg Hx     Pseudochol. Deficiency Neg Hx     Post-op Nausea/Vomiting Neg Hx     Emergence Delirium Neg Hx     Post-op Cognitive Dysfunction Neg Hx     Malig Hypertherm Neg Hx       Social History     Tobacco Use    Smoking status: Never    Smokeless tobacco: Never   Substance Use Topics    Alcohol use: Not Currently       ROS:    Review of Systems   Constitutional: Negative for fever.   HENT:  Negative for stridor.    Eyes:  Negative for pain.   Cardiovascular:  Negative for chest pain.   Respiratory:  Negative for cough.    Endocrine: Negative for cold intolerance.   Skin:  Negative for nail changes.   Musculoskeletal:  Negative for arthritis.   Gastrointestinal:  Negative for abdominal pain.   Genitourinary:  Negative for dysuria.   Neurological:  Negative for aphonia.   Psychiatric/Behavioral:  Negative for altered mental status.    Allergic/Immunologic: Negative for hives.           PHYSICAL EXAM:    /72   Pulse 64   Ht 1.626 m (5' 4\")   Wt 90.1 kg (198 lb 9.6 oz)   LMP  (LMP Unknown)   BMI 34.09 kg/m²        Wt Readings from Last 3 Encounters:   07/22/24 90.1 kg (198 lb 9.6 oz)   07/17/24 88.9 kg (196 lb)   06/14/24 91.6 kg (202 lb)     BP Readings from Last 3 Encounters:   07/22/24 120/72   07/17/24 122/78   05/22/24 128/72         Physical Exam  Vitals reviewed.   HENT:      Head: Normocephalic.      Right Ear: External ear normal.      Left Ear: External ear normal.      Nose: Nose normal.   Eyes:      General: No scleral icterus.  Pulmonary:      Effort: Pulmonary effort is normal.   Abdominal:      General: There is no distension.   Musculoskeletal:      Cervical back: Neck supple.   Skin:     General: Skin is warm.   Neurological:      Mental Status: She is alert. Mental status is at baseline.         Medical problems and test results were reviewed with the patient today.           Recent Results (from the past 672 hour(s))   Culture, Urine    Collection Time: 07/17/24

## 2024-07-24 ENCOUNTER — APPOINTMENT (RX ONLY)
Dept: URBAN - METROPOLITAN AREA CLINIC 24 | Facility: CLINIC | Age: 79
Setting detail: DERMATOLOGY
End: 2024-07-24

## 2024-07-24 DIAGNOSIS — D22 MELANOCYTIC NEVI: ICD-10-CM

## 2024-07-24 DIAGNOSIS — L57.8 OTHER SKIN CHANGES DUE TO CHRONIC EXPOSURE TO NONIONIZING RADIATION: ICD-10-CM

## 2024-07-24 DIAGNOSIS — D69.2 OTHER NONTHROMBOCYTOPENIC PURPURA: ICD-10-CM

## 2024-07-24 DIAGNOSIS — D18.0 HEMANGIOMA: ICD-10-CM

## 2024-07-24 DIAGNOSIS — L28.1 PRURIGO NODULARIS: ICD-10-CM

## 2024-07-24 DIAGNOSIS — Z71.89 OTHER SPECIFIED COUNSELING: ICD-10-CM

## 2024-07-24 DIAGNOSIS — L81.4 OTHER MELANIN HYPERPIGMENTATION: ICD-10-CM

## 2024-07-24 DIAGNOSIS — L57.0 ACTINIC KERATOSIS: ICD-10-CM

## 2024-07-24 DIAGNOSIS — L82.1 OTHER SEBORRHEIC KERATOSIS: ICD-10-CM

## 2024-07-24 PROBLEM — D22.5 MELANOCYTIC NEVI OF TRUNK: Status: ACTIVE | Noted: 2024-07-24

## 2024-07-24 PROBLEM — D18.01 HEMANGIOMA OF SKIN AND SUBCUTANEOUS TISSUE: Status: ACTIVE | Noted: 2024-07-24

## 2024-07-24 PROCEDURE — ? COUNSELING

## 2024-07-24 PROCEDURE — 17000 DESTRUCT PREMALG LESION: CPT

## 2024-07-24 PROCEDURE — ? LIQUID NITROGEN

## 2024-07-24 PROCEDURE — ? PRESCRIPTION MEDICATION MANAGEMENT

## 2024-07-24 PROCEDURE — ? INTRALESIONAL KENALOG

## 2024-07-24 PROCEDURE — 11900 INJECT SKIN LESIONS </W 7: CPT | Mod: 59

## 2024-07-24 PROCEDURE — 99213 OFFICE O/P EST LOW 20 MIN: CPT | Mod: 25

## 2024-07-24 ASSESSMENT — LOCATION DETAILED DESCRIPTION DERM
LOCATION DETAILED: LEFT ANTERIOR PROXIMAL THIGH
LOCATION DETAILED: INFERIOR THORACIC SPINE
LOCATION DETAILED: RIGHT ANTERIOR PROXIMAL THIGH
LOCATION DETAILED: STERNAL NOTCH
LOCATION DETAILED: LEFT PROXIMAL DORSAL FOREARM
LOCATION DETAILED: LEFT INFERIOR LATERAL UPPER BACK
LOCATION DETAILED: SUPERIOR THORACIC SPINE
LOCATION DETAILED: RIGHT CENTRAL MALAR CHEEK
LOCATION DETAILED: NASAL SUPRATIP
LOCATION DETAILED: LEFT CENTRAL MALAR CHEEK
LOCATION DETAILED: RIGHT LATERAL ELBOW
LOCATION DETAILED: LEFT PROXIMAL PRETIBIAL REGION
LOCATION DETAILED: LEFT DISTAL LATERAL POSTERIOR THIGH
LOCATION DETAILED: RIGHT ANTERIOR DISTAL THIGH

## 2024-07-24 ASSESSMENT — LOCATION ZONE DERM
LOCATION ZONE: NOSE
LOCATION ZONE: TRUNK
LOCATION ZONE: LEG
LOCATION ZONE: ARM
LOCATION ZONE: FACE

## 2024-07-24 ASSESSMENT — LOCATION SIMPLE DESCRIPTION DERM
LOCATION SIMPLE: LEFT CHEEK
LOCATION SIMPLE: LEFT THIGH
LOCATION SIMPLE: NOSE
LOCATION SIMPLE: LEFT FOREARM
LOCATION SIMPLE: LEFT POSTERIOR THIGH
LOCATION SIMPLE: LEFT UPPER BACK
LOCATION SIMPLE: CHEST
LOCATION SIMPLE: RIGHT CHEEK
LOCATION SIMPLE: UPPER BACK
LOCATION SIMPLE: RIGHT THIGH
LOCATION SIMPLE: RIGHT ELBOW
LOCATION SIMPLE: LEFT PRETIBIAL REGION

## 2024-07-24 NOTE — PROCEDURE: PRESCRIPTION MEDICATION MANAGEMENT
Render In Strict Bullet Format?: No
Detail Level: Zone
Plan: Discussed benefits of intralesional injections\\nrelated to anxiety

## 2024-07-24 NOTE — PROCEDURE: LIQUID NITROGEN
Consent: The patient's consent was obtained including but not limited to risks of crusting, scabbing, blistering, scarring, darker or lighter pigmentary change, recurrence, incomplete removal and infection.
Show Applicator Variable?: Yes
Number Of Freeze-Thaw Cycles: 2 freeze-thaw cycles
Render Post-Care Instructions In Note?: no
Duration Of Freeze Thaw-Cycle (Seconds): 15
Post-Care Instructions: I reviewed with the patient in detail post-care instructions. Patient is to wear sunprotection, and avoid picking at any of the treated lesions. Pt may apply Vaseline to crusted or scabbing areas.
Detail Level: Detailed

## 2024-07-24 NOTE — PROCEDURE: INTRALESIONAL KENALOG
Lot # For Kenalog (Optional): [id number]
Kenalog Preparation: Kenalog
How Many Mls Were Removed From The 40 Mg/Ml (10ml) Vial When Preparing The Injectable Solution?: 0
Validate Note Data When Using Inventory: Yes
Bill For Wasted Drug (Kenalog)?: no
Administered By (Optional): MARISOL
Detail Level: Detailed
Total Volume (Ccs): 0.2
Expiration Date For Kenalog (Optional): 02/2026
Medical Necessity Clause: This procedure was medically necessary because the lesions that were treated were:
Treatment Number (Optional): 1
Kenalog Type Of Vial: Multiple Dose
Ndc# For Andreia Only: [address]-30
Consent: The risks of atrophy were reviewed with the patient.
Show Inventory Tab: Hide
Concentration Of Kenalog Solution Injected (Mg/Ml): 10.0

## 2024-07-25 RX ORDER — VALSARTAN 160 MG/1
160 TABLET ORAL DAILY
Qty: 90 TABLET | Refills: 3 | Status: SHIPPED | OUTPATIENT
Start: 2024-07-25

## 2024-07-25 NOTE — TELEPHONE ENCOUNTER
Requested Prescriptions     Pending Prescriptions Disp Refills    valsartan (DIOVAN) 160 MG tablet 90 tablet 3     Sig: Take 1 tablet by mouth daily      Pharmacy verified. Erx as requested.

## 2024-07-25 NOTE — TELEPHONE ENCOUNTER
MEDICATION REFILL REQUEST      Name of Medication:  Valsartan   Dose:  160 mg  Frequency:  daily   Quantity:    Days' supply:  90      Pharmacy Name/Location:  [facility] / [phone]

## 2024-07-27 ASSESSMENT — ENCOUNTER SYMPTOMS
RESPIRATORY NEGATIVE: 1
EYES NEGATIVE: 1
GASTROINTESTINAL NEGATIVE: 1

## 2024-07-27 NOTE — PROGRESS NOTES
Calcium 03/12/2024 10.8 (H)  8.3 - 10.4 MG/DL Final    Total Bilirubin 03/12/2024 0.4  0.2 - 1.1 MG/DL Final    ALT 03/12/2024 19  12 - 65 U/L Final    AST 03/12/2024 17  15 - 37 U/L Final    Alk Phosphatase 03/12/2024 114  50 - 136 U/L Final    Total Protein 03/12/2024 6.9  6.3 - 8.2 g/dL Final    Albumin 03/12/2024 3.7  3.2 - 4.6 g/dL Final    Globulin 03/12/2024 3.2  2.8 - 4.5 g/dL Final    Albumin/Globulin Ratio 03/12/2024 1.2  0.4 - 1.6   Final    TSH, 3rd Generation 03/12/2024 2.210  0.358 - 3.740 uIU/mL Final    Special Requests 03/12/2024 NO SPECIAL REQUESTS    Final    Culture 03/12/2024 <10,000 COLONIES/mL MIXED SKIN DEACON ISOLATED    Final       ASSESSMENT and PLAN    UTI symptoms  -     Culture, Urine; Future  Abnormal result on screening urine test  -     Culture, Urine; Future      Current treatment plan is effective. no changes in therapy  the following changes in treatment are made UTI ; ECOLI ON CULTURE , TREAT AND FOLLOW UP     No follow-ups on file.     RACH MARRERO MD

## 2024-08-29 ENCOUNTER — TELEPHONE (OUTPATIENT)
Dept: FAMILY MEDICINE CLINIC | Facility: CLINIC | Age: 79
End: 2024-08-29

## 2024-08-29 DIAGNOSIS — I10 ESSENTIAL (PRIMARY) HYPERTENSION: ICD-10-CM

## 2024-08-29 DIAGNOSIS — E89.0 POSTSURGICAL HYPOTHYROIDISM: ICD-10-CM

## 2024-08-29 DIAGNOSIS — R39.9 UTI SYMPTOMS: Primary | ICD-10-CM

## 2024-08-29 DIAGNOSIS — E55.9 VITAMIN D DEFICIENCY: ICD-10-CM

## 2024-08-29 DIAGNOSIS — Z00.00 MEDICARE ANNUAL WELLNESS VISIT, SUBSEQUENT: ICD-10-CM

## 2024-08-29 NOTE — TELEPHONE ENCOUNTER
Patient prompted to go to a walk-in lab facility to have labs drawn for upcoming AWV scheduled on 9/27/24.   Please place lab orders with a release date of one month before their scheduled appointment.

## 2024-09-10 NOTE — TELEPHONE ENCOUNTER
Patient contacted via phone for [address] [employer] [address] 1 year follow up. Patient states that she is doing well. She is currently taking asa 81mg. Her plavix was discontinued on 1/15/19. She has not had any hospitalizations or procedures since her 6 month follow up in January 2019. She has no questions or concerns at this time.
115

## 2024-09-20 ENCOUNTER — OFFICE VISIT (OUTPATIENT)
Dept: SLEEP MEDICINE | Age: 79
End: 2024-09-20

## 2024-09-20 VITALS
SYSTOLIC BLOOD PRESSURE: 129 MMHG | RESPIRATION RATE: 17 BRPM | HEART RATE: 70 BPM | HEIGHT: 64 IN | BODY MASS INDEX: 34.83 KG/M2 | DIASTOLIC BLOOD PRESSURE: 83 MMHG | WEIGHT: 204 LBS | OXYGEN SATURATION: 96 %

## 2024-09-20 DIAGNOSIS — E66.01 SEVERE OBESITY (BMI 35.0-39.9) WITH COMORBIDITY (HCC): ICD-10-CM

## 2024-09-20 DIAGNOSIS — G47.00 PERSISTENT DISORDER OF INITIATING OR MAINTAINING SLEEP: ICD-10-CM

## 2024-09-20 DIAGNOSIS — G47.33 OSA ON CPAP: Primary | ICD-10-CM

## 2024-09-20 ASSESSMENT — SLEEP AND FATIGUE QUESTIONNAIRES
HOW LIKELY ARE YOU TO NOD OFF OR FALL ASLEEP WHILE SITTING AND READING: HIGH CHANCE OF DOZING
HOW LIKELY ARE YOU TO NOD OFF OR FALL ASLEEP WHEN YOU ARE A PASSENGER IN A CAR FOR AN HOUR WITHOUT A BREAK: HIGH CHANCE OF DOZING
HOW LIKELY ARE YOU TO NOD OFF OR FALL ASLEEP WHILE SITTING QUIETLY AFTER LUNCH WITHOUT ALCOHOL: HIGH CHANCE OF DOZING
HOW LIKELY ARE YOU TO NOD OFF OR FALL ASLEEP IN A CAR, WHILE STOPPED FOR A FEW MINUTES IN TRAFFIC: WOULD NEVER DOZE
ESS TOTAL SCORE: 16
HOW LIKELY ARE YOU TO NOD OFF OR FALL ASLEEP WHILE SITTING AND TALKING TO SOMEONE: WOULD NEVER DOZE
HOW LIKELY ARE YOU TO NOD OFF OR FALL ASLEEP WHILE SITTING INACTIVE IN A PUBLIC PLACE: MODERATE CHANCE OF DOZING
HOW LIKELY ARE YOU TO NOD OFF OR FALL ASLEEP WHILE LYING DOWN TO REST IN THE AFTERNOON WHEN CIRCUMSTANCES PERMIT: MODERATE CHANCE OF DOZING
HOW LIKELY ARE YOU TO NOD OFF OR FALL ASLEEP WHILE WATCHING TV: HIGH CHANCE OF DOZING

## 2024-09-24 DIAGNOSIS — E89.0 POSTSURGICAL HYPOTHYROIDISM: ICD-10-CM

## 2024-09-24 DIAGNOSIS — Z00.00 MEDICARE ANNUAL WELLNESS VISIT, SUBSEQUENT: ICD-10-CM

## 2024-09-24 DIAGNOSIS — R39.9 UTI SYMPTOMS: ICD-10-CM

## 2024-09-24 DIAGNOSIS — I10 ESSENTIAL (PRIMARY) HYPERTENSION: ICD-10-CM

## 2024-09-24 LAB
ALBUMIN SERPL-MCNC: 3.7 G/DL (ref 3.2–4.6)
ALBUMIN/GLOB SERPL: 1.3 (ref 1–1.9)
ALP SERPL-CCNC: 102 U/L (ref 35–104)
ALT SERPL-CCNC: 15 U/L (ref 8–45)
ANION GAP SERPL CALC-SCNC: 12 MMOL/L (ref 9–18)
APPEARANCE UR: CLEAR
AST SERPL-CCNC: 18 U/L (ref 15–37)
BACTERIA URNS QL MICRO: 0 /HPF
BASOPHILS # BLD: 0.1 K/UL (ref 0–0.2)
BASOPHILS NFR BLD: 1 % (ref 0–2)
BILIRUB SERPL-MCNC: 0.4 MG/DL (ref 0–1.2)
BILIRUB UR QL: NEGATIVE
BUN SERPL-MCNC: 16 MG/DL (ref 8–23)
CALCIUM SERPL-MCNC: 9.4 MG/DL (ref 8.8–10.2)
CASTS URNS QL MICRO: 0 /LPF
CHLORIDE SERPL-SCNC: 105 MMOL/L (ref 98–107)
CHOLEST SERPL-MCNC: 192 MG/DL (ref 0–200)
CO2 SERPL-SCNC: 26 MMOL/L (ref 20–28)
COLOR UR: ABNORMAL
CREAT SERPL-MCNC: 0.96 MG/DL (ref 0.6–1.1)
CRYSTALS URNS QL MICRO: 0 /LPF
DIFFERENTIAL METHOD BLD: NORMAL
EOSINOPHIL # BLD: 0.2 K/UL (ref 0–0.8)
EOSINOPHIL NFR BLD: 3 % (ref 0.5–7.8)
EPI CELLS #/AREA URNS HPF: ABNORMAL /HPF
ERYTHROCYTE [DISTWIDTH] IN BLOOD BY AUTOMATED COUNT: 13.2 % (ref 11.9–14.6)
GLOBULIN SER CALC-MCNC: 2.9 G/DL (ref 2.3–3.5)
GLUCOSE SERPL-MCNC: 109 MG/DL (ref 70–99)
GLUCOSE UR STRIP.AUTO-MCNC: NEGATIVE MG/DL
HCT VFR BLD AUTO: 38 % (ref 35.8–46.3)
HDLC SERPL-MCNC: 78 MG/DL (ref 40–60)
HDLC SERPL: 2.5 (ref 0–5)
HGB BLD-MCNC: 12.4 G/DL (ref 11.7–15.4)
HGB UR QL STRIP: NEGATIVE
IMM GRANULOCYTES # BLD AUTO: 0 K/UL (ref 0–0.5)
IMM GRANULOCYTES NFR BLD AUTO: 0 % (ref 0–5)
KETONES UR QL STRIP.AUTO: NEGATIVE MG/DL
LDLC SERPL CALC-MCNC: 96 MG/DL (ref 0–100)
LEUKOCYTE ESTERASE UR QL STRIP.AUTO: ABNORMAL
LYMPHOCYTES # BLD: 1.7 K/UL (ref 0.5–4.6)
LYMPHOCYTES NFR BLD: 21 % (ref 13–44)
MCH RBC QN AUTO: 30.5 PG (ref 26.1–32.9)
MCHC RBC AUTO-ENTMCNC: 32.6 G/DL (ref 31.4–35)
MCV RBC AUTO: 93.4 FL (ref 82–102)
MONOCYTES # BLD: 0.6 K/UL (ref 0.1–1.3)
MONOCYTES NFR BLD: 7 % (ref 4–12)
MUCOUS THREADS URNS QL MICRO: 0 /LPF
NEUTS SEG # BLD: 5.7 K/UL (ref 1.7–8.2)
NEUTS SEG NFR BLD: 68 % (ref 43–78)
NITRITE UR QL STRIP.AUTO: NEGATIVE
NRBC # BLD: 0 K/UL (ref 0–0.2)
OTHER OBSERVATIONS: ABNORMAL
PH UR STRIP: 7.5 (ref 5–9)
PLATELET # BLD AUTO: 298 K/UL (ref 150–450)
PMV BLD AUTO: 10.8 FL (ref 9.4–12.3)
POTASSIUM SERPL-SCNC: 4.3 MMOL/L (ref 3.5–5.1)
PROT SERPL-MCNC: 6.7 G/DL (ref 6.3–8.2)
PROT UR STRIP-MCNC: ABNORMAL MG/DL
RBC # BLD AUTO: 4.07 M/UL (ref 4.05–5.2)
RBC #/AREA URNS HPF: ABNORMAL /HPF
SODIUM SERPL-SCNC: 142 MMOL/L (ref 136–145)
SP GR UR REFRACTOMETRY: 1.02 (ref 1–1.02)
TRIGL SERPL-MCNC: 90 MG/DL (ref 0–150)
TSH, 3RD GENERATION: 1.62 UIU/ML (ref 0.27–4.2)
URINE CULTURE IF INDICATED: ABNORMAL
UROBILINOGEN UR QL STRIP.AUTO: 1 EU/DL (ref 0.2–1)
VLDLC SERPL CALC-MCNC: 18 MG/DL (ref 6–23)
WBC # BLD AUTO: 8.2 K/UL (ref 4.3–11.1)
WBC URNS QL MICRO: ABNORMAL /HPF

## 2024-10-02 ENCOUNTER — PATIENT MESSAGE (OUTPATIENT)
Age: 79
End: 2024-10-02

## 2024-10-09 ENCOUNTER — OFFICE VISIT (OUTPATIENT)
Dept: FAMILY MEDICINE CLINIC | Facility: CLINIC | Age: 79
End: 2024-10-09

## 2024-10-09 VITALS
SYSTOLIC BLOOD PRESSURE: 136 MMHG | BODY MASS INDEX: 34.66 KG/M2 | HEIGHT: 64 IN | DIASTOLIC BLOOD PRESSURE: 72 MMHG | HEART RATE: 58 BPM | TEMPERATURE: 97.5 F | OXYGEN SATURATION: 99 % | WEIGHT: 203 LBS

## 2024-10-09 DIAGNOSIS — K21.9 GASTROESOPHAGEAL REFLUX DISEASE, UNSPECIFIED WHETHER ESOPHAGITIS PRESENT: Primary | ICD-10-CM

## 2024-10-09 DIAGNOSIS — R26.89 BALANCE PROBLEM: ICD-10-CM

## 2024-10-09 SDOH — HEALTH STABILITY: PHYSICAL HEALTH: ON AVERAGE, HOW MANY DAYS PER WEEK DO YOU ENGAGE IN MODERATE TO STRENUOUS EXERCISE (LIKE A BRISK WALK)?: 0 DAYS

## 2024-10-09 SDOH — HEALTH STABILITY: PHYSICAL HEALTH: ON AVERAGE, HOW MANY MINUTES DO YOU ENGAGE IN EXERCISE AT THIS LEVEL?: 10 MIN

## 2024-10-09 ASSESSMENT — LIFESTYLE VARIABLES
HOW MANY STANDARD DRINKS CONTAINING ALCOHOL DO YOU HAVE ON A TYPICAL DAY: 1 OR 2
HOW OFTEN DO YOU HAVE SIX OR MORE DRINKS ON ONE OCCASION: 2
HOW OFTEN DURING THE LAST YEAR HAVE YOU FOUND THAT YOU WERE NOT ABLE TO STOP DRINKING ONCE YOU HAD STARTED: LESS THAN MONTHLY
HOW OFTEN DURING THE LAST YEAR HAVE YOU FAILED TO DO WHAT WAS NORMALLY EXPECTED FROM YOU BECAUSE OF DRINKING: LESS THAN MONTHLY
HOW OFTEN DURING THE LAST YEAR HAVE YOU HAD A FEELING OF GUILT OR REMORSE AFTER DRINKING: LESS THAN MONTHLY
HOW OFTEN DURING THE LAST YEAR HAVE YOU FOUND THAT YOU WERE NOT ABLE TO STOP DRINKING ONCE YOU HAD STARTED: LESS THAN MONTHLY
HOW OFTEN DURING THE LAST YEAR HAVE YOU FAILED TO DO WHAT WAS NORMALLY EXPECTED FROM YOU BECAUSE OF DRINKING: LESS THAN MONTHLY
HOW OFTEN DURING THE LAST YEAR HAVE YOU NEEDED AN ALCOHOLIC DRINK FIRST THING IN THE MORNING TO GET YOURSELF GOING AFTER A NIGHT OF HEAVY DRINKING: LESS THAN MONTHLY
HOW OFTEN DO YOU HAVE A DRINK CONTAINING ALCOHOL: 2
HOW OFTEN DURING THE LAST YEAR HAVE YOU HAD A FEELING OF GUILT OR REMORSE AFTER DRINKING: LESS THAN MONTHLY
HAS A RELATIVE, FRIEND, DOCTOR, OR ANOTHER HEALTH PROFESSIONAL EXPRESSED CONCERN ABOUT YOUR DRINKING OR SUGGESTED YOU CUT DOWN: NO
HAVE YOU OR SOMEONE ELSE BEEN INJURED AS A RESULT OF YOUR DRINKING: NO
HOW OFTEN DO YOU HAVE A DRINK CONTAINING ALCOHOL: MONTHLY OR LESS
HAVE YOU OR SOMEONE ELSE BEEN INJURED AS A RESULT OF YOUR DRINKING: NO
HOW OFTEN DURING THE LAST YEAR HAVE YOU BEEN UNABLE TO REMEMBER WHAT HAPPENED THE NIGHT BEFORE BECAUSE YOU HAD BEEN DRINKING: LESS THAN MONTHLY
HOW OFTEN DURING THE LAST YEAR HAVE YOU NEEDED AN ALCOHOLIC DRINK FIRST THING IN THE MORNING TO GET YOURSELF GOING AFTER A NIGHT OF HEAVY DRINKING: LESS THAN MONTHLY
HOW OFTEN DURING THE LAST YEAR HAVE YOU BEEN UNABLE TO REMEMBER WHAT HAPPENED THE NIGHT BEFORE BECAUSE YOU HAD BEEN DRINKING: LESS THAN MONTHLY
HAS A RELATIVE, FRIEND, DOCTOR, OR ANOTHER HEALTH PROFESSIONAL EXPRESSED CONCERN ABOUT YOUR DRINKING OR SUGGESTED YOU CUT DOWN: NO
HOW MANY STANDARD DRINKS CONTAINING ALCOHOL DO YOU HAVE ON A TYPICAL DAY: 1

## 2024-10-09 ASSESSMENT — PATIENT HEALTH QUESTIONNAIRE - PHQ9
2. FEELING DOWN, DEPRESSED OR HOPELESS: SEVERAL DAYS
SUM OF ALL RESPONSES TO PHQ QUESTIONS 1-9: 2
SUM OF ALL RESPONSES TO PHQ9 QUESTIONS 1 & 2: 2
SUM OF ALL RESPONSES TO PHQ QUESTIONS 1-9: 2
1. LITTLE INTEREST OR PLEASURE IN DOING THINGS: SEVERAL DAYS

## 2024-10-11 ENCOUNTER — TELEPHONE (OUTPATIENT)
Age: 79
End: 2024-10-11

## 2024-10-11 ENCOUNTER — HOSPITAL ENCOUNTER (OUTPATIENT)
Dept: PHYSICAL THERAPY | Age: 79
Setting detail: RECURRING SERIES
Discharge: HOME | End: 2024-10-14
Attending: FAMILY MEDICINE
Payer: MEDICARE

## 2024-10-11 DIAGNOSIS — M62.81 MUSCLE WEAKNESS (GENERALIZED): ICD-10-CM

## 2024-10-11 DIAGNOSIS — R26.89 BALANCE PROBLEM: Primary | ICD-10-CM

## 2024-10-11 DIAGNOSIS — R26.9 GAIT DIFFICULTY: ICD-10-CM

## 2024-10-11 PROCEDURE — 97161 PT EVAL LOW COMPLEX 20 MIN: CPT

## 2024-10-11 PROCEDURE — 97110 THERAPEUTIC EXERCISES: CPT

## 2024-10-11 ASSESSMENT — PAIN SCALES - GENERAL: PAINLEVEL_OUTOF10: 0

## 2024-10-11 NOTE — TELEPHONE ENCOUNTER
Pt is having blepharoplasty on October 24 at [facility]. pt needs medical clearance and any medication hold. Pt last seen 7/22/2024.

## 2024-10-11 NOTE — THERAPY EVALUATION
Priscilla Ricks  : 1945  Primary: Medicare Part A And B (Medicare)  Secondary: GENERIC COMMERCIAL [employer]  [address]  [address]  [facility] [address]  Phone: [phone]  Fax: [phone] Plan Frequency: 2 x week for 6 weeks    Plan of Care/Certification Expiration Date: 12/10/24        Plan of Care/Certification Expiration Date:  Plan of Care/Certification Expiration Date: 12/10/24    Frequency/Duration: Plan Frequency: 2 x week for 6 weeks      Time In/Out:   Time In: 1100  Time Out: 1145      PT Visit Info:         Visit Count:  1                OUTPATIENT PHYSICAL THERAPY:             Initial Assessment 10/11/2024               Episode (Balance problems)         Treatment Diagnosis:     Balance problem  Gait difficulty  Muscle weakness (generalized)  Medical/Referring Diagnosis:    Balance problem [R26.89]    Referring Physician:  Brandon Henderson MD MD Orders:  PT Eval and Treat   Return MD Appt:  TBD  Date of Onset:  Onset Date: 24    Allergies:  Diflucan [fluconazole], Hydromorphone, and Sulfa antibiotics  Restrictions/Precautions:    Fall Precautions:        Medications Last Reviewed:  10/11/2024     SUBJECTIVE   History of Injury/Illness (Reason for Referral):  Patient reports having apprehension with walking and has fallen twice this year.  Patient reports having a long history of low back pain and upper neck issues.  MRI revealed  Marked chronic left subarticular disc herniation at C3-4,resulting in marked chronic left-sided cord compression and marked left lateral recess stenosis, marked central canal and bilateral neural foraminal stenosis at this level, mild chronic ventral cord indentation at C6-7, secondary to disc bulge and osteophytes.  Patient reports unsteadiness with walking in garden, feels unable to recover her balance when perturbed.  Patient reports that she used to walk her dog a couple times a day but has not done so in the last 4-5 months.  Patient

## 2024-10-11 NOTE — TELEPHONE ENCOUNTER
Patient states she dropped off a form for upcoming surgery to be completed. Calling to get the status.

## 2024-10-14 NOTE — PROGRESS NOTES
Priscilla Ricks  : 1945  Primary: Medicare Part A And B (Medicare)  Secondary: GENERIC COMMERCIAL [employer]  [address]  [address] [place] [address]  Phone: [phone]  Fax: [phone] Plan Frequency: 2 x week for 6 weeks    Plan of Care/Certification Expiration Date: 12/10/24        Plan of Care/Certification Expiration Date:  Plan of Care/Certification Expiration Date: 12/10/24    Frequency/Duration:   Plan Frequency: 2 x week for 6 weeks      Time In/Out:   Time In: 1100  Time Out: 1145      PT Visit Info:         Visit Count:  1    OUTPATIENT PHYSICAL THERAPY:   Treatment Note 10/11/2024       Episode  (Balance problems)               Treatment Diagnosis:    Balance problem  Gait difficulty  Muscle weakness (generalized)  Medical/Referring Diagnosis:    Balance problem [R26.89]    Referring Physician:  Brandon Henderson MD MD Orders:  PT Eval and Treat   Return MD Appt:  TBD   Date of Onset:  Onset Date: 24     Allergies:   Diflucan [fluconazole], Hydromorphone, and Sulfa antibiotics  Restrictions/Precautions:   Fall Precautions:        Interventions Planned (Treatment may consist of any combination of the following):     See Assessment Note    Subjective Comments:    Patient reports unsteadiness with walking in garden, feels unable to recover her balance when perturbed   Initial Pain Level::     0/10  Post Session Pain Level:       0/10  Medications Last Reviewed:  10/11/2024  Updated Objective Findings:  See Evaluation Note from today  Treatment   THERAPEUTIC EXERCISE: (15 minutes):    Exercises per grid below to improve mobility.  Required minimal visual and verbal cues to promote proper body alignment.  Progressed complexity of movement as indicated.   Date:  10/11/24 Date:   Date:     Activity/Exercise Parameters Parameters Parameters   Patient education/HEP 5 min     Hip abd Standing 2x10; B     Heel raises At counter 2 x 10                               [employer]

## 2024-10-15 ENCOUNTER — TELEPHONE (OUTPATIENT)
Dept: FAMILY MEDICINE CLINIC | Facility: CLINIC | Age: 79
End: 2024-10-15

## 2024-10-15 NOTE — TELEPHONE ENCOUNTER
Patient states she brought in a pre-op form from [facility] when she was here on 10/9/24, but they have not received the form yet. Please check on this and send to [facility]. Her surgery is next week on 10/24/24. Their fax number is [phone]

## 2024-10-16 ENCOUNTER — HOSPITAL ENCOUNTER (OUTPATIENT)
Dept: PHYSICAL THERAPY | Age: 79
Setting detail: RECURRING SERIES
Discharge: HOME | End: 2024-10-19
Attending: FAMILY MEDICINE
Payer: MEDICARE

## 2024-10-16 PROCEDURE — 97110 THERAPEUTIC EXERCISES: CPT

## 2024-10-16 NOTE — PROGRESS NOTES
Priscilla Solizormack  : 1945  Primary: Medicare Part A And B (Medicare)  Secondary: GENERIC COMMERCIAL [employer]  [address]  [address] [place] [address]  Phone: [phone]  Fax: [phone] Plan Frequency: 2 x week for 6 weeks    Plan of Care/Certification Expiration Date: 12/10/24        Plan of Care/Certification Expiration Date:  Plan of Care/Certification Expiration Date: 12/10/24    Frequency/Duration:   Plan Frequency: 2 x week for 6 weeks      Time In/Out:   Time In: 1430  Time Out: 1515      PT Visit Info:         Visit Count:  2    OUTPATIENT PHYSICAL THERAPY:   Treatment Note 10/16/2024       Episode  (Balance problems)               Treatment Diagnosis:    No data found  Medical/Referring Diagnosis:    Balance problem [R26.89]    Referring Physician:  Brandon Henderson MD MD Orders:  PT Eval and Treat   Return MD Appt:  TBD   Date of Onset:  Onset Date: 24     Allergies:   Diflucan [fluconazole], Hydromorphone, and Sulfa antibiotics  Restrictions/Precautions:   Fall Precautions:        Interventions Planned (Treatment may consist of any combination of the following):     See Assessment Note    Subjective Comments:    Patient reports doing well today  Initial Pain Level::     0/10  Post Session Pain Level:       0/10  Medications Last Reviewed:  10/16/2024  Updated Objective Findings:  None Today  Treatment   THERAPEUTIC EXERCISE: (42 minutes):    Exercises per grid below to improve mobility.  Required minimal visual and verbal cues to promote proper body alignment.  Progressed complexity of movement as indicated.   Date:  10/11/24 Date:  10/16/24 Date:     Activity/Exercise Parameters Parameters Parameters   Patient education/HEP 5 min     Hip abd Standing 2x10; B Standing hip abd 10x, 10x w LTB; B    Heel raises At counter 2 x 10 2 x 10 on airex    Nu Step  8 min L1    Pelvic tilt  10x    Supine march  10x, 2 sets; B    SLR  10x; 2 sets; B    Bridge  10x w ball    Squat  10x

## 2024-10-17 ENCOUNTER — TELEPHONE (OUTPATIENT)
Age: 79
End: 2024-10-17

## 2024-10-17 ENCOUNTER — HOSPITAL ENCOUNTER (OUTPATIENT)
Dept: PHYSICAL THERAPY | Age: 79
Setting detail: RECURRING SERIES
Discharge: HOME | End: 2024-10-20
Attending: FAMILY MEDICINE
Payer: MEDICARE

## 2024-10-17 DIAGNOSIS — M62.81 MUSCLE WEAKNESS (GENERALIZED): ICD-10-CM

## 2024-10-17 DIAGNOSIS — R26.9 GAIT DIFFICULTY: ICD-10-CM

## 2024-10-17 DIAGNOSIS — R26.89 BALANCE PROBLEM: Primary | ICD-10-CM

## 2024-10-17 PROCEDURE — 97110 THERAPEUTIC EXERCISES: CPT

## 2024-10-17 ASSESSMENT — PAIN SCALES - GENERAL
PAINLEVEL_OUTOF10: 0
PAINLEVEL_OUTOF10: 0

## 2024-10-17 NOTE — PROGRESS NOTES
Priscilla KAUR Millicent  : 1945  Primary: Medicare Part A And B (Medicare)  Secondary: GENERIC COMMERCIAL [employer]  [address]  [address] [place] [address]  Phone: [phone]  Fax: [phone] Plan Frequency: 2 x week for 6 weeks    Plan of Care/Certification Expiration Date: 12/10/24        Plan of Care/Certification Expiration Date:  Plan of Care/Certification Expiration Date: 12/10/24    Frequency/Duration:   Plan Frequency: 2 x week for 6 weeks      Time In/Out:   Time In: 1122  Time Out: 1202      PT Visit Info:         Visit Count:  3    OUTPATIENT PHYSICAL THERAPY:   Treatment Note 10/17/2024       Episode  (Balance problems)               Treatment Diagnosis:    Balance problem  Gait difficulty  Muscle weakness (generalized)  Medical/Referring Diagnosis:    Balance problem [R26.89]    Referring Physician:  Brandon Henderson MD MD Orders:  PT Eval and Treat   Return MD Appt:  TBD   Date of Onset:  Onset Date: 24     Allergies:   Diflucan [fluconazole], Hydromorphone, and Sulfa antibiotics  Restrictions/Precautions:   Fall Precautions:        Interventions Planned (Treatment may consist of any combination of the following):     See Assessment Note    Subjective Comments:    Patient reports doing well today  Initial Pain Level::     0/10  Post Session Pain Level:       0/10  Medications Last Reviewed:  10/17/2024  Updated Objective Findings:  None Today  Treatment   THERAPEUTIC EXERCISE: (42 minutes):    Exercises per grid below to improve mobility.  Required minimal visual and verbal cues to promote proper body alignment.  Progressed complexity of movement as indicated.   Date:  10/11/24 Date:  10/16/24 Date:  10/17/24   Activity/Exercise Parameters Parameters Parameters   Patient education/HEP 5 min     Hip abd Standing 2x10; B Standing hip abd 10x, 10x w LTB; B Standing hip abd 10x, 2 sets   Heel raises At counter 2 x 10 2 x 10 on airex 2 x 10 on airex   Nu Step  8 min L1 8 min L1

## 2024-10-17 NOTE — TELEPHONE ENCOUNTER
Cardiac Clearance        Physician or Practice Requesting:[facility]   :   Contact Phone Number: [phone]  Fax Number: [phone]  Date of Surgery/Procedure: 10/24/24  Type of Surgery or Procedure: BUL BLEPH   Type of Anesthesia:   Type of Clearance Requested: risk assessment and any medication hold   Medication to Hold:?  Days to Hold: ?

## 2024-10-17 NOTE — TELEPHONE ENCOUNTER
Pre-Operative Risk Assessment Using 2014 ACC/AHA Guidelines     Emergent procedure: No  Active Cardiac Condition including decompensated HF, Arrhythmia, MI <3 weeks, severe valve disease: No  Risk Level of Procedure: low      According to the 2014 ACC/AHA pre-operative risk assessment guidelines Priscilla Ricks is at low risk for major cardiac complications during a low risk procedure.     The patient has a history of atrial fibrillation Watchman device is in place.

## 2024-10-21 ENCOUNTER — HOSPITAL ENCOUNTER (OUTPATIENT)
Dept: PHYSICAL THERAPY | Age: 79
Setting detail: RECURRING SERIES
Discharge: HOME | End: 2024-10-24
Attending: FAMILY MEDICINE
Payer: MEDICARE

## 2024-10-21 ENCOUNTER — OFFICE VISIT (OUTPATIENT)
Dept: ENT CLINIC | Age: 79
End: 2024-10-21
Payer: MEDICARE

## 2024-10-21 VITALS — RESPIRATION RATE: 16 BRPM | WEIGHT: 203 LBS | BODY MASS INDEX: 34.66 KG/M2 | HEIGHT: 64 IN

## 2024-10-21 DIAGNOSIS — K21.9 LARYNGOPHARYNGEAL REFLUX (LPR): Primary | ICD-10-CM

## 2024-10-21 DIAGNOSIS — R09.A2 GLOBUS PHARYNGEUS: ICD-10-CM

## 2024-10-21 DIAGNOSIS — K21.9 GASTROESOPHAGEAL REFLUX DISEASE, UNSPECIFIED WHETHER ESOPHAGITIS PRESENT: ICD-10-CM

## 2024-10-21 PROCEDURE — 31575 DIAGNOSTIC LARYNGOSCOPY: CPT | Performed by: STUDENT IN AN ORGANIZED HEALTH CARE EDUCATION/TRAINING PROGRAM

## 2024-10-21 PROCEDURE — G8427 DOCREV CUR MEDS BY ELIG CLIN: HCPCS | Performed by: STUDENT IN AN ORGANIZED HEALTH CARE EDUCATION/TRAINING PROGRAM

## 2024-10-21 PROCEDURE — 1123F ACP DISCUSS/DSCN MKR DOCD: CPT | Performed by: STUDENT IN AN ORGANIZED HEALTH CARE EDUCATION/TRAINING PROGRAM

## 2024-10-21 PROCEDURE — G8484 FLU IMMUNIZE NO ADMIN: HCPCS | Performed by: STUDENT IN AN ORGANIZED HEALTH CARE EDUCATION/TRAINING PROGRAM

## 2024-10-21 PROCEDURE — 97110 THERAPEUTIC EXERCISES: CPT

## 2024-10-21 PROCEDURE — G8417 CALC BMI ABV UP PARAM F/U: HCPCS | Performed by: STUDENT IN AN ORGANIZED HEALTH CARE EDUCATION/TRAINING PROGRAM

## 2024-10-21 PROCEDURE — 1090F PRES/ABSN URINE INCON ASSESS: CPT | Performed by: STUDENT IN AN ORGANIZED HEALTH CARE EDUCATION/TRAINING PROGRAM

## 2024-10-21 PROCEDURE — G8399 PT W/DXA RESULTS DOCUMENT: HCPCS | Performed by: STUDENT IN AN ORGANIZED HEALTH CARE EDUCATION/TRAINING PROGRAM

## 2024-10-21 PROCEDURE — 1036F TOBACCO NON-USER: CPT | Performed by: STUDENT IN AN ORGANIZED HEALTH CARE EDUCATION/TRAINING PROGRAM

## 2024-10-21 PROCEDURE — 99204 OFFICE O/P NEW MOD 45 MIN: CPT | Performed by: STUDENT IN AN ORGANIZED HEALTH CARE EDUCATION/TRAINING PROGRAM

## 2024-10-21 RX ORDER — NITROFURANTOIN 25; 75 MG/1; MG/1
CAPSULE ORAL
COMMUNITY
Start: 2023-12-30

## 2024-10-21 RX ORDER — FLECAINIDE ACETATE 100 MG/1
TABLET ORAL
COMMUNITY
Start: 2024-08-31

## 2024-10-21 RX ORDER — BUSPIRONE HYDROCHLORIDE 15 MG/1
TABLET ORAL
COMMUNITY
Start: 2024-08-08

## 2024-10-21 RX ORDER — ERYTHROMYCIN 5 MG/G
OINTMENT OPHTHALMIC
COMMUNITY
Start: 2024-10-15 | End: 2024-11-13

## 2024-10-21 RX ORDER — DULOXETIN HYDROCHLORIDE 30 MG/1
CAPSULE, DELAYED RELEASE ORAL DAILY
COMMUNITY
Start: 2024-10-02

## 2024-10-21 ASSESSMENT — ENCOUNTER SYMPTOMS
EYE ITCHING: 0
NAUSEA: 0
EYE PAIN: 0
CONSTIPATION: 0
FACIAL SWELLING: 0
SHORTNESS OF BREATH: 0
STRIDOR: 0
WHEEZING: 0
CHOKING: 1
COUGH: 0
SINUS PAIN: 0
EYE DISCHARGE: 0
DIARRHEA: 0
APNEA: 0
SINUS PRESSURE: 0

## 2024-10-21 ASSESSMENT — PAIN SCALES - GENERAL: PAINLEVEL_OUTOF10: 0

## 2024-10-21 NOTE — PROGRESS NOTES
[facility] Office Note    Patient: Priscilla Ricks  MRN: [id number]  : 1945  Gender:  female  Vital Signs: Resp 16   Ht 1.626 m (5' 4\")   Wt 92.1 kg (203 lb)   LMP  (LMP Unknown)   BMI 34.84 kg/m²   Date: 10/21/2024    CC:   Chief Complaint   Patient presents with    New Patient     Gastroesophageal reflux disease         HPI:  Priscilla Ricks is a 79 y.o. female here for evaluation of reflux and globus.  Notes referring provider reviewed.  She endorses a history of sleeve gastrectomy in the past.  She takes 40 mg of omeprazole nightly.  She endorses globus sensation just above the sternal notch.  She denies shortness of breath or trouble swallowing.    Past Medical History, Past Surgical History, Family history, Social History, and Medications were all reviewed with the patient today and updated as necessary.     Allergies   Allergen Reactions    Diflucan [Fluconazole] Swelling     Hand swelling    Hydromorphone Swelling    Hydromorphone Hcl     Sulfa Antibiotics Rash     Patient Active Problem List   Diagnosis    Osteoarthritis of left knee    HTN (hypertension)    Near syncope    A-fib (HCC)    Osteoarthritis of right knee    S/P total knee replacement using cement    Esophageal reflux    Class 1 obesity due to excess calories with serious comorbidity and body mass index (BMI) of 32.0 to 32.9 in adult    Orthostatic hypotension    Anemia requiring transfusions    Traumatic hematoma of right hip    Paroxysmal atrial fibrillation (HCC)    Acute blood loss anemia    Anxiety    Postsurgical hypothyroidism    Status post total knee replacement    Depression    Atherosclerosis of aorta (HCC)    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

## 2024-10-21 NOTE — PROGRESS NOTES
Priscilla NATASHA Millicent  : 1945  Primary: Medicare Part A And B (Medicare)  Secondary: GENERIC COMMERCIAL [employer]  [address]  [address] [place] [address]  Phone: [phone]  Fax: [phone] Plan Frequency: 2 x week for 6 weeks    Plan of Care/Certification Expiration Date: 12/10/24        Plan of Care/Certification Expiration Date:  Plan of Care/Certification Expiration Date: 12/10/24    Frequency/Duration:   Plan Frequency: 2 x week for 6 weeks      Time In/Out:   Time In: 1345  Time Out: 1430      PT Visit Info:         Visit Count:  4    OUTPATIENT PHYSICAL THERAPY:   Treatment Note 10/21/2024       Episode  (Balance problems)               Treatment Diagnosis:    Balance problem  Gait difficulty  Muscle weakness (generalized)  Medical/Referring Diagnosis:    Balance problem [R26.89]    Referring Physician:  Brandon Henderson MD MD Orders:  PT Eval and Treat   Return MD Appt:  TBD   Date of Onset:  Onset Date: 24     Allergies:   Diflucan [fluconazole], Hydromorphone, Hydromorphone hcl, and Sulfa antibiotics  Restrictions/Precautions:   Fall Precautions:        Interventions Planned (Treatment may consist of any combination of the following):     See Assessment Note    Subjective Comments:    Patient reports doing well today  Initial Pain Level::     0/10  Post Session Pain Level:       0/10  Medications Last Reviewed:  10/21/2024  Updated Objective Findings:  None Today  Treatment   THERAPEUTIC EXERCISE: (43 minutes):    Exercises per grid below to improve mobility.  Required minimal visual and verbal cues to promote proper body alignment.  Progressed complexity of movement as indicated.   Date:  10/11/24 Date:  10/16/24 Date:  10/17/24 Date:  10/21/24   Activity/Exercise Parameters Parameters Parameters    Patient education/HEP 5 min      Hip abd Standing 2x10; B Standing hip abd 10x, 10x w LTB; B Standing hip abd 10x, 2 sets Standing hip abd 10x, 2 sets w 1#/B   Heel raises At

## 2024-10-28 ENCOUNTER — HOSPITAL ENCOUNTER (OUTPATIENT)
Dept: PHYSICAL THERAPY | Age: 79
Setting detail: RECURRING SERIES
End: 2024-10-28
Attending: FAMILY MEDICINE
Payer: MEDICARE

## 2024-10-30 ENCOUNTER — APPOINTMENT (OUTPATIENT)
Dept: PHYSICAL THERAPY | Age: 79
End: 2024-10-30
Attending: FAMILY MEDICINE
Payer: MEDICARE

## 2024-11-04 ENCOUNTER — TRANSCRIBE ORDERS (OUTPATIENT)
Facility: HOSPITAL | Age: 79
End: 2024-11-04

## 2024-11-04 DIAGNOSIS — Z12.31 VISIT FOR SCREENING MAMMOGRAM: Primary | ICD-10-CM

## 2024-11-13 ENCOUNTER — HOSPITAL ENCOUNTER (OUTPATIENT)
Dept: PHYSICAL THERAPY | Age: 79
Setting detail: RECURRING SERIES
Discharge: HOME | End: 2024-11-16
Attending: FAMILY MEDICINE
Payer: MEDICARE

## 2024-11-13 PROCEDURE — 97110 THERAPEUTIC EXERCISES: CPT

## 2024-11-13 ASSESSMENT — PAIN SCALES - GENERAL: PAINLEVEL_OUTOF10: 3

## 2024-11-13 NOTE — PROGRESS NOTES
Priscilla Ricks  : 1945  Primary: Medicare Part A And B (Medicare)  Secondary: GENERIC COMMERCIAL [facility] [employer]  [address]  [address] [place] [address]  Phone: [phone]  Fax: [phone] Plan Frequency: 2 x week for 6 weeks    Plan of Care/Certification Expiration Date: 12/10/24        Plan of Care/Certification Expiration Date:  Plan of Care/Certification Expiration Date: 12/10/24    Frequency/Duration:   Plan Frequency: 2 x week for 6 weeks      Time In/Out:   Time In: 1345  Time Out: 1430      PT Visit Info:    No Show: 1  Canceled Appointment: 1      Visit Count:  5    OUTPATIENT PHYSICAL THERAPY:   Treatment Note 2024       Episode  (Balance problems)               Treatment Diagnosis:    Balance problem  Gait difficulty  Muscle weakness (generalized)  Medical/Referring Diagnosis:    Balance problem [R26.89]    Referring Physician:  Brandon Henderson MD MD Orders:  PT Eval and Treat   Return MD Appt:  TBD   Date of Onset:  Onset Date: 24     Allergies:   Diflucan [fluconazole], Hydromorphone, Hydromorphone hcl, and Sulfa antibiotics  Restrictions/Precautions:   Fall Precautions:        Interventions Planned (Treatment may consist of any combination of the following):     See Assessment Note    Subjective Comments:    Patient reports she was away in [place] at a craft fair with her daught.  Did a lot activities using a high platform walker with good results  Initial Pain Level::     3/10  Post Session Pain Level:       1/10  Medications Last Reviewed:  2024  Updated Objective Findings:  None Today  Treatment   THERAPEUTIC EXERCISE: (43 minutes):    Exercises per grid below to improve mobility.  Required minimal visual and verbal cues to promote proper body alignment.  Progressed complexity of movement as indicated.   Date:  10/11/24 Date:  10/16/24 Date:  10/17/24 Date:  10/21/24 Date:  24   Activity/Exercise Parameters Parameters Parameters     Patient

## 2024-11-14 ENCOUNTER — HOSPITAL ENCOUNTER (OUTPATIENT)
Dept: MAMMOGRAPHY | Age: 79
Discharge: HOME | End: 2024-11-17
Attending: FAMILY MEDICINE
Payer: MEDICARE

## 2024-11-14 VITALS — WEIGHT: 203 LBS | BODY MASS INDEX: 34.66 KG/M2 | HEIGHT: 64 IN

## 2024-11-14 DIAGNOSIS — Z12.31 VISIT FOR SCREENING MAMMOGRAM: ICD-10-CM

## 2024-11-14 PROCEDURE — 77063 BREAST TOMOSYNTHESIS BI: CPT

## 2024-11-18 ENCOUNTER — HOSPITAL ENCOUNTER (OUTPATIENT)
Dept: PHYSICAL THERAPY | Age: 79
Setting detail: RECURRING SERIES
Discharge: HOME | End: 2024-11-21
Attending: FAMILY MEDICINE
Payer: MEDICARE

## 2024-11-18 PROCEDURE — 97110 THERAPEUTIC EXERCISES: CPT

## 2024-11-18 ASSESSMENT — PAIN SCALES - GENERAL: PAINLEVEL_OUTOF10: 2

## 2024-11-18 NOTE — PROGRESS NOTES
1430    Jus Nichole, PT         Charge Capture  Events  [employer] Portal  Appt Desk  Attendance Report     Future Appointments   Date Time Provider Department Center   11/20/2024  1:45 PM Jus Nichole PT [facility]   11/21/2024  8:30 AM Grinnell, Melissa R, PA-C [facility]   12/4/2024  1:45 PM Jus Nichole PT [facility]   12/11/2024  1:45 PM Jus Nichole PT [facility]   12/13/2024 11:00 AM Jus Nichole PT [facility]   1/22/2025  1:00 PM Billy Chew MD [facility]   1/29/2025 10:20 AM Brandon Henderson MD [facility] [place]   2/21/2025 10:00 AM Larry Pastor MD [facility]

## 2024-11-20 ENCOUNTER — HOSPITAL ENCOUNTER (OUTPATIENT)
Dept: PHYSICAL THERAPY | Age: 79
Setting detail: RECURRING SERIES
Discharge: HOME | End: 2024-11-23
Attending: FAMILY MEDICINE
Payer: MEDICARE

## 2024-11-20 PROCEDURE — 97110 THERAPEUTIC EXERCISES: CPT

## 2024-11-20 ASSESSMENT — PAIN SCALES - GENERAL: PAINLEVEL_OUTOF10: 1

## 2024-11-20 NOTE — PROGRESS NOTES
Priscilla KAUR Millicent  : 1945  Primary: Medicare Part A And B (Medicare)  Secondary: GENERIC COMMERCIAL [facility]  [address]  [address] [place] [address]  Phone: [phone]  Fax: [phone] Plan Frequency: 2 x week for 6 weeks    Plan of Care/Certification Expiration Date: 12/10/24        Plan of Care/Certification Expiration Date:  Plan of Care/Certification Expiration Date: 12/10/24    Frequency/Duration:   Plan Frequency: 2 x week for 6 weeks      Time In/Out:   Time In: 1345  Time Out: 1430      PT Visit Info:    No Show: 1  Canceled Appointment: 1      Visit Count:  7    OUTPATIENT PHYSICAL THERAPY:   Treatment Note 2024       Episode  (Balance problems)               Treatment Diagnosis:    Balance problem  Gait difficulty  Muscle weakness (generalized)  Medical/Referring Diagnosis:    Balance problem [R26.89]    Referring Physician:  Brandon Henderson MD MD Orders:  PT Eval and Treat   Return MD Appt:  TBD   Date of Onset:  Onset Date: 24     Allergies:   Diflucan [fluconazole], Hydromorphone, Hydromorphone hcl, and Sulfa antibiotics  Restrictions/Precautions:   Fall Precautions:        Interventions Planned (Treatment may consist of any combination of the following):     See Assessment Note    Subjective Comments:    Patient ambulated to clinic without cane today  Initial Pain Level::     10  Post Session Pain Level:       1/10  Medications Last Reviewed:  2024  Updated Objective Findings:  None Today  Treatment   THERAPEUTIC EXERCISE: (43 minutes):    Exercises per grid below to improve mobility.  Required minimal visual and verbal cues to promote proper body alignment.  Progressed complexity of movement as indicated.   Date:  10/11/24 Date:  10/16/24 Date:  10/17/24 Date:  10/21/24 Date:  24 Date:  24 Date:  24   Activity/Exercise Parameters Parameters Parameters       Patient education/HEP 5 min         Hip abd Standing 2x10; B Standing hip abd

## 2024-11-21 ENCOUNTER — OFFICE VISIT (OUTPATIENT)
Age: 79
End: 2024-11-21
Payer: MEDICARE

## 2024-11-21 ENCOUNTER — PREP FOR PROCEDURE (OUTPATIENT)
Dept: GASTROENTEROLOGY | Age: 79
End: 2024-11-21

## 2024-11-21 VITALS
HEART RATE: 55 BPM | RESPIRATION RATE: 18 BRPM | DIASTOLIC BLOOD PRESSURE: 69 MMHG | OXYGEN SATURATION: 99 % | BODY MASS INDEX: 35.57 KG/M2 | WEIGHT: 207.2 LBS | SYSTOLIC BLOOD PRESSURE: 149 MMHG

## 2024-11-21 DIAGNOSIS — K21.9 GASTROESOPHAGEAL REFLUX DISEASE, UNSPECIFIED WHETHER ESOPHAGITIS PRESENT: Primary | ICD-10-CM

## 2024-11-21 DIAGNOSIS — R09.A2 GLOBUS SENSATION: ICD-10-CM

## 2024-11-21 PROCEDURE — 1123F ACP DISCUSS/DSCN MKR DOCD: CPT | Performed by: PHYSICIAN ASSISTANT

## 2024-11-21 PROCEDURE — 1036F TOBACCO NON-USER: CPT | Performed by: PHYSICIAN ASSISTANT

## 2024-11-21 PROCEDURE — G8484 FLU IMMUNIZE NO ADMIN: HCPCS | Performed by: PHYSICIAN ASSISTANT

## 2024-11-21 PROCEDURE — 99204 OFFICE O/P NEW MOD 45 MIN: CPT | Performed by: PHYSICIAN ASSISTANT

## 2024-11-21 PROCEDURE — G8399 PT W/DXA RESULTS DOCUMENT: HCPCS | Performed by: PHYSICIAN ASSISTANT

## 2024-11-21 PROCEDURE — G8417 CALC BMI ABV UP PARAM F/U: HCPCS | Performed by: PHYSICIAN ASSISTANT

## 2024-11-21 PROCEDURE — 1160F RVW MEDS BY RX/DR IN RCRD: CPT | Performed by: PHYSICIAN ASSISTANT

## 2024-11-21 PROCEDURE — 3077F SYST BP >= 140 MM HG: CPT | Performed by: PHYSICIAN ASSISTANT

## 2024-11-21 PROCEDURE — 1159F MED LIST DOCD IN RCRD: CPT | Performed by: PHYSICIAN ASSISTANT

## 2024-11-21 PROCEDURE — 3078F DIAST BP <80 MM HG: CPT | Performed by: PHYSICIAN ASSISTANT

## 2024-11-21 PROCEDURE — G8427 DOCREV CUR MEDS BY ELIG CLIN: HCPCS | Performed by: PHYSICIAN ASSISTANT

## 2024-11-21 PROCEDURE — 1090F PRES/ABSN URINE INCON ASSESS: CPT | Performed by: PHYSICIAN ASSISTANT

## 2024-11-21 RX ORDER — SODIUM CHLORIDE 0.9 % (FLUSH) 0.9 %
5-40 SYRINGE (ML) INJECTION EVERY 12 HOURS SCHEDULED
Status: CANCELLED | OUTPATIENT
Start: 2024-11-21

## 2024-11-21 RX ORDER — SODIUM CHLORIDE 9 MG/ML
25 INJECTION, SOLUTION INTRAVENOUS PRN
Status: CANCELLED | OUTPATIENT
Start: 2024-11-21

## 2024-11-21 RX ORDER — SODIUM CHLORIDE 0.9 % (FLUSH) 0.9 %
5-40 SYRINGE (ML) INJECTION PRN
Status: CANCELLED | OUTPATIENT
Start: 2024-11-21

## 2024-11-21 RX ORDER — ERYTHROMYCIN 5 MG/G
OINTMENT OPHTHALMIC
COMMUNITY
Start: 2024-10-15

## 2024-11-21 RX ORDER — FAMOTIDINE 40 MG/1
40 TABLET, FILM COATED ORAL EVERY EVENING
Qty: 90 TABLET | Refills: 0 | Status: SHIPPED | OUTPATIENT
Start: 2024-11-21

## 2024-11-21 NOTE — PROGRESS NOTES
oriented to person, place, and time.   Psychiatric:         Mood and Affect: Mood normal.         Behavior: Behavior normal.         Thought Content: Thought content normal.         Judgment: Judgment normal.              Return for scheduled EGD, follow-up visit 1-2 weeks after procedure.            An electronic signature was used to authenticate this note.    --Melissa R Grinnell, PA-C

## 2024-11-25 ENCOUNTER — APPOINTMENT (OUTPATIENT)
Dept: PHYSICAL THERAPY | Age: 79
End: 2024-11-25
Attending: FAMILY MEDICINE
Payer: MEDICARE

## 2024-11-27 ENCOUNTER — APPOINTMENT (OUTPATIENT)
Dept: PHYSICAL THERAPY | Age: 79
End: 2024-11-27
Attending: FAMILY MEDICINE
Payer: MEDICARE

## 2024-12-02 ENCOUNTER — APPOINTMENT (OUTPATIENT)
Dept: PHYSICAL THERAPY | Age: 79
End: 2024-12-02
Attending: FAMILY MEDICINE
Payer: MEDICARE

## 2024-12-04 ENCOUNTER — HOSPITAL ENCOUNTER (OUTPATIENT)
Dept: PHYSICAL THERAPY | Age: 79
Setting detail: RECURRING SERIES
Discharge: HOME | End: 2024-12-07
Attending: FAMILY MEDICINE
Payer: MEDICARE

## 2024-12-04 DIAGNOSIS — M62.81 MUSCLE WEAKNESS (GENERALIZED): ICD-10-CM

## 2024-12-04 DIAGNOSIS — R26.89 BALANCE PROBLEM: Primary | ICD-10-CM

## 2024-12-04 DIAGNOSIS — R26.9 GAIT DIFFICULTY: ICD-10-CM

## 2024-12-04 PROCEDURE — 97110 THERAPEUTIC EXERCISES: CPT

## 2024-12-04 ASSESSMENT — PAIN SCALES - GENERAL: PAINLEVEL_OUTOF10: 0

## 2024-12-04 NOTE — THERAPY RECERTIFICATION
disorder, S/P total knee replacement using cement, Sleep apnea, and Unspecified hypothyroidism.  Ms. Ricks  has a past surgical history that includes gastrectomy (9/21/15); thyroidectomy (); pr unlisted procedure cardiac surgery; Breast biopsy (Bilateral); Colonoscopy (2016); Tonsillectomy (); Cataract removal (Bilateral, ); total knee arthroplasty (Left, ); total knee arthroplasty (Right, ); orthopedic surgery (); orthopedic surgery (Bilateral, ); Breast lumpectomy (); Hysterectomy (); Cholecystectomy (); Appendectomy (); Urological Surgery (); joint replacement ( 1nd ); Hysterectomy, total abdominal; Ovary removal; and Colonoscopy ().     OBJECTIVE   Observation: Gait:   flexed trunk, tredelenburg, decreased clearnance of RLE during swing phase  Posture:   Thoracic khyphosis    ROM:   Date:  10/11/24       Right Left   Hip flexion 120 130        Knee flexion 110 120   Knee extension -4 -5           Strength:   Date:  10/11/24    Date:  24     Right Left Right Left   Hip flex 4- 5 4 5   Hip ext 3- 3- 4- 4-   Hip abd 3- 3- 3- 4-   Knee ext 4+ 4 5 4   Knee flex 4+ 4 5- 5-   Ankle DF 5 5 5 5   Ankle PF   4- 4-     Special Tests: TU.77 w/o AD  Outcome Measure:   Tool Used: Activities-Specific Balance Confidence (ABC) Scale  Score:  Initial: 43.7% Confidence Most Recent: 71.25% Confidence (Date: 24 )   Interpretation of Score: The test rates the patient’s percentage of confidence with functional daily activities from 0%, indicating no confidence, to 100%, indicating complete confidence. The percentages are added together and then averaged. If the average is less than 80%, this indicates significant impairment with daily activities.    Tool Used: Flores Balance Scale  Score:  Initial: 4156 Most Recent: 4656 (Date: 24 )   Interpretation of Score: Each section is scored on a 0-4 scale, 0 representing the patient’s inability to

## 2024-12-05 NOTE — PROGRESS NOTES
Date Time Provider Department [facility]   12/11/2024  1:45 PM Jus Nichole, PT [facility]   12/13/2024 11:00 AM Jus Nichole PT [facility]   1/22/2025  1:00 PM Billy Chew MD [facility]   [facility]21/2025 10:00 [facility]

## 2024-12-09 ENCOUNTER — TELEPHONE (OUTPATIENT)
Dept: GASTROENTEROLOGY | Age: 79
End: 2024-12-09

## 2024-12-09 NOTE — TELEPHONE ENCOUNTER
Pt called requesting to rescheduled procedure due to transportation issues. Rescheduled procedure to 1/8/24.Case message sent

## 2024-12-11 ENCOUNTER — HOSPITAL ENCOUNTER (OUTPATIENT)
Dept: PHYSICAL THERAPY | Age: 79
Setting detail: RECURRING SERIES
Discharge: HOME | End: 2024-12-14
Attending: FAMILY MEDICINE
Payer: MEDICARE

## 2024-12-11 PROCEDURE — 97110 THERAPEUTIC EXERCISES: CPT

## 2024-12-11 ASSESSMENT — PAIN SCALES - GENERAL: PAINLEVEL_OUTOF10: 5

## 2024-12-11 NOTE — PROGRESS NOTES
Priscilla Ricks  : 1945  Primary: Medicare Part A And B (Medicare)  Secondary: GENERIC COMMERCIAL [facility] [employer]  [address]  [address] [place] [id number]  Phone: [phone]  Fax: [phone] Plan Frequency: 2 x week for 6 weeks    Plan of Care/Certification Expiration Date: 25        Plan of Care/Certification Expiration Date:  Plan of Care/Certification Expiration Date: 25    Frequency/Duration:   Plan Frequency: 2 x week for 6 weeks      Time In/Out:   Time In: 1345  Time Out: 1430      PT Visit Info:    No Show: 60  Canceled Appointment: 1      Visit Count:  9    OUTPATIENT PHYSICAL THERAPY:   Treatment Note 2024       Episode  (Balance problems)               Treatment Diagnosis:    Balance problem  Gait difficulty  Muscle weakness (generalized)  Medical/Referring Diagnosis:    Balance problem [R26.89]    Referring Physician:  Ayad Rosales Jr., MD MD Orders:  PT Eval and Treat   Return MD Appt:  TBD   Date of Onset:  Onset Date: 24     Allergies:   Diflucan [fluconazole], Hydromorphone, Hydromorphone hcl, and Sulfa antibiotics  Restrictions/Precautions:   Fall Precautions:        Interventions Planned (Treatment may consist of any combination of the following):     See Assessment Note    Subjective Comments:    Patient with complaints of back soreness after lifting boxes with xmas decoreatiosn  Initial Pain Level::     5/10  Post Session Pain Level:       1/10  Medications Last Reviewed:  2024  Updated Objective Findings:  None Today  Treatment   THERAPEUTIC EXERCISE: (43 minutes):    Exercises per grid below to improve mobility.  Required minimal visual and verbal cues to promote proper body alignment.  Progressed complexity of movement as indicated.   Date:  24 Date:  24 Date:  24 Date:  24   Activity/Exercise       Patient education/HEP       Hip abd   2 x10 in sidelying  15x: B   Heel raises 2 x 10 on airex 2 x 10 on airex

## 2024-12-13 ENCOUNTER — HOSPITAL ENCOUNTER (OUTPATIENT)
Dept: PHYSICAL THERAPY | Age: 79
Setting detail: RECURRING SERIES
Discharge: HOME | End: 2024-12-16
Attending: FAMILY MEDICINE
Payer: MEDICARE

## 2024-12-13 PROCEDURE — 97110 THERAPEUTIC EXERCISES: CPT

## 2024-12-13 ASSESSMENT — PAIN SCALES - GENERAL: PAINLEVEL_OUTOF10: 6

## 2024-12-13 NOTE — PROGRESS NOTES
Priscilla Ricks  : 1945  Primary: Medicare Part A And B (Medicare)  Secondary: GENERIC COMMERCIAL [facility] [employer]  [address]  [address] [place] [address]  Phone: [phone]  Fax: [phone] Plan Frequency: 2 x week for 6 weeks    Plan of Care/Certification Expiration Date: 25        Plan of Care/Certification Expiration Date:  Plan of Care/Certification Expiration Date: 25    Frequency/Duration:   Plan Frequency: 2 x week for 6 weeks      Time In/Out:   Time In: 1100  Time Out: 1145      PT Visit Info:    No Show: 60  Canceled Appointment: 1      Visit Count:  10    OUTPATIENT PHYSICAL THERAPY:   Treatment Note 2024       Episode  (Balance problems)               Treatment Diagnosis:    Balance problem  Gait difficulty  Muscle weakness (generalized)  Medical/Referring Diagnosis:    Balance problem [R26.89]    Referring Physician:  Ayad Rosales Jr., MD MD Orders:  PT Eval and Treat   Return MD Appt:  TBD   Date of Onset:  Onset Date: 24     Allergies:   Diflucan [fluconazole], Hydromorphone, Hydromorphone hcl, and Sulfa antibiotics  Restrictions/Precautions:   Fall Precautions:        Interventions Planned (Treatment may consist of any combination of the following):     See Assessment Note    Subjective Comments:    Patient with complaints of increased low back pain today.    Initial Pain Level::     6/10  Post Session Pain Level:       6/10  Medications Last Reviewed:  2024  Updated Objective Findings:   antalgic gait w/o assistive device  Treatment   THERAPEUTIC EXERCISE: (43 minutes):    Exercises per grid below to improve mobility.  Required minimal visual and verbal cues to promote proper body alignment.  Progressed complexity of movement as indicated.   Date:  24 Date:  24 Date:  24 Date:  24   Activity/Exercise       Patient education/HEP       Hip abd  2 x10 in sidelying  15x: B Hooklying with PTB 2 x 10   Heel raises 2 x 10

## 2024-12-27 ENCOUNTER — TELEPHONE (OUTPATIENT)
Dept: ORTHOPEDIC SURGERY | Age: 79
End: 2024-12-27

## 2024-12-27 NOTE — PERIOP NOTE
Patient verified name, , and procedure.    Type: 1a; abbreviated assessment per anesthesia guidelines  Labs per surgeon: None  Labs per anesthesia: POC K+      Instructed pt that they will be notified by the Gi Lab for time of arrival. If any questions please call the GI lab at [phone].    Follow diet and prep instructions per office. Please drink 32 ounces of non-caffeinated clear liquids 2 hours prior to your arrival to avoid dehydration then nothing by mouth until after the procedure.        Bath or shower the night before and the am of surgery with antibacterial soap. No lotions, oils, powders, cologne on skin. No make up, eye make up or jewelry. Wear loose fitting comfortable, clean clothing.     Must have adult present in building the entire time .     Medications for the day of procedure Amlodipine, Buspirone, Duloxetine, Flecainide, Levothyroxine and Metoprolol    Please hold all vitamins x 7 days prior to procedure and NSAIDS (Aspirin, Excedrin, Goody powders, Motrin, Ibuprofen, Advil, Aleve and Naproxen) x 5 days prior to procedure. Should you have a procedure date that does not allow for the amount of time instructed above, please stop taking vitamins, supplements, and NSAIDS IMMEDIATELY.       The following discharge instructions reviewed with patient: medication given during procedure may cause drowsiness for several hours, therefore, do not drive or operate machinery for remainder of the day, no alcohol on the day of your procedure, resume regular diet and activity unless otherwise directed, for mild sore throat you may use Cepacol throat lozenges or warm salt water gargles as needed, call your physician for any problems or questions. Patient verbalizes understanding.     Pre surgery instructions sent to [facility]

## 2024-12-27 NOTE — TELEPHONE ENCOUNTER
Adriana - I'd like Ms Rivera to see you in the next few weeks for a full med review and med education -- she hasn't brought in her medications when she comes to see me, and isn't sure which ones she takes or what they're for. Have asked her to bring all of them tot he visit with you.  Thanks! Karan James MD  She needs a refill on her Tizanidine to [facility] on [facility]. She is completely out.

## 2025-01-06 ENCOUNTER — HOSPITAL ENCOUNTER (OUTPATIENT)
Dept: PHYSICAL THERAPY | Age: 80
Setting detail: RECURRING SERIES
End: 2025-01-06
Attending: FAMILY MEDICINE
Payer: MEDICARE

## 2025-01-07 ENCOUNTER — ANESTHESIA EVENT (OUTPATIENT)
Dept: ENDOSCOPY | Age: 80
End: 2025-01-07
Payer: MEDICARE

## 2025-01-07 RX ORDER — IBUPROFEN 600 MG/1
1 TABLET ORAL PRN
Status: CANCELLED | OUTPATIENT
Start: 2025-01-07

## 2025-01-07 RX ORDER — DIPHENHYDRAMINE HYDROCHLORIDE 50 MG/ML
12.5 INJECTION INTRAMUSCULAR; INTRAVENOUS
Status: CANCELLED | OUTPATIENT
Start: 2025-01-07 | End: 2025-01-08

## 2025-01-07 RX ORDER — PROCHLORPERAZINE EDISYLATE 5 MG/ML
5 INJECTION INTRAMUSCULAR; INTRAVENOUS
Status: CANCELLED | OUTPATIENT
Start: 2025-01-07 | End: 2025-01-08

## 2025-01-07 RX ORDER — SODIUM CHLORIDE, SODIUM LACTATE, POTASSIUM CHLORIDE, CALCIUM CHLORIDE 600; 310; 30; 20 MG/100ML; MG/100ML; MG/100ML; MG/100ML
INJECTION, SOLUTION INTRAVENOUS CONTINUOUS
Status: CANCELLED | OUTPATIENT
Start: 2025-01-07

## 2025-01-07 RX ORDER — SODIUM CHLORIDE 9 MG/ML
INJECTION, SOLUTION INTRAVENOUS PRN
Status: CANCELLED | OUTPATIENT
Start: 2025-01-07

## 2025-01-07 RX ORDER — FENTANYL CITRATE 50 UG/ML
25 INJECTION, SOLUTION INTRAMUSCULAR; INTRAVENOUS EVERY 5 MIN PRN
Status: CANCELLED | OUTPATIENT
Start: 2025-01-07

## 2025-01-07 RX ORDER — SODIUM CHLORIDE 0.9 % (FLUSH) 0.9 %
5-40 SYRINGE (ML) INJECTION PRN
Status: CANCELLED | OUTPATIENT
Start: 2025-01-07

## 2025-01-07 RX ORDER — NALOXONE HYDROCHLORIDE 0.4 MG/ML
INJECTION, SOLUTION INTRAMUSCULAR; INTRAVENOUS; SUBCUTANEOUS PRN
Status: CANCELLED | OUTPATIENT
Start: 2025-01-07

## 2025-01-07 RX ORDER — OXYCODONE HYDROCHLORIDE 5 MG/1
5 TABLET ORAL
Status: CANCELLED | OUTPATIENT
Start: 2025-01-07 | End: 2025-01-08

## 2025-01-07 RX ORDER — MEPERIDINE HYDROCHLORIDE 50 MG/ML
12.5 INJECTION INTRAMUSCULAR; INTRAVENOUS; SUBCUTANEOUS EVERY 5 MIN PRN
Status: CANCELLED | OUTPATIENT
Start: 2025-01-07

## 2025-01-07 RX ORDER — DEXTROSE MONOHYDRATE 100 MG/ML
INJECTION, SOLUTION INTRAVENOUS CONTINUOUS PRN
Status: CANCELLED | OUTPATIENT
Start: 2025-01-07

## 2025-01-07 RX ORDER — ONDANSETRON 2 MG/ML
4 INJECTION INTRAMUSCULAR; INTRAVENOUS
Status: CANCELLED | OUTPATIENT
Start: 2025-01-07 | End: 2025-01-08

## 2025-01-07 RX ORDER — SODIUM CHLORIDE 0.9 % (FLUSH) 0.9 %
5-40 SYRINGE (ML) INJECTION EVERY 12 HOURS SCHEDULED
Status: CANCELLED | OUTPATIENT
Start: 2025-01-07

## 2025-01-08 ENCOUNTER — HOSPITAL ENCOUNTER (OUTPATIENT)
Age: 80
Setting detail: OUTPATIENT SURGERY
Discharge: HOME | End: 2025-01-08
Attending: INTERNAL MEDICINE | Admitting: INTERNAL MEDICINE
Payer: MEDICARE

## 2025-01-08 ENCOUNTER — ANESTHESIA (OUTPATIENT)
Dept: ENDOSCOPY | Age: 80
End: 2025-01-08
Payer: MEDICARE

## 2025-01-08 ENCOUNTER — APPOINTMENT (OUTPATIENT)
Dept: PHYSICAL THERAPY | Age: 80
End: 2025-01-08
Attending: FAMILY MEDICINE
Payer: MEDICARE

## 2025-01-08 VITALS
DIASTOLIC BLOOD PRESSURE: 83 MMHG | HEART RATE: 53 BPM | HEIGHT: 64 IN | TEMPERATURE: 97.6 F | RESPIRATION RATE: 15 BRPM | WEIGHT: 205.2 LBS | BODY MASS INDEX: 35.03 KG/M2 | OXYGEN SATURATION: 94 % | SYSTOLIC BLOOD PRESSURE: 152 MMHG

## 2025-01-08 DIAGNOSIS — Z90.3 STATUS POST SLEEVE GASTRECTOMY: Primary | ICD-10-CM

## 2025-01-08 DIAGNOSIS — R13.10 DYSPHAGIA, UNSPECIFIED TYPE: ICD-10-CM

## 2025-01-08 PROCEDURE — 43239 EGD BIOPSY SINGLE/MULTIPLE: CPT | Performed by: INTERNAL MEDICINE

## 2025-01-08 PROCEDURE — 6360000002 HC RX W HCPCS: Performed by: NURSE ANESTHETIST, CERTIFIED REGISTERED

## 2025-01-08 PROCEDURE — 2709999900 HC NON-CHARGEABLE SUPPLY: Performed by: INTERNAL MEDICINE

## 2025-01-08 PROCEDURE — 7100000011 HC PHASE II RECOVERY - ADDTL 15 MIN: Performed by: INTERNAL MEDICINE

## 2025-01-08 PROCEDURE — 3700000001 HC ADD 15 MINUTES (ANESTHESIA): Performed by: INTERNAL MEDICINE

## 2025-01-08 PROCEDURE — 3609017100 HC EGD: Performed by: INTERNAL MEDICINE

## 2025-01-08 PROCEDURE — 7100000010 HC PHASE II RECOVERY - FIRST 15 MIN: Performed by: INTERNAL MEDICINE

## 2025-01-08 PROCEDURE — 88305 TISSUE EXAM BY PATHOLOGIST: CPT

## 2025-01-08 PROCEDURE — 3700000000 HC ANESTHESIA ATTENDED CARE: Performed by: INTERNAL MEDICINE

## 2025-01-08 RX ORDER — SODIUM CHLORIDE 9 MG/ML
INJECTION, SOLUTION INTRAVENOUS PRN
Status: DISCONTINUED | OUTPATIENT
Start: 2025-01-08 | End: 2025-01-08 | Stop reason: HOSPADM

## 2025-01-08 RX ORDER — SODIUM CHLORIDE 0.9 % (FLUSH) 0.9 %
5-40 SYRINGE (ML) INJECTION EVERY 12 HOURS SCHEDULED
Status: DISCONTINUED | OUTPATIENT
Start: 2025-01-08 | End: 2025-01-08 | Stop reason: HOSPADM

## 2025-01-08 RX ORDER — SODIUM CHLORIDE 0.9 % (FLUSH) 0.9 %
5-40 SYRINGE (ML) INJECTION PRN
Status: DISCONTINUED | OUTPATIENT
Start: 2025-01-08 | End: 2025-01-08 | Stop reason: HOSPADM

## 2025-01-08 RX ORDER — PROPOFOL 10 MG/ML
INJECTION, EMULSION INTRAVENOUS
Status: DISCONTINUED | OUTPATIENT
Start: 2025-01-08 | End: 2025-01-08 | Stop reason: SDUPTHER

## 2025-01-08 RX ORDER — LIDOCAINE HYDROCHLORIDE 10 MG/ML
1 INJECTION, SOLUTION INFILTRATION; PERINEURAL
Status: DISCONTINUED | OUTPATIENT
Start: 2025-01-08 | End: 2025-01-08 | Stop reason: HOSPADM

## 2025-01-08 RX ORDER — SODIUM CHLORIDE, SODIUM LACTATE, POTASSIUM CHLORIDE, CALCIUM CHLORIDE 600; 310; 30; 20 MG/100ML; MG/100ML; MG/100ML; MG/100ML
INJECTION, SOLUTION INTRAVENOUS CONTINUOUS
Status: DISCONTINUED | OUTPATIENT
Start: 2025-01-08 | End: 2025-01-08 | Stop reason: HOSPADM

## 2025-01-08 RX ORDER — SODIUM CHLORIDE 9 MG/ML
25 INJECTION, SOLUTION INTRAVENOUS PRN
Status: DISCONTINUED | OUTPATIENT
Start: 2025-01-08 | End: 2025-01-08 | Stop reason: HOSPADM

## 2025-01-08 RX ORDER — LIDOCAINE HYDROCHLORIDE 20 MG/ML
INJECTION, SOLUTION EPIDURAL; INFILTRATION; INTRACAUDAL; PERINEURAL
Status: DISCONTINUED | OUTPATIENT
Start: 2025-01-08 | End: 2025-01-08 | Stop reason: SDUPTHER

## 2025-01-08 RX ADMIN — PROPOFOL 30 MG: 10 INJECTION, EMULSION INTRAVENOUS at 13:30

## 2025-01-08 RX ADMIN — LIDOCAINE HYDROCHLORIDE 50 MG: 20 INJECTION, SOLUTION EPIDURAL; INFILTRATION; INTRACAUDAL; PERINEURAL at 13:21

## 2025-01-08 RX ADMIN — PROPOFOL 30 MG: 10 INJECTION, EMULSION INTRAVENOUS at 13:24

## 2025-01-08 RX ADMIN — PROPOFOL 80 MG: 10 INJECTION, EMULSION INTRAVENOUS at 13:21

## 2025-01-08 ASSESSMENT — PAIN - FUNCTIONAL ASSESSMENT: PAIN_FUNCTIONAL_ASSESSMENT: 0-10

## 2025-01-08 NOTE — H&P
HISTORY AND PHYSICAL             Date: 1/8/2025        Patient Name: Priscilla Ricsk     YOB: 1945      Age:  79 y.o.      History of Present Illness   GERD/globus     Past Medical History     Past Medical History:   Diagnosis Date    Anxiety     Arthritis     Bell's palsy     in Oct. 2009 with some vertigo at times still, no other after effects    Carotid artery stenosis afab    yes    Chronic back pain     yes    Depression     GERD (gastroesophageal reflux disease)     reflux, takes nexium    Hematoma of hip, right, initial encounter     Hypertension     on medication since 2006    Hypothyroidism     Nausea & vomiting     Obese     Paroxysmal atrial fibrillation (HCC) 8/13/2017    Psychiatric disorder     depression    S/P total knee replacement using cement 5/16/2011    Sleep apnea     yes    Unspecified hypothyroidism 5/16/2011        Past Surgical History     Past Surgical History:   Procedure Laterality Date    APPENDECTOMY  1961    BREAST BIOPSY Bilateral     BREAST LUMPECTOMY  1998    benign    CATARACT REMOVAL Bilateral 2014    CHOLECYSTECTOMY  1976    COLONOSCOPY  04/04/2016    COLONOSCOPY  2022    GASTRECTOMY  9/21/15    sleeve    HYSTERECTOMY (CERVIX STATUS UNKNOWN)  1987    total    HYSTERECTOMY, TOTAL ABDOMINAL (CERVIX REMOVED)      yes    JOINT REPLACEMENT  2010 1nd 2011    ORTHOPEDIC SURGERY  2005    lower back     ORTHOPEDIC SURGERY Bilateral 1994    heel spurs removed    OVARY REMOVAL      NE UNLISTED PROCEDURE CARDIAC SURGERY      LAAO placed 7/17/18    THYROIDECTOMY  1980    partial    TONSILLECTOMY  1966    TOTAL KNEE ARTHROPLASTY Left 2011    TOTAL KNEE ARTHROPLASTY Right 2010    UROLOGICAL SURGERY  2008/2009    bladder tack X2        Medications Prior to Admission     Prior to Admission medications    Medication Sig Start Date End Date Taking? Authorizing Provider   tiZANidine (ZANAFLEX) 4 MG tablet Take 1/2 to 1 tablet by mouth tid, prn 12/30/24   Anna Gaston MD  tablet Take 3 tablets by mouth daily    Automatic Reconciliation, Ar   DULoxetine (CYMBALTA) 60 MG extended release capsule 90 mg TAKE 1 CAPSULE BY MOUTH EVERY DAY 4/24/19   Automatic Reconciliation, Ar   melatonin 10 MG CAPS capsule Take 1 capsule by mouth nightly    Automatic Reconciliation, Ar        Allergies     Diflucan [fluconazole], Hydromorphone, Hydromorphone hcl, and Sulfa antibiotics    Social History     For pertinent, see above.     Family History     Family History   Problem Relation Age of Onset    Cancer Mother         breast    Breast Cancer Mother 58    Heart Attack Mother     Cancer Father         throat    Other Father         gastric ulcer    Kidney Disease Father     Prostate Cancer Father     Substance Abuse Father         alcohol    Breast Cancer Daughter 52    Delayed Awakening Neg Hx     Pseudochol. Deficiency Neg Hx     Post-op Nausea/Vomiting Neg Hx     Emergence Delirium Neg Hx     Post-op Cognitive Dysfunction Neg Hx     Malig Hypertherm Neg Hx        Review of Systems   - CONSTITUTIONAL: Denies weight loss, fever and chills.    - HEENT: Denies changes in vision and hearing.    - RESPIRATORY: Denies SOB and cough.    - CV: Denies palpitations and CP.    - GI: Denies abdominal pain, nausea.    - : Denies dysuria and urinary frequency.    - MSK: Denies myalgia and joint pain.    - SKIN: Denies rash and pruritus.    - NEUROLOGICAL: Denies headache and syncope.    - PSYCHIATRIC: Denies recent changes in mood. Denies anxiety and depression.    - SKIN: No jaundice or skin lesions.    -RECTAL: No pain or tenderness.     Physical Exam   Ht 1.626 m (5' 4\")   Wt 90.7 kg (200 lb)   LMP  (LMP Unknown)   BMI 34.33 kg/m²     - GENERAL: Alert and oriented x 3. No acute distress. Well-nourished.    - EYES: EOMI. Anicteric.    - HENT: Moist mucous membranes. No cervical lymphadenopathy.    - LUNGS: Clear to auscultation bilaterally. No accessory muscle use.    - CARDIOVASCULAR: Regular rate and

## 2025-01-08 NOTE — DISCHARGE INSTRUCTIONS
Gastrointestinal Esophagogastroduodenoscopy (EGD) - Upper Exam Discharge Instructions    1. Call Dr. Landry at *** for any problems or questions.    2. Contact the doctor's office for follow up appointment as directed.    3. Medication may cause drowsiness for several hours, therefore:  Do not drive or operate machinery for remainder of the day.    No alcohol today.  Do not make any important or legal decisions for 24 hours.  Do not sign any legal documents for 24 hours.    5. Ordinarily, you may resume regular diet and activity after exam unless otherwise specified by your physician.    6. For mild soreness in your throat you may use Cepacol throat lozenges or warm salt-water gargles as needed.    Any additional instructions:  ***

## 2025-01-08 NOTE — ANESTHESIA PRE PROCEDURE
(s/p watchman procedure): atrial fibrillation      ECG reviewed  Rhythm: regular  Rate: normal  Echocardiogram reviewed               ROS comment: Echo:  ·  Left Ventricle: Normal left ventricular systolic function with a visually estimated EF of 60 - 65%. Left ventricle size is normal. Mildly increased wall thickness. Normal wall motion. Normal diastolic function.  ·  Left Atrium: Left atrium is moderately dilated.  ·  Mitral Valve: Mild regurgitation.  ·  Tricuspid Valve: Mild regurgitation.       Neuro/Psych:   (+) CVA (carotid stenosis):, neuromuscular disease (bell's palsy):, psychiatric history:depression/anxiety              ROS comment: Cervical spinal stenosis- bilateral 50-69% stenosis     GI/Hepatic/Renal:   (+) GERD: well controlled         ROS comment: BMI 35.2.   Endo/Other:    (+) hypothyroidism::..                 Abdominal:   (+) obese          Vascular: negative vascular ROS.         Other Findings:             Anesthesia Plan      TIVA     ASA 3       Induction: intravenous.    MIPS: Postoperative opioids intended and Prophylactic antiemetics administered.  Anesthetic plan and risks discussed with patient.      Plan discussed with CRNA.                    Tony Vasquez MD   1/8/2025

## 2025-01-08 NOTE — ANESTHESIA POSTPROCEDURE EVALUATION
Department of Anesthesiology  Postprocedure Note    Patient: Priscilla Ricks  MRN: [id number]  YOB: 1945  Date of evaluation: 1/8/2025    Procedure Summary       Date: 01/08/25 [place] [facility] [place] [facility]    Anesthesia Start: 1312 Anesthesia Stop: 1340    Procedure: ESOPHAGOGASTRODUODENOSCOPY/biopsies (Upper GI Region) Diagnosis:       Gastroesophageal reflux disease, unspecified whether esophagitis present      Globus sensation      (Gastroesophageal reflux disease, unspecified whether esophagitis present [K21.9])      (Globus sensation [R09.A2])    Surgeons: uGanako Landry MD Responsible Provider: Matthew Chester MD    Anesthesia Type: TIVA ASA Status: 3            Anesthesia Type: No value filed.    Marquis Phase I:      Marquis Phase II: Marquis Score: 10    Anesthesia Post Evaluation    Patient location during evaluation: PACU  Patient participation: complete - patient participated  Level of consciousness: awake and alert  Airway patency: patent  Nausea & Vomiting: no nausea and no vomiting  Cardiovascular status: hemodynamically stable  Respiratory status: acceptable, nonlabored ventilation and spontaneous ventilation  Hydration status: euvolemic  Comments: BP (!) 152/83   Pulse 53   Temp 97.6 °F (36.4 °C) (Temporal)   Resp 15   Ht 1.626 m (5' 4\")   Wt 93.1 kg (205 lb 3.2 oz)   LMP  (LMP Unknown)   SpO2 96%   BMI 35.22 kg/m²     Multimodal analgesia pain management approach  Pain management: adequate and satisfactory to patient    No notable events documented.

## 2025-01-08 NOTE — PROGRESS NOTES
Large hiatal hernia (5-6 cm), angulation to gastric sleeve, biopsies done of stomach and distal esophagus, gastritis

## 2025-01-09 DIAGNOSIS — I48.91 ATRIAL FIBRILLATION WITH RVR (HCC): ICD-10-CM

## 2025-01-09 DIAGNOSIS — I10 HTN (HYPERTENSION): ICD-10-CM

## 2025-01-09 RX ORDER — AMLODIPINE BESYLATE 5 MG/1
5 TABLET ORAL DAILY
Qty: 90 TABLET | Refills: 3 | Status: SHIPPED | OUTPATIENT
Start: 2025-01-09

## 2025-01-09 NOTE — TELEPHONE ENCOUNTER
I spoke with the pt and I reviewed the medications that Dr. Chew prescribed for the the pt. Pt asked about flecainide, she does not know who prescribed it but she has been taking it and has refills. She will discuss the medication with Dr Chew at her 1/22/2025 appt. Pt stated she was told that the pharmacy could not refill her valsartan until 1/26 but she will see if they will give her a short term supply.  Pt needs refills of amlodipine 5 mg.  Requested Prescriptions     Pending Prescriptions Disp Refills    amLODIPine (NORVASC) 5 MG tablet 90 tablet 3     Sig: Take 1 tablet by mouth daily

## 2025-01-10 ENCOUNTER — APPOINTMENT (OUTPATIENT)
Dept: PHYSICAL THERAPY | Age: 80
End: 2025-01-10
Attending: FAMILY MEDICINE
Payer: MEDICARE

## 2025-01-13 ENCOUNTER — HOSPITAL ENCOUNTER (OUTPATIENT)
Dept: PHYSICAL THERAPY | Age: 80
Setting detail: RECURRING SERIES
Discharge: HOME | End: 2025-01-16
Attending: FAMILY MEDICINE
Payer: MEDICARE

## 2025-01-13 PROCEDURE — 97110 THERAPEUTIC EXERCISES: CPT

## 2025-01-15 ENCOUNTER — HOSPITAL ENCOUNTER (OUTPATIENT)
Dept: PHYSICAL THERAPY | Age: 80
Setting detail: RECURRING SERIES
Discharge: HOME | End: 2025-01-18
Attending: FAMILY MEDICINE
Payer: MEDICARE

## 2025-01-15 PROCEDURE — 97110 THERAPEUTIC EXERCISES: CPT

## 2025-01-15 ASSESSMENT — PAIN SCALES - GENERAL: PAINLEVEL_OUTOF10: 4

## 2025-01-15 NOTE — PROGRESS NOTES
Priscilla Ricks  : 1945  Primary: Medicare Part A And B (Medicare)  Secondary: GENERIC COMMERCIAL [facility]  [address]  [address]  [facility] [address]  Phone: [phone]  Fax: [phone] Plan Frequency: 2 x week for 6 weeks    Plan of Care/Certification Expiration Date: 25        Plan of Care/Certification Expiration Date:  Plan of Care/Certification Expiration Date: 25    Frequency/Duration:   Plan Frequency: 2 x week for 6 weeks      Time In/Out:   Time In: 1430  Time Out: 1515      PT Visit Info:    No Show: 60  Canceled Appointment: 2      Visit Count:  12    OUTPATIENT PHYSICAL THERAPY:   Treatment Note 1/15/2025       Episode  (Balance problems)               Treatment Diagnosis:    Balance problem  Gait difficulty  Muscle weakness (generalized)  Medical/Referring Diagnosis:    Balance problem [R26.89]    Referring Physician:  Ayad Rosales Jr., MD MD Orders:  PT Eval and Treat   Return MD Appt:  TBD   Date of Onset:  Onset Date: 24     Allergies:   Diflucan [fluconazole], Hydromorphone, Hydromorphone hcl, and Sulfa antibiotics  Restrictions/Precautions:   Fall Precautions:        Interventions Planned (Treatment may consist of any combination of the following):     See Assessment Note    Subjective Comments:    Patient reports not being able to stand erect today.    Initial Pain Level::     4/10  Post Session Pain Level:       4/10  Medications Last Reviewed:  1/15/2025  Updated Objective Findings:  None Today  Treatment   THERAPEUTIC EXERCISE: (42 minutes):    Exercises per grid below to improve mobility.  Required minimal visual and verbal cues to promote proper body alignment.  Progressed complexity of movement as indicated.   Date:  24 Date:  24 Date:  25 Date:  1/15/25   Activity/Exercise       Patient education/HEP       Hip abd 15x: B Hooklying with PTB 2 x 10     Heel raises       Nu Step 10 min L3 10x min L3 10x min L2 10 min L3

## 2025-01-20 ENCOUNTER — HOSPITAL ENCOUNTER (OUTPATIENT)
Dept: PHYSICAL THERAPY | Age: 80
Setting detail: RECURRING SERIES
End: 2025-01-20
Attending: FAMILY MEDICINE
Payer: MEDICARE

## 2025-01-22 ENCOUNTER — OFFICE VISIT (OUTPATIENT)
Age: 80
End: 2025-01-22

## 2025-01-22 ENCOUNTER — HOSPITAL ENCOUNTER (OUTPATIENT)
Dept: PHYSICAL THERAPY | Age: 80
Setting detail: RECURRING SERIES
End: 2025-01-22
Attending: FAMILY MEDICINE
Payer: MEDICARE

## 2025-01-22 VITALS
SYSTOLIC BLOOD PRESSURE: 100 MMHG | HEART RATE: 71 BPM | BODY MASS INDEX: 34.83 KG/M2 | HEIGHT: 64 IN | DIASTOLIC BLOOD PRESSURE: 60 MMHG | WEIGHT: 204 LBS

## 2025-01-22 DIAGNOSIS — I10 PRIMARY HYPERTENSION: ICD-10-CM

## 2025-01-22 DIAGNOSIS — I48.0 PAROXYSMAL ATRIAL FIBRILLATION (HCC): Primary | ICD-10-CM

## 2025-01-22 DIAGNOSIS — Z79.899 LONG TERM CURRENT USE OF ANTIARRHYTHMIC DRUG: ICD-10-CM

## 2025-01-22 RX ORDER — METOPROLOL TARTRATE 25 MG/1
12.5 TABLET, FILM COATED ORAL EVERY 12 HOURS
Qty: 90 TABLET | Refills: 3 | Status: CANCELLED | OUTPATIENT
Start: 2025-01-22

## 2025-01-22 RX ORDER — FLECAINIDE ACETATE 50 MG/1
50 TABLET ORAL 2 TIMES DAILY
Qty: 180 TABLET | Refills: 3 | Status: SHIPPED | OUTPATIENT
Start: 2025-01-22

## 2025-01-22 RX ORDER — METOPROLOL SUCCINATE 25 MG/1
25 TABLET, EXTENDED RELEASE ORAL DAILY
Qty: 90 TABLET | Refills: 3 | Status: SHIPPED | OUTPATIENT
Start: 2025-01-22

## 2025-01-22 NOTE — PROGRESS NOTES
[address], [address]  [place] [address]  PHONE: [phone]    SUBJECTIVE:   Priscilla Ricks is a 79 y.o. female 1945   seen for a follow up visit regarding the following:     Chief Complaint   Patient presents with    6 Month Follow-Up    Atrial Fibrillation         History of Present Illness  The patient is a 79-year-old female with a medical history significant for hypertension, atrial fibrillation, dizziness, long-term anticoagulation therapy, anxiety disorder, and chronic antiarrhythmic drug use.    Her blood pressure was recorded at 100/60 mmHg today. She has not been monitoring her blood pressure frequently and reports no episodes of dizziness. Approximately 2-3 weeks ago, her systolic blood pressure was noted to be 200 mmHg during a procedure. She does not consistently take hydrochlorothiazide. Her blood pressure readings are generally stable, with occasional elevations noted during dental visits. Her current antihypertensive regimen includes amlodipine 5 mg, valsartan 160 mg, metoprolol tartrate 0.5 mg twice daily, and hydrochlorothiazide 12.5 mg.    The patient reports no recent episodes of atrial fibrillation, with her Apple watch indicating an occurrence rate of less than 2%. She expresses satisfaction with her Watchman device. Her last episode of atrial fibrillation occurred a few years ago, necessitating hospitalization. She is currently on flecainide 100 mg twice daily and metoprolol tartrate 0.5 mg twice daily.    The patient has increased her physical activity and is participating in physical therapy. She reports improved medication management with the use of a new pill splitter.    She is awaiting an appointment with a new primary care physician at [facility], scheduled for February 2025. Additionally, she has a hernia.    MEDICATIONS  Amlodipine, valsartan, metoprolol tartrate, hydrochlorothiazide, BuSpar, flecainide.

## 2025-01-23 ENCOUNTER — HOSPITAL ENCOUNTER (OUTPATIENT)
Dept: PHYSICAL THERAPY | Age: 80
Setting detail: RECURRING SERIES
Discharge: HOME | End: 2025-01-26
Attending: FAMILY MEDICINE
Payer: MEDICARE

## 2025-01-23 PROCEDURE — 97110 THERAPEUTIC EXERCISES: CPT

## 2025-01-23 ASSESSMENT — PAIN SCALES - GENERAL: PAINLEVEL_OUTOF10: 3

## 2025-01-23 NOTE — PROGRESS NOTES
Priscilla Ricks  : 1945  Primary: Medicare Part A And B (Medicare)  Secondary: GENERIC COMMERCIAL [facility] [employer]  [address]  [address]  [facility] [address]  Phone: [phone]  Fax: [phone] Plan Frequency: 2 x week for 6 weeks    Plan of Care/Certification Expiration Date: 25        Plan of Care/Certification Expiration Date:  Plan of Care/Certification Expiration Date: 25    Frequency/Duration:   Plan Frequency: 2 x week for 6 weeks      Time In/Out:   Time In: 1301  Time Out: 1345      PT Visit Info:    No Show: 60  Canceled Appointment: 4      Visit Count:  13    OUTPATIENT PHYSICAL THERAPY:   Treatment Note 2025       Episode  (Balance problems)               Treatment Diagnosis:    Balance problem  Gait difficulty  Muscle weakness (generalized)  Medical/Referring Diagnosis:    Balance problem [R26.89]    Referring Physician:  Ayad Rosales Jr., MD MD Orders:  PT Eval and Treat   Return MD Appt:  TBD   Date of Onset:  Onset Date: 24     Allergies:   Diflucan [fluconazole], Hydromorphone, Hydromorphone hcl, and Sulfa antibiotics  Restrictions/Precautions:   Fall Precautions:        Interventions Planned (Treatment may consist of any combination of the following):     See Assessment Note    Subjective Comments:    Patient reports having some back aching today  Initial Pain Level::     3/10  Post Session Pain Level:       3/10  Medications Last Reviewed:  2025  Updated Objective Findings:  None Today  Treatment   THERAPEUTIC EXERCISE: (42 minutes):    Exercises per grid below to improve mobility.  Required minimal visual and verbal cues to promote proper body alignment.  Progressed complexity of movement as indicated.   Date:  24 Date:  25 Date:  1/15/25 Date:  25   Activity/Exercise       Patient education/HEP       Hip abd Hooklying with PTB 2 x 10      Heel raises       Nu Step 10x min L3 10x min L2 10 min L3 8 min L2   Pelvic tilt 10x

## 2025-01-27 ENCOUNTER — HOSPITAL ENCOUNTER (OUTPATIENT)
Dept: PHYSICAL THERAPY | Age: 80
Setting detail: RECURRING SERIES
Discharge: HOME | End: 2025-01-30
Attending: FAMILY MEDICINE
Payer: MEDICARE

## 2025-01-27 PROCEDURE — 97110 THERAPEUTIC EXERCISES: CPT

## 2025-01-27 ASSESSMENT — PAIN SCALES - GENERAL: PAINLEVEL_OUTOF10: 0

## 2025-01-27 NOTE — PROGRESS NOTES
Priscilla Ricks  : 1945  Primary: Medicare Part A And B (Medicare)  Secondary: GENERIC COMMERCIAL [facility] [employer]  [address]  [address]  [facility] [address]  Phone: [phone]  Fax: [phone] Plan Frequency: 2 x week for 6 weeks    Plan of Care/Certification Expiration Date: 25        Plan of Care/Certification Expiration Date:  Plan of Care/Certification Expiration Date: 25    Frequency/Duration:   Plan Frequency: 2 x week for 6 weeks      Time In/Out:   Time In: 1345  Time Out: 1430      PT Visit Info:    No Show: 60  Canceled Appointment: 4      Visit Count:  14    OUTPATIENT PHYSICAL THERAPY:   Treatment Note 2025       Episode  (Balance problems)               Treatment Diagnosis:    Balance problem  Gait difficulty  Muscle weakness (generalized)  Medical/Referring Diagnosis:    Balance problem [R26.89]    Referring Physician:  Ayad Rosales Jr., MD MD Orders:  PT Eval and Treat   Return MD Appt:  TBD   Date of Onset:  Onset Date: 24     Allergies:   Diflucan [fluconazole], Hydromorphone, Hydromorphone hcl, and Sulfa antibiotics  Restrictions/Precautions:   Fall Precautions:        Interventions Planned (Treatment may consist of any combination of the following):     See Assessment Note    Subjective Comments:    Patient reports having some back aching today  Initial Pain Level::     0/10  Post Session Pain Level:       0/10  Medications Last Reviewed:  2025  Updated Objective Findings:  None Today  Treatment   THERAPEUTIC EXERCISE: (42 minutes):    Exercises per grid below to improve mobility.  Required minimal visual and verbal cues to promote proper body alignment.  Progressed complexity of movement as indicated.   Date:  24 Date:  25 Date:  1/15/25 Date:  25 Date:  25   Activity/Exercise        Patient education/HEP        Hip abd Hooklying with PTB 2 x 10       Heel raises        Nu Step 10x min L3 10x min L2 10 min L3 8 min L2

## 2025-01-29 ENCOUNTER — HOSPITAL ENCOUNTER (OUTPATIENT)
Dept: PHYSICAL THERAPY | Age: 80
Setting detail: RECURRING SERIES
Discharge: HOME | End: 2025-02-01
Attending: FAMILY MEDICINE
Payer: MEDICARE

## 2025-01-29 DIAGNOSIS — R26.89 BALANCE PROBLEM: Primary | ICD-10-CM

## 2025-01-29 DIAGNOSIS — M62.81 MUSCLE WEAKNESS (GENERALIZED): ICD-10-CM

## 2025-01-29 DIAGNOSIS — R26.9 GAIT DIFFICULTY: ICD-10-CM

## 2025-01-29 PROCEDURE — 97110 THERAPEUTIC EXERCISES: CPT

## 2025-01-29 ASSESSMENT — PAIN SCALES - GENERAL: PAINLEVEL_OUTOF10: 3

## 2025-01-29 NOTE — THERAPY DISCHARGE
Priscilla Ricks  : 1945  Primary: Medicare Part A And B (Medicare)  Secondary: GENERIC COMMERCIAL [facility] [employer]  [address]  [address]  [facility] [address]  Phone: [phone]  Fax: [phone] Plan Frequency: 2 x week for 6 weeks    Plan of Care/Certification Expiration Date: 25        Plan of Care/Certification Expiration Date:  Plan of Care/Certification Expiration Date: 25    Frequency/Duration: Plan Frequency: 2 x week for 6 weeks      Time In/Out:   Time In: 1345  Time Out: 1430      PT Visit Info:    No Show: 60  Canceled Appointment: 4      Visit Count:  15                OUTPATIENT PHYSICAL THERAPY:             Discharge Summary 2025               Episode (Balance problems)         Treatment Diagnosis:     Balance problem  Gait difficulty  Muscle weakness (generalized)  Medical/Referring Diagnosis:    Balance problem [R26.89]    Referring Physician:  Ayad Rosales Jr., MD MD Orders:  PT Eval and Treat   Return MD Appt:  TBD  Date of Onset:  Onset Date: 24    Allergies:  Diflucan [fluconazole], Hydromorphone, Hydromorphone hcl, and Sulfa antibiotics  Restrictions/Precautions:    Fall Precautions:        Medications Last Reviewed:  2025     SUBJECTIVE   As of 2025, Priscilla Ricks has attended 15 out of 19 scheduled visits, with 4 cancellation(s) and 0 no shows.    Current Complaints:   Patient reports 60% improvement;  still has difficulty with stairs when she has to negotiate them.  Reports she has started doing chair yoga at home  Patient Stated Goal(s):  \"To improve balance and be able to walk more\"  Initial Pain Level:      3/10   Post Session Pain Level:     310  Past Medical History/Comorbidities:   Ms. Ricks  has a past medical history of Anxiety, Arthritis, Bell's palsy, Carotid artery stenosis, Chronic back pain, Depression, GERD (gastroesophageal reflux disease), Hematoma of hip, right, initial encounter, Hypertension,

## 2025-01-29 NOTE — PROGRESS NOTES
Priscilla Solizormack  : 1945  Primary: Medicare Part A And B (Medicare)  Secondary: GENERIC COMMERCIAL [facility] [employer]  [address]  [address]  [facility] [address]  Phone: [phone]  Fax: [phone] Plan Frequency: 2 x week for 6 weeks    Plan of Care/Certification Expiration Date: 25        Plan of Care/Certification Expiration Date:  Plan of Care/Certification Expiration Date: 25    Frequency/Duration:   Plan Frequency: 2 x week for 6 weeks      Time In/Out:   Time In: 1345  Time Out: 1430      PT Visit Info:    No Show: 60  Canceled Appointment: 4      Visit Count:  15    OUTPATIENT PHYSICAL THERAPY:   Treatment Note 2025       Episode  (Balance problems)               Treatment Diagnosis:    Balance problem  Gait difficulty  Muscle weakness (generalized)  Medical/Referring Diagnosis:    Balance problem [R26.89]    Referring Physician:  Ayad Rosales Jr., MD MD Orders:  PT Eval and Treat   Return MD Appt:  TBD   Date of Onset:  Onset Date: 24     Allergies:   Diflucan [fluconazole], Hydromorphone, Hydromorphone hcl, and Sulfa antibiotics  Restrictions/Precautions:   Fall Precautions:        Interventions Planned (Treatment may consist of any combination of the following):     See Assessment Note    Subjective Comments:    Patient reports having some back aching today, and ambulating with exeessive weight shift  Initial Pain Level::     3/10  Post Session Pain Level:       3/10  Medications Last Reviewed:  2025  Updated Objective Findings:  See Discharge Note from today  Treatment   THERAPEUTIC EXERCISE: (38 minutes):    Exercises per grid below to improve mobility.  Required minimal visual and verbal cues to promote proper body alignment.  Progressed complexity of movement as indicated.   Date:  25 Date:  1/15/25 Date:  25 Date:  25 Date:  25   Activity/Exercise        Patient education/HEP     Reviewed and updated HEP, discussed community

## 2025-01-29 NOTE — THERAPY RECERTIFICATION
Priscilla Ricks  : 1945  Primary: Medicare Part A And B (Medicare)  Secondary: GENERIC COMMERCIAL [facility] [employer]  [address]  [address]  [facility] [address]  Phone: [phone]  Fax: [phone] Plan Frequency: 2 x week for 6 weeks    Plan of Care/Certification Expiration Date: 25        Plan of Care/Certification Expiration Date:  Plan of Care/Certification Expiration Date: 25    Frequency/Duration: Plan Frequency: 2 x week for 6 weeks      Time In/Out:   Time In: 1345  Time Out: 1430      PT Visit Info:    No Show: 60  Canceled Appointment: 4      Visit Count:  15                OUTPATIENT PHYSICAL THERAPY:             Recertification 2025               Episode (Balance problems)         Treatment Diagnosis:     Balance problem  Gait difficulty  Muscle weakness (generalized)  Medical/Referring Diagnosis:    Balance problem [R26.89]    Referring Physician:  Ayad Rosales Jr., MD MD Orders:  PT Eval and Treat   Return MD Appt:  TBD  Date of Onset:  Onset Date: 24    Allergies:  Diflucan [fluconazole], Hydromorphone, Hydromorphone hcl, and Sulfa antibiotics  Restrictions/Precautions:    Fall Precautions:        Medications Last Reviewed:  2025     SUBJECTIVE   As of 2025, Priscilla Ricks has attended 15 out of 19 scheduled visits, with 4 cancellation(s) and 0 no shows.    Current Complaints:   Patient reports 60% improvement;  still has difficulty with stairs when she has to negotiate them.  Reports she has started doing chair yoga at home  Patient Stated Goal(s):  \"To improve balance and be able to walk more\"  Initial Pain Level:      3/10   Post Session Pain Level:     3/10  Past Medical History/Comorbidities:   Ms. Ricks  has a past medical history of Anxiety, Arthritis, Bell's palsy, Carotid artery stenosis, Chronic back pain, Depression, GERD (gastroesophageal reflux disease), Hematoma of hip, right, initial encounter, Hypertension,

## 2025-02-07 ENCOUNTER — HOSPITAL ENCOUNTER (OUTPATIENT)
Dept: GENERAL RADIOLOGY | Age: 80
Discharge: HOME | End: 2025-02-10
Attending: INTERNAL MEDICINE
Payer: MEDICARE

## 2025-02-07 DIAGNOSIS — R13.10 DYSPHAGIA, UNSPECIFIED TYPE: ICD-10-CM

## 2025-02-07 DIAGNOSIS — Z90.3 STATUS POST SLEEVE GASTRECTOMY: ICD-10-CM

## 2025-02-07 PROCEDURE — 74240 X-RAY XM UPR GI TRC 1CNTRST: CPT

## 2025-02-07 PROCEDURE — 6370000000 HC RX 637 (ALT 250 FOR IP): Performed by: INTERNAL MEDICINE

## 2025-02-07 PROCEDURE — 2500000003 HC RX 250 WO HCPCS: Performed by: INTERNAL MEDICINE

## 2025-02-07 RX ADMIN — BARIUM SULFATE 140 ML: 980 POWDER, FOR SUSPENSION ORAL at 10:14

## 2025-02-07 RX ADMIN — ANTACID/ANTIFLATULENT 1 EACH: 380; 550; 10; 10 GRANULE, EFFERVESCENT ORAL at 10:14

## 2025-02-07 RX ADMIN — BARIUM SULFATE 355 ML: 0.6 SUSPENSION ORAL at 10:14

## 2025-02-28 ENCOUNTER — PATIENT MESSAGE (OUTPATIENT)
Age: 80
End: 2025-02-28

## 2025-02-28 DIAGNOSIS — I48.91 ATRIAL FIBRILLATION WITH RVR (HCC): ICD-10-CM

## 2025-02-28 RX ORDER — HYDROCHLOROTHIAZIDE 12.5 MG/1
TABLET ORAL
Qty: 30 TABLET | Refills: 11 | Status: SHIPPED | OUTPATIENT
Start: 2025-02-28

## 2025-02-28 NOTE — TELEPHONE ENCOUNTER
Requested Prescriptions     Pending Prescriptions Disp Refills    hydroCHLOROthiazide 12.5 MG tablet 30 tablet 11     Sig: If SBP is < 140 mm Hg do not take.

## 2025-03-17 NOTE — PROGRESS NOTES
S/P total knee replacement using cement    Esophageal reflux    Class 1 obesity due to excess calories with serious comorbidity and body mass index (BMI) of 32.0 to 32.9 in adult    Orthostatic hypotension    Anemia requiring transfusions    Traumatic hematoma of right hip    Paroxysmal atrial fibrillation (HCC)    Acute blood loss anemia    Anxiety    Postsurgical hypothyroidism    Status post total knee replacement    Depression    Atherosclerosis of aorta    Atherosclerosis of native coronary artery of native heart with angina pectoris    Atrial fibrillation with RVR (HCC)    Stroke-like symptoms    Multiple thyroid nodules    Left parietal scalp hematoma, initial encounter    Mood disorder    Cervical spinal stenosis    DISH (diffuse idiopathic skeletal hyperostosis)    Cervical spinal cord compression (HCC)    Mass of left hand    GEORGE on CPAP    Non-restorative sleep    Persistent disorder of initiating or maintaining sleep    Gastroesophageal reflux disease    Globus sensation          Past Surgical History:   Procedure Laterality Date    APPENDECTOMY  1961    BREAST BIOPSY Bilateral     BREAST LUMPECTOMY  1998    benign    CATARACT REMOVAL Bilateral 2014    CHOLECYSTECTOMY  1976    COLONOSCOPY  04/04/2016    COLONOSCOPY  2022    GASTRECTOMY  9/21/15    sleeve    HYSTERECTOMY (CERVIX STATUS UNKNOWN)  1987    total    HYSTERECTOMY, TOTAL ABDOMINAL (CERVIX REMOVED)      yes    JOINT REPLACEMENT  2010 1nd 2011    ORTHOPEDIC SURGERY  2005    lower back     ORTHOPEDIC SURGERY Bilateral 1994    heel spurs removed    OVARY REMOVAL      VT UNLISTED PROCEDURE CARDIAC SURGERY      LAAO placed 7/17/18    THYROIDECTOMY  1980    partial    TONSILLECTOMY  1966    TOTAL KNEE ARTHROPLASTY Left 2011    TOTAL KNEE ARTHROPLASTY Right 2010    UPPER GASTROINTESTINAL ENDOSCOPY N/A 1/8/2025    ESOPHAGOGASTRODUODENOSCOPY/biopsies performed by Guanako Landry MD at [facility]    UROLOGICAL SURGERY  2008/2009    bladder tack

## 2025-03-18 ENCOUNTER — OFFICE VISIT (OUTPATIENT)
Dept: SLEEP MEDICINE | Age: 80
End: 2025-03-18
Payer: MEDICARE

## 2025-03-18 VITALS
OXYGEN SATURATION: 99 % | SYSTOLIC BLOOD PRESSURE: 124 MMHG | HEART RATE: 80 BPM | RESPIRATION RATE: 16 BRPM | BODY MASS INDEX: 33.97 KG/M2 | TEMPERATURE: 97.1 F | HEIGHT: 64 IN | WEIGHT: 199 LBS | DIASTOLIC BLOOD PRESSURE: 80 MMHG

## 2025-03-18 DIAGNOSIS — G47.33 OSA ON CPAP: Primary | ICD-10-CM

## 2025-03-18 DIAGNOSIS — G47.00 PERSISTENT DISORDER OF INITIATING OR MAINTAINING SLEEP: ICD-10-CM

## 2025-03-18 DIAGNOSIS — E66.811 CLASS 1 OBESITY WITH SERIOUS COMORBIDITY AND BODY MASS INDEX (BMI) OF 34.0 TO 34.9 IN ADULT, UNSPECIFIED OBESITY TYPE: ICD-10-CM

## 2025-03-18 PROCEDURE — 1036F TOBACCO NON-USER: CPT | Performed by: NURSE PRACTITIONER

## 2025-03-18 PROCEDURE — G8417 CALC BMI ABV UP PARAM F/U: HCPCS | Performed by: NURSE PRACTITIONER

## 2025-03-18 PROCEDURE — G2211 COMPLEX E/M VISIT ADD ON: HCPCS | Performed by: NURSE PRACTITIONER

## 2025-03-18 PROCEDURE — 99213 OFFICE O/P EST LOW 20 MIN: CPT | Performed by: NURSE PRACTITIONER

## 2025-03-18 PROCEDURE — 1160F RVW MEDS BY RX/DR IN RCRD: CPT | Performed by: NURSE PRACTITIONER

## 2025-03-18 PROCEDURE — 1090F PRES/ABSN URINE INCON ASSESS: CPT | Performed by: NURSE PRACTITIONER

## 2025-03-18 PROCEDURE — 3079F DIAST BP 80-89 MM HG: CPT | Performed by: NURSE PRACTITIONER

## 2025-03-18 PROCEDURE — 1123F ACP DISCUSS/DSCN MKR DOCD: CPT | Performed by: NURSE PRACTITIONER

## 2025-03-18 PROCEDURE — 1126F AMNT PAIN NOTED NONE PRSNT: CPT | Performed by: NURSE PRACTITIONER

## 2025-03-18 PROCEDURE — 1159F MED LIST DOCD IN RCRD: CPT | Performed by: NURSE PRACTITIONER

## 2025-03-18 PROCEDURE — 3074F SYST BP LT 130 MM HG: CPT | Performed by: NURSE PRACTITIONER

## 2025-03-18 PROCEDURE — G8399 PT W/DXA RESULTS DOCUMENT: HCPCS | Performed by: NURSE PRACTITIONER

## 2025-03-18 PROCEDURE — G8427 DOCREV CUR MEDS BY ELIG CLIN: HCPCS | Performed by: NURSE PRACTITIONER

## 2025-03-18 ASSESSMENT — SLEEP AND FATIGUE QUESTIONNAIRES
HOW LIKELY ARE YOU TO NOD OFF OR FALL ASLEEP WHEN YOU ARE A PASSENGER IN A CAR FOR AN HOUR WITHOUT A BREAK: SLIGHT CHANCE OF DOZING
HOW LIKELY ARE YOU TO NOD OFF OR FALL ASLEEP IN A CAR, WHILE STOPPED FOR A FEW MINUTES IN TRAFFIC: WOULD NEVER DOZE
ESS TOTAL SCORE: 10
HOW LIKELY ARE YOU TO NOD OFF OR FALL ASLEEP WHILE SITTING AND READING: MODERATE CHANCE OF DOZING
HOW LIKELY ARE YOU TO NOD OFF OR FALL ASLEEP WHILE SITTING INACTIVE IN A PUBLIC PLACE: SLIGHT CHANCE OF DOZING
HOW LIKELY ARE YOU TO NOD OFF OR FALL ASLEEP WHILE SITTING QUIETLY AFTER LUNCH WITHOUT ALCOHOL: SLIGHT CHANCE OF DOZING
HOW LIKELY ARE YOU TO NOD OFF OR FALL ASLEEP WHILE WATCHING TV: MODERATE CHANCE OF DOZING
HOW LIKELY ARE YOU TO NOD OFF OR FALL ASLEEP WHILE SITTING AND TALKING TO SOMEONE: WOULD NEVER DOZE
HOW LIKELY ARE YOU TO NOD OFF OR FALL ASLEEP WHILE LYING DOWN TO REST IN THE AFTERNOON WHEN CIRCUMSTANCES PERMIT: HIGH CHANCE OF DOZING

## 2025-03-18 ASSESSMENT — ENCOUNTER SYMPTOMS
APNEA: 0
VOICE CHANGE: 0
SORE THROAT: 0
SHORTNESS OF BREATH: 0

## 2025-03-18 NOTE — PATIENT INSTRUCTIONS
My Options > Climate Control. Change this to manual from auto.  You will then see humidity and tube temperature under the menu.    Humidity ranges from 0-8. This will default to 4.  The lower the number, the more dry the air. The higher the number, the more moisture you'll receive in the air. Leave this on 4 for now and adjust later if needed.  Tube temperature ranges from 60-86 degrees F. This will default to 81 degrees.  The higher the number, the more dry the air.       How to Adjust Humidity Level  Humidity ranges from 0-8. This will default to 4.  The lower the number, the more dry the air. The higher the number, the more moisture you'll receive in the air.  My Options  Humidity  Choose the number ranging from: off, 1 to 8   Then press OK         How to Adjust Humidity Level  My Options- press the dial  Scroll the dial down to Humidity and press dial  Turn the dial to adjust the humidity level and press dial  Scroll the dial up to Home to get to main menu       How to Adjust Tube Temperature  My Options- press the dial  Scroll the dial down to Climate Control and press dial  Ensure Climate Control is set to Manual and then press dial  Turn the dial to Tube Temp and press dial  Adjust temperature to your preference  Scroll the dial up to Home to get to main menu

## 2025-04-25 ENCOUNTER — OFFICE VISIT (OUTPATIENT)
Dept: FAMILY MEDICINE CLINIC | Facility: CLINIC | Age: 80
End: 2025-04-25

## 2025-04-25 VITALS
OXYGEN SATURATION: 97 % | DIASTOLIC BLOOD PRESSURE: 88 MMHG | HEIGHT: 64 IN | TEMPERATURE: 97.7 F | BODY MASS INDEX: 34.66 KG/M2 | HEART RATE: 60 BPM | SYSTOLIC BLOOD PRESSURE: 134 MMHG | WEIGHT: 203 LBS

## 2025-04-25 DIAGNOSIS — G95.9 CERVICAL MYELOPATHY (HCC): ICD-10-CM

## 2025-04-25 DIAGNOSIS — K21.9 GASTROESOPHAGEAL REFLUX DISEASE, UNSPECIFIED WHETHER ESOPHAGITIS PRESENT: ICD-10-CM

## 2025-04-25 PROBLEM — D62 ACUTE BLOOD LOSS ANEMIA: Status: RESOLVED | Noted: 2017-12-05 | Resolved: 2025-04-25

## 2025-04-25 PROBLEM — G47.8 NON-RESTORATIVE SLEEP: Status: RESOLVED | Noted: 2023-02-02 | Resolved: 2025-04-25

## 2025-04-25 PROBLEM — I95.1 ORTHOSTATIC HYPOTENSION: Status: RESOLVED | Noted: 2017-12-02 | Resolved: 2025-04-25

## 2025-04-25 PROBLEM — I48.91 A-FIB (HCC): Status: RESOLVED | Noted: 2018-07-17 | Resolved: 2025-04-25

## 2025-04-25 PROBLEM — S70.01XA TRAUMATIC HEMATOMA OF RIGHT HIP: Status: RESOLVED | Noted: 2017-12-05 | Resolved: 2025-04-25

## 2025-04-25 PROBLEM — I48.91 ATRIAL FIBRILLATION WITH RVR (HCC): Status: RESOLVED | Noted: 2022-11-17 | Resolved: 2025-04-25

## 2025-04-25 PROBLEM — R09.A2 GLOBUS SENSATION: Status: RESOLVED | Noted: 2024-11-21 | Resolved: 2025-04-25

## 2025-04-25 PROBLEM — S00.03XA: Status: RESOLVED | Noted: 2022-12-01 | Resolved: 2025-04-25

## 2025-04-25 PROBLEM — R29.90 STROKE-LIKE SYMPTOMS: Status: RESOLVED | Noted: 2022-12-01 | Resolved: 2025-04-25

## 2025-04-25 PROBLEM — G47.00 PERSISTENT DISORDER OF INITIATING OR MAINTAINING SLEEP: Status: RESOLVED | Noted: 2023-02-02 | Resolved: 2025-04-25

## 2025-04-25 PROBLEM — E04.2 MULTIPLE THYROID NODULES: Status: RESOLVED | Noted: 2022-12-01 | Resolved: 2025-04-25

## 2025-04-25 PROBLEM — D64.9 ANEMIA REQUIRING TRANSFUSIONS: Status: RESOLVED | Noted: 2017-12-05 | Resolved: 2025-04-25

## 2025-04-25 RX ORDER — FAMOTIDINE 40 MG/1
40 TABLET, FILM COATED ORAL EVERY EVENING
Qty: 90 TABLET | Refills: 1 | Status: SHIPPED | OUTPATIENT
Start: 2025-04-25

## 2025-04-25 RX ORDER — OMEPRAZOLE 20 MG/1
20 CAPSULE, DELAYED RELEASE ORAL DAILY
Qty: 90 CAPSULE | Refills: 1 | Status: SHIPPED | OUTPATIENT
Start: 2025-04-25

## 2025-04-25 SDOH — ECONOMIC STABILITY: FOOD INSECURITY: WITHIN THE PAST 12 MONTHS, THE FOOD YOU BOUGHT JUST DIDN'T LAST AND YOU DIDN'T HAVE MONEY TO GET MORE.: NEVER TRUE

## 2025-04-25 SDOH — ECONOMIC STABILITY: FOOD INSECURITY: WITHIN THE PAST 12 MONTHS, YOU WORRIED THAT YOUR FOOD WOULD RUN OUT BEFORE YOU GOT MONEY TO BUY MORE.: NEVER TRUE

## 2025-04-25 ASSESSMENT — ENCOUNTER SYMPTOMS
VOMITING: 0
SHORTNESS OF BREATH: 0
ABDOMINAL PAIN: 0
WHEEZING: 0
SORE THROAT: 0
NAUSEA: 0
COUGH: 0

## 2025-04-25 ASSESSMENT — PATIENT HEALTH QUESTIONNAIRE - PHQ9
7. TROUBLE CONCENTRATING ON THINGS, SUCH AS READING THE NEWSPAPER OR WATCHING TELEVISION: NOT AT ALL
SUM OF ALL RESPONSES TO PHQ QUESTIONS 1-9: 0
8. MOVING OR SPEAKING SO SLOWLY THAT OTHER PEOPLE COULD HAVE NOTICED. OR THE OPPOSITE, BEING SO FIGETY OR RESTLESS THAT YOU HAVE BEEN MOVING AROUND A LOT MORE THAN USUAL: NOT AT ALL
10. IF YOU CHECKED OFF ANY PROBLEMS, HOW DIFFICULT HAVE THESE PROBLEMS MADE IT FOR YOU TO DO YOUR WORK, TAKE CARE OF THINGS AT HOME, OR GET ALONG WITH OTHER PEOPLE: NOT DIFFICULT AT ALL
4. FEELING TIRED OR HAVING LITTLE ENERGY: NOT AT ALL
2. FEELING DOWN, DEPRESSED OR HOPELESS: NOT AT ALL
SUM OF ALL RESPONSES TO PHQ QUESTIONS 1-9: 0
3. TROUBLE FALLING OR STAYING ASLEEP: NOT AT ALL
9. THOUGHTS THAT YOU WOULD BE BETTER OFF DEAD, OR OF HURTING YOURSELF: NOT AT ALL
5. POOR APPETITE OR OVEREATING: NOT AT ALL
SUM OF ALL RESPONSES TO PHQ QUESTIONS 1-9: 0
6. FEELING BAD ABOUT YOURSELF - OR THAT YOU ARE A FAILURE OR HAVE LET YOURSELF OR YOUR FAMILY DOWN: NOT AT ALL
1. LITTLE INTEREST OR PLEASURE IN DOING THINGS: NOT AT ALL
SUM OF ALL RESPONSES TO PHQ QUESTIONS 1-9: 0

## 2025-04-25 NOTE — ASSESSMENT & PLAN NOTE
Chronic, at goal (stable), changes made today: Patient's dose of omeprazole was decreased to omeprazole 20 mg, 1 p.o. daily in the morning 30 minutes before breakfast due to the patient's history of osteopenia and GERD.  Patient will also resume taking famotidine 40 mg, 1 p.o. daily at bedtime due to having a fairly large hiatal hernia and GERD symptoms at night, medication adherence emphasized, and lifestyle modifications recommended    Orders:    famotidine (PEPCID) 40 MG tablet; Take 1 tablet by mouth every evening

## 2025-04-25 NOTE — PROGRESS NOTES
rPiscilla Ricks (:  1945) is a 80 y.o. female,Established patient, here for evaluation of the following chief complaint(s):  Established New Doctor         Assessment & Plan  Gastroesophageal reflux disease, unspecified whether esophagitis present   Chronic, at goal (stable), changes made today: Patient's dose of omeprazole was decreased to omeprazole 20 mg, 1 p.o. daily in the morning 30 minutes before breakfast due to the patient's history of osteopenia and GERD.  Patient will also resume taking famotidine 40 mg, 1 p.o. daily at bedtime due to having a fairly large hiatal hernia and GERD symptoms at night, medication adherence emphasized, and lifestyle modifications recommended    Orders:    famotidine (PEPCID) 40 MG tablet; Take 1 tablet by mouth every evening    Cervical myelopathy (HCC)    Patient continues to follow with neurosurgery for history of cervical spinal stenosis and cervical spinal cord compression.  Patient denies any known tingling, or burning in her arms bilaterally and her only neurological complaint is issues with balance.  Due to the patient being a fall risk her history of cervical myelopathy, the patient was offered vestibular therapy and will consider it.           No follow-ups on file.       Subjective   HPI    GERD-patient has a history of esophageal reflux and hiatal hernia.  Patient is currently prescribed omeprazole 40 mg, 1 p.o. daily 30 minutes before breakfast and famotidine 40 mg, 1 p.o. daily at bedtime.  Patient has not been taking famotidine.  Patient denies any epigastric pain, frequent heartburn symptoms, or black, tarry stools.  Due to the patient's history of osteopenia on DEXA scan from , the patient will start taking omeprazole 20 mg, 1 p.o. daily 30 minutes before breakfast and famotidine 40 mg, 1 p.o. daily at bedtime.  Patient's main reflux symptoms with his throat irritation and mucus which was likely caused by her hiatal hernia and lying flat at

## 2025-06-13 ENCOUNTER — PATIENT MESSAGE (OUTPATIENT)
Dept: FAMILY MEDICINE CLINIC | Facility: CLINIC | Age: 80
End: 2025-06-13

## 2025-06-13 DIAGNOSIS — E89.0 POSTSURGICAL HYPOTHYROIDISM: ICD-10-CM

## 2025-06-13 RX ORDER — LEVOTHYROXINE SODIUM 100 UG/1
100 TABLET ORAL
Qty: 90 TABLET | Refills: 1 | Status: SHIPPED | OUTPATIENT
Start: 2025-06-13

## 2025-07-23 ENCOUNTER — OFFICE VISIT (OUTPATIENT)
Age: 80
End: 2025-07-23
Payer: MEDICARE

## 2025-07-23 VITALS
SYSTOLIC BLOOD PRESSURE: 148 MMHG | DIASTOLIC BLOOD PRESSURE: 78 MMHG | WEIGHT: 204 LBS | HEIGHT: 64 IN | HEART RATE: 74 BPM | BODY MASS INDEX: 34.83 KG/M2

## 2025-07-23 DIAGNOSIS — I48.91 ATRIAL FIBRILLATION WITH RVR (HCC): Primary | ICD-10-CM

## 2025-07-23 DIAGNOSIS — Z79.899 LONG TERM CURRENT USE OF ANTIARRHYTHMIC DRUG: ICD-10-CM

## 2025-07-23 DIAGNOSIS — I10 PRIMARY HYPERTENSION: ICD-10-CM

## 2025-07-23 PROCEDURE — G8399 PT W/DXA RESULTS DOCUMENT: HCPCS | Performed by: INTERNAL MEDICINE

## 2025-07-23 PROCEDURE — 99214 OFFICE O/P EST MOD 30 MIN: CPT | Performed by: INTERNAL MEDICINE

## 2025-07-23 PROCEDURE — G8428 CUR MEDS NOT DOCUMENT: HCPCS | Performed by: INTERNAL MEDICINE

## 2025-07-23 PROCEDURE — 3078F DIAST BP <80 MM HG: CPT | Performed by: INTERNAL MEDICINE

## 2025-07-23 PROCEDURE — 1090F PRES/ABSN URINE INCON ASSESS: CPT | Performed by: INTERNAL MEDICINE

## 2025-07-23 PROCEDURE — 3077F SYST BP >= 140 MM HG: CPT | Performed by: INTERNAL MEDICINE

## 2025-07-23 PROCEDURE — G8417 CALC BMI ABV UP PARAM F/U: HCPCS | Performed by: INTERNAL MEDICINE

## 2025-07-23 PROCEDURE — 1123F ACP DISCUSS/DSCN MKR DOCD: CPT | Performed by: INTERNAL MEDICINE

## 2025-07-23 PROCEDURE — 1036F TOBACCO NON-USER: CPT | Performed by: INTERNAL MEDICINE

## 2025-07-23 RX ORDER — HYDRALAZINE HYDROCHLORIDE 25 MG/1
25 TABLET, FILM COATED ORAL AS NEEDED
COMMUNITY

## 2025-07-23 NOTE — PROGRESS NOTES
[address], [address]  [place] [address]  PHONE: [phone]    SUBJECTIVE:   Priscilla Ricks is a 80 y.o. female 1945   seen for a follow up visit regarding the following:     Chief Complaint   Patient presents with    Hypertension    Atrial Fibrillation         History of Present Illness  The patient is a 80-year-old individual presenting with dizziness, atrial fibrillation, hiatal hernia, sleep apnea, and balance disturbances.    The patient reports stable blood pressure and mitigates dizziness by adopting cautious standing techniques. No recent episodes of atrial fibrillation have been detected, as monitored via an Apple Watch. The patient recently consulted with their primary care physician, Dr. Renae, and has scheduled an annual follow-up. No current concerns regarding cardiac health are noted.    Balance disturbances are attributed to mechanical falls rather than cardiac etiologies. The patient completed physical therapy in January 2025 but has not attended recent sessions. Bilateral knee arthroplasties limit the ability to independently rise following falls. A consultation with a neck specialist in September 2024 emphasized the importance of fall prevention due to the risk of exacerbating cervical spine issues.    A barium swallow study identified a hiatal hernia, which has been associated with episodes of dysphagia occurring over two consecutive nights.    The patient utilizes continuous positive airway pressure (CPAP) therapy daily for the management of obstructive sleep apnea.    PAST SURGICAL HISTORY: Bilateral knee arthroplasties.    SOCIAL HISTORY  - Gardens flowers, peppers, and tomatoes        Interval history:       Results     Atrial fibrillation in 2017 with spontaneous cardioversion to sinus rhythm.       Echocardiogram 2017 normal left ventricular systolic function mild aortic sclerosis      Anticoagulation with Xarelto.      Fall in 2017

## 2025-07-29 ENCOUNTER — APPOINTMENT (OUTPATIENT)
Dept: URBAN - METROPOLITAN AREA CLINIC 24 | Facility: CLINIC | Age: 80
Setting detail: DERMATOLOGY
End: 2025-07-29

## 2025-07-29 DIAGNOSIS — L82.1 OTHER SEBORRHEIC KERATOSIS: ICD-10-CM

## 2025-07-29 DIAGNOSIS — D18.0 HEMANGIOMA: ICD-10-CM

## 2025-07-29 DIAGNOSIS — L57.8 OTHER SKIN CHANGES DUE TO CHRONIC EXPOSURE TO NONIONIZING RADIATION: ICD-10-CM

## 2025-07-29 DIAGNOSIS — D69.2 OTHER NONTHROMBOCYTOPENIC PURPURA: ICD-10-CM

## 2025-07-29 DIAGNOSIS — D22 MELANOCYTIC NEVI: ICD-10-CM

## 2025-07-29 DIAGNOSIS — L28.1 PRURIGO NODULARIS: ICD-10-CM

## 2025-07-29 DIAGNOSIS — Z71.89 OTHER SPECIFIED COUNSELING: ICD-10-CM

## 2025-07-29 DIAGNOSIS — L81.4 OTHER MELANIN HYPERPIGMENTATION: ICD-10-CM

## 2025-07-29 DIAGNOSIS — Z41.9 ENCOUNTER FOR PROCEDURE FOR PURPOSES OTHER THAN REMEDYING HEALTH STATE, UNSPECIFIED: ICD-10-CM

## 2025-07-29 PROBLEM — D22.5 MELANOCYTIC NEVI OF TRUNK: Status: ACTIVE | Noted: 2025-07-29

## 2025-07-29 PROBLEM — D18.01 HEMANGIOMA OF SKIN AND SUBCUTANEOUS TISSUE: Status: ACTIVE | Noted: 2025-07-29

## 2025-07-29 PROCEDURE — ? REFERRAL CORRESPONDENCE

## 2025-07-29 PROCEDURE — ? PATIENT SPECIFIC COUNSELING

## 2025-07-29 PROCEDURE — ? COUNSELING

## 2025-07-29 PROCEDURE — ? PRESCRIPTION MEDICATION MANAGEMENT

## 2025-07-29 RX ORDER — VALSARTAN 160 MG/1
160 TABLET ORAL DAILY
Qty: 90 TABLET | Refills: 3 | Status: SHIPPED | OUTPATIENT
Start: 2025-07-29

## 2025-07-29 ASSESSMENT — LOCATION SIMPLE DESCRIPTION DERM
LOCATION SIMPLE: UPPER BACK
LOCATION SIMPLE: RIGHT ELBOW
LOCATION SIMPLE: RIGHT CHEEK
LOCATION SIMPLE: RIGHT INFERIOR EYELID
LOCATION SIMPLE: LEFT FOREARM
LOCATION SIMPLE: LEFT CHEEK
LOCATION SIMPLE: CHEST
LOCATION SIMPLE: LEFT UPPER BACK
LOCATION SIMPLE: LEFT THIGH
LOCATION SIMPLE: LEFT PRETIBIAL REGION
LOCATION SIMPLE: ABDOMEN
LOCATION SIMPLE: RIGHT THIGH

## 2025-07-29 ASSESSMENT — LOCATION ZONE DERM
LOCATION ZONE: EYELID
LOCATION ZONE: ARM
LOCATION ZONE: TRUNK
LOCATION ZONE: FACE
LOCATION ZONE: LEG

## 2025-07-29 ASSESSMENT — LOCATION DETAILED DESCRIPTION DERM
LOCATION DETAILED: LEFT CENTRAL MALAR CHEEK
LOCATION DETAILED: LEFT PROXIMAL DORSAL FOREARM
LOCATION DETAILED: LEFT RIB CAGE
LOCATION DETAILED: RIGHT MEDIAL INFERIOR EYELID
LOCATION DETAILED: LEFT ANTERIOR PROXIMAL THIGH
LOCATION DETAILED: RIGHT CENTRAL MALAR CHEEK
LOCATION DETAILED: SUPERIOR THORACIC SPINE
LOCATION DETAILED: INFERIOR THORACIC SPINE
LOCATION DETAILED: LEFT SUPERIOR CENTRAL MALAR CHEEK
LOCATION DETAILED: LEFT INFERIOR LATERAL UPPER BACK
LOCATION DETAILED: RIGHT ANTERIOR PROXIMAL THIGH
LOCATION DETAILED: STERNAL NOTCH
LOCATION DETAILED: LEFT PROXIMAL PRETIBIAL REGION
LOCATION DETAILED: RIGHT LATERAL ELBOW

## 2025-07-29 NOTE — PROCEDURE: PRESCRIPTION MEDICATION MANAGEMENT
Render In Strict Bullet Format?: No
Detail Level: Zone
Plan: related to anxiety\\nDidn’t find ILK helpful\\nDefers treatment at this time

## 2025-08-26 DIAGNOSIS — I48.91 ATRIAL FIBRILLATION WITH RVR (HCC): ICD-10-CM

## 2025-08-26 DIAGNOSIS — I10 HTN (HYPERTENSION): ICD-10-CM

## 2025-08-27 RX ORDER — AMLODIPINE BESYLATE 5 MG/1
5 TABLET ORAL DAILY
Qty: 90 TABLET | Refills: 3 | Status: SHIPPED | OUTPATIENT
Start: 2025-08-27

## (undated) DEVICE — GAUZE,SPONGE,4"X4",12PLY,WOVEN,NS,LF: Brand: MEDLINE

## (undated) DEVICE — CONTAINER FORMALIN PREFILLED 10% NBF 60ML

## (undated) DEVICE — SOLUTION IV 1000ML 0.9% SOD CHL

## (undated) DEVICE — ABDOMINAL PAD: Brand: DERMACEA

## (undated) DEVICE — CONNECTOR TBNG OD5-7MM O2 END DISP

## (undated) DEVICE — SUT ETHLN 3-0 18IN PS2 BLK --

## (undated) DEVICE — REM POLYHESIVE ADULT PATIENT RETURN ELECTRODE: Brand: VALLEYLAB

## (undated) DEVICE — Device

## (undated) DEVICE — MOUTHPIECE ENDOSCP L CTRL OPN AND SIDE PORTS DISP

## (undated) DEVICE — SPONGE LAP 18X18IN STRL -- 5/PK

## (undated) DEVICE — SINGLE PORT MANIFOLD: Brand: NEPTUNE 2

## (undated) DEVICE — PENROSE DRAIN 12" X 1/4: Brand: CARDINAL HEALTH

## (undated) DEVICE — AIRLIFE™ OXYGEN TUBING 7 FEET (2.1 M) CRUSH RESISTANT OXYGEN TUBING, VINYL TIPPED: Brand: AIRLIFE™

## (undated) DEVICE — SUTURE ETHLN SZ 3-0 L18IN NONABSORBABLE BLK FS-1 L24MM 3/8 663H

## (undated) DEVICE — FORCEPS BX L240CM JAW DIA2.8MM L CAP W/ NDL MIC MESH TOOTH

## (undated) DEVICE — AMD ANTIMICROBIAL BANDAGE ROLL,6 PLY: Brand: KERLIX

## (undated) DEVICE — GOWN,REINFORCED,POLY,AURORA,XXLARGE,STR: Brand: MEDLINE

## (undated) DEVICE — TRAY PREP DRY W/ PREM GLV 2 APPL 6 SPNG 2 UNDPD 1 OVERWRAP

## (undated) DEVICE — NEEDLE HYPO 21GA L1.5IN INTRAMUSCULAR S STL LATCH BVL UP

## (undated) DEVICE — BALLOON US E LIN RNG O KT FOR FG-32UA

## (undated) DEVICE — KENDALL RADIOLUCENT FOAM MONITORING ELECTRODE RECTANGULAR SHAPE: Brand: KENDALL

## (undated) DEVICE — BLOCK BITE AD 60FR W/ VELC STRP ADDRESSES MOST PT AND

## (undated) DEVICE — AMD ANTIMICROBIAL GAUZE SPONGES,12 PLY USP TYPE VII, 0.2% POLYHEXAMETHYLENE BIGUANIDE HCI (PHMB): Brand: CURITY

## (undated) DEVICE — ENDOSCOPIC KIT 1.1+ OP4 CA DE 2 GWN AAMI LEVEL 3

## (undated) DEVICE — 2000CC GUARDIAN II: Brand: GUARDIAN